# Patient Record
Sex: MALE | Race: BLACK OR AFRICAN AMERICAN | Employment: OTHER | URBAN - METROPOLITAN AREA
[De-identification: names, ages, dates, MRNs, and addresses within clinical notes are randomized per-mention and may not be internally consistent; named-entity substitution may affect disease eponyms.]

---

## 2017-01-03 ENCOUNTER — TELEPHONE (OUTPATIENT)
Dept: INTERNAL MEDICINE CLINIC | Age: 77
End: 2017-01-03

## 2017-01-03 ENCOUNTER — HOME CARE VISIT (OUTPATIENT)
Dept: SCHEDULING | Facility: HOME HEALTH | Age: 77
End: 2017-01-03
Payer: MEDICARE

## 2017-01-03 PROCEDURE — G0299 HHS/HOSPICE OF RN EA 15 MIN: HCPCS

## 2017-01-03 NOTE — TELEPHONE ENCOUNTER
Contacted pt at [facility] number. Two patient Identifiers confirmed. Advised pt per Dr Cristina Fermin notes. Pt verbalized understanding.

## 2017-01-05 ENCOUNTER — HOME CARE VISIT (OUTPATIENT)
Dept: SCHEDULING | Facility: HOME HEALTH | Age: 77
End: 2017-01-05
Payer: MEDICARE

## 2017-01-05 VITALS
OXYGEN SATURATION: 97 % | DIASTOLIC BLOOD PRESSURE: 60 MMHG | RESPIRATION RATE: 24 BRPM | TEMPERATURE: 98.4 F | SYSTOLIC BLOOD PRESSURE: 118 MMHG | HEART RATE: 84 BPM

## 2017-01-05 PROCEDURE — G0300 HHS/HOSPICE OF LPN EA 15 MIN: HCPCS

## 2017-01-09 DIAGNOSIS — I10 BENIGN HYPERTENSION WITHOUT CHF: Primary | ICD-10-CM

## 2017-01-09 DIAGNOSIS — Z79.01 ANTICOAGULATION MONITORING, INR RANGE 2-3: ICD-10-CM

## 2017-01-10 VITALS
RESPIRATION RATE: 16 BRPM | TEMPERATURE: 98.7 F | HEART RATE: 86 BPM | SYSTOLIC BLOOD PRESSURE: 130 MMHG | OXYGEN SATURATION: 97 % | DIASTOLIC BLOOD PRESSURE: 76 MMHG

## 2017-01-11 ENCOUNTER — HOME CARE VISIT (OUTPATIENT)
Dept: HOME HEALTH SERVICES | Facility: HOME HEALTH | Age: 77
End: 2017-01-11
Payer: MEDICARE

## 2017-01-13 ENCOUNTER — HOME CARE VISIT (OUTPATIENT)
Dept: HOME HEALTH SERVICES | Facility: HOME HEALTH | Age: 77
End: 2017-01-13
Payer: MEDICARE

## 2017-01-17 ENCOUNTER — TELEPHONE (OUTPATIENT)
Dept: INTERNAL MEDICINE CLINIC | Age: 77
End: 2017-01-17

## 2017-01-17 NOTE — TELEPHONE ENCOUNTER
Incoming call from pt's wife. She was told of below. He is taking 5mg daily of coumadin. He will continue this and she said they will check INR today or Thursday. Told her that to give us a call when we does the INR. She verbalized understanding. Just got results of his INR today. INR: 2.4. Called pt at 1/17/17 and left message on VM to call the office back at [phone].

## 2017-01-26 ENCOUNTER — HOME CARE VISIT (OUTPATIENT)
Dept: SCHEDULING | Facility: HOME HEALTH | Age: 77
End: 2017-01-26
Payer: MEDICARE

## 2017-01-26 ENCOUNTER — HOME CARE VISIT (OUTPATIENT)
Dept: HOME HEALTH SERVICES | Facility: HOME HEALTH | Age: 77
End: 2017-01-26
Payer: MEDICARE

## 2017-01-26 ENCOUNTER — TELEPHONE (OUTPATIENT)
Dept: INTERNAL MEDICINE CLINIC | Age: 77
End: 2017-01-26

## 2017-01-26 VITALS
DIASTOLIC BLOOD PRESSURE: 70 MMHG | HEART RATE: 76 BPM | RESPIRATION RATE: 20 BRPM | OXYGEN SATURATION: 98 % | SYSTOLIC BLOOD PRESSURE: 140 MMHG | TEMPERATURE: 97.7 F

## 2017-01-26 LAB
INR BLD: 2.7 (ref 0.9–1.1)
PT POC: 32.7 SEC

## 2017-01-26 PROCEDURE — G0496 LPN CARE TRAIN/EDU IN HH: HCPCS

## 2017-01-26 NOTE — TELEPHONE ENCOUNTER
Contacted pts wife at [facility] number. Two patient Identifiers confirmed. Advised pt per Dr Radha Parham notes. Pts wife verbalized understanding.

## 2017-01-26 NOTE — TELEPHONE ENCOUNTER
Patient calling in PT/INR Results as follows:    PT: 32.7  INR: 2.7    Patient currently on 5mg of coumadin daily. Patient would like callback with further instructions.

## 2017-02-03 ENCOUNTER — HOME CARE VISIT (OUTPATIENT)
Dept: HOME HEALTH SERVICES | Facility: HOME HEALTH | Age: 77
End: 2017-02-03
Payer: MEDICARE

## 2017-02-11 ENCOUNTER — HOME CARE VISIT (OUTPATIENT)
Dept: HOME HEALTH SERVICES | Facility: HOME HEALTH | Age: 77
End: 2017-02-11
Payer: MEDICARE

## 2017-02-14 ENCOUNTER — TELEPHONE (OUTPATIENT)
Dept: INTERNAL MEDICINE CLINIC | Age: 77
End: 2017-02-14

## 2017-02-14 NOTE — TELEPHONE ENCOUNTER
Please let pt know that received the INR results from 2/10/17 which was 2.7. Continue the 5 mg daily and recheck this Thursday. Please call us with the results.

## 2017-02-14 NOTE — TELEPHONE ENCOUNTER
Attempted to reach patient regarding below. No answer; left message for pt to return call to the office at [phone].  Will continue to try to contact patient

## 2017-02-15 NOTE — TELEPHONE ENCOUNTER
Attempted to contact pt at mobile number, no answer. m for pt to return call to office at [phone]. Will continue to try to contact pt.

## 2017-02-16 NOTE — TELEPHONE ENCOUNTER
Spoke with patient's wife, confirmed name and . Reported patient's INR at 2.7 on 5 mg of Coumadin daily. Per Dr. Magy Sanchez pt is to continue 5 mg daily and to recheck next Thursday.  Wife verbalized understanding    Be advised

## 2017-02-16 NOTE — TELEPHONE ENCOUNTER
Spoke with patient's wife, confirmed patient name and . Advised that we need a PT/INR recheck today per Dr. Sharmaine Gutiérrez. Wife verbalized understanding, stated that she would call back shortly with results.

## 2017-02-27 ENCOUNTER — TELEPHONE (OUTPATIENT)
Dept: INTERNAL MEDICINE CLINIC | Age: 77
End: 2017-02-27

## 2017-02-27 NOTE — TELEPHONE ENCOUNTER
Please let pt know that just received INR results done on 2/24/17. INR: 2.5. Continue 5mg daily and recheck on Thursday. Please call us with results.

## 2017-02-27 NOTE — TELEPHONE ENCOUNTER
Attempted to reach patient regarding below. No answer; left message for pt to return call to the office at [phone].  Will continue to try to contact patient

## 2017-02-28 NOTE — TELEPHONE ENCOUNTER
Spoke with pt's wife, confirmed pt name and . Relayed Dr. Richy Rodriguez advise regarding pts Coumadin. Wife verbalized understanding.      Be advised

## 2017-03-04 ENCOUNTER — TELEPHONE (OUTPATIENT)
Dept: INTERNAL MEDICINE CLINIC | Age: 77
End: 2017-03-04

## 2017-03-04 NOTE — TELEPHONE ENCOUNTER
Called pt and talked to pt's wife. INR: 2.5 on 3/2/17    Continue coumadin 5mg daily qpm and recheck in 2 weeks. She was told to give us a call with the results. Pt's wife verbalized understanding.

## 2017-03-24 ENCOUNTER — TELEPHONE (OUTPATIENT)
Dept: INTERNAL MEDICINE CLINIC | Age: 77
End: 2017-03-24

## 2017-03-24 NOTE — TELEPHONE ENCOUNTER
Attempted to reach patient regarding PT INR results. No answer; left message for pt to return call to the office at [phone].  Will continue to try to contact patient

## 2017-03-24 NOTE — TELEPHONE ENCOUNTER
Wife called to report INR results. She said Dr Max Page told her to. 2.5.  Please call to confirm [phone]

## 2017-03-25 NOTE — TELEPHONE ENCOUNTER
Contacted pts wife Jarred Jorge  at [facility] number. Two patient Identifiers confirmed. Confirmed pts INR was 2.5. Pt currently taking coumadin 5 mg daily.   Please advise

## 2017-03-27 NOTE — TELEPHONE ENCOUNTER
Attempted to reach patient regarding below. No answer; left message for pt to return call to the office at [phone].  Will continue to try to contact patient

## 2017-03-28 NOTE — TELEPHONE ENCOUNTER
Attempted to contact pt at  number, no answer. Lvm for pt's wife to continue having the pt take the 5 mg, and to repeat on 4/6/17 and call the office at [phone] with the results.      Be advised

## 2017-04-06 ENCOUNTER — TELEPHONE (OUTPATIENT)
Dept: INTERNAL MEDICINE CLINIC | Age: 77
End: 2017-04-06

## 2017-04-06 NOTE — TELEPHONE ENCOUNTER
Patients wife calling in patient INR result which is a 2.5 and he is taking 5 mg of Coumadin please call patient with new orders

## 2017-04-07 NOTE — TELEPHONE ENCOUNTER
Pt wife contacted at home number. 2 pt identifiers confirmed. Pt wife informed of below. Pt wife verbalized understanding. No other questions at this time.

## 2017-04-24 ENCOUNTER — TELEPHONE (OUTPATIENT)
Dept: INTERNAL MEDICINE CLINIC | Age: 77
End: 2017-04-24

## 2017-04-24 NOTE — TELEPHONE ENCOUNTER
Please let pt know that received INR 2.5 on 4/20/17. Continue the coumadin 5mg qpm and recheck this Thursday.

## 2017-04-25 NOTE — TELEPHONE ENCOUNTER
I do not think there will be anyone to go to the home. They have to do home INR machine or come for OV.

## 2017-04-25 NOTE — TELEPHONE ENCOUNTER
2 pt. Identifiers confirmed. S/w pt's wife about below. She verbalized understanding and wants to know if it is possible to have someone come out to their house to have pt's INR checked once a week. She states sometimes she can get the INR and sometimes she can't so she'll go with the last value that she was able to get. She does not want the patient to be in any danger. Dr. Gilford Pilar please advise.

## 2017-04-25 NOTE — TELEPHONE ENCOUNTER
Attempted to contact pt at  number, no answer. Lvm for pt to return call to office at [phone]. Will continue to try to contact pt.

## 2017-04-25 NOTE — TELEPHONE ENCOUNTER
Attempted to reach patient regarding below. No answer; left message for pt to return call to the office at [phone].  Will continue to try to contact patient

## 2017-05-08 ENCOUNTER — TELEPHONE (OUTPATIENT)
Dept: INTERNAL MEDICINE CLINIC | Age: 77
End: 2017-05-08

## 2017-05-08 NOTE — TELEPHONE ENCOUNTER
Please let pt know that INR 2.5 on 5/5/17. He needs to continue the coumadin 5mg daily qpm and recheck on Thursday. Give us a call with the results.

## 2017-05-09 NOTE — TELEPHONE ENCOUNTER
Spoke with patient, confirmed name and . Advised patient of Coumadin results and instructions, per Dr. Shaggy Bella. Patient verbalized understanding.      Be advised

## 2017-05-19 ENCOUNTER — TELEPHONE (OUTPATIENT)
Dept: INTERNAL MEDICINE CLINIC | Age: 77
End: 2017-05-19

## 2017-05-19 NOTE — TELEPHONE ENCOUNTER
Attempted to contact pt at  number, no answer. Lvm for pt to return call to office at [phone]. Will continue to try to contact pt.

## 2017-05-22 NOTE — TELEPHONE ENCOUNTER
Attempted to contact pt at mobile  number, no answer. Lvm for pt to return call to office at [phone]. Will continue to try to contact pt.

## 2017-05-23 NOTE — TELEPHONE ENCOUNTER
Attempted to contact pt at cell number, no answer. Lvm for pt to return call to office at [phone]. [facility]. Advised pts wife Chris Dexter per Dr Greco Speaker notes. Pts wife verbalized understanding.

## 2017-05-23 NOTE — TELEPHONE ENCOUNTER
Attempted to contact pt at  number, no answer. Lvm for pt to return call to office at [phone]. Will continue to try to contact pt.

## 2017-06-06 ENCOUNTER — TELEPHONE (OUTPATIENT)
Dept: INTERNAL MEDICINE CLINIC | Age: 77
End: 2017-06-06

## 2017-06-06 NOTE — TELEPHONE ENCOUNTER
Please let pt know that INR on 6/1/17 is 2.5. Continue the 5mg daily pm and recheck in 2 weeks. Please call us with the results.

## 2017-06-06 NOTE — TELEPHONE ENCOUNTER
2 pt. Identifiers confirmed. Pt.'s wife Zoey Guzman notified of below. She states that the company checks his INR weekly. She will call in with the 2 week result. No other questions/concerns at this time.

## 2017-07-06 ENCOUNTER — TELEPHONE (OUTPATIENT)
Dept: INTERNAL MEDICINE CLINIC | Age: 77
End: 2017-07-06

## 2017-07-06 NOTE — TELEPHONE ENCOUNTER
Contacted pts wife ( Mrs Anaya Quispe). Two patient Identifiers confirmed. Advised pt per Dr Venecia Clements notes. Pts wife stated pt has not taken INR this week. She stated that the results was from last week. She stated she wanted to know if she still needed to recheck this week or next week. Per verbal Dr Venecia Clements pt needs to recheck next week. Pts wife verbalized understanding.

## 2017-07-06 NOTE — TELEPHONE ENCOUNTER
Please let pt know that INR: 2.5  and to continue coumadin 5mg daily pm and recheck in 2 weeks. Call us with the results.

## 2017-07-06 NOTE — TELEPHONE ENCOUNTER
Attempted to contact pt at  number, no answer. Lvm for pt to return call to office at [phone]. Will continue to try to contact pt.

## 2017-07-14 ENCOUNTER — TELEPHONE (OUTPATIENT)
Dept: INTERNAL MEDICINE CLINIC | Age: 77
End: 2017-07-14

## 2017-07-14 NOTE — TELEPHONE ENCOUNTER
Patient daughter requesting orders for [employer] to assist patient with PT/INR checks, daily medications and daily hygiene. Any questions please contact Daughter at number listed.

## 2017-07-14 NOTE — TELEPHONE ENCOUNTER
HH does not do daily meds. They can do med teaching. They also do not supply aides for hygiene. Pt also needs to be seen in office before [place] can be ordered. (Medicare rules).

## 2017-07-15 NOTE — TELEPHONE ENCOUNTER
Called patient on both the following number [phone] & [phone] left a message for patient to call us back to let him know what provider said about home health. Also called and left a message for his daughter Osorio Prior that I was returning her call and advised that she is not on his FANG I can't speak with her about her father. Daughter called back stated she has a POA but both Carrie Ba and I could not find it on fill advised if patient called me and gave me a verbal over the phone I could then talk with her. Daughter verbalized understanding.

## 2017-07-15 NOTE — TELEPHONE ENCOUNTER
Daughter called back had father on the phone he verified who he was with two identifiers and he gave me a verbal consent to speak to his daughter. Maurice Mckeon that per Dr. Nichole Isaacs who is filling in for Dr. Rigo Ahuja that home health does not do daily med's, they can do med teaching. They also do not supply aides for hygiene. We also have to see her father in the office before we can order home health and that he does have a follow up on 8/3/17. Trisha Noriega stated that what she really wants was someone to go in and help him with his INR checks that she isn't sure her mother is doing it right. Advised that he still has to be seen and we can then do a home health order for someone to come out and do a teaching of his INR, his dm and the diease process etc. but it wont be for long term. She stated she understands and that she was going to see what  will  and do for them. She stated she is okay with all of this to wait till 8/3/17 when he comes in to see Dr. Rigo Ahuja.   In the meantime she is going to fax me the POA she has so it can be on file and patient will up date his permission to release as well and add his daughter to the list.

## 2017-07-31 ENCOUNTER — TELEPHONE (OUTPATIENT)
Dept: INTERNAL MEDICINE CLINIC | Age: 77
End: 2017-07-31

## 2017-07-31 NOTE — TELEPHONE ENCOUNTER
INR: 2.5 on 7/27/17. Let pt know to continue coumadin 5mg daily pm and recheck in 2 weeks. She needs to call us with the results.

## 2017-08-01 NOTE — TELEPHONE ENCOUNTER
2 pt. Identifiers confirmed. Pt's wife notified of below. Wife transferred to reschedule from 8/3/17 to a later date when Dr. Brendon Ball is in the office. Wife declined Saturday aptmt. No other questions/concerns at this time.

## 2017-08-10 ENCOUNTER — OFFICE VISIT (OUTPATIENT)
Dept: INTERNAL MEDICINE CLINIC | Age: 77
End: 2017-08-10

## 2017-08-10 VITALS
HEART RATE: 78 BPM | TEMPERATURE: 96.3 F | OXYGEN SATURATION: 96 % | WEIGHT: 171.2 LBS | SYSTOLIC BLOOD PRESSURE: 122 MMHG | BODY MASS INDEX: 27.51 KG/M2 | HEIGHT: 66 IN | RESPIRATION RATE: 16 BRPM | DIASTOLIC BLOOD PRESSURE: 80 MMHG

## 2017-08-10 DIAGNOSIS — I10 BENIGN HYPERTENSION WITHOUT CHF: ICD-10-CM

## 2017-08-10 DIAGNOSIS — J44.9 CHRONIC OBSTRUCTIVE PULMONARY DISEASE, UNSPECIFIED COPD TYPE (HCC): ICD-10-CM

## 2017-08-10 DIAGNOSIS — Z00.00 ENCOUNTER FOR MEDICARE ANNUAL WELLNESS EXAM: Primary | ICD-10-CM

## 2017-08-10 DIAGNOSIS — E78.5 DYSLIPIDEMIA: ICD-10-CM

## 2017-08-10 DIAGNOSIS — Z12.5 SCREENING PSA (PROSTATE SPECIFIC ANTIGEN): ICD-10-CM

## 2017-08-10 DIAGNOSIS — E11.9 DIABETES MELLITUS, STABLE (HCC): ICD-10-CM

## 2017-08-10 LAB
INR BLD: 3.4
PT POC: 41.4 SECONDS
VALID INTERNAL CONTROL?: YES

## 2017-08-10 NOTE — PROGRESS NOTES
Linda Elizabeth is a 67 y.o. male and presents for annual Medicare Wellness Visit. Problem List: Reviewed with patient and discussed risk factors. 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    H/O colonoscopy Z98.890    COPD (chronic obstructive pulmonary disease) ([facility]) J44.9    Microhematuria R31.29    History of urinary retention Z87.898    Renal cyst, right N28.1    Enlarged prostate N40.0    Coagulation defect ([facility] D68.9    Advance directive discussed with patient Z70.80    Eye exam, routine Z01.00       Current medical providers:  Patient Care Team:  Thomas Funes MD as PCP - General (Internal Medicine)  Thomas Funes MD (Internal Medicine)  Ky Delgado RN as Nurse Navigator    PSH: Reviewed with patient  Past Surgical History:   Procedure Laterality Date    HX CATARACT REMOVAL Left 4/2015    by Dr. Mando Humphrey  late 1588'A        SH: Reviewed with patient  Social History   Substance Use Topics    Smoking status: Former Smoker     Packs/day: 1.00     Years: 45.00     Types: Cigarettes     Quit date: 8/19/2015    Smokeless tobacco: Never Used      Comment: Pt counseled to continue to not smoke.  Alcohol use No       FH: Reviewed with patient  Family History   Problem Relation Age of Onset    Cancer Father      he does not know       Medications/Allergies: Reviewed with patient  Current Outpatient Prescriptions on File Prior to Visit   Medication Sig Dispense Refill    warfarin (COUMADIN) 5 mg tablet Take 1 Tab by mouth every evening.  90 Tab 3    potassium chloride (KLOR-CON M20) 20 mEq tablet Take 1 Tab by mouth daily. 90 Tab 3    omeprazole (PRILOSEC) 40 mg capsule Take 1 Cap by mouth daily. 90 Cap 3    finasteride (PROSCAR) 5 mg tablet Take 1 Tab by mouth daily. 90 Tab 3    ferrous sulfate 325 mg (65 mg iron) tablet Take 1 Tab by mouth two (2) times a day. 180 Tab 3    tamsulosin (FLOMAX) 0.4 mg capsule Take 1 Cap by mouth nightly. 90 Cap 3    insulin glargine (LANTUS) 100 unit/mL injection 7 Units by SubCUTAneous route nightly. 3 Vial 3    Lancets misc Check fasting sugars daily before breakfast. DX: E11.9 for freestyle lite meter 100 Each 11    furosemide (LASIX) 20 mg tablet Take 1 Tab by mouth daily. Indications: HYPERTENSION 90 Tab 3    metoprolol tartrate (LOPRESSOR) 25 mg tablet Take 1 Tab by mouth two (2) times a day. 180 Tab 3    Nebulizer & Compressor machine 1 Each.  albuterol (ACCUNEB) 1.25 mg/3 mL nebu 1.25 mg.      albuterol (VENTOLIN HFA) 90 mcg/actuation inhaler Take 1-2 puffs every 4-6 hrs prn shortness of breath 3 Inhaler 3    glucose blood VI test strips (FREESTYLE LITE STRIPS) strip Check fasting sugars daily before breakfast. DX: E11.9 for freestyle lite meter 100 Strip 11    ascorbic acid (VITAMIN C) 500 mg tablet Take 500 mg by mouth two (2) times a day.  Blood-Glucose Meter (ONE TOUCH ULTRA 2) monitoring kit Check fasting sugars daily before breakfast. DX: 250.02 1 Kit 0    fluticasone-salmeterol (ADVAIR HFA) 115-21 mcg/actuation inhaler Take 2 Puffs by inhalation two (2) times a day.  tiotropium bromide (SPIRIVA RESPIMAT) 2.5 mcg/actuation inhaler Take 2 Puffs by inhalation daily. No current facility-administered medications on file prior to visit.        No Known Allergies    Objective:  Visit Vitals    /80 (BP 1 Location: Left arm, BP Patient Position: Sitting)    Pulse 78    Temp 96.3 °F (35.7 °C) (Oral)    Resp 16    Ht 5' 6\" (1.676 m)    Wt 171 lb 3.2 oz (77.7 kg)    SpO2 96%  Comment: 3LNC  BMI 27.63 kg/m2    Body mass index is 27.63 kg/(m^2). Assessment of cognitive impairment: Alert and oriented x 3    Depression Screen:   PHQ over the last two weeks 8/10/2017   Little interest or pleasure in doing things Not at all   Feeling down, depressed or hopeless Not at all   Total Score PHQ 2 0       Fall Risk Assessment:    Fall Risk Assessment, last 12 mths 8/10/2017   Able to walk? Yes   Fall in past 12 months? Yes   Fall with injury? No   Number of falls in past 12 months 2   Fall Risk Score 2       Functional Ability:   Does the patient exhibit a steady gait? No, use seated walker   How long did it take the patient to get up and walk from a sitting position? 10 seconds   Is the patient self reliant?  (ie can do own laundry, meals, household chores)  no     Does the patient handle his/her own medications?  no     Does the patient handle his/her own money? no     Is the patients home safe (ie good lighting, handrails on stairs and bath, etc.)? yes     Did you notice or did patient express any hearing difficulties? yes     Did you notice or did patient express any vision difficulties?   no     Were distance and reading eye charts used? no       Advance Care Planning:   Patient was offered the opportunity to discuss advance care planning:  yes     Does patient have an Advance Directive:  yes   If no, did you provide information on [employer]? No, pt has advance directive. Pt was asked to bring us a copy. Daughter is apparently POA per wife.         Plan:      Orders Placed This Encounter    LIPID PANEL    HEMOGLOBIN A1C W/O EAG    METABOLIC PANEL, COMPREHENSIVE    CBC W/O DIFF    URINALYSIS W/ RFLX MICROSCOPIC    MICROALBUMIN, UR, RAND W/ MICROALBUMIN/CREA RATIO    PSA SCREENING (SCREENING)    TSH 3RD GENERATION    REFERRAL TO PODIATRY    AMB POC PT/INR    HM DIABETES FOOT EXAM       Health Maintenance   Topic Date Due    MICROALBUMIN Q1  11/05/2016    HEMOGLOBIN A1C Q6M 02/04/2017    EYE EXAM RETINAL OR DILATED Q1  07/26/2017    LIPID PANEL Q1  08/04/2017    INFLUENZA AGE 9 TO ADULT  09/15/2017 (Originally 8/1/2017)    GLAUCOMA SCREENING Q2Y  07/26/2018    FOOT EXAM Q1  08/10/2018    MEDICARE YEARLY EXAM  08/11/2018    COLONOSCOPY  11/24/2020    DTaP/Tdap/Td series (2 - Td) 08/01/2026    ZOSTER VACCINE AGE 60>  Addressed    Pneumococcal 65+ Low/Medium Risk  Completed       *Patient verbalized understanding and agreement with the plan. A copy of the After Visit Summary with personalized health plan was given to the patient today. Family History:   Family History   Problem Relation Age of Onset    Cancer Father      he does not know       Social History:   He  reports that he quit smoking about 1 years ago. His smoking use included Cigarettes. He has a 45.00 pack-year smoking history. He has never used smokeless tobacco.  He  reports that he does not drink alcohol.             ROS:  · General: negative for - chills, fever, weight changes or malaise, night sweats  · HEENT: no sore throat, nasal congestion,  vision problems or ear problems  · Respiratory: no cough, shortness of breath, or wheezing  · Cardiovascular: no chest pain, palpitations, or dyspnea on exertion; no orthopnea or PND  · Gastrointestinal: no abdominal pain, N/V, change in bowel habits,  black or bloody stools, constipation or diarrhea  · Musculoskeletal: no back pain, joint pain, joint stiffness, muscle pain or muscle weakness  · Neurological: no numbness, tingling, headache or dizziness  · Psychological: negative for - anxiety, depression, sleep disturbances, suicidal or homicidal ideations    Objective:   Physical exam:   Visit Vitals    /80 (BP 1 Location: Left arm, BP Patient Position: Sitting)    Pulse 78    Temp 96.3 °F (35.7 °C) (Oral)    Resp 16    Ht 5' 6\" (1.676 m)    Wt 171 lb 3.2 oz (77.7 kg)    SpO2 96%  Comment: 3LNC    BMI 27.63 kg/m2        Generally: Pleasant male in no acute distress  Cardiac Exam: regular, rate, and rhythm. Normal S1 and S2. No murmurs, gallops, or rubs  Pulmonary exam: Clear to auscultation bilaterally  Abdominal exam: Positive bowel sounds in all four quadrants, soft, nondistended, nontender  Extremities: 2+ dorsalis pedis pulses bilaterally. No pedal edema    bilaterally  Diabetic foot exam:     Left: Filament test normal sensation with micro filament, positive monofilament in all 5 toes and bottom of foot. Pulse DP: 2+ (normal)   Deformities: Yes - thickened toe nails and a lot of dry skin on his foot. Right: Filament test normal sensation with micro filament. Decrease sensation of right big toe, but otherwise normal in other 4 toes and bottom of foot. Pulse DP: 2+ (normal)   Deformities: Yes - thickened toe nails and a lot of dry on his foot.      LABS/Radiological Tests:  Lab Results   Component Value Date/Time    WBC 5.4 06/12/2013 04:23 PM    HGB 13.6 06/12/2013 04:23 PM    HCT 41.0 06/12/2013 04:23 PM    PLATELET 994 35/03/9351 04:23 PM     Lab Results   Component Value Date/Time    Sodium 143 08/04/2016 12:00 PM    Potassium 4.0 08/09/2016 11:19 AM    Chloride 97 08/04/2016 12:00 PM    CO2 43 08/04/2016 12:00 PM    Glucose 97 08/04/2016 12:00 PM    BUN 12 08/04/2016 12:00 PM    Creatinine 0.68 08/04/2016 12:00 PM     Lab Results   Component Value Date/Time    Cholesterol, total 179 08/04/2016 12:00 PM    HDL Cholesterol 78 08/04/2016 12:00 PM    LDL, calculated 84.2 08/04/2016 12:00 PM    Triglyceride 84 08/04/2016 12:00 PM     No results found for: GPT  Component      Latest Ref Rng & Units 8/4/2016          12:00 PM   Hemoglobin A1c, (calculated)      4.2 - 5.6 % 5.5   Est. average glucose      mg/dL 111       Previous labs  Component      Latest Ref Rng & Units 8/10/2017 1/26/2017          11:11 AM  1:55 AM   VALID INTERNAL CONTROL POC       Yes    Prothrombin time (POC)      seconds 41.4 32.7   INR       3.4 2.7 (A)   Pt notified of results at [address] today, 8/10/17. ASSESSMENT/PLAN:    1. Encounter for Medicare annual wellness exam    2. Diabetes mellitus, stable ([facility]): we will see what the labs show. Continue diet, exercise, and lantus.   -     HEMOGLOBIN A1C W/O EAG; Future  -     MICROALBUMIN, UR, RAND W/ MICROALBUMIN/CREA RATIO; Future  -      DIABETES FOOT EXAM  -     TSH 3RD GENERATION; Future  -     REFERRAL TO PODIATRY    3. Benign hypertension without CHF: stable. Continue the lopressor and lasix, diet and exercise. -     METABOLIC PANEL, COMPREHENSIVE; Future  -     CBC W/O DIFF; Future  -     URINALYSIS W/ RFLX MICROSCOPIC; Future    4. Saddle embolus ([facility]): hold today's dose. Starting tomorrow go back to 5mg daily pm of coumadin and recheck in 1 week. He will do with home PT/INR machine.   -     AMB POC PT/INR    5. Dyslipidemia: we will see what the labs show. Continue diet and exercise.   -     LIPID PANEL; Future  -     METABOLIC PANEL, COMPREHENSIVE; Future    6. Screening PSA (prostate specific antigen)  -     PSA SCREENING (SCREENING); Future    7. Chronic obstructive pulmonary disease, unspecified COPD type ([facility]): not well controlled because not taking the advair, spiriva. Told him he needs to be taking everyday. Continue the albuterol prn.       8. Patient verbalized understanding and agreement with the plan. 9. Patient was given an after-visit summary. 10.   Follow-up Disposition:  Return in about 3 months (around 11/10/2017) for f/u DM/HTN/lipids. or sooner if worsening symptoms.                 Li Mendoza MD

## 2017-08-10 NOTE — ACP (ADVANCE CARE PLANNING)
Advance Care Planning (ACP) Provider Conversation Snapshot    Date of ACP Conversation: 08/10/17  Persons included in Conversation:  patient and family  Length of ACP Conversation in minutes:  <16 minutes (Non-Billable)    Authorized Decision Maker (if patient is incapable of making informed decisions): This person is: Pt pt's wife, it is their daughter who is POA.   Healthcare Agent/Medical Power of  under Advance Directive          For Patients with Decision Making Capacity:   Values/Goals: Exploration of values, goals, and preferences if recovery is not expected, even with continued medical treatment in the event of:  Imminent death    Conversation Outcomes / Follow-Up Plan:   Recommended communicating the plan and making copies for the healthcare agent, personal physician, and others as appropriate (e.g., health system)

## 2017-08-10 NOTE — PROGRESS NOTES
Chief Complaint   Patient presents with    Annual Wellness Visit       HPI:     Stella Oliver is a 67 y.o.  male with history of  CVA, HTN, COPD, type 2 DM here for the above complaint. He denies any chest pain, shortness of breath, abdominal pain, headaches or dizziness. Pt accompanied by his wife at [address] Type 2 DM: 130's sugar range. COPD: he is not taking the advair or spiriva. Told him he needs to take both. He has the rx, just never picked up from the pharmacy. Coumadin: No changes to diet. No bleeding and has not missed any dosages of coumadin. Past Medical History:   Diagnosis Date    Advance directive discussed with patient     COPD [facility]chronic obstructive pulmonary disease) [facility]     CVA (cerebrovascular accident) ([facility] 7/11/2012    possible    Enlarged prostate     Essential hypertension, benign     Eye exam, routine 07/26/2016    Dr. Ariadne Duron repeat in 1 yr    H/O colonoscopy 11/24/15    Dr. Rohith Yao ([facility])Tubular adenoma/polyp bx, showed diverticulosis in the sigmoid/descending colon and internal hemorrhoids, 7mm polyp    History of urinary retention     Hypercholesterolemia     Hypertension     Microhematuria     Other and unspecified hyperlipidemia     Renal cyst, right     Saddle embolus of pulmonary artery without acute cor pulmonale ([facility] 10/2015    Tobacco use disorder 8/10/2010    Type II or unspecified type diabetes mellitus without mention of complication, not stated as uncontrolled     Urinary retention      Past Surgical History:   Procedure Laterality Date    HX CATARACT REMOVAL Left 4/2015    by Dr. Riya Tobar  late 8483'F     Current Outpatient Prescriptions   Medication Sig    warfarin (COUMADIN) 5 mg tablet Take 1 Tab by mouth every evening.  potassium chloride (KLOR-CON M20) 20 mEq tablet Take 1 Tab by mouth daily.     omeprazole (PRILOSEC) 40 mg capsule Take 1 Cap by mouth daily.    finasteride (PROSCAR) 5 mg tablet Take 1 Tab by mouth daily.  ferrous sulfate 325 mg (65 mg iron) tablet Take 1 Tab by mouth two (2) times a day.  tamsulosin (FLOMAX) 0.4 mg capsule Take 1 Cap by mouth nightly.  insulin glargine (LANTUS) 100 unit/mL injection 7 Units by SubCUTAneous route nightly.  Lancets misc Check fasting sugars daily before breakfast. DX: E11.9 for freestyle lite meter    furosemide (LASIX) 20 mg tablet Take 1 Tab by mouth daily. Indications: HYPERTENSION    metoprolol tartrate (LOPRESSOR) 25 mg tablet Take 1 Tab by mouth two (2) times a day.  Nebulizer & Compressor machine 1 Each.  albuterol (ACCUNEB) 1.25 mg/3 mL nebu 1.25 mg.    albuterol (VENTOLIN HFA) 90 mcg/actuation inhaler Take 1-2 puffs every 4-6 hrs prn shortness of breath    glucose blood VI test strips (FREESTYLE LITE STRIPS) strip Check fasting sugars daily before breakfast. DX: E11.9 for freestyle lite meter    ascorbic acid (VITAMIN C) 500 mg tablet Take 500 mg by mouth two (2) times a day.  Blood-Glucose Meter (ONE TOUCH ULTRA 2) monitoring kit Check fasting sugars daily before breakfast. DX: 250.02    fluticasone-salmeterol (ADVAIR HFA) 115-21 mcg/actuation inhaler Take 2 Puffs by inhalation two (2) times a day.  tiotropium bromide (SPIRIVA RESPIMAT) 2.5 mcg/actuation inhaler Take 2 Puffs by inhalation daily. No current facility-administered medications for this visit.       Health Maintenance   Topic Date Due    MICROALBUMIN Q1  11/05/2016    HEMOGLOBIN A1C Q6M  02/04/2017    EYE EXAM RETINAL OR DILATED Q1  07/26/2017    LIPID PANEL Q1  08/04/2017    INFLUENZA AGE 9 TO ADULT  09/15/2017 (Originally 8/1/2017)    GLAUCOMA SCREENING Q2Y  07/26/2018    FOOT EXAM Q1  08/10/2018    MEDICARE YEARLY EXAM  08/11/2018    COLONOSCOPY  11/24/2020    DTaP/Tdap/Td series (2 - Td) 08/01/2026    ZOSTER VACCINE AGE 60>  Addressed    Pneumococcal 65+ Low/Medium Risk  Completed Immunization History   Administered Date(s) Administered    Influenza High Dose Vaccine PF 11/05/2015    Influenza Vaccine 10/13/2010, 09/16/2014, 09/14/2015, 11/10/2016    Influenza Vaccine PF 01/29/2013    Pneumococcal Conjugate (PCV-13) 09/14/2015    Pneumococcal Polysaccharide (PPSV-23) 10/13/2010, 08/14/2014     No LMP for male patient. Allergies and Intolerances:   No Known Allergies    Family History:   Family History   Problem Relation Age of Onset    Cancer Father      he does not know       Social History:   He  reports that he quit smoking about 1 years ago. His smoking use included Cigarettes. He has a 45.00 pack-year smoking history. He has never used smokeless tobacco.  He  reports that he does not drink alcohol. ROS:  · General: negative for - chills, fever, weight changes or malaise, night sweats  · HEENT: no sore throat, nasal congestion,  vision problems or ear problems  · Respiratory: no cough, shortness of breath, or wheezing  · Cardiovascular: no chest pain, palpitations, or dyspnea on exertion; no orthopnea or PND  · Gastrointestinal: no abdominal pain, N/V, change in bowel habits,  black or bloody stools, constipation or diarrhea  · Musculoskeletal: no back pain, joint pain, joint stiffness, muscle pain or muscle weakness  · Neurological: no numbness, tingling, headache or dizziness  · Psychological: negative for - anxiety, depression, sleep disturbances, suicidal or homicidal ideations    Objective:   Physical exam:   Visit Vitals    /80 (BP 1 Location: Left arm, BP Patient Position: Sitting)    Pulse 78    Temp 96.3 °F (35.7 °C) (Oral)    Resp 16    Ht 5' 6\" (1.676 m)    Wt 171 lb 3.2 oz (77.7 kg)    SpO2 96%  Comment: 3LNC    BMI 27.63 kg/m2        Generally: Pleasant male in no acute distress  Cardiac Exam: regular, rate, and rhythm. Normal S1 and S2.  No murmurs, gallops, or rubs  Pulmonary exam: Clear to auscultation bilaterally  Abdominal exam: Positive bowel sounds in all four quadrants, soft, nondistended, nontender  Extremities: 2+ dorsalis pedis pulses bilaterally. No pedal edema    bilaterally  Diabetic foot exam:     Left: Filament test normal sensation with micro filament, positive monofilament in all 5 toes and bottom of foot. Pulse DP: 2+ (normal)   Deformities: Yes - thickened toe nails and a lot of dry skin on his foot. Right: Filament test normal sensation with micro filament. Decrease sensation of right big toe, but otherwise normal in other 4 toes and bottom of foot. Pulse DP: 2+ (normal)   Deformities: Yes - thickened toe nails and a lot of dry on his foot. LABS/Radiological Tests:  Lab Results   Component Value Date/Time    WBC 5.4 06/12/2013 04:23 PM    HGB 13.6 06/12/2013 04:23 PM    HCT 41.0 06/12/2013 04:23 PM    PLATELET 267 17/53[birth year]1922 04:23 PM     Lab Results   Component Value Date/Time    Sodium 143 08/04/2016 12:00 PM    Potassium 4.0 08/09/2016 11:19 AM    Chloride 97 08/04/2016 12:00 PM    CO2 43 08/04/2016 12:00 PM    Glucose 97 08/04/2016 12:00 PM    BUN 12 08/04/2016 12:00 PM    Creatinine 0.68 08/04/2016 12:00 PM     Lab Results   Component Value Date/Time    Cholesterol, total 179 08/04/2016 12:00 PM    HDL Cholesterol 78 08/04/2016 12:00 PM    LDL, calculated 84.2 08/04/2016 12:00 PM    Triglyceride 84 08/04/2016 12:00 PM     No results found for: GPT  Component      Latest Ref Rng & Units 8/4/2016          12:00 PM   Hemoglobin A1c, (calculated)      4.2 - 5.6 % 5.5   Est. average glucose      mg/dL 111       Previous labs  Component      Latest Ref Rng & Units 8/10/2017 1/26/2017          11:11 AM  1:55 AM   VALID INTERNAL CONTROL POC       Yes    Prothrombin time (POC)      seconds 41.4 32.7   INR       3.4 2.7 (A)   Pt notified of results at [address] today, 8/10/17. ASSESSMENT/PLAN:    1. Encounter for Medicare annual wellness exam    2. Diabetes mellitus, stable ([facility] [address]): we will see what the labs show. Continue diet, exercise, and lantus.   -     HEMOGLOBIN A1C W/O EAG; Future  -     MICROALBUMIN, UR, RAND W/ MICROALBUMIN/CREA RATIO; Future  -      DIABETES FOOT EXAM  -     TSH 3RD GENERATION; Future  -     REFERRAL TO PODIATRY    3. Benign hypertension without CHF: stable. Continue the lopressor and lasix, diet and exercise. -     METABOLIC PANEL, COMPREHENSIVE; Future  -     CBC W/O DIFF; Future  -     URINALYSIS W/ RFLX MICROSCOPIC; Future    4. Saddle embolus ([facility]): hold today's dose. Starting tomorrow go back to 5mg daily pm of coumadin and recheck in 1 week. He will do with home PT/INR machine.   -     AMB POC PT/INR    5. Dyslipidemia: we will see what the labs show. Continue diet and exercise.   -     LIPID PANEL; Future  -     METABOLIC PANEL, COMPREHENSIVE; Future    6. Screening PSA (prostate specific antigen)  -     PSA SCREENING (SCREENING); Future    7. Chronic obstructive pulmonary disease, unspecified COPD type ([facility]): not well controlled because not taking the advair, spiriva. Told him he needs to be taking everyday. Continue the albuterol prn.       8. Patient verbalized understanding and agreement with the plan. 9. Patient was given an after-visit summary. 10.   Follow-up Disposition:  Return in about 3 months (around 11/10/2017) for f/u DM/HTN/lipids. or sooner if worsening symptoms.                 Brittney Olivarez MD

## 2017-08-10 NOTE — PATIENT INSTRUCTIONS
1) take the advair and spiriva. 2) hold today's dose and starting tomorrow go back to coumadin 5mg daily pm and recheck in 1 week. 3) follow-up in 1 week or sooner if worsening symptoms. 4) give us a call with the results. 5) follow-up in 3 months or sooner if worsening symptoms.

## 2017-08-10 NOTE — MR AVS SNAPSHOT
Visit Information Date & Time Provider Department Dept. Phone Encounter #  
 8/10/2017 10:45 AM Alyssa Muse MD [facility] [phone] [id number] Follow-up Instructions Return in about 3 months (around 11/10/2017) for f/u DM/HTN/lipids. 8/24/2017 10:30 AM  
Any with Naty Leon MD  
[facility] [address] [facility]) Appt Note: Return for F/u in 1 year with Dr. Harris Neff with [facility].  
 [address] [address]  
[phone]  
  
   
 [place] [id number] Upcoming Health Maintenance Date Due MICROALBUMIN Q1 11/5/2016 FOOT EXAM Q1 12/8/2016 HEMOGLOBIN A1C Q6M 2/4/2017 EYE EXAM RETINAL OR DILATED Q1 7/26/2017 MEDICARE YEARLY EXAM 8/2/2017 LIPID PANEL Q1 8/4/2017 INFLUENZA AGE 9 TO ADULT 9/15/2017* GLAUCOMA SCREENING Q2Y 7/26/2018 COLONOSCOPY 11/24/2020 DTaP/Tdap/Td series (2 - Td) 8/1/2026 *Topic was postponed. The date shown is not the original due date. Allergies as of 8/10/2017  Review Complete On: 8/10/2017 By: Jonelle Quiroz MD  
 No Known Allergies Current Immunizations  Reviewed on 8/30/2016 Name Date Influenza High Dose Vaccine PF 11/5/2015 Influenza Vaccine 11/10/2016 12:00 AM, 9/14/2015, 9/16/2014 12:14 PM, 10/13/2010 Influenza Vaccine PF 1/29/2013  8:12 AM  
 Pneumococcal Conjugate (PCV-13) 9/14/2015 Pneumococcal Polysaccharide (PPSV-23) 8/14/2014 12:00 PM, 10/13/2010 Not reviewed this visit You Were Diagnosed With   
  
 Codes Comments Saddle embolus (HCC)    -  Primary ICD-10-CM: I74.9 ICD-9-CM: 444.9 Diabetes mellitus, stable ([facility]     ICD-10-CM: E11.9 ICD-9-CM: 250.00 Dyslipidemia     ICD-10-CM: E78.5 ICD-9-CM: 272.4 Benign hypertension without CHF     ICD-10-CM: I10 
ICD-9-CM: 401.1 Screening PSA (prostate specific antigen)     ICD-10-CM: Z12.5 ICD-9-CM: V76.44   
  
 Vitals BP Pulse Temp Resp Height(growth percentile) Weight(growth percentile) 144/77 (BP 1 Location: Right arm, BP Patient Position: Sitting) 78 96.3 °F (35.7 °C) (Oral) 16 5' 6\" (1.676 m) 171 lb 3.2 oz (77.7 kg) SpO2 BMI Smoking Status 96% 27.63 kg/m2 Former Smoker Vitals History BMI and BSA Data Body Mass Index Body Surface Area  
 27.63 kg/m 2 1.9 m 2 Preferred Pharmacy Pharmacy Name [address] [place] [address],[address] [phone] Your Updated Medication List  
  
   
This list is accurate as of: 8/10/17 11:25 AM.  Always use your most recent med list.  
  
  
  
  
 * albuterol 90 mcg/actuation inhaler Commonly known as:  VENTOLIN HFA Take 1-2 puffs every 4-6 hrs prn shortness of breath * albuterol 1.25 mg/3 mL Nebu Commonly known as:  ACCUNEB  
1.25 mg. Blood-Glucose Meter monitoring kit Commonly known as:  RachelKupiBonuss Check fasting sugars daily before breakfast. DX: 250.02  
  
 ferrous sulfate 325 mg (65 mg iron) tablet Take 1 Tab by mouth two (2) times a day. finasteride 5 mg tablet Commonly known as:  PROSCAR Take 1 Tab by mouth daily. fluticasone-salmeterol 115-21 mcg/actuation inhaler Commonly known as:  ADVAIR HFA Take 2 Puffs by inhalation two (2) times a day. furosemide 20 mg tablet Commonly known as:  LASIX Take 1 Tab by mouth daily. Indications: HYPERTENSION  
  
 glucose blood VI test strips strip Commonly known as:  FREESTYLE LITE STRIPS Check fasting sugars daily before breakfast. DX: E11.9 for freestyle lite meter  
  
 insulin glargine 100 unit/mL injection Commonly known as:  LANTUS  
7 Units by SubCUTAneous route nightly. Lancets Misc Check fasting sugars daily before breakfast. DX: E11.9 for freestyle lite meter  
  
 metoprolol tartrate 25 mg tablet Commonly known as:  LOPRESSOR  
 Take 1 Tab by mouth two (2) times a day. Nebulizer & Compressor machine 1 Each. omeprazole 40 mg capsule Commonly known as:  PRILOSEC Take 1 Cap by mouth daily. potassium chloride 20 mEq tablet Commonly known as:  KLOR-CON M20 Take 1 Tab by mouth daily. tamsulosin 0.4 mg capsule Commonly known as:  FLOMAX Take 1 Cap by mouth nightly. tiotropium bromide 2.5 mcg/actuation inhaler Commonly known as:  Kota Stanislaus Take 2 Puffs by inhalation daily. VITAMIN C 500 mg tablet Generic drug:  ascorbic acid (vitamin C) Take 500 mg by mouth two (2) times a day. warfarin 5 mg tablet Commonly known as:  COUMADIN Take 1 Tab by mouth every evening. * Notice: This list has 2 medication(s) that are the same as other medications prescribed for you. Read the directions carefully, and ask your doctor or other care provider to review them with you. We Performed the Following AMB POC PT/INR [28035 CPT(R)]  DIABETES FOOT EXAM [HM7 Custom] REFERRAL TO PODIATRY [REF90 Custom] Comments:  
 Please evaluate for DM foot exam, thickened toe nails in 1-2 weeks. Follow-up Instructions Return in about 3 months (around 11/10/2017) for f/u DM/HTN/lipids. To-Do List   
 08/10/2017 Lab:  CBC W/O DIFF   
  
 08/10/2017 Lab:  HEMOGLOBIN A1C W/O EAG   
  
 08/10/2017 Lab:  LIPID PANEL   
  
 08/10/2017 Lab:  METABOLIC PANEL, COMPREHENSIVE   
  
 08/10/2017 Lab:  MICROALBUMIN, UR, RAND W/ MICROALBUMIN/CREA RATIO   
  
 08/10/2017 Lab:  PSA SCREENING (SCREENING)   
  
 08/10/2017 Lab:  TSH 3RD GENERATION   
  
 08/10/2017 Lab:  URINALYSIS W/ RFLX MICROSCOPIC Referral Information Referral ID Referred By Referred To  
  
 [id number] [place],  [place]ovanna [place] Phone: [phone] Fax: [phone] Visits Status Start Date End Date 1 New Request 8/10/17 8/10/18 If your referral has a status of pending review or denied, additional information will be sent to support the outcome of this decision. Patient Instructions 1) take the advair and spiriva. 2) hold today's dose and starting tomorrow go back to coumadin 5mg daily pm and recheck in 1 week. 3) follow-up in 1 week or sooner if worsening symptoms. 4) give us a call with the results. 5) follow-up in 3 months or sooner if worsening symptoms. Introducing [facility] SERVICES! [place] Gonsalez introduces Highfive patient portal. Now you can access parts of your medical record, email your doctor's office, and request medication refills online. 1. In your internet browser, go to [email or web address] 2. Click on the First Time User? Click Here link in the Sign In box. You will see the New Member Sign Up page. 3. Enter your Highfive Access Code exactly as it appears below. You will not need to use this code after youve completed the sign-up process. If you do not sign up before the expiration date, you must request a new code. · Highfive Access Code: [email or web address] Expires: 11/8/2017 11:11 AM 
 
4. Enter the last four digits of your Social Security Number (xxxx) and Date of Birth (mm/dd/yyyy) as indicated and click Submit. You will be taken to the next sign-up page. 5. Create a ArthroCADt ID. This will be your Highfive login ID and cannot be changed, so think of one that is secure and easy to remember. 6. Create a Highfive password. You can change your password at any time. 7. Enter your Password Reset Question and Answer. This can be used at a later time if you forget your password. 8. Enter your e-mail address. You will receive e-mail notification when new information is available in [address] 9. Click Sign Up. You can now view and download portions of your medical record. 10. Click the Download Summary menu link to download a portable copy of your medical information. If you have questions, please visit the Frequently Asked Questions section of the [email or web address] website. Remember, [email or web address] is NOT to be used for urgent needs. For medical emergencies, dial 911. Now available from your iPhone and Android! Please provide this summary of care documentation to your next provider. Your primary care clinician is listed as Omar Keane. If you have any questions after today's visit, please call [phone].

## 2017-08-10 NOTE — PROGRESS NOTES
ROOM # 3    Cullen Dwyer presents today for   Chief Complaint   Patient presents with    Annual Wellness Visit       Cullen Dwyer preferred language for health care discussion is english/other. Is someone accompanying this pt? Yes, wife    Is the patient using any DME equipment during [address]? Yes, wheelchair, O2NC    Depression Screening:  PHQ over the last two weeks 8/10/2017 12/12/2016 8/30/2016 8/16/2016 8/9/2016 8/1/2016 7/26/2016   Little interest or pleasure in doing things Not at all Not at all Not at all Not at all Not at all Not at all Not at all   Feeling down, depressed or hopeless Not at all Not at all Not at all Not at all Not at all Not at all Not at all   Total Score PHQ 2 0 0 0 0 0 0 0       Learning Assessment:  Learning Assessment 8/14/2014   PRIMARY LEARNER Patient   HIGHEST LEVEL OF EDUCATION - PRIMARY LEARNER  SOME COLLEGE   BARRIERS PRIMARY LEARNER NONE   PRIMARY LANGUAGE ENGLISH   LEARNER PREFERENCE PRIMARY LISTENING   ANSWERED BY self   RELATIONSHIP SELF       Abuse Screening:  Abuse Screening Questionnaire 8/10/2017 5/12/2015   Do you ever feel afraid of your partner? N N   Are you in a relationship with someone who physically or mentally threatens you? N N   Is it safe for you to go home? Y Y       Fall Risk  Fall Risk Assessment, last 12 mths 8/10/2017 12/12/2016 8/30/2016 8/16/2016 8/9/2016 8/1/2016 7/26/2016   Able to walk? Yes Yes Yes Yes No Yes No   Fall in past 12 months? Yes Yes No No - No No   Fall with injury? No Yes - - - - -   Number of falls in past 12 months 2 3 - - - - -   Fall Risk Score 2 4 - - - - -       Health Maintenance reviewed and discussed per provider. Yes    Cullen Dwyer is due for MULTIPLE, see hm. Please order/place referral if appropriate. Advance Directive:  1. Do you have an advance directive in place? Patient Reply: no    2. If not, would you like material regarding how to put one in place? Patient Reply: no    Coordination of Care:  1.  Have you been to the ER, urgent care clinic since your last visit? Hospitalized since your last visit? no    2. Have you seen or consulted any other health care providers outside of the [address] [place] [address] since your last visit? Include any pap smears or colon screening.  no

## 2017-08-11 ENCOUNTER — HOSPITAL ENCOUNTER (OUTPATIENT)
Dept: LAB | Age: 77
Discharge: HOME | End: 2017-08-11
Payer: MEDICARE

## 2017-08-11 DIAGNOSIS — E11.9 DIABETES MELLITUS, STABLE (HCC): ICD-10-CM

## 2017-08-11 DIAGNOSIS — I10 BENIGN HYPERTENSION WITHOUT CHF: ICD-10-CM

## 2017-08-11 DIAGNOSIS — E78.5 DYSLIPIDEMIA: ICD-10-CM

## 2017-08-11 DIAGNOSIS — Z12.5 SCREENING PSA (PROSTATE SPECIFIC ANTIGEN): ICD-10-CM

## 2017-08-11 LAB
ALBUMIN SERPL BCP-MCNC: 3.5 G/DL (ref 3.4–5)
ALBUMIN/GLOB SERPL: 1.1 {RATIO} (ref 0.8–1.7)
ALP SERPL-CCNC: 69 U/L (ref 45–117)
ALT SERPL-CCNC: 19 U/L (ref 16–61)
ANION GAP BLD CALC-SCNC: 7 MMOL/L (ref 3–18)
AST SERPL W P-5'-P-CCNC: 15 U/L (ref 15–37)
BILIRUB SERPL-MCNC: 0.6 MG/DL (ref 0.2–1)
BUN SERPL-MCNC: 14 MG/DL (ref 7–18)
BUN/CREAT SERPL: 22 (ref 12–20)
CALCIUM SERPL-MCNC: 8.7 MG/DL (ref 8.5–10.1)
CHLORIDE SERPL-SCNC: 97 MMOL/L (ref 100–108)
CHOLEST SERPL-MCNC: 202 MG/DL
CO2 SERPL-SCNC: 39 MMOL/L (ref 21–32)
CREAT SERPL-MCNC: 0.65 MG/DL (ref 0.6–1.3)
ERYTHROCYTE [DISTWIDTH] IN BLOOD BY AUTOMATED COUNT: 13.1 % (ref 11.6–14.5)
GLOBULIN SER CALC-MCNC: 3.1 G/DL (ref 2–4)
GLUCOSE SERPL-MCNC: 99 MG/DL (ref 74–99)
HBA1C MFR BLD: 6.1 % (ref 4.2–5.6)
HCT VFR BLD AUTO: 41.3 % (ref 36–48)
HDLC SERPL-MCNC: 80 MG/DL (ref 40–60)
HDLC SERPL: 2.5 {RATIO} (ref 0–5)
HGB BLD-MCNC: 12.3 G/DL (ref 13–16)
LDLC SERPL CALC-MCNC: 104 MG/DL (ref 0–100)
LIPID PROFILE,FLP: ABNORMAL
MCH RBC QN AUTO: 31.4 PG (ref 24–34)
MCHC RBC AUTO-ENTMCNC: 29.8 G/DL (ref 31–37)
MCV RBC AUTO: 105.4 FL (ref 74–97)
PLATELET # BLD AUTO: 185 K/UL (ref 135–420)
PMV BLD AUTO: 11.9 FL (ref 9.2–11.8)
POTASSIUM SERPL-SCNC: 3.7 MMOL/L (ref 3.5–5.5)
PROT SERPL-MCNC: 6.6 G/DL (ref 6.4–8.2)
PSA SERPL-MCNC: 1.1 NG/ML (ref 0–4)
RBC # BLD AUTO: 3.92 M/UL (ref 4.7–5.5)
SODIUM SERPL-SCNC: 143 MMOL/L (ref 136–145)
TRIGL SERPL-MCNC: 90 MG/DL (ref ?–150)
TSH SERPL DL<=0.05 MIU/L-ACNC: 1.38 UIU/ML (ref 0.36–3.74)
VLDLC SERPL CALC-MCNC: 18 MG/DL
WBC # BLD AUTO: 6.7 K/UL (ref 4.6–13.2)

## 2017-08-11 PROCEDURE — 36415 COLL VENOUS BLD VENIPUNCTURE: CPT | Performed by: INTERNAL MEDICINE

## 2017-08-11 PROCEDURE — 85027 COMPLETE CBC AUTOMATED: CPT | Performed by: INTERNAL MEDICINE

## 2017-08-11 PROCEDURE — 84153 ASSAY OF PSA TOTAL: CPT | Performed by: INTERNAL MEDICINE

## 2017-08-11 PROCEDURE — 83036 HEMOGLOBIN GLYCOSYLATED A1C: CPT | Performed by: INTERNAL MEDICINE

## 2017-08-11 PROCEDURE — 80061 LIPID PANEL: CPT | Performed by: INTERNAL MEDICINE

## 2017-08-11 PROCEDURE — 84443 ASSAY THYROID STIM HORMONE: CPT | Performed by: INTERNAL MEDICINE

## 2017-08-11 PROCEDURE — 80053 COMPREHEN METABOLIC PANEL: CPT | Performed by: INTERNAL MEDICINE

## 2017-08-18 ENCOUNTER — TELEPHONE (OUTPATIENT)
Dept: INTERNAL MEDICINE CLINIC | Age: 77
End: 2017-08-18

## 2017-08-19 NOTE — TELEPHONE ENCOUNTER
Attempted to contact pt at  number, no answer. m for pt to return call to office at [phone]. Will continue to try to contact pt.

## 2017-08-21 NOTE — TELEPHONE ENCOUNTER
Attempted to contact pt at mobile number, sister-in-law answered. Lvm for pt to return call to office at [phone]. Will continue to try to contact pt/await return call.

## 2017-08-22 NOTE — TELEPHONE ENCOUNTER
Spoke with patient's wife, confirmed pt name and . Pt's wife states that the pt's INR was 2.4  When checked on Friday.  Will recheck this Friday

## 2017-08-28 NOTE — TELEPHONE ENCOUNTER
Spoke with patient's wife, confirmed pt name and . Calling to get pt's PT/INR results, 2.4. I advised I would let Dr. Joyce Campbell know. Patient's wife verbalized understanding.      Be advised

## 2017-08-29 NOTE — TELEPHONE ENCOUNTER
Spoke with wife, verified patient with 2 identifiers. Advised of below. Wife stated understanding. No further questions at this time.

## 2017-09-18 ENCOUNTER — TELEPHONE (OUTPATIENT)
Dept: INTERNAL MEDICINE CLINIC | Age: 77
End: 2017-09-18

## 2017-09-18 DIAGNOSIS — I10 ESSENTIAL HYPERTENSION, BENIGN: ICD-10-CM

## 2017-09-18 RX ORDER — FUROSEMIDE 20 MG/1
20 TABLET ORAL DAILY
Qty: 90 TAB | Refills: 3 | Status: SHIPPED | OUTPATIENT
Start: 2017-09-18 | End: 2017-10-06 | Stop reason: SDUPTHER

## 2017-09-18 RX ORDER — METOPROLOL TARTRATE 25 MG/1
25 TABLET, FILM COATED ORAL 2 TIMES DAILY
Qty: 180 TAB | Refills: 3 | Status: SHIPPED | OUTPATIENT
Start: 2017-09-18 | End: 2017-10-06 | Stop reason: SDUPTHER

## 2017-09-18 NOTE — TELEPHONE ENCOUNTER
2 pt. Identifiers confirmed. Pt's wife notified of below. She would aso like Dr. Deloris Le to know that Mr. Norma Riley has had 2 falls and was taken to [facility] for labs etc. He may be admitted. She was advised to make a 30min hospital f/u aptmt after he is discharge. No other questions/concerns at this time.

## 2017-09-18 NOTE — TELEPHONE ENCOUNTER
Please let pt know that INR done on 9/14/17 was 2.4. Continue the coumadin 5mg daily pm.     Recheck in 2 weeks and give us a call with the results.

## 2017-09-25 ENCOUNTER — OFFICE VISIT (OUTPATIENT)
Dept: INTERNAL MEDICINE CLINIC | Age: 77
End: 2017-09-25

## 2017-09-25 ENCOUNTER — HOSPITAL ENCOUNTER (OUTPATIENT)
Dept: LAB | Age: 77
Discharge: HOME | End: 2017-09-25
Payer: MEDICARE

## 2017-09-25 VITALS
HEIGHT: 66 IN | OXYGEN SATURATION: 92 % | WEIGHT: 176.4 LBS | BODY MASS INDEX: 28.35 KG/M2 | HEART RATE: 95 BPM | RESPIRATION RATE: 16 BRPM | DIASTOLIC BLOOD PRESSURE: 68 MMHG | SYSTOLIC BLOOD PRESSURE: 122 MMHG | TEMPERATURE: 97.1 F

## 2017-09-25 DIAGNOSIS — R26.89 IMBALANCE: ICD-10-CM

## 2017-09-25 DIAGNOSIS — R53.1 WEAKNESS: Primary | ICD-10-CM

## 2017-09-25 DIAGNOSIS — E11.9 DIABETES MELLITUS, STABLE (HCC): ICD-10-CM
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
## 2017-09-25 DIAGNOSIS — Z23 ENCOUNTER FOR IMMUNIZATION: ICD-10-CM

## 2017-09-25 DIAGNOSIS — I82.409 DEEP VEIN THROMBOSIS (DVT) OF LOWER EXTREMITY, UNSPECIFIED CHRONICITY, UNSPECIFIED LATERALITY, UNSPECIFIED VEIN (HCC): ICD-10-CM

## 2017-09-25 DIAGNOSIS — E87.0 HYPERNATREMIA: ICD-10-CM

## 2017-09-25 LAB
INR BLD: 4.3
PT POC: 51.6 SECONDS
SODIUM SERPL-SCNC: 145 MMOL/L (ref 136–145)
VALID INTERNAL CONTROL?: YES
VIT B12 SERPL-MCNC: 520 PG/ML (ref 211–911)

## 2017-09-25 PROCEDURE — 82607 VITAMIN B-12: CPT | Performed by: INTERNAL MEDICINE

## 2017-09-25 PROCEDURE — 36415 COLL VENOUS BLD VENIPUNCTURE: CPT | Performed by: INTERNAL MEDICINE

## 2017-09-25 PROCEDURE — 84295 ASSAY OF SERUM SODIUM: CPT | Performed by: INTERNAL MEDICINE

## 2017-09-25 PROCEDURE — 82043 UR ALBUMIN QUANTITATIVE: CPT | Performed by: INTERNAL MEDICINE

## 2017-09-25 NOTE — MR AVS SNAPSHOT
Visit Information Date & Time Provider Department Dept. Phone Encounter #  
 9/25/2017  1:30 PM Orly Hurst MD duuin [phone] [id number] Follow-up Instructions Return in about 3 months (around 12/25/2017) for f/u DM/HTN.  
  
 8/24/2018 11:00 AM  
Any with Chrissy Lyons MD  
[facility] [place] [facility] [id number] [facility]) Appt Note: 1 year fu  
 [address] [place] [address]  
[phone]  
  
   
 [facility] [id number] Upcoming Health Maintenance Date Due MICROALBUMIN Q1 11/5/2016 INFLUENZA AGE 9 TO ADULT 8/1/2017 HEMOGLOBIN A1C Q6M 2/11/2018 EYE EXAM RETINAL OR DILATED Q1 7/25/2018 FOOT EXAM Q1 8/10/2018 MEDICARE YEARLY EXAM 8/11/2018 LIPID PANEL Q1 8/11/2018 GLAUCOMA SCREENING Q2Y 7/25/2019 COLONOSCOPY 11/24/2020 DTaP/Tdap/Td series (2 - Td) 8/1/2026 Allergies as of 9/25/2017  Review Complete On: 9/25/2017 By: Orly Hurst MD  
 No Known Allergies Current Immunizations  Reviewed on 8/30/2016 Name Date Influenza High Dose Vaccine PF  Incomplete, 11/5/2015 Influenza Vaccine 11/10/2016 12:00 AM, 9/14/2015, 9/16/2014 12:14 PM, 10/13/2010 Influenza Vaccine PF 1/29/2013  8:12 AM  
 Pneumococcal Conjugate (PCV-13) 9/14/2015 Pneumococcal Polysaccharide (PPSV-23) 8/14/2014 12:00 PM, 10/13/2010 12:00 AM  
  
 Not reviewed this visit You Were Diagnosed With   
  
 Codes Comments Deep vein thrombosis (DVT) of lower extremity, unspecified chronicity, unspecified laterality, unspecified vein (HCC)    -  Primary ICD-10-CM: I82.409 ICD-9-CM: 453.40 Encounter for immunization     ICD-10-CM: Q05 ICD-9-CM: V03.89 Hypernatremia     ICD-10-CM: E87.0 ICD-9-CM: 276.0 Diabetes mellitus, stable ([facility]     ICD-10-CM: E11.9 ICD-9-CM: 250.00 Imbalance     ICD-10-CM: R26.89 
ICD-9-CM: 781. 2 Vitals BP Pulse Temp Resp Height(growth percentile) Weight(growth percentile) 122/68 (BP 1 Location: Left arm, BP Patient Position: Sitting) 95 97.1 °F (36.2 °C) (Oral) 16 5' 6\" (1.676 m) 176 lb 6.4 oz (80 kg) SpO2 BMI Smoking Status 92% 28.47 kg/m2 Former Smoker Vitals History BMI and BSA Data Body Mass Index Body Surface Area  
 28.47 kg/m 2 1.93 m 2 Preferred Pharmacy Pharmacy Name Phone [facility] [address] [place] [address],[address] [phone] Your Updated Medication List  
  
   
This list is accurate as of: 9/25/17  1:30 PM.  Always use your most recent med list.  
  
  
  
  
 * albuterol 90 mcg/actuation inhaler Commonly known as:  VENTOLIN HFA Take 1-2 puffs every 4-6 hrs prn shortness of breath * albuterol 1.25 mg/3 mL Nebu Commonly known as:  ACCUNEB  
1.25 mg. Blood-Glucose Meter monitoring kit Commonly known as:  Kevin Campos Check fasting sugars daily before breakfast. DX: 250.02  
  
 ferrous sulfate 325 mg (65 mg iron) tablet Take 1 Tab by mouth two (2) times a day. finasteride 5 mg tablet Commonly known as:  PROSCAR Take 1 Tab by mouth daily. fluticasone-salmeterol 115-21 mcg/actuation inhaler Commonly known as:  ADVAIR HFA Take 2 Puffs by inhalation two (2) times a day. furosemide 20 mg tablet Commonly known as:  LASIX Take 1 Tab by mouth daily. Indications: hypertension  
  
 glucose blood VI test strips strip Commonly known as:  FREESTYLE LITE STRIPS Check fasting sugars daily before breakfast. DX: E11.9 for freestyle lite meter  
  
 insulin glargine 100 unit/mL injection Commonly known as:  LANTUS  
7 Units by SubCUTAneous route nightly. Lancets Misc Check fasting sugars daily before breakfast. DX: E11.9 for freestyle lite meter  
  
 metoprolol tartrate 25 mg tablet Commonly known as:  LOPRESSOR Take 1 Tab by mouth two (2) times a day. Nebulizer & Compressor machine 1 Each. omeprazole 40 mg capsule Commonly known as:  PRILOSEC Take 1 Cap by mouth daily. potassium chloride 20 mEq tablet Commonly known as:  KLOR-CON M20 Take 1 Tab by mouth daily. tamsulosin 0.4 mg capsule Commonly known as:  FLOMAX Take 1 Cap by mouth nightly. tiotropium bromide 2.5 mcg/actuation inhaler Commonly known as:  [place] Pellant Take 2 Puffs by inhalation daily. VITAMIN C 500 mg tablet Generic drug:  ascorbic acid (vitamin C) Take 500 mg by mouth two (2) times a day. warfarin 5 mg tablet Commonly known as:  COUMADIN Take 1 Tab by mouth every evening. * Notice: This list has 2 medication(s) that are the same as other medications prescribed for you. Read the directions carefully, and ask your doctor or other care provider to review them with you. We Performed the Following AMB POC PT/INR [63784 CPT(R)] INFLUENZA VIRUS VACCINE, HIGH DOSE SEASONAL, PRESERVATIVE FREE [36802 CPT(R)] Follow-up Instructions Return in about 3 months (around 12/25/2017) for f/u DM/HTN. To-Do List   
 09/25/2017 Lab:  MICROALBUMIN, UR, RAND W/ MICROALBUMIN/CREA RATIO   
  
 09/25/2017 Lab:  SODIUM   
  
 09/25/2017 Lab:  VITAMIN B12 Patient Instructions 1) hold coumadin today and tomorrow and recheck on Wed. Give us a call. 2) follow-up in 3 months or sooner if worsening symptoms. Introducing [facility]! Sukhi Torrez introduces [employer] patient portal. Now you can access parts of your medical record, email your doctor's office, and request medication refills online. 1. In your internet browser, go to [email or web address] 2. Click on the First Time User? Click Here link in the Sign In box. You will see the New Member Sign Up page. 3. Enter your [employer] Access Code exactly as it appears below.  You will not need to use this code after youve completed the sign-up process. If you do not sign up before the expiration date, you must request a new code. · [employer] Access Code: [email or web address] Expires: 11/8/2017 11:11 AM 
 
4. Enter the last four digits of your Social Security Number (xxxx) and Date of Birth (mm/dd/yyyy) as indicated and click Submit. You will be taken to the next sign-up page. 5. Create a [employer] ID. This will be your [employer] login ID and cannot be changed, so think of one that is secure and easy to remember. 6. Create a [employer] password. You can change your password at any time. 7. Enter your Password Reset Question and Answer. This can be used at a later time if you forget your password. 8. Enter your e-mail address. You will receive e-mail notification when new information is available in [address]. 9. Click Sign Up. You can now view and download portions of your medical record. 10. Click the Download Summary menu link to download a portable copy of your medical information. If you have questions, please visit the Frequently Asked Questions section of the [employer] website. Remember, [employer] is NOT to be used for urgent needs. For medical emergencies, dial 911. Now available from your iPhone and Android! Please provide this summary of care documentation to your next provider. Your primary care clinician is listed as Omar eKane. If you have any questions after today's visit, please call [phone].

## 2017-09-25 NOTE — PATIENT INSTRUCTIONS
1) hold coumadin today and tomorrow and recheck on Wed. Give us a call. 2) follow-up in 3 months or sooner if worsening symptoms.

## 2017-09-25 NOTE — PROGRESS NOTES
Chief Complaint   Patient presents with   Meli Mitchell     s/p fall. [address] f/u    Extremity Weakness     [address] fu for generalized weakness       HPI:     Khushbu Yeboah is a 65 y.o.  male with history of type 2 DM and PE   here for the above complaint. He is not walking at all and told him he needs to walk and move around. He denies any chest pain, shortness of breath, abdominal pain, headaches or dizziness. He is not moving or walking at all and this is most likely the cause of his weakness in lower extremities and causing fall. He was told to walk around house and go to the mailbox. Also do weights in chair and exercises. He has home PT coming to house. Pt went to [facility] ER on 9/18/17. Head CT negative. L-spine x-ray negative. Labs okay except sodium high. Hospital records were reviewed.            Past Medical History:   Diagnosis Date    Advance directive discussed with patient     COPD (chronic obstructive pulmonary disease) ([address]     COPD with emphysema ([address]     CVA (cerebrovascular accident) ([address] 7/11/2012    possible    Diabetes mellitus ([address]     Drowsiness     Enlarged prostate     Essential hypertension, benign     Eye exam, routine 07/26/2016    Dr. Linda Love repeat in 1 yr    H/O colonoscopy 11/24/15    Dr. 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 Kaylee Echeverria  late 3888'P     Current Outpatient Prescriptions   Medication Sig    metoprolol tartrate (LOPRESSOR) 25 mg tablet Take 1 Tab by mouth two (2) times a day.  furosemide (LASIX) 20 mg tablet Take 1 Tab by mouth daily. Indications: hypertension    warfarin (COUMADIN) 5 mg tablet Take 1 Tab by mouth every evening.  potassium chloride (KLOR-CON M20) 20 mEq tablet Take 1 Tab by mouth daily.  omeprazole (PRILOSEC) 40 mg capsule Take 1 Cap by mouth daily.  finasteride (PROSCAR) 5 mg tablet Take 1 Tab by mouth daily.  ferrous sulfate 325 mg (65 mg iron) tablet Take 1 Tab by mouth two (2) times a day.  tamsulosin (FLOMAX) 0.4 mg capsule Take 1 Cap by mouth nightly.  insulin glargine (LANTUS) 100 unit/mL injection 7 Units by SubCUTAneous route nightly.  Lancets misc Check fasting sugars daily before breakfast. DX: E11.9 for freestyle lite meter    fluticasone-salmeterol (ADVAIR HFA) 115-21 mcg/actuation inhaler Take 2 Puffs by inhalation two (2) times a day.  Nebulizer & Compressor machine 1 Each.  tiotropium bromide (SPIRIVA RESPIMAT) 2.5 mcg/actuation inhaler Take 2 Puffs by inhalation daily.  albuterol (ACCUNEB) 1.25 mg/3 mL nebu 1.25 mg.    albuterol (VENTOLIN HFA) 90 mcg/actuation inhaler Take 1-2 puffs every 4-6 hrs prn shortness of breath    glucose blood VI test strips (FREESTYLE LITE STRIPS) strip Check fasting sugars daily before breakfast. DX: E11.9 for freestyle lite meter    ascorbic acid (VITAMIN C) 500 mg tablet Take 500 mg by mouth two (2) times a day.  Blood-Glucose Meter (ONE TOUCH ULTRA 2) monitoring kit Check fasting sugars daily before breakfast. DX: 250.02     No current facility-administered medications for this visit.       Health Maintenance   Topic Date Due    MICROALBUMIN Q1  11/05/2016    INFLUENZA AGE 9 TO ADULT  08/01/2017    HEMOGLOBIN A1C Q6M  02/11/2018    EYE EXAM RETINAL OR DILATED Q1  07/25/2018    FOOT EXAM Q1  08/10/2018    MEDICARE YEARLY EXAM  08/11/2018    LIPID PANEL Q1  08/11/2018    GLAUCOMA SCREENING Q2Y  07/25/2019    COLONOSCOPY  11/24/2020    DTaP/Tdap/Td series (2 - Td) 08/01/2026    ZOSTER VACCINE AGE 60>  Addressed    Pneumococcal 65+ Low/Medium Risk  Completed     Immunization History   Administered Date(s) Administered    Influenza High Dose Vaccine PF 11/05/2015, 09/25/2017    Influenza Vaccine 10/13/2010, 09/16/2014, 09/14/2015, 11/10/2016    Influenza Vaccine PF 01/29/2013    Pneumococcal Conjugate (PCV-13) 09/14/2015    Pneumococcal Polysaccharide (PPSV-23) 10/13/2010, 08/14/2014     No LMP for male patient. Allergies and Intolerances:   No Known Allergies    Family History:   Family History   Problem Relation Age of Onset    Cancer Father      he does not know       Social History:   He  reports that he quit smoking about 2 years ago. His smoking use included Cigarettes. He has a 45.00 pack-year smoking history. He has never used smokeless tobacco.  He  reports that he does not drink alcohol. ·     OBJECTIVE:   Physical exam:   Visit Vitals    /68 (BP 1 Location: Left arm, BP Patient Position: Sitting)    Pulse 95    Temp 97.1 °F (36.2 °C) (Oral)    Resp 16    Ht 5' 6\" (1.676 m)    Wt 176 lb 6.4 oz (80 kg)    SpO2 92%  Comment: 3LNC    BMI 28.47 kg/m2        Generally: Pleasant female in no acute distress  Cardiac Exam: regular, rate, and rhythm. Normal S1 and S2. No murmurs, gallops, or rubs  Pulmonary exam: Clear to auscultation bilaterally  Abdominal exam: Positive bowel sounds in all four quadrants, soft, nondistended, nontender  Extremities: 2+ dorsalis pedis pulses bilaterally.  No pedal edema    bilaterally    LABS/RADIOLOGICAL TESTS:  Lab Results   Component Value Date/Time    WBC 6.7 08/11/2017 10:55 AM    HGB 12.3 08/11/2017 10:55 AM    HCT 41.3 08/11/2017 10:55 AM PLATELET 188 21/07/5617 10:55 AM     Lab Results   Component Value Date/Time    Sodium 143 08/11/2017 10:55 AM    Potassium 3.7 08/11/2017 10:55 AM    Chloride 97 08/11/2017 10:55 AM    CO2 39 08/11/2017 10:55 AM    Glucose 99 08/11/2017 10:55 AM    BUN 14 08/11/2017 10:55 AM    Creatinine 0.65 08/11/2017 10:55 AM     Lab Results   Component Value Date/Time    Cholesterol, total 202 08/11/2017 10:55 AM    HDL Cholesterol 80 08/11/2017 10:55 AM    LDL, calculated 104 08/11/2017 10:55 AM    Triglyceride 90 08/11/2017 10:55 AM     No results found for: GPT  Previous labs  Component      Latest Ref Rng & Units 9/25/2017 8/10/2017           1:10 PM 11:11 AM   VALID INTERNAL CONTROL POC       Yes Yes   Prothrombin time (POC)      seconds 51.6 41.4   INR       4.3 3.4   Pt notified of results at [address] today. ASSESSMENT/PLAN:    1. Weakness: due to inactivity. Pt encouraged to walk and do PT exercises. If not better, will refer to neurology. 2. Imbalance  -     VITAMIN B12; Future    3. Fall, initial encounter    4. Deep vein thrombosis (DVT) of lower extremity, unspecified chronicity, unspecified laterality, unspecified vein ([facility]): hold coumadin today and tomorrow and recheck on Wed. She will call us with the results. -     AMB POC PT/INR    5. Hypernatremia  -     SODIUM; Future    6. Diabetes mellitus, stable (HCC)  -     MICROALBUMIN, UR, RAND W/ MICROALBUMIN/CREA RATIO; Future    7. Encounter for immunization  -     Influenza virus vaccine ([address]) 72 years and older ([address])      6. Patient verbalized understanding and agreement with the plan. 9. Patient was given an after-visit summary. 10.   Follow-up Disposition:  Return in about 3 months (around 12/25/2017) for f/u DM/HTN. or sooner if worsening symptoms.           Nahun Freitas MD

## 2017-09-25 NOTE — PROGRESS NOTES
ROOM # 1    Rosey Garcia presents today for   Chief Complaint   Patient presents with   Gaviota Young Fall     s/p fall. [address] f/u    Extremity Weakness     [address] fu for generalized weakness       Rosey Garcia preferred language for health care discussion is english/other. Is someone accompanying this pt? wife    Is the patient using any DME equipment during [address]? wheelchair    Depression Screening:  PHQ over the last two weeks 9/25/2017 8/10/2017 12/12/2016 8/30/2016 8/16/2016 8/9/2016 8/1/2016   Little interest or pleasure in doing things Not at all Not at all Not at all Not at all Not at all Not at all Not at all   Feeling down, depressed or hopeless Not at all Not at all Not at all Not at all Not at all Not at all Not at all   Total Score PHQ 2 0 0 0 0 0 0 0       Learning Assessment:  Learning Assessment 8/14/2014   PRIMARY LEARNER Patient   HIGHEST LEVEL OF EDUCATION - PRIMARY LEARNER  SOME COLLEGE   BARRIERS PRIMARY LEARNER NONE   PRIMARY LANGUAGE ENGLISH   LEARNER PREFERENCE PRIMARY LISTENING   ANSWERED BY self   RELATIONSHIP SELF       Abuse Screening:  Abuse Screening Questionnaire 8/10/2017 5/12/2015   Do you ever feel afraid of your partner? N N   Are you in a relationship with someone who physically or mentally threatens you? N N   Is it safe for you to go home? Y Y       Fall Risk  Fall Risk Assessment, last 12 mths 9/25/2017 8/10/2017 12/12/2016 8/30/2016 8/16/2016 8/9/2016 8/1/2016   Able to walk? Yes Yes Yes Yes Yes No Yes   Fall in past 12 months? Yes Yes Yes No No - No   Fall with injury? Yes No Yes - - - -   Number of falls in past 12 months 3 2 3 - - - -   Fall Risk Score 4 2 4 - - - -       Health Maintenance reviewed and discussed per provider. Yes    Rosey Garcia is due for influenza vax, microalbumin. Please order/place referral if appropriate. Advance Directive:  1. Do you have an advance directive in place? Patient Reply: no    2. If not, would you like material regarding how to put one in place?  Patient Reply: no    Coordination of Care:  1. Have you been to the ER, urgent care clinic since your last visit? Hospitalized since your last visit? [facility]    2. Have you seen or consulted any other health care providers outside of the [address] [place] [address] since your last visit? Include any pap smears or colon screening.  no

## 2017-09-25 NOTE — PROGRESS NOTES
Immunization/s administered 9/25/2017 by Henna Jean-Baptiste LPN with guardian's consent. Patient tolerated procedure well. No reactions noted.

## 2017-09-26 LAB
CREAT UR-MCNC: 175.34 MG/DL (ref 30–125)
MICROALBUMIN UR-MCNC: 6.8 MG/DL (ref 0–3)
MICROALBUMIN/CREAT UR-RTO: 39 MG/G (ref 0–30)

## 2017-09-27 ENCOUNTER — TELEPHONE (OUTPATIENT)
Dept: INTERNAL MEDICINE CLINIC | Age: 77
End: 2017-09-27

## 2017-09-28 NOTE — TELEPHONE ENCOUNTER
Patient's wife called back verified with two identifiers advised of Dr. Vargas Rhoades instructions to take 7.5 mg tonight (coumadin 5mg take 1.5 tablet) today and then back to coumadin 5mg daily starting on Friday, 5 mg on Saturday and 5 mg on Sunday then recheck on Monday and give us a call. Asked wife to repeat back to make sure she understood. Wife wrote down instructions and understands the above order.

## 2017-09-28 NOTE — TELEPHONE ENCOUNTER
Attempted to contact pt at  number, no answer. Lvm for pt to return call to office at [phone]. Will continue to try to contact pt.

## 2017-09-28 NOTE — TELEPHONE ENCOUNTER
Incoming call from pt on 9/27/17 at 5:00pm. Pt's wife said unable to get blood for INR. She was told to go back on the 5mg coumadin today and recheck tomorrow. He has been holding it for the past 2 days.

## 2017-09-28 NOTE — TELEPHONE ENCOUNTER
Take coumadin 7.5mg  (coumadin 5mg take 1.5 tablet) today and then back to coumadin 5mg daily starting on Friday. Recheck on Monday and give us a call.

## 2017-09-28 NOTE — TELEPHONE ENCOUNTER
Contact patient wife, patient wife states that she has not checked patients INR yet, but she will check it  and give us a call back.

## 2017-10-01 NOTE — TELEPHONE ENCOUNTER
Bed: ED14  Expected date:   Expected time:   Means of arrival:   Comments:  triage   Patient's spouse reports INR as follows:    INR:1.3

## 2017-10-02 ENCOUNTER — TELEPHONE (OUTPATIENT)
Dept: INTERNAL MEDICINE CLINIC | Age: 77
End: 2017-10-02

## 2017-10-02 DIAGNOSIS — Z75.3 LIMITED ACCESS TO COMMUNITY SUPPORT SERVICES: ICD-10-CM

## 2017-10-02 DIAGNOSIS — R41.89 COGNITIVE CHANGES: Primary | ICD-10-CM

## 2017-10-02 DIAGNOSIS — Z79.899 MEDICATION MANAGEMENT: ICD-10-CM

## 2017-10-02 NOTE — TELEPHONE ENCOUNTER
Called wife verified with two identifiers advised to continue with current treatment until I get recommendation from Dr. Randall Jenkins, and we will call back tomorrow. Wife verbalized understanding.

## 2017-10-02 NOTE — TELEPHONE ENCOUNTER
Patient's wife is calling in with patient's Pt/INR. She is reporting his reading at 2.5 and the patient is taking 5mg of coumadin a day.

## 2017-10-02 NOTE — TELEPHONE ENCOUNTER
Received fax from Avelino May RN  that pt needs orders for referrals to speech therapy, skilled nursing, and medical social worker. All referral put in the system and will be faxed to [address] [place] [address].. (f) [phone] (p) [phone].

## 2017-10-03 RX ORDER — LISINOPRIL 2.5 MG/1
2.5 TABLET ORAL DAILY
Qty: 30 TAB | Refills: 0 | Status: SHIPPED | OUTPATIENT
Start: 2017-10-03 | End: 2017-10-19 | Stop reason: SDUPTHER

## 2017-10-03 NOTE — TELEPHONE ENCOUNTER
Sent electronically:    Requested Prescriptions     Signed Prescriptions Disp Refills    lisinopril (PRINIVIL, ZESTRIL) 2.5 mg tablet 30 Tab 0     Sig: Take 1 Tab by mouth daily. Authorizing Provider: Isaac Bran     He will need to come back in 2 weeks to see how his blood pressure is doing.

## 2017-10-03 NOTE — TELEPHONE ENCOUNTER
----- Message from Rafael Maya MD sent at 10/3/2017  8:00 AM EDT -----  Please let pt know that:    1) microalbumin/creatinine ratio up. Need to start back on lisinopril 2.5mg one po daily. Is there a reason why it was stopped? Don't see why it was stopped? Did he have side effects from the lisinopril? Cough, swelling of mouth and tongue? 2) if not, will add lisinopril.

## 2017-10-03 NOTE — PROGRESS NOTES
Please let pt know that:    1) microalbumin/creatinine ratio up. Need to start back on lisinopril 2.5mg one po daily. Is there a reason why it was stopped? Don't see why it was stopped? Did he have side effects from the lisinopril? Cough, swelling of mouth and tongue? 2) if not, will add lisinopril.

## 2017-10-03 NOTE — TELEPHONE ENCOUNTER
Contacted pt wife informed her of below she stated understanding and that she does not remember him having an allergic reaction to the medication stated understanding and informed her that will let Dr Lynn White know and have her send a refill to their pharmacy she stated understanding no other questions or concerns noted at [facility] time    Requested Prescriptions     Pending Prescriptions Disp Refills    lisinopril (PRINIVIL, ZESTRIL) 2.5 mg tablet       Sig: Take 1 Tab by mouth daily.

## 2017-10-03 NOTE — TELEPHONE ENCOUNTER
Called spoke with patient's wife verified with two identifiers advised per Dr. Jacquie Waddell:  Continue the coumadin 5mg daily pm and recheck in 1 week. Call us with the results. Wife verbalized understanding.

## 2017-10-05 ENCOUNTER — TELEPHONE (OUTPATIENT)
Dept: INTERNAL MEDICINE CLINIC | Age: 77
End: 2017-10-05

## 2017-10-05 NOTE — TELEPHONE ENCOUNTER
Contacted pts wife at number listed. Two patient Identifiers confirmed. Advised pt per Dr Connor Davis notes.  Pts wife verbalized understanding and scheduled for Thursday, October 19, 2017 12:45 PM .

## 2017-10-05 NOTE — TELEPHONE ENCOUNTER
Patient's wife called in stating she would like to speak with Dr. Filemon Nye nurse about one of her husbands medications. Would not give any other information.

## 2017-10-06 DIAGNOSIS — I10 ESSENTIAL HYPERTENSION, BENIGN: ICD-10-CM

## 2017-10-06 RX ORDER — FUROSEMIDE 20 MG/1
20 TABLET ORAL DAILY
Qty: 90 TAB | Refills: 3 | Status: SHIPPED | OUTPATIENT
Start: 2017-10-06 | End: 2018-09-04 | Stop reason: SDUPTHER

## 2017-10-06 RX ORDER — METOPROLOL TARTRATE 25 MG/1
25 TABLET, FILM COATED ORAL 2 TIMES DAILY
Qty: 180 TAB | Refills: 3 | Status: SHIPPED | OUTPATIENT
Start: 2017-10-06 | End: 2018-09-04 | Stop reason: SDUPTHER

## 2017-10-06 NOTE — TELEPHONE ENCOUNTER
Requested Prescriptions     Pending Prescriptions Disp Refills    furosemide (LASIX) 20 mg tablet 90 Tab 3     Sig: Take 1 Tab by mouth daily. Indications: hypertension    metoprolol tartrate (LOPRESSOR) 25 mg tablet 180 Tab 3     Sig: Take 1 Tab by mouth two (2) times a day.

## 2017-10-07 NOTE — TELEPHONE ENCOUNTER
Attempted to contact pt at  number, no answer. Lvm for pt to return call to office at [phone]. Will continue to try to contact pt.

## 2017-10-09 NOTE — TELEPHONE ENCOUNTER
Unsuccessful attempt to reach pt for below. Left message for pt's wife to call back at her earliest convenience.

## 2017-10-10 NOTE — TELEPHONE ENCOUNTER
Contacted pts wife at [facility] number. Two patient Identifiers confirmed. Pts wife stated pt was suppose to have medication sent to pharmacy and she has a question about it but not have resolved issue. No other issues noted.

## 2017-10-13 ENCOUNTER — TELEPHONE (OUTPATIENT)
Dept: INTERNAL MEDICINE CLINIC | Age: 77
End: 2017-10-13

## 2017-10-13 NOTE — TELEPHONE ENCOUNTER
Called pt and talked to wife. She said pt is taking coumadin 5mg not 7mg. She got confused with the lantus in which he takes 7 units. Told her for the pt to continue coumadin 5mg daily pm and since he has appt. With me on 10/19/17, we will recheck his INR then. Told her that the appt. Time is 12:45pm.    She verbalized understanding of above.

## 2017-10-13 NOTE — TELEPHONE ENCOUNTER
Patient wife called with INR order of 2.3 patient is taking 7 mg of Coumadin please return call to wife with new orders

## 2017-10-19 ENCOUNTER — OFFICE VISIT (OUTPATIENT)
Dept: INTERNAL MEDICINE CLINIC | Age: 77
End: 2017-10-19

## 2017-10-19 ENCOUNTER — HOSPITAL ENCOUNTER (OUTPATIENT)
Dept: LAB | Age: 77
Discharge: HOME | End: 2017-10-19
Payer: MEDICARE

## 2017-10-19 VITALS
TEMPERATURE: 97.3 F | HEART RATE: 79 BPM | RESPIRATION RATE: 20 BRPM | HEIGHT: 66 IN | SYSTOLIC BLOOD PRESSURE: 113 MMHG | OXYGEN SATURATION: 92 % | WEIGHT: 175.8 LBS | BODY MASS INDEX: 28.25 KG/M2 | DIASTOLIC BLOOD PRESSURE: 65 MMHG

## 2017-10-19 DIAGNOSIS — Z79.01 ANTICOAGULATION MANAGEMENT ENCOUNTER: ICD-10-CM

## 2017-10-19 DIAGNOSIS — I10 BENIGN HYPERTENSION WITHOUT CHF: ICD-10-CM

## 2017-10-19 DIAGNOSIS — E11.9 DIABETES MELLITUS, STABLE (HCC): ICD-10-CM

## 2017-10-19 DIAGNOSIS — L72.3 SEBACEOUS CYST: Primary | ICD-10-CM

## 2017-10-19 DIAGNOSIS — Z76.0 MEDICATION REFILL: ICD-10-CM

## 2017-10-19 DIAGNOSIS — Z51.81 ANTICOAGULATION MANAGEMENT ENCOUNTER: ICD-10-CM

## 2017-10-19 LAB
AMORPH CRY URNS QL MICRO: ABNORMAL
APPEARANCE UR: ABNORMAL
BACTERIA URNS QL MICRO: ABNORMAL /HPF
BILIRUB UR QL: NEGATIVE
COLOR UR: YELLOW
EPITH CASTS URNS QL MICRO: ABNORMAL /LPF (ref 0–5)
GLUCOSE UR STRIP.AUTO-MCNC: NEGATIVE MG/DL
HGB UR QL STRIP: NEGATIVE
INR BLD: 2.7
KETONES UR QL STRIP.AUTO: NEGATIVE MG/DL
LEUKOCYTE ESTERASE UR QL STRIP.AUTO: ABNORMAL
NITRITE UR QL STRIP.AUTO: POSITIVE
PH UR STRIP: 7.5 [PH] (ref 5–8)
PROT UR STRIP-MCNC: NEGATIVE MG/DL
PT POC: 32 SECONDS
RBC #/AREA URNS HPF: ABNORMAL /HPF (ref 0–5)
SP GR UR REFRACTOMETRY: 1.02 (ref 1–1.03)
UROBILINOGEN UR QL STRIP.AUTO: 0.2 EU/DL (ref 0.2–1)
VALID INTERNAL CONTROL?: YES
WBC URNS QL MICRO: ABNORMAL /HPF (ref 0–4)

## 2017-10-19 PROCEDURE — 81001 URINALYSIS AUTO W/SCOPE: CPT | Performed by: INTERNAL MEDICINE

## 2017-10-19 PROCEDURE — 82043 UR ALBUMIN QUANTITATIVE: CPT | Performed by: INTERNAL MEDICINE

## 2017-10-19 RX ORDER — LISINOPRIL 2.5 MG/1
2.5 TABLET ORAL DAILY
Qty: 90 TAB | Refills: 3 | Status: SHIPPED | OUTPATIENT
Start: 2017-10-19 | End: 2018-09-04 | Stop reason: SDUPTHER

## 2017-10-19 NOTE — MR AVS SNAPSHOT
Visit Information Date & Time Provider Department Dept. Phone Encounter #  
 10/19/2017 12:45 PM Omar Hooper MD [employer] [phone] [id number] Follow-up Instructions Return in about 1 month (around 11/19/2017) for f/u DM/HTN/lipids. Your Appointments 1/2/2018 12:45 PM  
Office Visit with Tony Rose MD  
[employer] [place] [facility]) Appt Note: 3 month f/u HTN and DM  
 [address]  
  
   
 [address] [facility]  
  
    
  
 8/24/2018 11:00 AM  
Any with Giovanny Patterson MD  
[facility] [place]: 1 year fu  
 [address] [facility] [phone]  
  
   
 [place] [id number] Upcoming Health Maintenance Date Due HEMOGLOBIN A1C Q6M 2/11/2018 EYE EXAM RETINAL OR DILATED Q1 7/25/2018 FOOT EXAM Q1 8/10/2018 MEDICARE YEARLY EXAM 8/11/2018 LIPID PANEL Q1 8/11/2018 MICROALBUMIN Q1 9/25/2018 GLAUCOMA SCREENING Q2Y 7/25/2019 COLONOSCOPY 11/24/2020 DTaP/Tdap/Td series (2 - Td) 8/1/2026 Allergies as of 10/19/2017  Review Complete On: 10/19/2017 By: Tony Rose MD  
 No Known Allergies Current Immunizations  Reviewed on 10/19/2017 Name Date Influenza High Dose Vaccine PF 9/25/2017, 11/5/2015 Influenza Vaccine 11/10/2016 12:00 AM, 9/14/2015, 9/16/2014 12:14 PM, 10/13/2010 Influenza Vaccine PF 1/29/2013  8:12 AM  
 Pneumococcal Conjugate (PCV-13) 9/14/2015 Pneumococcal Polysaccharide (PPSV-23) 8/14/2014 12:00 PM, 10/13/2010 12:00 AM  
  
 Reviewed by Tony Rose MD on 10/19/2017 at  1:05 PM  
You Were Diagnosed With   
  
 Codes Comments Anticoagulation management encounter    -  Primary ICD-10-CM: Z51.81, Z79.01 
ICD-9-CM: V58.83, V58.61 Medication refill     ICD-10-CM: Z76.0 ICD-9-CM: V68.1 Vitals BP Pulse Temp Resp Height(growth percentile) Weight(growth percentile) 113/65 (BP 1 Location: Right arm, BP Patient Position: Sitting) 79 97.3 °F (36.3 °C) (Oral) 20 5' 6\" (1.676 m) 175 lb 12.8 oz (79.7 kg) SpO2 BMI Smoking Status 92% 28.37 kg/m2 Former Smoker Vitals History BMI and BSA Data Body Mass Index Body Surface Area  
 28.37 kg/m 2 1.93 m 2 Preferred Pharmacy Pharmacy Name Phone [address] [place] [address] - [address] [place] [address],[address] [phone] Your Updated Medication List  
  
   
This list is accurate as of: 10/19/17  1:12 PM.  Always use your most recent med list.  
  
  
  
  
 * albuterol 90 mcg/actuation inhaler Commonly known as:  VENTOLIN HFA Take 1-2 puffs every 4-6 hrs prn shortness of breath * albuterol 1.25 mg/3 mL Nebu Commonly known as:  ACCUNEB  
1.25 mg. Blood-Glucose Meter monitoring kit Commonly known as:  Faizan Deluna Check fasting sugars daily before breakfast. DX: 250.02  
  
 ferrous sulfate 325 mg (65 mg iron) tablet Take 1 Tab by mouth two (2) times a day. finasteride 5 mg tablet Commonly known as:  PROSCAR Take 1 Tab by mouth daily. fluticasone-salmeterol 115-21 mcg/actuation inhaler Commonly known as:  ADVAIR HFA Take 2 Puffs by inhalation two (2) times a day. furosemide 20 mg tablet Commonly known as:  LASIX Take 1 Tab by mouth daily. Indications: hypertension  
  
 glucose blood VI test strips strip Commonly known as:  FREESTYLE LITE STRIPS Check fasting sugars daily before breakfast. DX: E11.9 for freestyle lite meter  
  
 insulin glargine 100 unit/mL injection Commonly known as:  LANTUS  
7 Units by SubCUTAneous route nightly. Lancets Misc Check fasting sugars daily before breakfast. DX: E11.9 for freestyle lite meter  
  
 lisinopril 2.5 mg tablet Commonly known as:  Smita Swan  
 Take 1 Tab by mouth daily. metoprolol tartrate 25 mg tablet Commonly known as:  LOPRESSOR Take 1 Tab by mouth two (2) times a day. Nebulizer & Compressor machine 1 Each. omeprazole 40 mg capsule Commonly known as:  PRILOSEC Take 1 Cap by mouth daily. potassium chloride 20 mEq tablet Commonly known as:  KLOR-CON M20 Take 1 Tab by mouth daily. tamsulosin 0.4 mg capsule Commonly known as:  FLOMAX Take 1 Cap by mouth nightly. tiotropium bromide 2.5 mcg/actuation inhaler Commonly known as:  Evlyn Bare Take 2 Puffs by inhalation daily. VITAMIN C 500 mg tablet Generic drug:  ascorbic acid (vitamin C) Take 500 mg by mouth two (2) times a day. warfarin 5 mg tablet Commonly known as:  COUMADIN Take 1 Tab by mouth every evening. * Notice: This list has 2 medication(s) that are the same as other medications prescribed for you. Read the directions carefully, and ask your doctor or other care provider to review them with you. Prescriptions Sent to Pharmacy Refills  
 lisinopril (PRINIVIL, ZESTRIL) 2.5 mg tablet 3 Sig: Take 1 Tab by mouth daily. Class: Normal  
 [facility] [address] Ph #: [phone] Route: Oral  
  
We Performed the Following AMB POC PT/INR [65410 CPT(R)] Follow-up Instructions Return in about 1 month (around 11/19/2017) for f/u DM/HTN/lipids. Patient Instructions 1) continue coumadin 5mg daily pm and recheck in 2 weeks. Call us with the results. 2) Follow-up in 1 month or sooner if worsening symptoms. 3) monitor sebaceous cyst. If inflamed, drainage, pain, let us know. Epidermoid Cyst: Care Instructions Your Care Instructions An epidermoid (say \"gu-pap-HBJ-moyd\") cyst is a lump just under the skin. These cysts can form when a hair follicle becomes blocked.  They are common in acne and may occur on the face, neck, back, and genitals. However, they can form anywhere on the body. These cysts are not cancer and do not lead to cancer. They tend not to hurt, but they can sometimes become swollen and painful. They also may break open (rupture) and cause scarring. These cysts sometimes do not cause problems and may not need treatment. If you have a cyst that is swollen and hurts, your doctor may inject it with a medicine to help it heal. But it is more likely that a painful cyst will need to be removed. Your doctor will give you a shot of numbing medicine and cut into the cyst to drain it or remove it. This makes the symptoms go away. Follow-up care is a key part of your treatment and safety. Be sure to make and go to all appointments, and call your doctor if you are having problems. Its also a good idea to know your test results and keep a list of the medicines you take. How can you care for yourself at home? · Do not squeeze the cyst or poke it with a needle to open it. This can cause swelling, redness, and infection. · Always have a doctor look at any new lumps you get to make sure that they are not serious. When should you call for help? Watch closely for changes in your health, and be sure to contact your doctor if: 
· You have a fever, redness, or swelling after you get a shot of medicine in the cyst. 
· You see or feel a new lump on your skin. Where can you learn more? Go to http://ty-sharyn.info/. Enter W901 in the search box to learn more about \"Epidermoid Cyst: Care Instructions. \" Current as of: October 13, 2016 Content Version: 11.3 © 1185-5526 [employer]. Care instructions adapted under license by [employer] (which disclaims liability or warranty for this information).  If you have questions about a medical condition or this instruction, always ask your healthcare professional. [employer] disclaims any warranty or liability for your use of this information. Introducing [facility]! Roopa Dexter introduces ShopIt patient portal. Now you can access parts of your medical record, email your doctor's office, and request medication refills online. 1. In your internet browser, go to [email or web address] 2. Click on the First Time User? Click Here link in the Sign In box. You will see the New Member Sign Up page. 3. Enter your ShopIt Access Code exactly as it appears below. You will not need to use this code after youve completed the sign-up process. If you do not sign up before the expiration date, you must request a new code. · ShopIt Access Code: [email or web address] Expires: 11/8/2017 11:11 AM 
 
4. Enter the last four digits of your Social Security Number (xxxx) and Date of Birth (mm/dd/yyyy) as indicated and click Submit. You will be taken to the next sign-up page. 5. Create a ShopIt ID. This will be your ShopIt login ID and cannot be changed, so think of one that is secure and easy to remember. 6. Create a ShopIt password. You can change your password at any time. 7. Enter your Password Reset Question and Answer. This can be used at a later time if you forget your password. 8. Enter your e-mail address. You will receive e-mail notification when new information is available in [address] 9. Click Sign Up. You can now view and download portions of your medical record. 10. Click the Download Summary menu link to download a portable copy of your medical information. If you have questions, please visit the Frequently Asked Questions section of the ShopIt website. Remember, ShopIt is NOT to be used for urgent needs. For medical emergencies, dial 911. Now available from your iPhone and Android! Please provide this summary of care documentation to your next provider. Your primary care clinician is listed as Omar Keane. If you have any questions after today's visit, please call [phone].

## 2017-10-19 NOTE — PROGRESS NOTES
Chief Complaint   Patient presents with    Anticoagulation     pt/inr check    Hypertension       HPI:     Ranjana Nelson is a 65 y.o.  male with history of CVA and PE and COPD  here for the above complaint. He is taking coumadin 5mg daily pm and no bleeding, missing dosages or change in diet. He denies any chest pain, shortness of breath, abdominal pain, headaches or dizziness. Knot on lower back area for several months. No drainage or pain, fevers, night sweats. He has some chills. He said it is not getting any bigger. Started pt on lisinopril for proteinuria. Wife who accompanies him said first time he took it, he got shaky, but not anymore. He tolerates it fine.              Past Medical History:   Diagnosis Date    Advance directive discussed with patient     COPD (chronic obstructive pulmonary disease) ([address]     COPD with emphysema ([address]     CVA (cerebrovascular accident) ([address] 7/11/2012    possible    Diabetes mellitus ([address]     Drowsiness     Enlarged prostate     Essential hypertension, benign     Eye exam, routine 07/26/2016    Dr. Eri Carpenter repeat in 1 yr    H/O colonoscopy 11/24/15    Dr. 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 Onesimo Cortez late 1980's     Current Outpatient Prescriptions   Medication Sig    lisinopril (PRINIVIL, ZESTRIL) 2.5 mg tablet Take 1 Tab by mouth daily.  furosemide (LASIX) 20 mg tablet Take 1 Tab by mouth daily. Indications: hypertension    metoprolol tartrate (LOPRESSOR) 25 mg tablet Take 1 Tab by mouth two (2) times a day.  warfarin (COUMADIN) 5 mg tablet Take 1 Tab by mouth every evening.  potassium chloride (KLOR-CON M20) 20 mEq tablet Take 1 Tab by mouth daily.  omeprazole (PRILOSEC) 40 mg capsule Take 1 Cap by mouth daily.  finasteride (PROSCAR) 5 mg tablet Take 1 Tab by mouth daily.  ferrous sulfate 325 mg (65 mg iron) tablet Take 1 Tab by mouth two (2) times a day.  tamsulosin (FLOMAX) 0.4 mg capsule Take 1 Cap by mouth nightly.  insulin glargine (LANTUS) 100 unit/mL injection 7 Units by SubCUTAneous route nightly.  Lancets misc Check fasting sugars daily before breakfast. DX: E11.9 for freestyle lite meter    fluticasone-salmeterol (ADVAIR HFA) 115-21 mcg/actuation inhaler Take 2 Puffs by inhalation two (2) times a day.  Nebulizer & Compressor machine 1 Each.  tiotropium bromide (SPIRIVA RESPIMAT) 2.5 mcg/actuation inhaler Take 2 Puffs by inhalation daily.  albuterol (ACCUNEB) 1.25 mg/3 mL nebu 1.25 mg.    albuterol (VENTOLIN HFA) 90 mcg/actuation inhaler Take 1-2 puffs every 4-6 hrs prn shortness of breath    glucose blood VI test strips (FREESTYLE LITE STRIPS) strip Check fasting sugars daily before breakfast. DX: E11.9 for freestyle lite meter    ascorbic acid (VITAMIN C) 500 mg tablet Take 500 mg by mouth two (2) times a day.  Blood-Glucose Meter (ONE TOUCH ULTRA 2) monitoring kit Check fasting sugars daily before breakfast. DX: 250.02     No current facility-administered medications for this visit.       Health Maintenance   Topic Date Due    HEMOGLOBIN A1C Q6M  02/11/2018    EYE EXAM RETINAL OR DILATED Q1  07/25/2018    FOOT EXAM Q1  08/10/2018    MEDICARE YEARLY EXAM  08/11/2018    LIPID PANEL Q1  08/11/2018    MICROALBUMIN Q1  09/25/2018    GLAUCOMA SCREENING Q2Y  07/25/2019    COLONOSCOPY  11/24/2020    DTaP/Tdap/Td series (2 - Td) 08/01/2026    ZOSTER VACCINE AGE 60>  Addressed    Pneumococcal 65+ Low/Medium Risk  Completed    INFLUENZA AGE 9 TO ADULT  Completed     Immunization History   Administered Date(s) Administered    Influenza High Dose Vaccine PF 11/05/2015, 09/25/2017    Influenza Vaccine 10/13/2010, 09/16/2014, 09/14/2015, 11/10/2016    Influenza Vaccine PF 01/29/2013    Pneumococcal Conjugate (PCV-13) 09/14/2015    Pneumococcal Polysaccharide (PPSV-23) 10/13/2010, 08/14/2014     No LMP for male patient. Allergies and Intolerances:   No Known Allergies    Family History:   Family History   Problem Relation Age of Onset    Cancer Father      he does not know       Social History:   He  reports that he quit smoking about 2 years ago. His smoking use included Cigarettes. He has a 45.00 pack-year smoking history. He has never used smokeless tobacco.  He  reports that he does not drink alcohol. Objective:   Physical exam:   Visit Vitals    /65 (BP 1 Location: Right arm, BP Patient Position: Sitting)    Pulse 79    Temp 97.3 °F (36.3 °C) (Oral)    Resp 20    Ht 5' 6\" (1.676 m)    Wt 175 lb 12.8 oz (79.7 kg)    SpO2 92%  Comment: 3LNC    BMI 28.37 kg/m2        Generally: Pleasant male in no acute distress  Cardiac Exam: regular, rate, and rhythm. Normal S1 and S2. No murmurs, gallops, or rubs  Pulmonary exam: Clear to auscultation bilaterally  Abdominal exam: Positive bowel sounds in all four quadrants, soft, nondistended, nontender  Extremities: 2+ dorsalis pedis pulses bilaterally. No pedal edema    bilaterally  Lower back exam: at L1-L2 area is a 1/2 dollar size that is mobile and no erythema or TTP. No drainage.  Think this is a sebaceous cyst.     LABS/Radiological Tests:  Component      Latest Ref Rng & Units 10/19/2017 9/25/2017          12:55 PM  1:10 PM   VALID INTERNAL CONTROL POC       Yes Yes   Prothrombin time (POC)      seconds 32.0 51.6   INR       2.7 4.3     All lab results  were discussed and reviewed with the patient. ASSESSMENT/PLAN:    1. Sebaceous cyst: not inflamed or infected at this time. He will monitor and If starts draining or getting bigger. He will let us know. He does not want to see a surgeon at this time. 2. Benign hypertension without CHF: stable since adding the lisinopril for proteinuria. 3. Anticoagulation management encounter: stable. Continue the coumadin 5mg daily pm and recheck in 2 weeks. -     AMB POC PT/INR    4. Medication refill  -     lisinopril (PRINIVIL, ZESTRIL) 2.5 mg tablet; Take 1 Tab by mouth daily. 5.   Requested Prescriptions     Signed Prescriptions Disp Refills    lisinopril (PRINIVIL, ZESTRIL) 2.5 mg tablet 90 Tab 3     Sig: Take 1 Tab by mouth daily. 5. Patient verbalized understanding and agreement with the plan. 7. Patient was given an after-visit summary. 8. Follow-up Disposition:  Return in about 1 month (around 11/19/2017) for f/u DM/HTN/lipids. or sooner if worsening symptoms.                 Connie Fong MD

## 2017-10-19 NOTE — PROGRESS NOTES
ROOM # 1    Jose Elias Hopkins presents today for   Chief Complaint   Patient presents with    Anticoagulation     pt/inr check    Hypertension     Requested Prescriptions     Pending Prescriptions Disp Refills    lisinopril (PRINIVIL, ZESTRIL) 2.5 mg tablet 30 Tab 0     Sig: Take 1 Tab by mouth daily. Jose Elias Hopkins preferred language for health care discussion is english/other. Is someone accompanying this pt? Yes, wife    Is the patient using any DME equipment during OV? no    Depression Screening:  PHQ over the last two weeks 9/25/2017 8/10/2017 12/12/2016 8/30/2016 8/16/2016 8/9/2016 8/1/2016   Little interest or pleasure in doing things Not at all Not at all Not at all Not at all Not at all Not at all Not at all   Feeling down, depressed or hopeless Not at all Not at all Not at all Not at all Not at all Not at all Not at all   Total Score PHQ 2 0 0 0 0 0 0 0       Learning Assessment:  Learning Assessment 8/14/2014   PRIMARY LEARNER Patient   HIGHEST LEVEL OF EDUCATION - PRIMARY LEARNER  SOME COLLEGE   BARRIERS PRIMARY LEARNER NONE   PRIMARY LANGUAGE ENGLISH   LEARNER PREFERENCE PRIMARY LISTENING   ANSWERED BY self   RELATIONSHIP SELF       Abuse Screening:  Abuse Screening Questionnaire 8/10/2017 5/12/2015   Do you ever feel afraid of your partner? N N   Are you in a relationship with someone who physically or mentally threatens you? N N   Is it safe for you to go home? Y Y       Fall Risk  Fall Risk Assessment, last 12 mths 10/19/2017 9/25/2017 8/10/2017 12/12/2016 8/30/2016 8/16/2016 8/9/2016   Able to walk? Yes Yes Yes Yes Yes Yes No   Fall in past 12 months? No Yes Yes Yes No No -   Fall with injury? - Yes No Yes - - -   Number of falls in past 12 months - 3 2 3 - - -   Fall Risk Score - 4 2 4 - - -       Health Maintenance reviewed and discussed per provider. Yes      Advance Directive:  1. Do you have an advance directive in place? Patient Reply: no    2.  If not, would you like material regarding how to put one in place? Patient Reply: no    Coordination of Care:  1. Have you been to the ER, urgent care clinic since your last visit? Hospitalized since your last visit? no    2. Have you seen or consulted any other health care providers outside of the [facility] since your last visit? Include any pap smears or colon screening.  no

## 2017-10-19 NOTE — PATIENT INSTRUCTIONS
1) continue coumadin 5mg daily pm and recheck in 2 weeks. Call us with the results. 2) Follow-up in 1 month or sooner if worsening symptoms. 3) monitor sebaceous cyst. If inflamed, drainage, pain, let us know. Epidermoid Cyst: Care Instructions  Your Care Instructions  An epidermoid (say \"js-fwq-ZKT-mai\") cyst is a lump just under the skin. These cysts can form when a hair follicle becomes blocked. They are common in acne and may occur on the face, neck, back, and genitals. However, they can form anywhere on the body. These cysts are not cancer and do not lead to cancer. They tend not to hurt, but they can sometimes become swollen and painful. They also may break open (rupture) and cause scarring. These cysts sometimes do not cause problems and may not need treatment. If you have a cyst that is swollen and hurts, your doctor may inject it with a medicine to help it heal. But it is more likely that a painful cyst will need to be removed. Your doctor will give you a shot of numbing medicine and cut into the cyst to drain it or remove it. This makes the symptoms go away. Follow-up care is a key part of your treatment and safety. Be sure to make and go to all appointments, and call your doctor if you are having problems. Its also a good idea to know your test results and keep a list of the medicines you take. How can you care for yourself at home? · Do not squeeze the cyst or poke it with a needle to open it. This can cause swelling, redness, and infection. · Always have a doctor look at any new lumps you get to make sure that they are not serious. When should you call for help? Watch closely for changes in your health, and be sure to contact your doctor if:  · You have a fever, redness, or swelling after you get a shot of medicine in the cyst.  · You see or feel a new lump on your skin. Where can you learn more? Go to http://ty-sharyn.info/.   Enter P036 in the search box to learn more about \"Epidermoid Cyst: Care Instructions. \"  Current as of: October 13, 2016  Content Version: 11.3  © 9094-3342 [employer]. Care instructions adapted under license by [employer] (which disclaims liability or warranty for this information). If you have questions about a medical condition or this instruction, always ask your healthcare professional. [address] any warranty or liability for your use of this information.

## 2017-10-20 DIAGNOSIS — N39.0 URINARY TRACT INFECTION WITHOUT HEMATURIA, SITE UNSPECIFIED: Primary | ICD-10-CM

## 2017-10-20 LAB
CREAT UR-MCNC: 177.38 MG/DL (ref 30–125)
MICROALBUMIN UR-MCNC: 4.4 MG/DL (ref 0–3)
MICROALBUMIN/CREAT UR-RTO: 25 MG/G (ref 0–30)

## 2017-10-20 RX ORDER — CIPROFLOXACIN 500 MG/1
500 TABLET ORAL 2 TIMES DAILY
Qty: 20 TAB | Refills: 0 | Status: SHIPPED | OUTPATIENT
Start: 2017-10-20 | End: 2017-10-30

## 2017-10-20 NOTE — PROGRESS NOTES
1) Please let pt know that urine shows at UTI. 2) Is he having dysuria, hematuria, f/c/ns, increase urgency/frequency, abd pain, back pain? 3) He needs to come back to get urine ctx. 4) Will send electronically cipro 500mg one po bid x 10 days #20 no refills. This will increase his INR. He needs to check his INR on Tuesday. 5) don't take cipro at the same time as the flomax. \    6) other urine results are pending. Will notify him with results.

## 2017-10-21 ENCOUNTER — TELEPHONE (OUTPATIENT)
Dept: INTERNAL MEDICINE CLINIC | Age: 77
End: 2017-10-21

## 2017-10-21 NOTE — TELEPHONE ENCOUNTER
----- Message from Karly Hawthorne MD sent at 10/20/2017  9:52 AM EDT -----  1) Please let pt know that urine shows at UTI. 2) Is he having dysuria, hematuria, f/c/ns, increase urgency/frequency, abd pain, back pain? 3) He needs to come back to get urine ctx. 4) Will send electronically cipro 500mg one po bid x 10 days #20 no refills. This will increase his INR. He needs to check his INR on Tuesday. 5) don't take cipro at the same time as the flomax. \    6) other urine results are pending. Will notify him with results.

## 2017-10-21 NOTE — TELEPHONE ENCOUNTER
Called patient's wife spoke with her verified with two identifiers advised of lab results and Dr. Lisa Henderson instructions on coming in and giving us some urine so we can send out for culture (order is in system) she stated that she has cups from the lab and will bring it in on Tuesday advised of the new RX Cipro and that when taking this patient must stop his Flomax, and that when he is taking his Cipro his INR will increase so she needs to take his INR reading and call it in on Tuesday. I had wife repeat everything back to me she had a verbal understanding.

## 2017-10-21 NOTE — PROGRESS NOTES
Called patient's wife spoke with her verified with two identifiers advised of lab results and Dr. Leon Little instructions on coming in and giving us some urine so we can send out for culture (order is in system) she stated that she has cups from the lab and will bring it in on Tuesday advised of the new RX Cipro and that when taking this patient must stop his Flomax, and that when he is taking his Cipro his INR will increase so she needs to take his INR reading and call it in on Tuesday. I had wife repeat everything back to me she had a verbal understanding.

## 2017-10-24 ENCOUNTER — TELEPHONE (OUTPATIENT)
Dept: INTERNAL MEDICINE CLINIC | Age: 77
End: 2017-10-24

## 2017-10-25 NOTE — TELEPHONE ENCOUNTER
Attempted to reach patient regarding INR results. No answer; left message for pt to return call to the office at [phone].  Will continue to try to contact patient

## 2017-10-26 NOTE — TELEPHONE ENCOUNTER
Called spoke with wife verified with two identifiers advised per Dr. Jamie Snow to continue with 5mg and to call us back in a week. Wife verbalized understanding.   Closing call

## 2017-11-03 ENCOUNTER — TELEPHONE (OUTPATIENT)
Dept: INTERNAL MEDICINE CLINIC | Age: 77
End: 2017-11-03

## 2017-11-03 NOTE — TELEPHONE ENCOUNTER
Incoming from pts wife. Two patient Identifiers confirmed. She stated pts INR was 2.5 mg daily with pt taking 5 mg daily. She stated pt has finished Cipro and wanted to know if it was ok to get back lux the Flomax. Dr Lea Givens please advise.

## 2017-11-06 NOTE — TELEPHONE ENCOUNTER
Continue 5mg daily pm coumadin and recheck in 1 week. Please call us with the results. Yes, ok to go back on flomax.

## 2017-11-06 NOTE — TELEPHONE ENCOUNTER
Attempted to contact pt at  number, no answer. Lvm for pt to return call to office at [phone]. Will continue to try to contact pt.

## 2017-11-06 NOTE — TELEPHONE ENCOUNTER
Contacted pts wife at [facility] number. Two patient Identifiers confirmed. Advised pts wife per Dr Gamaliel West notes. Pts wife verbalized understanding.

## 2017-11-29 ENCOUNTER — TELEPHONE (OUTPATIENT)
Dept: INTERNAL MEDICINE CLINIC | Age: 77
End: 2017-11-29

## 2017-12-18 DIAGNOSIS — Z76.0 MEDICATION REFILL: ICD-10-CM

## 2017-12-18 DIAGNOSIS — R31.29 MICROHEMATURIA: ICD-10-CM

## 2017-12-18 DIAGNOSIS — Z87.898 HISTORY OF URINARY RETENTION: ICD-10-CM

## 2017-12-18 DIAGNOSIS — N28.1 RENAL CYST, RIGHT: ICD-10-CM

## 2017-12-18 DIAGNOSIS — N40.0 ENLARGED PROSTATE: ICD-10-CM

## 2017-12-18 RX ORDER — LANOLIN ALCOHOL/MO/W.PET/CERES
325 CREAM (GRAM) TOPICAL 2 TIMES DAILY
Qty: 180 TAB | Refills: 3 | Status: SHIPPED | OUTPATIENT
Start: 2017-12-18 | End: 2018-11-26 | Stop reason: SDUPTHER

## 2017-12-18 RX ORDER — FINASTERIDE 5 MG/1
5 TABLET, FILM COATED ORAL DAILY
Qty: 90 TAB | Refills: 3 | Status: SHIPPED | OUTPATIENT
Start: 2017-12-18 | End: 2018-11-26 | Stop reason: SDUPTHER

## 2017-12-18 RX ORDER — WARFARIN SODIUM 5 MG/1
5 TABLET ORAL EVERY EVENING
Qty: 90 TAB | Refills: 3 | Status: SHIPPED | OUTPATIENT
Start: 2017-12-18 | End: 2018-11-26 | Stop reason: SDUPTHER

## 2017-12-18 RX ORDER — OMEPRAZOLE 40 MG/1
40 CAPSULE, DELAYED RELEASE ORAL DAILY
Qty: 90 CAP | Refills: 3 | Status: SHIPPED | OUTPATIENT
Start: 2017-12-18 | End: 2018-11-26 | Stop reason: SDUPTHER

## 2017-12-18 RX ORDER — TAMSULOSIN HYDROCHLORIDE 0.4 MG/1
0.4 CAPSULE ORAL
Qty: 90 CAP | Refills: 3 | Status: SHIPPED | OUTPATIENT
Start: 2017-12-18 | End: 2018-11-06 | Stop reason: SDUPTHER

## 2017-12-18 RX ORDER — POTASSIUM CHLORIDE 20 MEQ/1
20 TABLET, EXTENDED RELEASE ORAL DAILY
Qty: 90 TAB | Refills: 3 | Status: SHIPPED | OUTPATIENT
Start: 2017-12-18 | End: 2018-11-26 | Stop reason: SDUPTHER

## 2017-12-18 RX ORDER — INSULIN GLARGINE 100 [IU]/ML
7 INJECTION, SOLUTION SUBCUTANEOUS
Qty: 3 VIAL | Refills: 3 | Status: SHIPPED | OUTPATIENT
Start: 2017-12-18 | End: 2018-10-11 | Stop reason: CLARIF

## 2017-12-18 NOTE — TELEPHONE ENCOUNTER
Requested Prescriptions     Pending Prescriptions Disp Refills    insulin glargine (LANTUS) 100 unit/mL injection 3 Vial 3     Si Units by SubCUTAneous route nightly.  finasteride (PROSCAR) 5 mg tablet 90 Tab 3     Sig: Take 1 Tab by mouth daily.  omeprazole (PRILOSEC) 40 mg capsule 90 Cap 3     Sig: Take 1 Cap by mouth daily.  potassium chloride (KLOR-CON M20) 20 mEq tablet 90 Tab 3     Sig: Take 1 Tab by mouth daily.  ferrous sulfate 325 mg (65 mg iron) tablet 180 Tab 3     Sig: Take 1 Tab by mouth two (2) times a day.  tamsulosin (FLOMAX) 0.4 mg capsule 90 Cap 3     Sig: Take 1 Cap by mouth nightly.  warfarin (COUMADIN) 5 mg tablet 90 Tab 3     Sig: Take 1 Tab by mouth every evening.

## 2017-12-19 ENCOUNTER — TELEPHONE (OUTPATIENT)
Dept: INTERNAL MEDICINE CLINIC | Age: 77
End: 2017-12-19

## 2018-01-09 ENCOUNTER — TELEPHONE (OUTPATIENT)
Dept: INTERNAL MEDICINE CLINIC | Age: 78
End: 2018-01-09

## 2018-01-09 NOTE — TELEPHONE ENCOUNTER
Just received fax regarding pt's INR on 1/5/18 which was 2.5. We were closed on 1/5/18 due to weather. Called pt and talked to wife. He has been taking 5mg coumadin daily pm and she will check INR tomorrow, 1/10/18 and she will us with the results. She verbalized understanding.

## 2018-01-16 ENCOUNTER — TELEPHONE (OUTPATIENT)
Dept: INTERNAL MEDICINE CLINIC | Age: 78
End: 2018-01-16

## 2018-01-30 ENCOUNTER — TELEPHONE (OUTPATIENT)
Dept: INTERNAL MEDICINE CLINIC | Age: 78
End: 2018-01-30

## 2018-01-30 NOTE — TELEPHONE ENCOUNTER
Patient's wife called in with the Pt.'s INR results. States today's INR is 2.5 and he is currently taking 5mg of coumadin a day. Please contact at [phone] with orders.     Thank you

## 2018-01-30 NOTE — TELEPHONE ENCOUNTER
Contacted pts wife at [facility] number. Two patient Identifiers confirmed. Advised pt per Dr Dayna Melton notes. Pts wife verbalized understanding.

## 2018-02-07 ENCOUNTER — TELEPHONE (OUTPATIENT)
Dept: INTERNAL MEDICINE CLINIC | Age: 78
End: 2018-02-07

## 2018-02-07 NOTE — TELEPHONE ENCOUNTER
Patient's wife returned phone call. She gave an alternative phone number if she can not be contacted at the number listed [phone].

## 2018-02-13 ENCOUNTER — TELEPHONE (OUTPATIENT)
Dept: INTERNAL MEDICINE CLINIC | Age: 78
End: 2018-02-13

## 2018-02-13 NOTE — TELEPHONE ENCOUNTER
Attempted to reach patient regarding inr results. No answer; left message for pt to return call to the office at [phone].  Will continue to try to contact patient

## 2018-02-13 NOTE — TELEPHONE ENCOUNTER
Please let pt know that INR: 2.5. Continue coumadin 5mg daily pm and recheck in 1 week. Give us a call with results. He needs follow-up with me this month for his DM, HTN, lipids--30 minute appt.

## 2018-02-14 NOTE — TELEPHONE ENCOUNTER
2 pt. Identifiers confirmed. Pt's wife notified of below. They will call back to make 30 min OV aptmt. No other questions/concerns at this time.

## 2018-02-22 ENCOUNTER — TELEPHONE (OUTPATIENT)
Dept: INTERNAL MEDICINE CLINIC | Age: 78
End: 2018-02-22

## 2018-02-22 NOTE — TELEPHONE ENCOUNTER
Please let pt know that INR today: 2.5. Continue coumadin 5mg daily pm and recheck in 1 week. Call us with the results. He also needs 30 minute OV with me for f/u DM,HTN, and lipids.

## 2018-02-22 NOTE — TELEPHONE ENCOUNTER
Attempted to contact pt at  number, no answer. Lvm for pt to return call to office at [phone]. Will continue to try to contact pt.

## 2018-02-23 NOTE — TELEPHONE ENCOUNTER
Attempted to reach patient regarding PT INR. No answer; left message for pt to return call to the office at [phone].  Will continue to try to contact patient

## 2018-02-24 NOTE — TELEPHONE ENCOUNTER
Attempted to contact pt at  number, no answer. Lvm for pt to return call to office at [phone]. Will continue to try to contact pt.

## 2018-02-26 NOTE — TELEPHONE ENCOUNTER
2 pt. Identifiers confirmed. Pt. Notified of below. Pt. Is already scheduled to be seen Thursday. No other questions/concerns at this time.

## 2018-03-01 ENCOUNTER — OFFICE VISIT (OUTPATIENT)
Dept: INTERNAL MEDICINE CLINIC | Age: 78
End: 2018-03-01

## 2018-03-01 ENCOUNTER — HOSPITAL ENCOUNTER (OUTPATIENT)
Dept: LAB | Age: 78
Discharge: HOME | End: 2018-03-01
Payer: MEDICARE

## 2018-03-01 VITALS
WEIGHT: 173 LBS | RESPIRATION RATE: 20 BRPM | OXYGEN SATURATION: 90 % | TEMPERATURE: 96.3 F | BODY MASS INDEX: 27.8 KG/M2 | SYSTOLIC BLOOD PRESSURE: 138 MMHG | DIASTOLIC BLOOD PRESSURE: 77 MMHG | HEART RATE: 77 BPM | HEIGHT: 66 IN

## 2018-03-01 DIAGNOSIS — E11.9 DIABETES MELLITUS, STABLE (HCC): ICD-10-CM

## 2018-03-01 DIAGNOSIS — E78.5 DYSLIPIDEMIA: ICD-10-CM

## 2018-03-01 DIAGNOSIS — I10 BENIGN HYPERTENSION WITHOUT CHF: ICD-10-CM

## 2018-03-01 DIAGNOSIS — Z91.81 AT RISK FOR FALLS: ICD-10-CM

## 2018-03-01 DIAGNOSIS — R26.89 DECREASED MOBILITY: ICD-10-CM

## 2018-03-01 DIAGNOSIS — Z74.1 REQUIRES ASSISTANCE WITH ACTIVITIES OF DAILY LIVING (ADL): ICD-10-CM

## 2018-03-01 DIAGNOSIS — D68.9 COAGULATION DEFECT (HCC): ICD-10-CM

## 2018-03-01 DIAGNOSIS — E11.9 DIABETES MELLITUS, STABLE (HCC): Primary | ICD-10-CM

## 2018-03-01 LAB
ALBUMIN SERPL-MCNC: 3.6 G/DL (ref 3.4–5)
ALBUMIN/GLOB SERPL: 1.1 {RATIO} (ref 0.8–1.7)
ALP SERPL-CCNC: 67 U/L (ref 45–117)
ALT SERPL-CCNC: 16 U/L (ref 16–61)
ANION GAP SERPL CALC-SCNC: 8 MMOL/L (ref 3–18)
AST SERPL-CCNC: 16 U/L (ref 15–37)
BILIRUB SERPL-MCNC: 0.5 MG/DL (ref 0.2–1)
BUN SERPL-MCNC: 15 MG/DL (ref 7–18)
BUN/CREAT SERPL: 27 (ref 12–20)
CALCIUM SERPL-MCNC: 8.7 MG/DL (ref 8.5–10.1)
CHLORIDE SERPL-SCNC: 96 MMOL/L (ref 100–108)
CHOLEST SERPL-MCNC: 199 MG/DL
CO2 SERPL-SCNC: 40 MMOL/L (ref 21–32)
CREAT SERPL-MCNC: 0.56 MG/DL (ref 0.6–1.3)
GLOBULIN SER CALC-MCNC: 3.3 G/DL (ref 2–4)
GLUCOSE SERPL-MCNC: 111 MG/DL (ref 74–99)
HBA1C MFR BLD: 5.3 % (ref 4.2–5.6)
HDLC SERPL-MCNC: 79 MG/DL (ref 40–60)
HDLC SERPL: 2.5 {RATIO} (ref 0–5)
INR BLD: 2.4
LDLC SERPL CALC-MCNC: 101.2 MG/DL (ref 0–100)
LIPID PROFILE,FLP: ABNORMAL
POTASSIUM SERPL-SCNC: 4.3 MMOL/L (ref 3.5–5.5)
PROT SERPL-MCNC: 6.9 G/DL (ref 6.4–8.2)
PT POC: 28.4 SECONDS
SODIUM SERPL-SCNC: 144 MMOL/L (ref 136–145)
TRIGL SERPL-MCNC: 94 MG/DL (ref ?–150)
VALID INTERNAL CONTROL?: YES
VLDLC SERPL CALC-MCNC: 18.8 MG/DL

## 2018-03-01 PROCEDURE — 80061 LIPID PANEL: CPT | Performed by: INTERNAL MEDICINE

## 2018-03-01 PROCEDURE — 80053 COMPREHEN METABOLIC PANEL: CPT | Performed by: INTERNAL MEDICINE

## 2018-03-01 PROCEDURE — 36415 COLL VENOUS BLD VENIPUNCTURE: CPT | Performed by: INTERNAL MEDICINE

## 2018-03-01 PROCEDURE — 83036 HEMOGLOBIN GLYCOSYLATED A1C: CPT | Performed by: INTERNAL MEDICINE

## 2018-03-01 NOTE — PROGRESS NOTES
ROOM # 2    Letty Swan presents today for   Chief Complaint   Patient presents with    Anticoagulation     pt/inr check       Letty Swan preferred language for health care discussion is english/other. Is someone accompanying this pt? Yes, wife    Is the patient using any DME equipment during [address]? Yes, [employer]    Depression Screening:  PHQ over the last two weeks 3/1/2018 9/25/2017 8/10/2017 12/12/2016 8/30/2016 8/16/2016 8/9/2016   Little interest or pleasure in doing things Not at all Not at all Not at all Not at all Not at all Not at all Not at all   Feeling down, depressed or hopeless Not at all Not at all Not at all Not at all Not at all Not at all Not at all   Total Score PHQ 2 0 0 0 0 0 0 0       Learning Assessment:  Learning Assessment 8/14/2014   PRIMARY LEARNER Patient   HIGHEST LEVEL OF EDUCATION - PRIMARY LEARNER  SOME COLLEGE   BARRIERS PRIMARY LEARNER NONE   PRIMARY LANGUAGE ENGLISH   LEARNER PREFERENCE PRIMARY LISTENING   ANSWERED BY self   RELATIONSHIP SELF       Abuse Screening:  Abuse Screening Questionnaire 8/10/2017 5/12/2015   Do you ever feel afraid of your partner? N N   Are you in a relationship with someone who physically or mentally threatens you? N N   Is it safe for you to go home? Y Y       Fall Risk  Fall Risk Assessment, last 12 mths 3/1/2018 10/19/2017 9/25/2017 8/10/2017 12/12/2016 8/30/2016 8/16/2016   Able to walk? Yes Yes Yes Yes Yes Yes Yes   Fall in past 12 months? Yes No Yes Yes Yes No No   Fall with injury? Yes - Yes No Yes - -   Number of falls in past 12 months 3 - 3 2 3 - -   Fall Risk Score 4 - 4 2 4 - -       Health Maintenance reviewed and discussed per provider. Yes    Letty Swan is due for A1C. Please order/place referral if appropriate. Advance Directive:  1. Do you have an advance directive in place? Patient Reply: no    2. If not, would you like material regarding how to put one in place? Patient Reply: no    Coordination of Care:  1.  Have you been to the ER, urgent care clinic since your last visit? Hospitalized since your last visit? no    2. Have you seen or consulted any other health care providers outside of the [address] [place] [address] since your last visit? Include any pap smears or colon screening.  no

## 2018-03-01 NOTE — PATIENT INSTRUCTIONS
1) follow-up in 3 months or sooner if worsening symptoms. 2) continue the coumadin 5mg daily pm and recheck in 2 weeks. Call us with results. Benign Paroxysmal Positional Vertigo (BPPV): Care Instructions  Your Care Instructions    Benign paroxysmal positional vertigo, also called BPPV, is an inner ear problem. It causes a spinning or whirling sensation when you move your head. This sensation is called vertigo. The vertigo usually lasts for less than a minute. People often have vertigo spells for a few days or weeks. Then the vertigo goes away. But it may come back again. The vertigo may be mild, or it may be bad enough to cause unsteadiness, nausea, and vomiting. When you move, your inner ear sends messages to the brain. This helps you keep your balance. Vertigo can happen when debris builds up in the inner ear. The buildup can cause the inner ear to send the wrong message to the brain. Your doctor may move you in different positions to help your vertigo get better faster. This is called the Epley maneuver. Your doctor may also prescribe medicines or exercises to help with your symptoms. Follow-up care is a key part of your treatment and safety. Be sure to make and go to all appointments, and call your doctor if you are having problems. It's also a good idea to know your test results and keep a list of the medicines you take. How can you care for yourself at home? · If your doctor suggests that you do Clark-Daroff exercises:  ¨ Sit on the edge of a bed or sofa. Quickly lie down on the side that causes the worst vertigo. Lie on your side with your ear down. ¨ Stay in this position for at least 30 seconds or until the vertigo goes away. ¨ Sit up. If this causes vertigo, wait for it to stop. ¨ Repeat the procedure on the other side. ¨ Repeat this 10 times. Do these exercises 2 times a day until the vertigo is gone. When should you call for help?   Call 911 anytime you think you may need emergency care. For example, call if:  ? · You have symptoms of a stroke. These may include:  ¨ Sudden numbness, tingling, weakness, or loss of movement in your face, arm, or leg, especially on only one side of your body. ¨ Sudden vision changes. ¨ Sudden trouble speaking. ¨ Sudden confusion or trouble understanding simple statements. ¨ Sudden problems with walking or balance. ¨ A sudden, severe headache that is different from past headaches. ?Call your doctor now or seek immediate medical care if:  ? · You have new or worse nausea and vomiting. ? · You have new symptoms such as hearing loss or roaring in your ears. ? Watch closely for changes in your health, and be sure to contact your doctor if:  ? · You are not getting better as expected. ? · Your vertigo gets worse. Where can you learn more? Go to http://ty-sharyn.info/. Enter  in the search box to learn more about \"Benign Paroxysmal Positional Vertigo (BPPV): Care Instructions. \"  Current as of: May 12, 2017  Content Version: 11.4  © 6570-0120 [employer]. Care instructions adapted under license by [employer] (which disclaims liability or warranty for this information). If you have questions about a medical condition or this instruction, always ask your healthcare professional. [place] [address] any warranty or liability for your use of this information. Epley Maneuver for Vertigo: Exercises  Your Care Instructions  The Epley Maneuver is a series of movements your doctor may use to treat your vertigo. Here are the steps for the exercises. Your doctor or physical therapist will guide you through the movements. A single 10- to 15-minute session often is all that's needed. Crystal debris (canaliths) cause the vertigo. When your head is moved into different positions, the debris moves freely. This may cause your symptoms to stop. How to do the exercises  Step 1    1.  You will sit on the doctor's exam table. Your legs will be out in front of you. The doctor or physical therapist will turn your head so that it is correction between looking straight ahead and looking to the side that causes the worst vertigo. 2. Without changing your head position, he or she will guide you back quickly. Your shoulders will be on the table. Your head will hang over the edge of the table. At this point, the side of your head that is causing the worst vertigo will face the floor. You'll stay in this position for 30 seconds or until your symptoms stop. Step 2    1. Then, the doctor or physical therapist will turn your head to the other side. You don't need to lift your head. The other side of your head will face the floor. You will stay in this position for 30 seconds or until your symptoms stop. Step 3    1. The doctor or physical therapist will help you roll your body in the same direction that your head is facing. You will lie on your side. (For example, if you are looking to your right, you will roll onto your right side.) The side that causes the worst symptoms should be facing up. You'll stay in this position for another 30 seconds or until your symptoms stop. Step 4    1. The doctor or physical therapist will then help you to sit back up. Your legs will hang off the table on the same side that you were facing. Follow-up care is a key part of your treatment and safety. Be sure to make and go to all appointments, and call your doctor if you are having problems. It's also a good idea to know your test results and keep a list of the medicines you take. Where can you learn more? Go to http://ty-sharyn.info/. Enter N644 in the search box to learn more about \"Epley Maneuver for Vertigo: Exercises. \"  Current as of: May 12, 2017  Content Version: 11.4  © 1736-0471 [employer].  Care instructions adapted under license by [employer] (which disclaims liability or warranty for this information). If you have questions about a medical condition or this instruction, always ask your healthcare professional. [address] any warranty or liability for your use of this information. Cawthorne Exercises for Vertigo: Care Instructions  Your Care Instructions  Simple exercises can help you regain your balance when you have vertigo. If you have Ménière's disease, benign paroxysmal positional vertigo (BPPV), or another inner ear problem, you may have vertigo off and on. Do these exercises first thing in the morning and before you go to bed. You might get dizzy when you first start them. If this happens, try to do them for at least 5 minutes. Do a group of exercises at a time, starting at the top of the list. It may take several weeks before you can do all the exercises without feeling dizzy. Follow-up care is a key part of your treatment and safety. Be sure to make and go to all appointments, and call your doctor if you are having problems. It's also a good idea to know your test results and keep a list of the medicines you take. How can you care for yourself at home? Exercise 1  While sitting on the side of the bed and holding your head still:  · Look up as far as you can. · Look down as far as you can. · Look from side to side as far as you can. · Stretch your arm straight out in front of you. Focus on your index finger. Continue to focus on your finger while you bring it to your nose. Exercise 2  While sitting on the side of the bed:  · Bring your head as far back as you can. · Bring your head forward to touch your chin to your chest.  · Turn your head from side to side. · Do these exercises first with your eyes open. Then try with your eyes closed. Exercise 3  While sitting on the side of the bed:  · Shrug your shoulders straight upward, then relax them. · Bend over and try to touch the ground with your fingers.  Then go back to a sitting position. · Toss a small ball from one hand to the other. Throw the ball higher than your eyes so you have to look up. Exercise 4  While standing (with someone close by if you feel uncomfortable):  · Repeat Exercise 1.  · Repeat Exercise 2.  · Pass a ball between your legs and above your head. · Sit down and then stand up. Repeat. Turn around in a [place] a different way each time you stand. · With someone close by to help you, try the above exercises with your eyes closed. Exercise 5  In a room that is cleared of obstacles:  · Walk to a corner of the room, turn to your right, and walk back to the starting point. Now, repeat and turn left. · Walk up and down a slope. Now try stairs. · While holding on to someone's arm, try these exercises with your eyes closed. When should you call for help? Watch closely for changes in your health, and be sure to contact your doctor if:  ? · You do not get better as expected. Where can you learn more? Go to http://ty-sharyn.info/. Enter W644 in the search box to learn more about \"Cawthorne Exercises for Vertigo: Care Instructions. \"  Current as of: May 12, 2017  Content Version: 11.4  © 9791-2126 [employer]. Care instructions adapted under license by [employer] (which disclaims liability or warranty for this information). If you have questions about a medical condition or this instruction, always ask your healthcare professional. [place] [address] any warranty or liability for your use of this information.

## 2018-03-01 NOTE — PROGRESS NOTES
Chief Complaint   Patient presents with    Anticoagulation     pt/inr check    Diabetes    Hypertension       HPI:     Bobby Browne is a 65 y.o.  male with history of COPD, type 2 DM and PE  here for the above complaint. He is taking coumadin 5mg daily pm and no bleeding, changes in diet or missing any dosages. He denies any chest pain, shortness of breath, abdominal pain, headaches. He has dizziness when he stands up or moves from side to side. However, he does not exercise much. He does not want medication for this. Given exercises for BPPV. His wife, who accompanied him, said that pt needs help with bathing and PT. She wants him to have [facility] come by the house. Orders in [facility].      Type 2 DM: sugar range: 's      Past Medical History:   Diagnosis Date    Advance directive discussed with patient     COPD (chronic obstructive pulmonary disease) ([address]     COPD with emphysema ([address]     CVA (cerebrovascular accident) ([address] 7/11/2012    possible    Diabetes mellitus ([address]     Drowsiness     Enlarged prostate     Essential hypertension, benign     Eye exam, routine 07/26/2016    Dr. Vini Bautista repeat in 1 yr    H/O colonoscopy 11/24/15    Dr. 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 Bullock Mouse  late [id number]     Current Outpatient Prescriptions   Medication Sig    insulin glargine (LANTUS) 100 unit/mL injection 7 Units by SubCUTAneous route nightly.  finasteride (PROSCAR) 5 mg tablet Take 1 Tab by mouth daily.  omeprazole (PRILOSEC) 40 mg capsule Take 1 Cap by mouth daily.  potassium chloride (KLOR-CON M20) 20 mEq tablet Take 1 Tab by mouth daily.  ferrous sulfate 325 mg (65 mg iron) tablet Take 1 Tab by mouth two (2) times a day.  tamsulosin (FLOMAX) 0.4 mg capsule Take 1 Cap by mouth nightly.  warfarin (COUMADIN) 5 mg tablet Take 1 Tab by mouth every evening.  lisinopril (PRINIVIL, ZESTRIL) 2.5 mg tablet Take 1 Tab by mouth daily.  furosemide (LASIX) 20 mg tablet Take 1 Tab by mouth daily. Indications: hypertension    metoprolol tartrate (LOPRESSOR) 25 mg tablet Take 1 Tab by mouth two (2) times a day.  Lancets misc Check fasting sugars daily before breakfast. DX: E11.9 for freestyle lite meter    fluticasone-salmeterol (ADVAIR HFA) 115-21 mcg/actuation inhaler Take 2 Puffs by inhalation two (2) times a day.  Nebulizer & Compressor machine 1 Each.  tiotropium bromide (SPIRIVA RESPIMAT) 2.5 mcg/actuation inhaler Take 2 Puffs by inhalation daily.  albuterol (ACCUNEB) 1.25 mg/3 mL nebu 1.25 mg.    glucose blood VI test strips (FREESTYLE LITE STRIPS) strip Check fasting sugars daily before breakfast. DX: E11.9 for freestyle lite meter    ascorbic acid (VITAMIN C) 500 mg tablet Take 500 mg by mouth two (2) times a day.  Blood-Glucose Meter (ONE TOUCH ULTRA 2) monitoring kit Check fasting sugars daily before breakfast. DX: 250.02    albuterol (VENTOLIN HFA) 90 mcg/actuation inhaler Take 1-2 puffs every 4-6 hrs prn shortness of breath     No current facility-administered medications for this visit.       Health Maintenance   Topic Date Due    HEMOGLOBIN A1C Q6M  02/11/2018    EYE EXAM RETINAL OR DILATED Q1 07/25/2018    FOOT EXAM Q1  08/10/2018    MEDICARE YEARLY EXAM  08/11/2018    LIPID PANEL Q1  08/11/2018    MICROALBUMIN Q1  10/19/2018    GLAUCOMA SCREENING Q2Y  07/25/2019    COLONOSCOPY  11/24/2020    DTaP/Tdap/Td series (2 - Td) 08/01/2026    ZOSTER VACCINE AGE 60>  Addressed    Pneumococcal 65+ Low/Medium Risk  Completed    Influenza Age 5 to Adult  Completed     Immunization History   Administered Date(s) Administered    Influenza High Dose Vaccine PF 11/05/2015, 09/25/2017    Influenza Vaccine 10/13/2010, 09/16/2014, 09/14/2015, 11/10/2016    Influenza Vaccine PF 01/29/2013    Pneumococcal Conjugate (PCV-13) 09/14/2015    Pneumococcal Polysaccharide (PPSV-23) 10/13/2010, 08/14/2014     No LMP for male patient. Allergies and Intolerances:   No Known Allergies    Family History:   Family History   Problem Relation Age of Onset    Cancer Father      he does not know       Social History:   He  reports that he quit smoking about 2 years ago. His smoking use included Cigarettes. He has a 45.00 pack-year smoking history. He has never used smokeless tobacco.  He  reports that he does not drink alcohol. ·     Objective:   Physical exam:   Visit Vitals    /77 (BP 1 Location: Right arm, BP Patient Position: Sitting)    Pulse 77    Temp 96.3 °F (35.7 °C) (Oral)    Resp 20    Ht 5' 6\" (1.676 m)    Wt 173 lb (78.5 kg)    SpO2 90%  Comment: 3LNC    BMI 27.92 kg/m2        Generally: Pleasant male in no acute distress  Cardiac Exam: regular, rate, and rhythm. Normal S1 and S2. No murmurs, gallops, or rubs  Pulmonary exam: Clear to auscultation bilaterally  Abdominal exam: Positive bowel sounds in all four quadrants, soft, nondistended, nontender  Extremities: 2+ dorsalis pedis pulses bilaterally.  No pedal edema    bilaterally    LABS/Radiological Tests:  Component      Latest Ref Rng & Units 3/1/2018 10/19/2017 10/19/2017          11:45 AM 12:55 PM 10:55 AM   Sodium      136 - 145 mmol/L      Potassium      3.5 - 5.5 mmol/L      Chloride      100 - 108 mmol/L      CO2      21 - 32 mmol/L      Anion gap      3.0 - 18 mmol/L      Glucose      74 - 99 mg/dL      BUN      7.0 - 18 MG/DL      Creatinine      0.6 - 1.3 MG/DL      BUN/Creatinine ratio      12 - 20        GFR est AA      >60 ml/min/1.73m2      GFR est non-AA      >60 ml/min/1.73m2      Calcium      8.5 - 10.1 MG/DL      Bilirubin, total      0.2 - 1.0 MG/DL      ALT (SGPT)      16 - 61 U/L      AST      15 - 37 U/L      Alk. phosphatase      45 - 117 U/L      Protein, total      6.4 - 8.2 g/dL      Albumin      3.4 - 5.0 g/dL      Globulin      2.0 - 4.0 g/dL      A-G Ratio      0.8 - 1.7        Color            Appearance            Specific gravity      1.005 - 1.030        pH (UA)      5.0 - 8.0        Protein      NEG mg/dL      Glucose      NEG mg/dL      Ketone      NEG mg/dL      Bilirubin      NEG        Blood      NEG        Urobilinogen      0.2 - 1.0 EU/dL      Nitrites      NEG        Leukocyte Esterase      NEG        WBC      4.6 - 13.2 K/uL      RBC      4.70 - 5.50 M/uL      HGB      13.0 - 16.0 g/dL      HCT      36.0 - 48.0 %      MCV      74.0 - 97.0 FL      MCH      24.0 - 34.0 PG      MCHC      31.0 - 37.0 g/dL      RDW      11.6 - 14.5 %      PLATELET      535 - 735 K/uL      MPV      9.2 - 11.8 FL      Cholesterol, total      <200 MG/DL      Triglyceride      <150 MG/DL      HDL Cholesterol      40 - 60 MG/DL      LDL, calculated      0 - 100 MG/DL      VLDL, calculated      MG/DL      CHOL/HDL Ratio      0 - 5.0        WBC      0 - 4 /hpf      RBC      0 - 5 /hpf      Epithelial cells      0 - 5 /lpf      Bacteria      NEG /hpf      Amorphous Crystals      NEG      VALID INTERNAL CONTROL POC       Yes Yes    Prothrombin time (POC)      seconds 28.4 32.0    INR       2.4 2.7    Microalbumin,urine random      0 - 3.0 MG/DL   4.40 (H)   Creatinine, urine      30 - 125 mg/dL   177.38 (H)   Microalbumin/Creat. Ratio      0 - 30 mg/g   25   Hemoglobin A1c, (calculated)      4.2 - 5.6 %      Prostate Specific Ag      0.0 - 4.0 ng/mL      TSH      0.36 - 3.74 uIU/mL      PVR      cc      Vitamin B12      211 - 911 pg/mL        Component      Latest Ref Rng & Units 10/19/2017 10/19/2017 9/25/2017          10:55 AM 10:55 AM  1:33 PM   Sodium      136 - 145 mmol/L      Potassium      3.5 - 5.5 mmol/L      Chloride      100 - 108 mmol/L      CO2      21 - 32 mmol/L      Anion gap      3.0 - 18 mmol/L      Glucose      74 - 99 mg/dL      BUN      7.0 - 18 MG/DL      Creatinine      0.6 - 1.3 MG/DL      BUN/Creatinine ratio      12 - 20        GFR est AA      >60 ml/min/1.73m2      GFR est non-AA      >60 ml/min/1.73m2      Calcium      8.5 - 10.1 MG/DL      Bilirubin, total      0.2 - 1.0 MG/DL      ALT (SGPT)      16 - 61 U/L      AST      15 - 37 U/L      Alk.  phosphatase      45 - 117 U/L      Protein, total      6.4 - 8.2 g/dL      Albumin      3.4 - 5.0 g/dL      Globulin      2.0 - 4.0 g/dL      A-G Ratio      0.8 - 1.7        Color        YELLOW    Appearance        CLOUDY    Specific gravity      1.005 - 1.030    1.024    pH (UA)      5.0 - 8.0    7.5    Protein      NEG mg/dL  NEGATIVE    Glucose      NEG mg/dL  NEGATIVE    Ketone      NEG mg/dL  NEGATIVE    Bilirubin      NEG    NEGATIVE    Blood      NEG    NEGATIVE    Urobilinogen      0.2 - 1.0 EU/dL  0.2    Nitrites      NEG    POSITIVE (A)    Leukocyte Esterase      NEG    TRACE (A)    WBC      4.6 - 13.2 K/uL      RBC      4.70 - 5.50 M/uL      HGB      13.0 - 16.0 g/dL      HCT      36.0 - 48.0 %      MCV      74.0 - 97.0 FL      MCH      24.0 - 34.0 PG      MCHC      31.0 - 37.0 g/dL      RDW      11.6 - 14.5 %      PLATELET      526 - 682 K/uL      MPV      9.2 - 11.8 FL      Cholesterol, total      <200 MG/DL      Triglyceride      <150 MG/DL      HDL Cholesterol      40 - 60 MG/DL      LDL, calculated      0 - 100 MG/DL      VLDL, calculated      MG/DL CHOL/HDL Ratio      0 - 5.0        WBC      0 - 4 /hpf 0 to 3     RBC      0 - 5 /hpf 0 to 2     Epithelial cells      0 - 5 /lpf FEW     Bacteria      NEG /hpf 2+ (A)     Amorphous Crystals      NEG 2+ (A)     VALID INTERNAL CONTROL POC            Prothrombin time (POC)      seconds      INR            Microalbumin,urine random      0 - 3.0 MG/DL   6.80 (H)   Creatinine, urine      30 - 125 mg/dL   175.34 (H)   Microalbumin/Creat. Ratio      0 - 30 mg/g   39 (H)   Hemoglobin A1c, (calculated)      4.2 - 5.6 %      Prostate Specific Ag      0.0 - 4.0 ng/mL      TSH      0.36 - 3.74 uIU/mL      PVR      cc      Vitamin B12      211 - 911 pg/mL        Component      Latest Ref Rng & Units 9/25/2017 9/25/2017 9/25/2017 8/24/2017           1:33 PM  1:33 PM  1:10 PM 10:50 AM   Sodium      136 - 145 mmol/L  145     Potassium      3.5 - 5.5 mmol/L       Chloride      100 - 108 mmol/L       CO2      21 - 32 mmol/L       Anion gap      3.0 - 18 mmol/L       Glucose      74 - 99 mg/dL       BUN      7.0 - 18 MG/DL       Creatinine      0.6 - 1.3 MG/DL       BUN/Creatinine ratio      12 - 20         GFR est AA      >60 ml/min/1.73m2       GFR est non-AA      >60 ml/min/1.73m2       Calcium      8.5 - 10.1 MG/DL       Bilirubin, total      0.2 - 1.0 MG/DL       ALT (SGPT)      16 - 61 U/L       AST      15 - 37 U/L       Alk.  phosphatase      45 - 117 U/L       Protein, total      6.4 - 8.2 g/dL       Albumin      3.4 - 5.0 g/dL       Globulin      2.0 - 4.0 g/dL       A-G Ratio      0.8 - 1.7         Color             Appearance             Specific gravity      1.005 - 1.030         pH (UA)      5.0 - 8.0         Protein      NEG mg/dL       Glucose      NEG mg/dL       Ketone      NEG mg/dL       Bilirubin      NEG         Blood      NEG         Urobilinogen      0.2 - 1.0 EU/dL       Nitrites      NEG         Leukocyte Esterase      NEG         WBC      4.6 - 13.2 K/uL       RBC      4.70 - 5.50 M/uL       HGB      13.0 - 16.0 g/dL       HCT      36.0 - 48.0 %       MCV      74.0 - 97.0 FL       MCH      24.0 - 34.0 PG       MCHC      31.0 - 37.0 g/dL       RDW      11.6 - 14.5 %       PLATELET      777 - 811 K/uL       MPV      9.2 - 11.8 FL       Cholesterol, total      <200 MG/DL       Triglyceride      <150 MG/DL       HDL Cholesterol      40 - 60 MG/DL       LDL, calculated      0 - 100 MG/DL       VLDL, calculated      MG/DL       CHOL/HDL Ratio      0 - 5.0         WBC      0 - 4 /hpf       RBC      0 - 5 /hpf       Epithelial cells      0 - 5 /lpf       Bacteria      NEG /hpf       Amorphous Crystals      NEG       VALID INTERNAL CONTROL POC         Yes    Prothrombin time (POC)      seconds   51.6    INR         4.3    Microalbumin,urine random      0 - 3.0 MG/DL       Creatinine, urine      30 - 125 mg/dL       Microalbumin/Creat. Ratio      0 - 30 mg/g       Hemoglobin A1c, (calculated)      4.2 - 5.6 %       Prostate Specific Ag      0.0 - 4.0 ng/mL       TSH      0.36 - 3.74 uIU/mL       PVR      cc    158   Vitamin B12      211 - 911 pg/mL 520        Component      Latest Ref Rng & Units 8/11/2017 8/11/2017 8/11/2017 8/11/2017          10:55 AM 10:55 AM 10:55 AM 10:55 AM   Sodium      136 - 145 mmol/L   143    Potassium      3.5 - 5.5 mmol/L   3.7    Chloride      100 - 108 mmol/L   97 (L)    CO2      21 - 32 mmol/L   39 (H)    Anion gap      3.0 - 18 mmol/L   7    Glucose      74 - 99 mg/dL   99    BUN      7.0 - 18 MG/DL   14    Creatinine      0.6 - 1.3 MG/DL   0.65    BUN/Creatinine ratio      12 - 20     22 (H)    GFR est AA      >60 ml/min/1.73m2   >60    GFR est non-AA      >60 ml/min/1.73m2   >60    Calcium      8.5 - 10.1 MG/DL   8.7    Bilirubin, total      0.2 - 1.0 MG/DL   0.6    ALT (SGPT)      16 - 61 U/L   19    AST      15 - 37 U/L   15    Alk.  phosphatase      45 - 117 U/L   69    Protein, total      6.4 - 8.2 g/dL   6.6    Albumin      3.4 - 5.0 g/dL   3.5    Globulin      2.0 - 4.0 g/dL   3.1    A-G Ratio      0.8 - 1.7     1.1    Color             Appearance             Specific gravity      1.005 - 1.030         pH (UA)      5.0 - 8.0         Protein      NEG mg/dL       Glucose      NEG mg/dL       Ketone      NEG mg/dL       Bilirubin      NEG         Blood      NEG         Urobilinogen      0.2 - 1.0 EU/dL       Nitrites      NEG         Leukocyte Esterase      NEG         WBC      4.6 - 13.2 K/uL       RBC      4.70 - 5.50 M/uL       HGB      13.0 - 16.0 g/dL       HCT      36.0 - 48.0 %       MCV      74.0 - 97.0 FL       MCH      24.0 - 34.0 PG       MCHC      31.0 - 37.0 g/dL       RDW      11.6 - 14.5 %       PLATELET      144 - 125 K/uL       MPV      9.2 - 11.8 FL       Cholesterol, total      <200 MG/DL    202 (H)   Triglyceride      <150 MG/DL    90   HDL Cholesterol      40 - 60 MG/DL    80 (H)   LDL, calculated      0 - 100 MG/DL    104 (H)   VLDL, calculated      MG/DL    18   CHOL/HDL Ratio      0 - 5.0      2.5   WBC      0 - 4 /hpf       RBC      0 - 5 /hpf       Epithelial cells      0 - 5 /lpf       Bacteria      NEG /hpf       Amorphous Crystals      NEG       VALID INTERNAL CONTROL POC             Prothrombin time (POC)      seconds       INR             Microalbumin,urine random      0 - 3.0 MG/DL       Creatinine, urine      30 - 125 mg/dL       Microalbumin/Creat.  Ratio      0 - 30 mg/g       Hemoglobin A1c, (calculated)      4.2 - 5.6 %       Prostate Specific Ag      0.0 - 4.0 ng/mL 1.1      TSH      0.36 - 3.74 uIU/mL  1.38     PVR      cc       Vitamin B12      211 - 911 pg/mL         Component      Latest Ref Rng & Units 8/11/2017 8/11/2017 8/10/2017          10:55 AM 10:55 AM 11:11 AM   Sodium      136 - 145 mmol/L      Potassium      3.5 - 5.5 mmol/L      Chloride      100 - 108 mmol/L      CO2      21 - 32 mmol/L      Anion gap      3.0 - 18 mmol/L      Glucose      74 - 99 mg/dL      BUN      7.0 - 18 MG/DL      Creatinine      0.6 - 1.3 MG/DL      BUN/Creatinine ratio 12 - 20        GFR est AA      >60 ml/min/1.73m2      GFR est non-AA      >60 ml/min/1.73m2      Calcium      8.5 - 10.1 MG/DL      Bilirubin, total      0.2 - 1.0 MG/DL      ALT (SGPT)      16 - 61 U/L      AST      15 - 37 U/L      Alk. phosphatase      45 - 117 U/L      Protein, total      6.4 - 8.2 g/dL      Albumin      3.4 - 5.0 g/dL      Globulin      2.0 - 4.0 g/dL      A-G Ratio      0.8 - 1.7        Color            Appearance            Specific gravity      1.005 - 1.030        pH (UA)      5.0 - 8.0        Protein      NEG mg/dL      Glucose      NEG mg/dL      Ketone      NEG mg/dL      Bilirubin      NEG        Blood      NEG        Urobilinogen      0.2 - 1.0 EU/dL      Nitrites      NEG        Leukocyte Esterase      NEG        WBC      4.6 - 13.2 K/uL  6.7    RBC      4.70 - 5.50 M/uL  3.92 (L)    HGB      13.0 - 16.0 g/dL  12.3 (L)    HCT      36.0 - 48.0 %  41.3    MCV      74.0 - 97.0 FL  105.4 (H)    MCH      24.0 - 34.0 PG  31.4    MCHC      31.0 - 37.0 g/dL  29.8 (L)    RDW      11.6 - 14.5 %  13.1    PLATELET      593 - 315 K/uL  185    MPV      9.2 - 11.8 FL  11.9 (H)    Cholesterol, total      <200 MG/DL      Triglyceride      <150 MG/DL      HDL Cholesterol      40 - 60 MG/DL      LDL, calculated      0 - 100 MG/DL      VLDL, calculated      MG/DL      CHOL/HDL Ratio      0 - 5.0        WBC      0 - 4 /hpf      RBC      0 - 5 /hpf      Epithelial cells      0 - 5 /lpf      Bacteria      NEG /hpf      Amorphous Crystals      NEG      VALID INTERNAL CONTROL POC         Yes   Prothrombin time (POC)      seconds   41.4   INR         3.4   Microalbumin,urine random      0 - 3.0 MG/DL      Creatinine, urine      30 - 125 mg/dL      Microalbumin/Creat.  Ratio      0 - 30 mg/g      Hemoglobin A1c, (calculated)      4.2 - 5.6 % 6.1 (H)     Prostate Specific Ag      0.0 - 4.0 ng/mL      TSH      0.36 - 3.74 uIU/mL      PVR      cc      Vitamin B12      211 - 911 pg/mL        Previous labs  Pt notified of INR results at [address] today, 3/1/18. ASSESSMENT/PLAN:    1. Diabetes mellitus, stable ([address]): we will see what the labs show. Continue diet, exercise and lantus.  -     HEMOGLOBIN A1C W/O EAG; Future    2. Dyslipidemia: we will see what the labs show. Continue diet and exercise.   -     LIPID PANEL; Future  -     METABOLIC PANEL, COMPREHENSIVE; Future    3. Benign hypertension without CHF: stable. Continue the lopressor, lisinopril, diet and exercise. 4. Coagulation defect (HCC):stable. Continue the coumadin 5mg daily pm and recheck in 2 weeks. He will call us with the results. -     AMB POC PT/INR    5. Requires assistance with activities of daily living (ADL)  -     [address]    6. Decreased mobility  -     REFERRAL TO HOME HEALTH    7. At risk for falls  -     [address]      8. Patient verbalized understanding and agreement with the plan. 9. Patient was given an after-visit summary. 10. Follow-up Disposition:  Return in about 4 months (around 6/28/2018) for f/u DM/HTN/lipids. or sooner if worsening symptoms.                 Arnie Young MD

## 2018-03-01 NOTE — MR AVS SNAPSHOT
[address] [place] [address] 
 
 
 [address] [facility] [address] 
[phone] Patient: Ketan Hargrove MRN: [id number] Elizabeth Metzger Visit Information Date & Time Provider Department Dept. Phone Encounter #  
 3/1/2018 11:15 AM Dennis Client Nae Mckay MD [facility] [phone] [id number] Follow-up Instructions Return in about 4 months (around 6/28/2018) for f/u DM/HTN/lipids. 8/24/2018 11:00 AM  
Any with Inessa Soto MD  
[facility] Appt Note: 1 year fu  
 [address] [place] [address]  
[phone]  
  
   
 [place] [id number] Upcoming Health Maintenance Date Due HEMOGLOBIN A1C Q6M 2/11/2018 EYE EXAM RETINAL OR DILATED Q1 7/25/2018 FOOT EXAM Q1 8/10/2018 MEDICARE YEARLY EXAM 8/11/2018 LIPID PANEL Q1 8/11/2018 MICROALBUMIN Q1 10/19/2018 GLAUCOMA SCREENING Q2Y 7/25/2019 COLONOSCOPY 11/24/2020 DTaP/Tdap/Td series (2 - Td) 8/1/2026 Allergies as of 3/1/2018  Review Complete On: 3/1/2018 By: Lord Emre MD  
 No Known Allergies Current Immunizations  Reviewed on 11/21/2017 Name Date Influenza High Dose Vaccine PF 9/25/2017, 11/5/2015 Influenza Vaccine 11/10/2016 12:00 AM, 9/14/2015, 9/16/2014 12:14 PM, 10/13/2010 Influenza Vaccine PF 1/29/2013  8:12 AM  
 Pneumococcal Conjugate (PCV-13) 9/14/2015 Pneumococcal Polysaccharide (PPSV-23) 8/14/2014 12:00 PM, 10/13/2010 12:00 AM  
  
 Not reviewed this visit You Were Diagnosed With   
  
 Codes Comments Coagulation defect ([facility]    -  Primary ICD-10-CM: M59.8 ICD-9-CM: 286.9 Diabetes mellitus, stable ([facility]     ICD-10-CM: E11.9 ICD-9-CM: 250.00 Dyslipidemia     ICD-10-CM: E78.5 ICD-9-CM: 272.4 Benign hypertension without CHF     ICD-10-CM: I10 
ICD-9-CM: 401.1 Requires assistance with activities of daily living (ADL)     ICD-10-CM: Z74.1 ICD-9-CM: V60.89 Decreased mobility     ICD-10-CM: R26.89 
ICD-9-CM: 781.99 At risk for falls     ICD-10-CM: Z91.81 
ICD-9-CM: V15.88 Vitals BP Pulse Temp Resp Height(growth percentile) Weight(growth percentile) 138/77 (BP 1 Location: Right arm, BP Patient Position: Sitting) 77 96.3 °F (35.7 °C) (Oral) 20 5' 6\" (1.676 m) 173 lb (78.5 kg) SpO2 BMI Smoking Status 90% 27.92 kg/m2 Former Smoker BMI and BSA Data Body Mass Index Body Surface Area  
 27.92 kg/m 2 1.91 m 2 Preferred Pharmacy Pharmacy Name Phone [facility] [address] [place] [address] - [address] [place] [address],[address] [phone] Your Updated Medication List  
  
   
This list is accurate as of 3/1/18 11:53 AM.  Always use your most recent med list.  
  
  
  
  
 * albuterol 90 mcg/actuation inhaler Commonly known as:  VENTOLIN HFA Take 1-2 puffs every 4-6 hrs prn shortness of breath * albuterol 1.25 mg/3 mL Nebu Commonly known as:  ACCUNEB  
1.25 mg. Blood-Glucose Meter monitoring kit Commonly known as:  Malissa Cleveland Check fasting sugars daily before breakfast. DX: 250.02  
  
 ferrous sulfate 325 mg (65 mg iron) tablet Take 1 Tab by mouth two (2) times a day. finasteride 5 mg tablet Commonly known as:  PROSCAR Take 1 Tab by mouth daily. fluticasone-salmeterol 115-21 mcg/actuation inhaler Commonly known as:  ADVAIR HFA Take 2 Puffs by inhalation two (2) times a day. furosemide 20 mg tablet Commonly known as:  LASIX Take 1 Tab by mouth daily. Indications: hypertension  
  
 glucose blood VI test strips strip Commonly known as:  FREESTYLE LITE STRIPS Check fasting sugars daily before breakfast. DX: E11.9 for freestyle lite meter  
  
 insulin glargine 100 unit/mL injection Commonly known as:  LANTUS U-100 INSULIN  
 7 Units by SubCUTAneous route nightly. Lancets Misc Check fasting sugars daily before breakfast. DX: E11.9 for freestyle lite meter  
  
 lisinopril 2.5 mg tablet Commonly known as:  Luetta Manzanilla Take 1 Tab by mouth daily. metoprolol tartrate 25 mg tablet Commonly known as:  LOPRESSOR Take 1 Tab by mouth two (2) times a day. Nebulizer & Compressor machine 1 Each. omeprazole 40 mg capsule Commonly known as:  PRILOSEC Take 1 Cap by mouth daily. potassium chloride 20 mEq tablet Commonly known as:  KLOR-CON M20 Take 1 Tab by mouth daily. tamsulosin 0.4 mg capsule Commonly known as:  FLOMAX Take 1 Cap by mouth nightly. tiotropium bromide 2.5 mcg/actuation inhaler Commonly known as:  Emily Parham Take 2 Puffs by inhalation daily. VITAMIN C 500 mg tablet Generic drug:  ascorbic acid (vitamin C) Take 500 mg by mouth two (2) times a day. warfarin 5 mg tablet Commonly known as:  COUMADIN Take 1 Tab by mouth every evening. * Notice: This list has 2 medication(s) that are the same as other medications prescribed for you. Read the directions carefully, and ask your doctor or other care provider to review them with you. We Performed the Following AMB POC PT/INR [46719 CPT(R)] [address] Comments:  
 Please evaluate for assistance with ADL's and possible PT/OT in 1 week with Jorge Nuñez. Follow-up Instructions Return in about 4 months (around 6/28/2018) for f/u DM/HTN/lipids. To-Do List   
 03/01/2018 Lab:  HEMOGLOBIN A1C W/O EAG   
  
 03/01/2018 Lab:  LIPID PANEL   
  
 03/01/2018 Lab:  METABOLIC PANEL, COMPREHENSIVE Referral Information Referral ID Referred By Referred To  
  
 [id number] Justin NICHOLS Not Available Visits Status Start Date End Date 1 New Request 3/1/18 3/1/19 If your referral has a status of pending review or denied, additional information will be sent to support the outcome of this decision. Patient Instructions 1) follow-up in 3 months or sooner if worsening symptoms. 2) continue the coumadin 5mg daily pm and recheck in 2 weeks. Call us with results. Benign Paroxysmal Positional Vertigo (BPPV): Care Instructions Your Care Instructions Benign paroxysmal positional vertigo, also called BPPV, is an inner ear problem. It causes a spinning or whirling sensation when you move your head. This sensation is called vertigo. The vertigo usually lasts for less than a minute. People often have vertigo spells for a few days or weeks. Then the vertigo goes away. But it may come back again. The vertigo may be mild, or it may be bad enough to cause unsteadiness, nausea, and vomiting. When you move, your inner ear sends messages to the brain. This helps you keep your balance. Vertigo can happen when debris builds up in the inner ear. The buildup can cause the inner ear to send the wrong message to the brain. Your doctor may move you in different positions to help your vertigo get better faster. This is called the Epley maneuver. Your doctor may also prescribe medicines or exercises to help with your symptoms. Follow-up care is a key part of your treatment and safety. Be sure to make and go to all appointments, and call your doctor if you are having problems. It's also a good idea to know your test results and keep a list of the medicines you take. How can you care for yourself at home? · If your doctor suggests that you do Clark-Daroff exercises: ¨ Sit on the edge of a bed or sofa. Quickly lie down on the side that causes the worst vertigo. Lie on your side with your ear down. ¨ Stay in this position for at least 30 seconds or until the vertigo goes away. ¨ Sit up. If this causes vertigo, wait for it to stop. ¨ Repeat the procedure on the other side. ¨ Repeat this 10 times. Do these exercises 2 times a day until the vertigo is gone. When should you call for help? Call 911 anytime you think you may need emergency care. For example, call if: 
? · You have symptoms of a stroke. These may include: 
¨ Sudden numbness, tingling, weakness, or loss of movement in your face, arm, or leg, especially on only one side of your body. ¨ Sudden vision changes. ¨ Sudden trouble speaking. ¨ Sudden confusion or trouble understanding simple statements. ¨ Sudden problems with walking or balance. ¨ A sudden, severe headache that is different from past headaches. ?Call your doctor now or seek immediate medical care if: 
? · You have new or worse nausea and vomiting. ? · You have new symptoms such as hearing loss or roaring in your ears. ? Watch closely for changes in your health, and be sure to contact your doctor if: 
? · You are not getting better as expected. ? · Your vertigo gets worse. Where can you learn more? Go to http://ty-sharyn.info/. Enter  in the search box to learn more about \"Benign Paroxysmal Positional Vertigo (BPPV): Care Instructions. \" Current as of: May 12, 2017 Content Version: 11.4 © 2246-6539 Tripvi. Care instructions adapted under license by [employer]ate (which disclaims liability or warranty for this information). If you have questions about a medical condition or this instruction, always ask your healthcare professional. [place] [address] any warranty or liability for your use of this information. Epley Maneuver for Vertigo: Exercises Your Care Instructions The Epley Maneuver is a series of movements your doctor may use to treat your vertigo. Here are the steps for the exercises. Your doctor or physical therapist will guide you through the movements. A single 10- to 15-minute session often is all that's needed. Crystal debris (canaliths) cause the vertigo. When your head is moved into different positions, the debris moves freely. This may cause your symptoms to stop. How to do the exercises Step 1 
 
1. You will sit on the doctor's exam table. Your legs will be out in front of you. The doctor or physical therapist will turn your head so that it is detention between looking straight ahead and looking to the side that causes the worst vertigo. 2. Without changing your head position, he or she will guide you back quickly. Your shoulders will be on the table. Your head will hang over the edge of the table. At this point, the side of your head that is causing the worst vertigo will face the floor. You'll stay in this position for 30 seconds or until your symptoms stop. Step 2 1. Then, the doctor or physical therapist will turn your head to the other side. You don't need to lift your head. The other side of your head will face the floor. You will stay in this position for 30 seconds or until your symptoms stop. Step 3 1. The doctor or physical therapist will help you roll your body in the same direction that your head is facing. You will lie on your side. (For example, if you are looking to your right, you will roll onto your right side.) The side that causes the worst symptoms should be facing up. You'll stay in this position for another 30 seconds or until your symptoms stop. Step 4 
 
1. The doctor or physical therapist will then help you to sit back up. Your legs will hang off the table on the same side that you were facing. Follow-up care is a key part of your treatment and safety. Be sure to make and go to all appointments, and call your doctor if you are having problems. It's also a good idea to know your test results and keep a list of the medicines you take. Where can you learn more? Go to http://ty-sharyn.info/. Enter R675 in the search box to learn more about \"Epley Maneuver for Vertigo: Exercises. \" 
 Current as of: May 12, 2017 Content Version: 11.4 © 6605-0130 Bux180. Care instructions adapted under license by [employer] (which disclaims liability or warranty for this information). If you have questions about a medical condition or this instruction, always ask your healthcare professional. [address] any warranty or liability for your use of this information. Cawthorne Exercises for Vertigo: Care Instructions Your Care Instructions Simple exercises can help you regain your balance when you have vertigo. If you have Ménière's disease, benign paroxysmal positional vertigo (BPPV), or another inner ear problem, you may have vertigo off and on. Do these exercises first thing in the morning and before you go to bed. You might get dizzy when you first start them. If this happens, try to do them for at least 5 minutes. Do a group of exercises at a time, starting at the top of the list. It may take several weeks before you can do all the exercises without feeling dizzy. Follow-up care is a key part of your treatment and safety. Be sure to make and go to all appointments, and call your doctor if you are having problems. It's also a good idea to know your test results and keep a list of the medicines you take. How can you care for yourself at home? Exercise 1 While sitting on the side of the bed and holding your head still: 
· Look up as far as you can. · Look down as far as you can. · Look from side to side as far as you can. · Stretch your arm straight out in front of you. Focus on your index finger. Continue to focus on your finger while you bring it to your nose. Exercise 2 While sitting on the side of the bed: · Bring your head as far back as you can. · Bring your head forward to touch your chin to your chest. 
· Turn your head from side to side. · Do these exercises first with your eyes open. Then try with your eyes closed.  
Exercise 3 
 While sitting on the side of the bed: · Shrug your shoulders straight upward, then relax them. · Bend over and try to touch the ground with your fingers. Then go back to a sitting position. · Toss a small ball from one hand to the other. Throw the ball higher than your eyes so you have to look up. Exercise 4 While standing (with someone close by if you feel uncomfortable): 
· Repeat Exercise 1. 
· Repeat Exercise 2. 
· Pass a ball between your legs and above your head. · Sit down and then stand up. Repeat. Turn around in a [place] a different way each time you stand. · With someone close by to help you, try the above exercises with your eyes closed. Exercise 5 In a room that is cleared of obstacles: 
· Walk to a corner of the room, turn to your right, and walk back to the starting point. Now, repeat and turn left. · Walk up and down a slope. Now try stairs. · While holding on to someone's arm, try these exercises with your eyes closed. When should you call for help? Watch closely for changes in your health, and be sure to contact your doctor if: 
? · You do not get better as expected. Where can you learn more? Go to http://ty-sharyn.info/. Enter G426 in the search box to learn more about \"Cawthorne Exercises for Vertigo: Care Instructions. \" Current as of: May 12, 2017 Content Version: 11.4 © 3257-4337 SEA. Care instructions adapted under license by Miradia (which disclaims liability or warranty for this information). If you have questions about a medical condition or this instruction, always ask your healthcare professional. [address] any warranty or liability for your use of this information. Introducing [facility]! Samantha Means introduces [employer] patient portal. Now you can access parts of your medical record, email your doctor's office, and request medication refills online. 1. In your internet browser, go to [email or web address] 2. Click on the First Time User? Click Here link in the Sign In box. You will see the New Member Sign Up page. 3. Enter your Invisible Access Code exactly as it appears below. You will not need to use this code after youve completed the sign-up process. If you do not sign up before the expiration date, you must request a new code. · Invisible Access Code: [email or web address] Expires: 5/30/2018 11:49 AM 
 
4. Enter the last four digits of your Social Security Number (xxxx) and Date of Birth (mm/dd/yyyy) as indicated and click Submit. You will be taken to the next sign-up page. 5. Create a DVDPlayt ID. This will be your Invisible login ID and cannot be changed, so think of one that is secure and easy to remember. 6. Create a Invisible password. You can change your password at any time. 7. Enter your Password Reset Question and Answer. This can be used at a later time if you forget your password. 8. Enter your e-mail address. You will receive e-mail notification when new information is available in [address] 9. Click Sign Up. You can now view and download portions of your medical record. 10. Click the Download Summary menu link to download a portable copy of your medical information. If you have questions, please visit the Frequently Asked Questions section of the Invisible website. Remember, Invisible is NOT to be used for urgent needs. For medical emergencies, dial 911. Now available from your iPhone and Android! Please provide this summary of care documentation to your next provider. Your primary care clinician is listed as Omar Keane. If you have any questions after today's visit, please call [phone].

## 2018-03-14 ENCOUNTER — TELEPHONE (OUTPATIENT)
Dept: INTERNAL MEDICINE CLINIC | Age: 78
End: 2018-03-14

## 2018-03-14 NOTE — TELEPHONE ENCOUNTER
Attempted to contact pt at cell number, no answer. Lvm for pt to return call to office at [phone]. Will continue to try to contact pt.

## 2018-03-14 NOTE — TELEPHONE ENCOUNTER
Please let pt know that just got INR results from 3/13/18. INR: 2.4. Continue the coumadin 5mg daily pm and recheck in 2 weeks. Please call us with the results.

## 2018-03-14 NOTE — TELEPHONE ENCOUNTER
Contacted pts wife at [facility] number. Two patient Identifiers confirmed. Advised pt per Dr Geoff Can notes. Pts wife verbalized understanding.

## 2018-03-27 ENCOUNTER — TELEPHONE (OUTPATIENT)
Dept: INTERNAL MEDICINE CLINIC | Age: 78
End: 2018-03-27

## 2018-03-27 NOTE — TELEPHONE ENCOUNTER
2 pt. Identifiers confirmed. S/W pt's wife about below. Pt. Was on O2 while walking c PT, but she thinks the O2 tank may not have been working properly when he was up. She thinks it is working properly now, and PT rechecked before they left. She believes his O2 sat was approx 98%. She denies pt. Has any CP, SOB, dizziness at this time and states that he is eating. No other questions/concerns at this time.

## 2018-03-27 NOTE — TELEPHONE ENCOUNTER
Gail from [address] [facility] called to inform you that the patient oxygen dropped to 83% with walking 30 feet

## 2018-04-02 ENCOUNTER — TELEPHONE (OUTPATIENT)
Dept: INTERNAL MEDICINE CLINIC | Age: 78
End: 2018-04-02

## 2018-04-02 NOTE — TELEPHONE ENCOUNTER
Attempted to reach patient regarding pt inr results. No answer; left message for pt to return call to the office at [phone].  Will continue to try to contact patient

## 2018-04-03 NOTE — TELEPHONE ENCOUNTER
Contacted pts wife at number. Two patient Identifiers confirmed. Advised pt per Dr Deloris Le notes. Pts wife verbalized understanding.

## 2018-04-17 ENCOUNTER — TELEPHONE (OUTPATIENT)
Dept: INTERNAL MEDICINE CLINIC | Age: 78
End: 2018-04-17

## 2018-04-17 DIAGNOSIS — J44.9 CHRONIC OBSTRUCTIVE PULMONARY DISEASE, UNSPECIFIED COPD TYPE (HCC): Primary | ICD-10-CM

## 2018-04-17 NOTE — TELEPHONE ENCOUNTER
Incoming from from [place]. Two patient Identifiers confirmed. She stated is currently on 3L 02 NC. She stated  while sitting 02 was 96-99 and when walking drops into the low 80 around 82. She stated pt is scheduled for PT x 2 a week and OT x 2 a week. She stated while pt walked approx 30 feet with 02 NC  levels began to fall. Inquired if he was seeing pulmonary. She stated his wife stated he has not seen  pulmonology in a while and she does not remember the name of the provider. Please advise.

## 2018-04-19 ENCOUNTER — TELEPHONE (OUTPATIENT)
Dept: INTERNAL MEDICINE CLINIC | Age: 78
End: 2018-04-19

## 2018-04-19 NOTE — TELEPHONE ENCOUNTER
Gail with [facility] called to let you know the patient's pulse ox dropped during ambulation.   States it went from 98% to 84%, this is while patient is on 3lts ofO2

## 2018-04-20 NOTE — TELEPHONE ENCOUNTER
Clearence Severe at number. Two patient Identifiers confirmed. Advised pt per Dr Alhaji Hannah notes regarding referral and increasing 02. Chandra Vazquez verbalized understanding.

## 2018-05-01 ENCOUNTER — TELEPHONE (OUTPATIENT)
Dept: INTERNAL MEDICINE CLINIC | Age: 78
End: 2018-05-01

## 2018-05-01 NOTE — TELEPHONE ENCOUNTER
Contacted pts wife at number. Two patient Identifiers confirmed. Advised pt per Dr Jose Ramon Torres notes. Pts wife verbalized understanding and stated that she will probably take it next week because the INR at home company gives them \" a hard time when they don't use it weekly and she does not want to have them take it. \"

## 2018-05-01 NOTE — TELEPHONE ENCOUNTER
Contacted pts wife  at [facility] number. Two patient Identifiers confirmed. Per pts wife INR was 2.7. Pt taking 5 mg coumadin daily. Please advise.

## 2018-05-08 ENCOUNTER — TELEPHONE (OUTPATIENT)
Dept: INTERNAL MEDICINE CLINIC | Age: 78
End: 2018-05-08

## 2018-05-08 NOTE — TELEPHONE ENCOUNTER
Unsuccessful attempt to reach [facility] for below. Left message for her to call back at her earliest convenience.

## 2018-05-08 NOTE — TELEPHONE ENCOUNTER
2 pt. Identifiers confirmed. Per. [place] nurse, pt. taking Lasix 20mg daily, lisinopril 2.5mg daily, metoprolol 25mg BID and his BP has been about 100/70's for last 2 weeks. Pt. is mostly asymptomatic but a little more tired. [place] nurse wanting to notify provider and ask if changes are necessary. Please advise.

## 2018-05-09 ENCOUNTER — TELEPHONE (OUTPATIENT)
Dept: INTERNAL MEDICINE CLINIC | Age: 78
End: 2018-05-09

## 2018-05-09 NOTE — TELEPHONE ENCOUNTER
Attempted to contact [facility] at number, no answer. Lvm on secure line per notes Dr Edy Wong  And advised to contact office at [phone]  if further clarification was needed.

## 2018-05-09 NOTE — TELEPHONE ENCOUNTER
Contacted pts wife at [facility] number. Two patient Identifiers confirmed. Advised pt per Dr Pilar Anderson notes. She stated HH comes on Tuesdays and maybe Fridays but she is not for sure. She stated they do not have a BP machine at home. Advised pts wife to record pts BP reading and advise via phone. Pts wife verbalized understanding.

## 2018-05-14 NOTE — TELEPHONE ENCOUNTER
Contacted Lexi at [facility]. Two patient Identifiers confirmed. Advised pt per Dr Cachorro Patricio notes to take metoprolol 1/2 tab bid. Binh Mojica  verbalized understanding.

## 2018-05-17 ENCOUNTER — TELEPHONE (OUTPATIENT)
Dept: INTERNAL MEDICINE CLINIC | Age: 78
End: 2018-05-17

## 2018-05-17 NOTE — TELEPHONE ENCOUNTER
Gail with [facility] called to let you know the patient's O2 dropped to 82%  while exercising. States is went back up to 99% not long after he was done with therapy.

## 2018-05-22 ENCOUNTER — PATIENT OUTREACH (OUTPATIENT)
Dept: INTERNAL MEDICINE CLINIC | Age: 78
End: 2018-05-22

## 2018-05-22 RX ORDER — FLUTICASONE FUROATE AND VILANTEROL 100; 25 UG/1; UG/1
1 POWDER RESPIRATORY (INHALATION) DAILY
COMMUNITY
Start: 2018-05-21 | End: 2018-11-06 | Stop reason: SDUPTHER

## 2018-05-22 RX ORDER — PREDNISONE 10 MG/1
TABLET ORAL DAILY
COMMUNITY
Start: 2018-05-21 | End: 2018-06-05

## 2018-05-22 RX ORDER — IPRATROPIUM BROMIDE AND ALBUTEROL SULFATE 2.5; .5 MG/3ML; MG/3ML
3 SOLUTION RESPIRATORY (INHALATION) EVERY 4 HOURS
COMMUNITY
Start: 2018-05-21 | End: 2018-06-06 | Stop reason: SDUPTHER

## 2018-05-22 RX ORDER — AZITHROMYCIN 500 MG/1
500 TABLET, FILM COATED ORAL DAILY
COMMUNITY
Start: 2018-05-21 | End: 2018-05-25

## 2018-05-22 NOTE — PROGRESS NOTES
Chronic Obstructive Pulmonary Disease Initial Follow Up Call    Dayne Morales RN contacted Alicja Andres by telephone to perform COPD Transitions of Care. I spoke with the wife , Ms. Lalito Ken . She was able to verify name and  as identifiers. Noted Priorities/Plan:  Diabetes Monitoring while on Prednisone, COPD education       Are you using a rescue inhaler? Type Albuterol every 4 hours, also has Duo-Neb to be used every 4 hours  Zone:(Pt Reported)  green   Provider Notified: no      Goals      Identification of barriers to adherence to a plan of care such as inability to afford medications, lack of insurance, lack of transportation, etc.      Patient  and wife verbalizes understanding of self -management goals of living with Diabetes.  Prevent complications post hospitalization. Needs addressed from pathway:   24-48 Hours/Initial   Review/Verification:    ? Identify care team   ? Disposition (Home, SNF, IRF LTC, assisted, Hospice)  ? DC Instructions, Education, and COPD Zones  ? Chin Kapadia in place. [facility])  (ML with [facility])  ? Evaluate DME needs; arrange home equipment   ? **portable tanks to get to appointments  ? Advanced care planning (e.g.: Palliative Care; Hospice - Need to do  ? Type of monitoring  ? Labs/Diagnostics to follow  ? Follow-up appts are arranged-  ? Spirometry Testing   ? Identify smoking status,  ? SSecond hand smoke exposure  ? Occupational exposure-   ? Radon, asbestos, etc.. ?             ? Identify transportation    Med Rec*   ? ISA  ? LORRIE  ? LAMA  ? LABA  ? Steroids- rinse mouth  ? ICS  ? LTOT  ? Antibiotics    Baseline Labs*  ? ABG  ? BMP  ? AAt  ? Sputum Culture      TATIANA Documentation:  ? Goals  ? Challenges/Plan  ? Symptoms      Education Disease Management:    ? Comorbidity Management  ? SHERRIE- CPAP/ BiPAP  ?  no diagnosis see if need to perform STOPBANG  ? Advance medical directive status   ? Cornet/ Flute   ? IS  ?  TCRS- DB  ? Abdominal/ purse lip breathing    Advanced Therapy:    ? Need for Non-invasive ventilator support? ? Vest    Surgery:  ? Lung Volume Reduction Surgery (LVRS)  ? Bronchoscopic Lung Volume Reduction (BLVR)  ? Bullectomy  ? Transplant         Pulmonologist consult while IP: yes    Palliative consult while IP:    no       Pulmonologist within 7 business days PCP within 14 business days   Name    Dr. Caroline Barrera   F/u date 5/24/18 5/31/18   Time 1030am 230pm       COPD Assessment Test (CAT)    I never cough 0 1 2 3 4 5 I cough all the time   I have no phelgm (mucus) 0 1 2 3 4 5 My chest is completely full of phelgm (mucus)   My chest does not feel tight at all    0 1 2 3 4 5 My chest feels tight   When I walk up a hill or one flight of stairs I am not breathless   0 1 2 3 4 5 When I walk up a hill or one flight of stairs I am very breathless   I am not limited doing any activities at home   0 1 2 3 4 5 I am very limited doing activities at home    I am confident leaving my home despite my condition  0 1 2 3 4 5 I am not at all confident leaving my home because of my lung condition   I sleep soundly  0 1 2 3 4 5 I don't sleep soundly because of my lung condition   I have lots of energy   0 1 2 3 4 5 I have no energy at all     TOTAL:                 GOLD class- 1, 2, 3, 4  FEV1  ? GOLD 1(mild)=FEV1 ?80%   predicted  ? GOLD 2 (Moderate)=50% ? FEV 1< 80%  predicted  ? GOLD 3(severe)= 30% ? FEV1 < 50% predicted  ?  GOLD 4 (very severe)=<30% predicted      COPD Medications: ISA; LORRIE; LAMA; LABA; ICS; PDE4 ; Macrolides ; O2    Group LABA LAMA LORRIE  prn ISA  prn LABA+ICS LAMA+ICS LABA+LAMA LABA+LAMA+ICS PDE-4 Inhibitor  Chronic bronchitis, FEV1<50% predicted   A.       x x        B       X x   X     C    x X  x  X     D  x     X     X X         Disposition of patient:  Home, Home Health      Services:  [address] and Contact information: [address] [facility]    Transportation: car - wife drives    DME: O2:  nebulizer: CPAP;   DME Company and Contact Information: not sure    Social support: wife and grandson    Does patient have an Advance Directive:  not on file     Advance Directive scanned into patients chart:  no    Patient reminded that there are physicians on call 24 hours a day / 7 days a week (M-F 5pm to 8am and from Friday 5pm until Monday 8a for the weekend) should the patient have questions or concerns. Patient reminded to call 911 if situation is emergent or patient feels the situation is emergent. Pt verbalizes understanding. \

## 2018-05-23 ENCOUNTER — TELEPHONE (OUTPATIENT)
Dept: INTERNAL MEDICINE CLINIC | Age: 78
End: 2018-05-23

## 2018-05-23 NOTE — TELEPHONE ENCOUNTER
Patient called in results INR 2.0 blood sugar: 292 was taken yesterday 05/22 Today 05/23 blood sugar: 512 call if is any questions

## 2018-05-23 NOTE — TELEPHONE ENCOUNTER
Attempted to contact pt at  number, no answer. Lvm for pt to return call to office at [phone]. Will continue to try to contact pt.        Re: To confirm BS and advised to go to ER if BS did read 512 this am

## 2018-05-23 NOTE — TELEPHONE ENCOUNTER
Contacted pt at [facility] number. Two patient Identifiers confirmed. Advised  Pt needed to go to ER. Pts wife stated pt had not c/o any issues and wanted to take BS while on the phone. Pts  BS while on the phone was 144. She stated BS this am was after he had eaten breakfast. Advised I would forward message but if pts BS was elevated in that range again they should immediately go to ER for evaluation. Advised per verbal Dr Errol Waddell to continue same dosage and recheck INR in one week. She verbalized understanding.

## 2018-05-24 ENCOUNTER — OFFICE VISIT (OUTPATIENT)
Dept: PULMONOLOGY | Facility: CLINIC | Age: 78
End: 2018-05-24

## 2018-05-24 VITALS
BODY MASS INDEX: 27.8 KG/M2 | HEART RATE: 90 BPM | SYSTOLIC BLOOD PRESSURE: 126 MMHG | WEIGHT: 173 LBS | DIASTOLIC BLOOD PRESSURE: 84 MMHG | OXYGEN SATURATION: 91 % | HEIGHT: 66 IN | RESPIRATION RATE: 16 BRPM

## 2018-05-24 DIAGNOSIS — J44.9 CHRONIC OBSTRUCTIVE PULMONARY DISEASE, UNSPECIFIED COPD TYPE (HCC): Primary | ICD-10-CM

## 2018-05-24 RX ORDER — AZITHROMYCIN 250 MG/1
TABLET, FILM COATED ORAL
Qty: 12 TAB | Refills: 2 | Status: SHIPPED | OUTPATIENT
Start: 2018-05-24 | End: 2018-05-29 | Stop reason: SDUPTHER

## 2018-05-24 NOTE — MR AVS SNAPSHOT
[facility] 
 
 
 [address] [id number] 
[phone] Patient: Samuel Taylor MRN: [id number] Caroline as Visit Information Date & Time Provider Department Dept. Phone Encounter #  
 5/24/2018 10:30 AM Savannah Borden MD [facility] [phone] Follow-up Instructions Return in about 4 weeks (around 6/21/2018). Your Appointments 6/21/2018  1:00 PM  
Follow Up with MD Eryn Hawkppard [facility] at [facility]) Appt Note: 4 week f/u from 5/24/18  
 [address] [facility]  
  
   
 [address]  
  
    
 7/3/2018 11:15 AM  
Office Visit with Jomar Hua MD  
[facility] ([facility]) Appt Note: 4 month f/u HTN/DM/lipids [address]  
  
   
 [address]  
  
    
  
 5/31/2018  2:30 PM  
TRANSITIONAL CARE MANAGEMENT with Jim Loyola MD  
[facility]) Appt Note: post hosp [facility] for copd [address]  
  
   
 [address]  
  
    
 8/21/2018  1:30 PM  
Any with Sun Olmedo MD  
[facility]) Appt Note: 1 year fu; Dr. Wilfrid Lombard template has changed - letter sent to pt w. new appt details [address] [place] [address]  
[phone]  
  
   
 [place] [id number] Upcoming Health Maintenance Date Due  
 EYE EXAM RETINAL OR DILATED Q1 7/25/2018 Influenza Age 5 to Adult 8/1/2018 FOOT EXAM Q1 8/10/2018 MEDICARE YEARLY EXAM 8/11/2018 HEMOGLOBIN A1C Q6M 9/1/2018 MICROALBUMIN Q1 10/19/2018 LIPID PANEL Q1 3/1/2019 GLAUCOMA SCREENING Q2Y 7/25/2019 COLONOSCOPY 11/24/2020 DTaP/Tdap/Td series (2 - Td) 8/1/2026 Allergies as of 5/24/2018  Review Complete On: 5/24/2018 By: Margo Crabtree MD  
 No Known Allergies Current Immunizations  Reviewed on 11/21/2017 Name Date Influenza High Dose Vaccine PF 9/25/2017, 11/5/2015 Influenza Vaccine 11/10/2016 12:00 AM, 9/14/2015, 9/16/2014 12:14 PM, 10/13/2010 Influenza Vaccine PF 1/29/2013  8:12 AM  
 Pneumococcal Conjugate (PCV-13) 9/14/2015 Pneumococcal Polysaccharide (PPSV-23) 8/14/2014 12:00 PM, 10/13/2010 12:00 AM  
  
 Not reviewed this visit You Were Diagnosed With   
  
 Codes Comments Chronic obstructive pulmonary disease, unspecified COPD type ([facility]    -  Primary ICD-10-CM: J44.9 ICD-9-CM: 110 Vitals BP Pulse Resp Height(growth percentile) Weight(growth percentile) SpO2  
 126/84 (BP 1 Location: Left arm, BP Patient Position: Sitting) 90 16 5' 6\" (1.676 m) 173 lb (78.5 kg) 91% BMI Smoking Status 27.92 kg/m2 Former Smoker Vitals History BMI and BSA Data Body Mass Index Body Surface Area  
 27.92 kg/m 2 1.91 m 2 Preferred Pharmacy Pharmacy Name Phone [facility] [address] [place] [address],[address] [phone] Your Updated Medication List  
  
   
This list is accurate as of 5/24/18 11:25 AM.  Always use your most recent med list.  
  
  
  
  
 albuterol 90 mcg/actuation inhaler Commonly known as:  VENTOLIN HFA Take 1-2 puffs every 4-6 hrs prn shortness of breath  
  
 albuterol-ipratropium 2.5 mg-0.5 mg/3 ml Nebu Commonly known as:  DUO-NEB  
3 mL by Nebulization route every four (4) hours. * azithromycin 500 mg Tab Commonly known as:  Ely Caller Take 500 mg by mouth daily. * azithromycin 250 mg tablet Commonly known as:  Ely Caller One tablet Mon/Wed/Fri  Indications: COPD  
  
 Blood-Glucose Meter monitoring kit Commonly known as:  Linda Barnard Check fasting sugars daily before breakfast. DX: 250.02  
  
 ferrous sulfate 325 mg (65 mg iron) tablet Take 1 Tab by mouth two (2) times a day. finasteride 5 mg tablet Commonly known as:  PROSCAR Take 1 Tab by mouth daily. fluticasone-vilanterol 100-25 mcg/dose inhaler Commonly known as:  BREO ELLIPTA Take 1 Puff by inhalation daily. furosemide 20 mg tablet Commonly known as:  LASIX Take 1 Tab by mouth daily. Indications: hypertension  
  
 glucose blood VI test strips strip Commonly known as:  FREESTYLE LITE STRIPS Check fasting sugars daily before breakfast. DX: E11.9 for freestyle lite meter  
  
 insulin glargine 100 unit/mL injection Commonly known as:  LANTUS U-100 INSULIN  
7 Units by SubCUTAneous route nightly. Lancets Misc Check fasting sugars daily before breakfast. DX: E11.9 for freestyle lite meter  
  
 lisinopril 2.5 mg tablet Commonly known as:  Velora Arvind Take 1 Tab by mouth daily. metoprolol tartrate 25 mg tablet Commonly known as:  LOPRESSOR Take 1 Tab by mouth two (2) times a day. Nebulizer & Compressor machine 1 Each. omeprazole 40 mg capsule Commonly known as:  PRILOSEC Take 1 Cap by mouth daily. potassium chloride 20 mEq tablet Commonly known as:  KLOR-CON M20 Take 1 Tab by mouth daily. predniSONE 10 mg tablet Commonly known as:  Mary Hilda Take  by mouth daily. 6 tabs x 2 days, 5 tabs x 2 days, 4 tabs x 2 days, 3 tabs x 2 days, 2 tabs x 2 days, 1 tab x 2 days. 1/2 tab x 2 days. Divided dosing. tamsulosin 0.4 mg capsule Commonly known as:  FLOMAX Take 1 Cap by mouth nightly. tiotropium bromide 2.5 mcg/actuation inhaler Commonly known as:  Herbert Rain Take 2 Puffs by inhalation daily. VITAMIN C 500 mg tablet Generic drug:  ascorbic acid (vitamin C) Take 500 mg by mouth two (2) times a day. warfarin 5 mg tablet Commonly known as:  COUMADIN Take 1 Tab by mouth every evening. * Notice: This list has 2 medication(s) that are the same as other medications prescribed for you. Read the directions carefully, and ask your doctor or other care provider to review them with you. Prescriptions Printed Refills  
 azithromycin (ZITHROMAX) 250 mg tablet 2 Sig: One tablet Mon/Wed/Fri  Indications: COPD Class: Print We Performed the Following REFERRAL TO PULMONARY REHAB [[id number] Custom] Follow-up Instructions Return in about 4 weeks (around 6/21/2018). Referral Information Referral ID Referred By Referred To  
  
 [id number] Mejia Juarez Not Available Visits Status Start Date End Date 1 New Request 5/24/18 5/24/19 If your referral has a status of pending review or denied, additional information will be sent to support the outcome of this decision. Patient Instructions Learning About Saving Energy When You Have a Chronic Condition Introduction Everyday tasks can be tiring when you have COPD, heart failure, or another long-term (chronic) condition. You may feel at times that you've lost your ability to live your life. But learning to conserve, or save, your energy can help you be less tired. Conserving your energy means finding ways of doing daily activities with as little effort as possible. With some small changes in the way you do things, you can get your tasks done more easily. Some treatments are available that might help. Pulmonary rehabilitation can teach you ways to breathe easier. Cardiac rehabilitation can help make your heart stronger. You also may want to see an occupational or physical therapist. The therapist can give you more tips on building strength and moving with less effort. What can you do to conserve your energy? Planning · Make a list of what you have to do every day. Group the tasks by location. · Do all the chores in one part of your house around the same time. · Go out for errands or do chores at the time of day when you have the most energy. · Plan rest periods into your day. Getting things done · Sit down as often as you can when you get dressed, do chores, or cook. · Use a cart with wheels to roll items, such as laundry, from one room to another. · Push or slide boxes or other large items instead of lifting them. Reaching and bending · Put things you use the most on shelves that are at the level of your waist or shoulder. · Use long-handled grabbers or other tools to reach items on a high shelf or to  things off the floor. Use long-handled dusters when you clean the house. · Use a raised toilet seat to avoid bending too far to sit or stand up. Eating · Eat several small meals instead of three larger meals. · If you get too tired to eat much, try to choose healthy foods that have more calories. Have a yogurt-and-fruit smoothie for breakfast. Put avocado on a sandwich. Or add cheese or peanut butter to snacks. · If you don't feel very hungry, try to eat first and drink water or other fluids later, after a meal. This can help keep you from losing weight. Sip small amounts of fluids if you need to drink while you eat. Having sex · Choose the time of day when you have more energy. · A gxxc-mz-gegw position for sex can be less tiring. Sometimes you may want to focus more on caressing. Watch closely for changes in your health, and be sure to contact your doctor if you have any problems. Where can you learn more? Go to http://ty-sharyn.info/. Enter H190 in the search box to learn more about \"Learning About Saving Energy When You Have a Chronic Condition. \" Current as of: May 12, 2017 Content Version: 11.4 © 0090-3819 Healthwise, Incorporated.  Care instructions adapted under license by [address] (which disclaims liability or warranty for this information). If you have questions about a medical condition or this instruction, always ask your healthcare professional. [address] any warranty or liability for your use of this information. Learning About COPD Triggers What are triggers? When you have COPD (chronic obstructive pulmonary disease), certain things can make your symptoms worse. These are called triggers. They include: · Cigarette smoke or air pollution. · Illnesses like colds, flu, or pneumonia. · Cleaning supplies or other chemicals. · Gases, particles, or fumes from wood or kerosene home heaters. Not all people have the same triggers. What may cause symptoms in one person may not be a problem for another person. How do triggers affect COPD? Triggers can make it harder for your lungs to work as they should and can lead to sudden difficulty breathing and other symptoms. When you are around a trigger, a COPD flare-up is more likely. If your symptoms are severe, you may need emergency treatment or have to go to the hospital for treatment. If you know what your triggers are and can avoid them, you can reduce how often you have flare-ups and how much COPD affects your life. It's also important to be active and to take your daily medicines as prescribed. This helps prevent flare-ups too. What can you do to avoid triggers? The first thing is to know your triggers. When you are having symptoms, note the things around you that might be causing them. Then look for patterns in what may be triggering your symptoms. When you have your list of possible triggers, work with your doctor to find ways to avoid them. Here are some ways to avoid a few common triggers. · Do not smoke or allow others to smoke around you. If you need help quitting, talk to your doctor about stop-smoking programs and medicines. These can increase your chances of quitting for good. · If there is a lot of pollution, pollen, or dust outside, stay at home and keep your windows closed. Use an air conditioner or air filter in your home. Check your local weather report or newspaper for air quality and pollen reports. · Get a flu vaccine every year. Talk to your doctor about getting a pneumococcal shot. Wash your hands often to prevent infections. How can you manage a flare-up? Do not panic if you start to have a COPD flare-up. · If you have a COPD action plan, follow the plan. In general: ¨ Use your quick-relief inhaler as directed by your doctor. If your symptoms do not get better after you use your medicine, have someone take you to the emergency room. Call an ambulance if needed. ¨ Use a spacer with your metered-dose inhaler (MDI). If you have a nebulizer for inhaled medicine, use it. A spacer or nebulizer may help get more medicine to your lungs. ¨ If your doctor has given you other inhaled medicines or steroid pills, take them as directed. ¨ If your doctor has given you a prescription for an antibiotic, fill it if you need to. ¨ Call your doctor if you have to use your antibiotic or steroid pills. Where can you learn more? Go to http://ty-sharyn.info/. Enter L703 in the search box to learn more about \"Learning About COPD Triggers. \" Current as of: May 12, 2017 Content Version: 11.4 © 3939-9342 [employer]. Care instructions adapted under license by [employer] (which disclaims liability or warranty for this information). If you have questions about a medical condition or this instruction, always ask your healthcare professional. [place] [address] any warranty or liability for your use of this information. Introducing [facility]!    
 Jossie Gomez introduces [employer] patient portal. Now you can access parts of your medical record, email your doctor's office, and request medication refills online. 1. In your internet browser, go to [email or web address]. [employer] 2. Click on the First Time User? Click Here link in the Sign In box. You will see the New Member Sign Up page. 3. Enter your Advanced Telemetry Access Code exactly as it appears below. You will not need to use this code after youve completed the sign-up process. If you do not sign up before the expiration date, you must request a new code. · Advanced Telemetry Access Code: [email or web address] Expires: 5/30/2018 12:49 PM 
 
4. Enter the last four digits of your Social Security Number (xxxx) and Date of Birth (mm/dd/yyyy) as indicated and click Submit. You will be taken to the next sign-up page. 5. Create a Advanced Telemetry ID. This will be your Advanced Telemetry login ID and cannot be changed, so think of one that is secure and easy to remember. 6. Create a Advanced Telemetry password. You can change your password at any time. 7. Enter your Password Reset Question and Answer. This can be used at a later time if you forget your password. 8. Enter your e-mail address. You will receive e-mail notification when new information is available in [address] 9. Click Sign Up. You can now view and download portions of your medical record. 10. Click the Download Summary menu link to download a portable copy of your medical information. If you have questions, please visit the Frequently Asked Questions section of the Advanced Telemetry website. Remember, Advanced Telemetry is NOT to be used for urgent needs. For medical emergencies, dial 911. Now available from your iPhone and Android! Please provide this summary of care documentation to your next provider. Your primary care clinician is listed as Omar Keane. If you have any questions after today's visit, please call [phone].

## 2018-05-24 NOTE — PATIENT INSTRUCTIONS
Learning About Saving Energy When You Have a Chronic Condition  Introduction    Everyday tasks can be tiring when you have COPD, heart failure, or another long-term (chronic) condition. You may feel at times that you've lost your ability to live your life. But learning to conserve, or save, your energy can help you be less tired. Conserving your energy means finding ways of doing daily activities with as little effort as possible. With some small changes in the way you do things, you can get your tasks done more easily. Some treatments are available that might help. Pulmonary rehabilitation can teach you ways to breathe easier. Cardiac rehabilitation can help make your heart stronger. You also may want to see an occupational or physical therapist. The therapist can give you more tips on building strength and moving with less effort. What can you do to conserve your energy? Planning  · Make a list of what you have to do every day. Group the tasks by location. · Do all the chores in one part of your house around the same time. · Go out for errands or do chores at the time of day when you have the most energy. · Plan rest periods into your day. Getting things done  · Sit down as often as you can when you get dressed, do chores, or cook. · Use a cart with wheels to roll items, such as laundry, from one room to another. · Push or slide boxes or other large items instead of lifting them. Reaching and bending  · Put things you use the most on shelves that are at the level of your waist or shoulder. · Use long-handled grabbers or other tools to reach items on a high shelf or to  things off the floor. Use long-handled dusters when you clean the house. · Use a raised toilet seat to avoid bending too far to sit or stand up. Eating  · Eat several small meals instead of three larger meals. · If you get too tired to eat much, try to choose healthy foods that have more calories.  Have a yogurt-and-fruit smoothie for breakfast. Put avocado on a sandwich. Or add cheese or peanut butter to snacks. · If you don't feel very hungry, try to eat first and drink water or other fluids later, after a meal. This can help keep you from losing weight. Sip small amounts of fluids if you need to drink while you eat. Having sex  · Choose the time of day when you have more energy. · A [id number] position for sex can be less tiring. Sometimes you may want to focus more on caressing. Watch closely for changes in your health, and be sure to contact your doctor if you have any problems. Where can you learn more? Go to http://ty-sharyn.info/. Enter H190 in the search box to learn more about \"Learning About Saving Energy When You Have a Chronic Condition. \"  Current as of: May 12, 2017  Content Version: 11.4  © 8927-5528 [employer]. Care instructions adapted under license by [employer] (which disclaims liability or warranty for this information). If you have questions about a medical condition or this instruction, always ask your healthcare professional. [place] [address] any warranty or liability for your use of this information. Learning About COPD Triggers  What are triggers? When you have COPD (chronic obstructive pulmonary disease), certain things can make your symptoms worse. These are called triggers. They include:  · Cigarette smoke or air pollution. · Illnesses like colds, flu, or pneumonia. · Cleaning supplies or other chemicals. · Gases, particles, or fumes from wood or kerosene home heaters. Not all people have the same triggers. What may cause symptoms in one person may not be a problem for another person. How do triggers affect COPD? Triggers can make it harder for your lungs to work as they should and can lead to sudden difficulty breathing and other symptoms. When you are around a trigger, a COPD flare-up is more likely.  If your symptoms are severe, you may need emergency treatment or have to go to the hospital for treatment. If you know what your triggers are and can avoid them, you can reduce how often you have flare-ups and how much COPD affects your life. It's also important to be active and to take your daily medicines as prescribed. This helps prevent flare-ups too. What can you do to avoid triggers? The first thing is to know your triggers. When you are having symptoms, note the things around you that might be causing them. Then look for patterns in what may be triggering your symptoms. When you have your list of possible triggers, work with your doctor to find ways to avoid them. Here are some ways to avoid a few common triggers. · Do not smoke or allow others to smoke around you. If you need help quitting, talk to your doctor about stop-smoking programs and medicines. These can increase your chances of quitting for good. · If there is a lot of pollution, pollen, or dust outside, stay at home and keep your windows closed. Use an air conditioner or air filter in your home. Check your local weather report or newspaper for air quality and pollen reports. · Get a flu vaccine every year. Talk to your doctor about getting a pneumococcal shot. Wash your hands often to prevent infections. How can you manage a flare-up? Do not panic if you start to have a COPD flare-up. · If you have a COPD action plan, follow the plan. In general:  ¨ Use your quick-relief inhaler as directed by your doctor. If your symptoms do not get better after you use your medicine, have someone take you to the emergency room. Call an ambulance if needed. ¨ Use a spacer with your metered-dose inhaler (MDI). If you have a nebulizer for inhaled medicine, use it. A spacer or nebulizer may help get more medicine to your lungs. ¨ If your doctor has given you other inhaled medicines or steroid pills, take them as directed.   ¨ If your doctor has given you a prescription for an antibiotic, fill it if you need to. ¨ Call your doctor if you have to use your antibiotic or steroid pills. Where can you learn more? Go to http://ty-sharyn.info/. Enter B470 in the search box to learn more about \"Learning About COPD Triggers. \"  Current as of: May 12, 2017  Content Version: 11.4  © 4166-0169 [employer]. Care instructions adapted under license by [employer] (which disclaims liability or warranty for this information). If you have questions about a medical condition or this instruction, always ask your healthcare professional. [place] [address] any warranty or liability for your use of this information.

## 2018-05-24 NOTE — PROGRESS NOTES
Chief Complaint   Patient presents with    Cough     patient states that this past weekend he had an episode of shortness of breath and went to the ER. He has a history of COPD and is on 3L of oxygen via nasul canula. Patient reports that for about 1-2 weeks he has been having a non-productive cough. 1. Have you been to the ER, urgent care clinic since your last visit? Hospitalized since your last visit? No    2. Have you seen or consulted any other health care providers outside of the [address] [place] [address] since your last visit? Include any pap smears or colon screening.  No

## 2018-05-24 NOTE — PROGRESS NOTES
[facility]  Pulmonary, Critical Care, and Sleep Medicine      Pulmonary Office Initial Consultation    Name: Marion Stauffer     : 1940     Date: 2018        Subjective:   Patient has been referred for evaluation of: COPD. Patient is a 65 y.o. male that started smoking around age 12. He stopped smoking about 1 year ago. Over those some 63 years he smoked between 1 and 2 packs per day. He also worked for the [employer] for a number of years but does not know of any asbestos exposure. He has COPD with evidence of chronic bronchitis by previous CT scans. He has severe COPD based on CO2 retention. He wears supplemental oxygen. He has a history of PE and is on chronic anticoagulation. At baseline he can walk 25 yards with a walker. He can walk perhaps 25 feet by himself. He was recently admitted to the hospital for an exacerbation of COPD. The trigger for the exacerbation remains unclear. He denies any sputum production stating that he is unable to clear any material from his chest.  He is not bothered by regular cough. He has clear rhinitis during the day which he attributes to the regular nasal cannula use. Denies any chest pains, syncope or dizziness. He has generalized weakness likely from a combination of severe pulmonary disease and general inactivity. He denies any focal neurologic deficits. He has no problems with reflux or aspiration. He denies lower extremity edema. He has increased urination recently because of probable steroid-induced hyperglycemia. Review of systems  He denies any new neurologic deficits. He has no pharyngitis or laryngitis. He denies any palpable lymphadenopathy. He reliably has dyspnea on exertion but denies chest pain with exertion. No orthopnea or PND. He has no abdominal pain or diarrhea. He denies any dysuria or hematuria. He has no peculiar skin lesions. He denies any fevers, night sweats or unexplained weight loss.   He has not seen any adverse effects of taking azithromycin after discharge from the hospital.    Past medical history  Tobacco abuse  COPD with FEV1 of 0.61 L or 25% predicted 9/14 and panlobular emphysema  Pulmonary emboli on chronic anticoagulation  Obstructive sleep apnea with CPAP noncompliance. AHI 12. 11/16  Paroxysmal atrial fibrillation  Type 2 diabetes mellitus with stage III chronic kidney disease  Dyslipidemia  Hypertension  Pulmonary hypertension likely secondary  Gastroesophageal reflux disease  Prostatic hypertrophy    Past Surgical History:   Procedure Laterality Date    HX CATARACT REMOVAL Left 4/2015    by Dr. Bill Christian  late 9512'[id number]     No Known Allergies    Current Outpatient Prescriptions   Medication Sig    azithromycin (ZITHROMAX) 250 mg tablet One tablet Mon/Wed/Fri  Indications: COPD    azithromycin (ZITHROMAX) 500 mg tab Take 500 mg by mouth daily.  fluticasone-vilanterol (BREO ELLIPTA) 100-25 mcg/dose inhaler Take 1 Puff by inhalation daily.  albuterol-ipratropium (DUO-NEB) 2.5 mg-0.5 mg/3 ml nebu 3 mL by Nebulization route every four (4) hours.  predniSONE (DELTASONE) 10 mg tablet Take  by mouth daily. 6 tabs x 2 days, 5 tabs x 2 days, 4 tabs x 2 days, 3 tabs x 2 days, 2 tabs x 2 days, 1 tab x 2 days. 1/2 tab x 2 days. Divided dosing.  insulin glargine (LANTUS) 100 unit/mL injection 7 Units by SubCUTAneous route nightly.  finasteride (PROSCAR) 5 mg tablet Take 1 Tab by mouth daily.  omeprazole (PRILOSEC) 40 mg capsule Take 1 Cap by mouth daily.  potassium chloride (KLOR-CON M20) 20 mEq tablet Take 1 Tab by mouth daily.  ferrous sulfate 325 mg (65 mg iron) tablet Take 1 Tab by mouth two (2) times a day.  tamsulosin (FLOMAX) 0.4 mg capsule Take 1 Cap by mouth nightly.  warfarin (COUMADIN) 5 mg tablet Take 1 Tab by mouth every evening.  lisinopril (PRINIVIL, ZESTRIL) 2.5 mg tablet Take 1 Tab by mouth daily.     furosemide (LASIX) 20 mg tablet Take 1 Tab by mouth daily. Indications: hypertension    metoprolol tartrate (LOPRESSOR) 25 mg tablet Take 1 Tab by mouth two (2) times a day.  tiotropium bromide (SPIRIVA RESPIMAT) 2.5 mcg/actuation inhaler Take 2 Puffs by inhalation daily.  albuterol (VENTOLIN HFA) 90 mcg/actuation inhaler Take 1-2 puffs every 4-6 hrs prn shortness of breath    ascorbic acid (VITAMIN C) 500 mg tablet Take 500 mg by mouth two (2) times a day.  Lancets misc Check fasting sugars daily before breakfast. DX: E11.9 for freestyle lite meter    Nebulizer & Compressor machine 1 Each.  glucose blood VI test strips (FREESTYLE LITE STRIPS) strip Check fasting sugars daily before breakfast. DX: E11.9 for freestyle lite meter    Blood-Glucose Meter (ONE TOUCH ULTRA 2) monitoring kit Check fasting sugars daily before breakfast. DX: 250.02     No current facility-administered medications for this visit. Social History     Social History    Marital status:      Spouse name: N/A    Number of children: N/A    Years of education: N/A     Occupational History    Not on file. Social History Main Topics    Smoking status: Former Smoker     Packs/day: 1.0 - 2.0     Years: 58     Types: Cigarettes     Quit date: 8/19/2015    Smokeless tobacco: Never Used      Comment: Pt counseled to continue to not smoke.     Alcohol use No    Drug use: No    Sexual activity: Not Currently     Other Topics Concern    Not on file     Social History Narrative     Family History   Problem Relation Age of Onset    Cancer Father      he does not know   No family history of pulmonary disease      Objective:     Visit Vitals    /84 (BP 1 Location: Left arm, BP Patient Position: Sitting)    Pulse 90    Resp 16    Ht 5' 6\" (1.676 m)    Wt 78.5 kg (173 lb)    SpO2 91%    BMI 27.92 kg/m2        Physical Exam:   General: Alert, oriented and in no respiratory distress at rest.  Affect is normal.  Seated in a wheelchair. HEENT: Sclera anicteric, EOM intact  Neck: No lymphadenopathy or jugular venous distention. Supple. CVS: S1S2 appear normal.  Tones are distant. No appreciable gallop. Regular rate and rhythm despite history of PAF. No murmurs  RS: Very diminished AE bilaterally, no tactile fremitus or egophony, no accessory muscle use. No wheezes, rales or rhonchi  Abd: soft, non tender  Neuro: non focal, awake, alert  Extrm: no leg edema, clubbing or cyanosis  Skin: no facial rash    Data review:   Chest x-ray 5/18/18: No infiltrate. Suggestion of enlarged pulmonary arteries. Limited echocardiogram 5/18/18: Normal left ventricular ejection fraction. Evidence of RV enlargement  Chest CT 3/8/16: Severe centrilobular emphysema with diffuse bronchial wall thickening. Evidence of bronchial malacia. Labs 5/18/18: WBC 8.1, hemoglobin 11.9. Sodium 148, potassium 4.4, HC03 34, BUN 16 and creatinine 0.7.   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 fib on chronic anticoagulation  · Presumed benign lung nodules  · Tobacco abuse with none ×1 year  · Obstructive sleep apnea with CPAP noncompliance  · Pulmonary arterial hypertension, secondary  · Diabetes mellitus with steroid-induced hyperglycemia  · General weakness and debilitation      RECOMMENDATIONS:   · Pulmonary rehab  · Nebulized all medications if possible  · Scheduled azithromycin  · Follow Coumadin levels with the addition of azithromycin    The patient's disease is quite advanced and I suspect that no intervention will be of particular benefit. Increasing his activity is likely to be better for him than any medication. Follow-up Disposition: 1 month return to clinic. Health maintenance screens deferred to Primary care provider.      Oscar Ackerman MD

## 2018-05-29 ENCOUNTER — TELEPHONE (OUTPATIENT)
Dept: INTERNAL MEDICINE CLINIC | Age: 78
End: 2018-05-29

## 2018-05-29 DIAGNOSIS — J44.9 CHRONIC OBSTRUCTIVE PULMONARY DISEASE, UNSPECIFIED COPD TYPE (HCC): ICD-10-CM

## 2018-05-29 RX ORDER — AZITHROMYCIN 250 MG/1
TABLET, FILM COATED ORAL
Qty: 12 TAB | Refills: 2 | Status: SHIPPED | OUTPATIENT
Start: 2018-05-29 | End: 2018-06-03

## 2018-05-29 NOTE — TELEPHONE ENCOUNTER
Script was printed initially, reordered so that it will go electronically as pharmacy will not accept verbal orders per Radha.

## 2018-05-29 NOTE — TELEPHONE ENCOUNTER
2 pt. Identifiers confirmed. Pt's wife called to report pt's INR is 2.0 and he takes coumadin 5mg daily. Per Dr. Shanna Fairbanks, continue same dose daily, recheck in 1 week and call c results. No other questions/concerns at this time.

## 2018-05-31 ENCOUNTER — OFFICE VISIT (OUTPATIENT)
Dept: INTERNAL MEDICINE CLINIC | Age: 78
End: 2018-05-31

## 2018-05-31 VITALS
RESPIRATION RATE: 18 BRPM | WEIGHT: 173 LBS | SYSTOLIC BLOOD PRESSURE: 134 MMHG | OXYGEN SATURATION: 88 % | HEART RATE: 81 BPM | BODY MASS INDEX: 27.8 KG/M2 | HEIGHT: 66 IN | TEMPERATURE: 98.1 F | DIASTOLIC BLOOD PRESSURE: 70 MMHG

## 2018-05-31 DIAGNOSIS — J44.9 CHRONIC OBSTRUCTIVE PULMONARY DISEASE, UNSPECIFIED COPD TYPE (HCC): Primary | ICD-10-CM

## 2018-05-31 NOTE — MR AVS SNAPSHOT
[facility] 
 
 
 [address] [address] 
[phone] Patient: Jet Estevez MRN: [id number] Corire Rivera Visit Information Date & Time Provider Department Dept. Phone Encounter #  
 5/31/2018  2:30 PM Herb Drake [facility] [employer] [phone] [id number] Follow-up Instructions Return in about 3 months (around 8/31/2018). Your Appointments 6/21/2018  1:00 PM  
Follow Up with MD Lamar [facility]) Appt Note: 4 week f/u from 5/24/18  
 [address] [facility] [address]  
  
   
 [address]  
  
    
 7/3/2018 11:15 AM  
Office Visit with Renae Le MD  
[facility] Appt Note: 4 month f/u HTN/DM/lipids [address]  
  
   
 [address] [facility]  
  
    
  
 8/21/2018  1:30 PM  
Any with Ml Yepez MD  
[facility] [place] [address] [place] [address] [facility] Appt Note: 1 year fu; Dr. Kalen Best template has changed - letter sent to pt w. new appt details [address] [facility] [address]  
[phone]  
  
   
 [place] [id number] Upcoming Health Maintenance Date Due  
 EYE EXAM RETINAL OR DILATED Q1 7/25/2018 Influenza Age 5 to Adult 8/1/2018 FOOT EXAM Q1 8/10/2018 MEDICARE YEARLY EXAM 8/11/2018 HEMOGLOBIN A1C Q6M 9/1/2018 MICROALBUMIN Q1 10/19/2018 LIPID PANEL Q1 3/1/2019 GLAUCOMA SCREENING Q2Y 7/25/2019 COLONOSCOPY 11/24/2020 DTaP/Tdap/Td series (2 - Td) 8/1/2026 Allergies as of 5/31/2018  Review Complete On: 5/31/2018 By: Herb Drake MD  
 No Known Allergies Current Immunizations  Reviewed on 11/21/2017 Name Date Influenza High Dose Vaccine PF 9/25/2017, 11/5/2015 Influenza Vaccine 11/10/2016 12:00 AM, 9/14/2015, 9/16/2014 12:14 PM, 10/13/2010 Influenza Vaccine PF 1/29/2013  8:12 AM  
 Pneumococcal Conjugate (PCV-13) 9/14/2015 Pneumococcal Polysaccharide (PPSV-23) 8/14/2014 12:00 PM, 10/13/2010 12:00 AM  
  
 Not reviewed this visit You Were Diagnosed With   
  
 Codes Comments Chronic obstructive pulmonary disease, unspecified COPD type ([facility]    -  Primary ICD-10-CM: J44.9 ICD-9-CM: 430 Vitals BP Pulse Temp Resp Height(growth percentile) Weight(growth percentile) 134/70 (BP 1 Location: Left arm, BP Patient Position: Sitting) 81 98.1 °F (36.7 °C) (Oral) 18 5' 6\" (1.676 m) 173 lb (78.5 kg) SpO2 BMI Smoking Status (!) 88% 27.92 kg/m2 Former Smoker BMI and BSA Data Body Mass Index Body Surface Area  
 27.92 kg/m 2 1.91 m 2 Preferred Pharmacy Pharmacy Name Phone [address], [address] [phone] Your Updated Medication List  
  
   
This list is accurate as of 5/31/18  3:15 PM.  Always use your most recent med list.  
  
  
  
  
 albuterol 90 mcg/actuation inhaler Commonly known as:  VENTOLIN HFA Take 1-2 puffs every 4-6 hrs prn shortness of breath  
  
 albuterol-ipratropium 2.5 mg-0.5 mg/3 ml Nebu Commonly known as:  DUO-NEB  
3 mL by Nebulization route every four (4) hours. azithromycin 250 mg tablet Commonly known as:  Cherrie Croak One tablet Mon/Wed/Fri  Indications: COPD Blood-Glucose Meter monitoring kit Commonly known as:  Katie Kessler Check fasting sugars daily before breakfast. DX: 250.02  
  
 ferrous sulfate 325 mg (65 mg iron) tablet Take 1 Tab by mouth two (2) times a day. finasteride 5 mg tablet Commonly known as:  PROSCAR Take 1 Tab by mouth daily. fluticasone-vilanterol 100-25 mcg/dose inhaler Commonly known as:  BREO ELLIPTA Take 1 Puff by inhalation daily. furosemide 20 mg tablet Commonly known as:  LASIX Take 1 Tab by mouth daily. Indications: hypertension  
  
 glucose blood VI test strips strip Commonly known as:  FREESTYLE LITE STRIPS Check fasting sugars daily before breakfast. DX: E11.9 for freestyle lite meter  
  
 insulin glargine 100 unit/mL injection Commonly known as:  LANTUS U-100 INSULIN  
7 Units by SubCUTAneous route nightly. Lancets Misc Check fasting sugars daily before breakfast. DX: E11.9 for freestyle lite meter  
  
 lisinopril 2.5 mg tablet Commonly known as:  Pecola Cypher Take 1 Tab by mouth daily. metoprolol tartrate 25 mg tablet Commonly known as:  LOPRESSOR Take 1 Tab by mouth two (2) times a day. Nebulizer & Compressor machine 1 Each. omeprazole 40 mg capsule Commonly known as:  PRILOSEC Take 1 Cap by mouth daily. potassium chloride 20 mEq tablet Commonly known as:  KLOR-CON M20 Take 1 Tab by mouth daily. predniSONE 10 mg tablet Commonly known as:  Manolo Noss Take  by mouth daily. 6 tabs x 2 days, 5 tabs x 2 days, 4 tabs x 2 days, 3 tabs x 2 days, 2 tabs x 2 days, 1 tab x 2 days. 1/2 tab x 2 days. Divided dosing. tamsulosin 0.4 mg capsule Commonly known as:  FLOMAX Take 1 Cap by mouth nightly. tiotropium bromide 2.5 mcg/actuation inhaler Commonly known as:  Meg Flaming Take 2 Puffs by inhalation daily. VITAMIN C 500 mg tablet Generic drug:  ascorbic acid (vitamin C) Take 500 mg by mouth two (2) times a day. warfarin 5 mg tablet Commonly known as:  COUMADIN Take 1 Tab by mouth every evening. Follow-up Instructions Return in about 3 months (around 8/31/2018). Introducing [facility]! [facility] [employer] introduces Over[employer] patient portal. Now you can access parts of your medical record, email your doctor's office, and request medication refills online. 1. In your internet browser, go to [email or web address] 2. Click on the First Time User? Click Here link in the Sign In box. You will see the New Member Sign Up page. 3. Enter your Polimetrix Access Code exactly as it appears below. You will not need to use this code after youve completed the sign-up process. If you do not sign up before the expiration date, you must request a new code. · Polimetrix Access Code: [email or web address] Expires: 8/29/2018  3:15 PM 
 
4. Enter the last four digits of your Social Security Number (xxxx) and Date of Birth (mm/dd/yyyy) as indicated and click Submit. You will be taken to the next sign-up page. 5. Create a Logoworkst ID. This will be your Polimetrix login ID and cannot be changed, so think of one that is secure and easy to remember. 6. Create a Polimetrix password. You can change your password at any time. 7. Enter your Password Reset Question and Answer. This can be used at a later time if you forget your password. 8. Enter your e-mail address. You will receive e-mail notification when new information is available in [address] 9. Click Sign Up. You can now view and download portions of your medical record. 10. Click the Download Summary menu link to download a portable copy of your medical information. If you have questions, please visit the Frequently Asked Questions section of the Pol[employer] website. Remember, Polimetrix is NOT to be used for urgent needs. For medical emergencies, dial 911. Now available from your iPhone and Android! Please provide this summary of care documentation to your next provider. Your primary care clinician is listed as Omar Keane. If you have any questions after today's visit, please call [phone].

## 2018-05-31 NOTE — PROGRESS NOTES
FAMILY MEDICINE CLINIC NOTE    S: The patient presents for follow up after a recent hospital admission from 5/18/18-5/21/18 at [facility] for COPD exacerbation. Discharge summary reviewed. The patient was managed with a course of oral steroids and given azithromycin. He was also started on duoneb and breo ellipta. He is home O2 dependent and has SHERRIE, previously on CPAP but this was discontinued secondary to non-compliance. He has completed his course of azithromycin. He was seen by pulmonology recently who recommended pulmonary rehab as well as discontinuing breo and spiriva and starting regular scheduled azithromycin 250 mg M/W/F. He will be finished with his course of prednisone in 4 days. He states that he is breathing at baseline, utilizing 3 L of home O2, denies angina, dyspnea, palpitations or edema. Current Outpatient Prescriptions on File Prior to Visit   Medication Sig Dispense Refill    azithromycin (ZITHROMAX) 250 mg tablet One tablet Mon/Wed/Fri  Indications: COPD 12 Tab 2    fluticasone-vilanterol (BREO ELLIPTA) 100-25 mcg/dose inhaler Take 1 Puff by inhalation daily.  albuterol-ipratropium (DUO-NEB) 2.5 mg-0.5 mg/3 ml nebu 3 mL by Nebulization route every four (4) hours.  predniSONE (DELTASONE) 10 mg tablet Take  by mouth daily. 6 tabs x 2 days, 5 tabs x 2 days, 4 tabs x 2 days, 3 tabs x 2 days, 2 tabs x 2 days, 1 tab x 2 days. 1/2 tab x 2 days. Divided dosing.  insulin glargine (LANTUS) 100 unit/mL injection 7 Units by SubCUTAneous route nightly. 3 Vial 3    finasteride (PROSCAR) 5 mg tablet Take 1 Tab by mouth daily. 90 Tab 3    omeprazole (PRILOSEC) 40 mg capsule Take 1 Cap by mouth daily. 90 Cap 3    potassium chloride (KLOR-CON M20) 20 mEq tablet Take 1 Tab by mouth daily. 90 Tab 3    ferrous sulfate 325 mg (65 mg iron) tablet Take 1 Tab by mouth two (2) times a day. 180 Tab 3    tamsulosin (FLOMAX) 0.4 mg capsule Take 1 Cap by mouth nightly.  90 Cap 3    warfarin (COUMADIN) 5 mg tablet Take 1 Tab by mouth every evening. 90 Tab 3    lisinopril (PRINIVIL, ZESTRIL) 2.5 mg tablet Take 1 Tab by mouth daily. 90 Tab 3    furosemide (LASIX) 20 mg tablet Take 1 Tab by mouth daily. Indications: hypertension 90 Tab 3    metoprolol tartrate (LOPRESSOR) 25 mg tablet Take 1 Tab by mouth two (2) times a day. 180 Tab 3    Lancets misc Check fasting sugars daily before breakfast. DX: E11.9 for freestyle lite meter 100 Each 11    Nebulizer & Compressor machine 1 Each.  tiotropium bromide (SPIRIVA RESPIMAT) 2.5 mcg/actuation inhaler Take 2 Puffs by inhalation daily.  albuterol (VENTOLIN HFA) 90 mcg/actuation inhaler Take 1-2 puffs every 4-6 hrs prn shortness of breath 3 Inhaler 3    glucose blood VI test strips (FREESTYLE LITE STRIPS) strip Check fasting sugars daily before breakfast. DX: E11.9 for freestyle lite meter 100 Strip 11    ascorbic acid (VITAMIN C) 500 mg tablet Take 500 mg by mouth two (2) times a day.  Blood-Glucose Meter (ONE TOUCH ULTRA 2) monitoring kit Check fasting sugars daily before breakfast. DX: 250.02 1 Kit 0     No current facility-administered medications on file prior to visit.         Past Medical History:   Diagnosis Date    Advance directive discussed with patient     COPD with emphysema ([address]     CVA (cerebrovascular accident) ([facility] 7/11/2012    possible    Diabetes mellitus ([address]     Drowsiness     Enlarged prostate     Essential hypertension, benign     Eye exam, routine 07/26/2016    Dr. Aparna Denton repeat in 1 yr    H/O colonoscopy 11/24/15    Dr. 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 Social History Main Topics    Smoking status: Former Smoker     Packs/day: 1.00     Years: 45.00     Types: Cigarettes     Quit date: 8/19/2015    Smokeless tobacco: Never Used      Comment: Pt counseled to continue to not smoke.  Alcohol use No    Drug use: No    Sexual activity: Not Currently     Other Topics Concern    Not on file     Social History Narrative       Family History   Problem Relation Age of Onset    Cancer Father      he does not know       O:  Visit Vitals    /70 (BP 1 Location: Left arm, BP Patient Position: Sitting)    Pulse 81    Temp 98.1 °F (36.7 °C) (Oral)    Resp 18    Ht 5' 6\" (1.676 m)    Wt 173 lb (78.5 kg)    SpO2 (!) 88%    BMI 27.92 kg/m2     NAD, comfortable  Ambulating in wheelchair  NC O2 in place  RRR, no murmurs  Mild bilateral inspiratory wheeze  No edema    65 y.o. male      ICD-10-CM ICD-9-CM    1.  Chronic obstructive pulmonary disease, unspecified COPD type ([facility] J44.9 496 Continue home O2  Start scheduled azithromycin   Follow up with pulmonology  Complete course of prednisone

## 2018-06-05 ENCOUNTER — TELEPHONE (OUTPATIENT)
Dept: INTERNAL MEDICINE CLINIC | Age: 78
End: 2018-06-05

## 2018-06-05 NOTE — TELEPHONE ENCOUNTER
Incoming call from Alba Rawls with [facility]. 2 pt identifiers confirmed. Reporting PT/INR results. PT(32.6), INR(2.7), BS(138 and last ate at 0900). Reports that pt was in the hospital the first time for pneumonia and again recently for bronchitis. Pt is still on abx for this. Needs refill on nebulizer solution for chest tightness. Notified will give a call back with recommendations on anticoagulation therapy. Verbalized understanding. No further questions or concerns at this time.

## 2018-06-06 RX ORDER — IPRATROPIUM BROMIDE AND ALBUTEROL SULFATE 2.5; .5 MG/3ML; MG/3ML
3 SOLUTION RESPIRATORY (INHALATION) EVERY 4 HOURS
Qty: 30 NEBULE | Refills: 5 | Status: SHIPPED | OUTPATIENT
Start: 2018-06-06 | End: 2020-09-30

## 2018-06-06 NOTE — TELEPHONE ENCOUNTER
Continue same coumadin.  Recheck one week    RX sent to [facility] for the duoneb via nebulizer

## 2018-06-06 NOTE — TELEPHONE ENCOUNTER
Unsuccessful attempt to reach pt for below. Left message for them to call back at their earliest convenience. 2 pt. Identifiers confirmed. [address] notified of below. No other questions/concerns at this time.

## 2018-06-21 ENCOUNTER — PATIENT OUTREACH (OUTPATIENT)
Dept: INTERNAL MEDICINE CLINIC | Age: 78
End: 2018-06-21

## 2018-06-21 NOTE — PROGRESS NOTES
Follow-Up      Date/Time:  6/21/2018 3:44 PM    History:  Patient was admitted to [facility] in May for COPD Exacerbation. He was discharged home on Prednisone and several inhalers. He was seen by Dr. Mynor López at Pulmonary office on May 24th who recommended increasing activity level as much as patient can tolerate. His wfie states that the therapist and OT are coming to house and working with him and he needs a lot of encouragement to increase his activity level. He missed his appt today with the Pulmonologist and this has been rescheduled to July 12th. He also has a f/u appt with Dr. Chery Nunn on July 3rd. He has finished his oral prednisone at this time. Goals      Identification of barriers to adherence to a plan of care such as inability to afford medications, lack of insurance, lack of transportation, etc.            No barriers identified at this time       Patient  and wife verbalizes understanding of self -management goals of living with Diabetes. Wife checks BS on a regular basis -       Prevent complications post hospitalization. 6/21/18 - Aware to call if develops increasing SOB, fever, chills, or increased sputum           Aware that physicians are on call 24 hours a day or to go to ED if develops acute illness or acute SOB. Will continue to follow.

## 2018-06-26 ENCOUNTER — TELEPHONE (OUTPATIENT)
Dept: INTERNAL MEDICINE CLINIC | Age: 78
End: 2018-06-26

## 2018-06-26 NOTE — TELEPHONE ENCOUNTER
Patient wife called in INR results as 2.4 patient is on 5 mg of Coumadian and his glucose was 105 please return call with new orders

## 2018-06-26 NOTE — TELEPHONE ENCOUNTER
Called pt and talked to wife. Told her for pt to continue the coumadin 5mg daily pm and since has appt. With me on 7/3/18, will recheck INR then. Glucose looks good. She verbalized understanding.

## 2018-06-28 ENCOUNTER — DOCUMENTATION ONLY (OUTPATIENT)
Dept: INTERNAL MEDICINE CLINIC | Age: 78
End: 2018-06-28

## 2018-06-28 NOTE — PROGRESS NOTES
Nori Solomon with [facility] called to let us know that they will be re certifying patient for continued PT for another 4 weeks will send over orders for signature

## 2018-07-03 ENCOUNTER — TELEPHONE (OUTPATIENT)
Dept: INTERNAL MEDICINE CLINIC | Age: 78
End: 2018-07-03

## 2018-07-03 ENCOUNTER — OFFICE VISIT (OUTPATIENT)
Dept: INTERNAL MEDICINE CLINIC | Age: 78
End: 2018-07-03

## 2018-07-03 VITALS
HEIGHT: 66 IN | HEART RATE: 82 BPM | BODY MASS INDEX: 26.74 KG/M2 | OXYGEN SATURATION: 99 % | TEMPERATURE: 95.5 F | RESPIRATION RATE: 18 BRPM | WEIGHT: 166.4 LBS | DIASTOLIC BLOOD PRESSURE: 69 MMHG | SYSTOLIC BLOOD PRESSURE: 135 MMHG

## 2018-07-03 DIAGNOSIS — E78.5 DYSLIPIDEMIA: ICD-10-CM

## 2018-07-03 DIAGNOSIS — Z79.01 ANTICOAGULATED ON COUMADIN: ICD-10-CM

## 2018-07-03 DIAGNOSIS — Z12.5 SCREENING PSA (PROSTATE SPECIFIC ANTIGEN): ICD-10-CM

## 2018-07-03 DIAGNOSIS — I10 BENIGN HYPERTENSION WITHOUT CHF: ICD-10-CM

## 2018-07-03 DIAGNOSIS — E11.9 DIABETES MELLITUS, STABLE (HCC): Primary | ICD-10-CM

## 2018-07-03 LAB
INR BLD: 2.9
PT POC: 35.2 SECONDS
VALID INTERNAL CONTROL?: YES

## 2018-07-03 NOTE — TELEPHONE ENCOUNTER
Called Gem Chandra with [facility] in regards to ICD 10 codes for pt most recent hospital stay. 2 pt identifiers confirmed, she stated that pt informed her that he was seen at a hospital in June with pneumonia, stated understanding and that the most recent notes are from a hospital stay in May at [facility] so she should have the codes, Gem Chandra asked if he was seen at [place] stated understanding and that there are no notes in his chart in regards to a possible hopsital encounter for June at [place]. She stated understanding and that she will keep checking and if she finds out anything she will let us know. No other questions or concerns noted at this time.

## 2018-07-03 NOTE — MR AVS SNAPSHOT
[address] [place] [address] 
 
 
 [address] [address] 
[phone] Patient: Jose Elias Hopkins MRN: [id number] Harshad Pans Visit Information Date & Time Provider Department Dept. Phone Encounter #  
 7/3/2018 11:15 AM MD Peter Graham [address] [phone] [id number] Follow-up Instructions Return in about 3 months (around 10/3/2018) for f/u DM/HTN/lipids. Your Appointments 7/12/2018  1:45 PM  
Follow Up with Surjit Seth MD  
[facility]) Appt Note: 4 week f/u from 5/24/18; pt r/s to later date [facility] [address] [facility] [address]  
  
   
 [facility] [address]  
  
    
 8/30/2018 10:30 AM  
Office Visit with MD Peter Mccarty [address] ([facility]) Appt Note: 3 month f/u  
 [address]  
  
   
 [address] [facility]  
  
    
  
 8/21/2018  1:30 PM  
Any with Khadar Singh MD  
[facility]) Appt Note: 1 year fu; Dr. Richard foster has changed - letter sent to pt osman loaiza appt details [address] [place] [address]  
[phone]  
  
   
 [place] [address] Upcoming Health Maintenance Date Due  
 EYE EXAM RETINAL OR DILATED Q1 7/25/2018 Influenza Age 5 to Adult 8/1/2018 FOOT EXAM Q1 8/10/2018 HEMOGLOBIN A1C Q6M 9/1/2018 MICROALBUMIN Q1 10/19/2018 LIPID PANEL Q1 3/1/2019 GLAUCOMA SCREENING Q2Y 7/25/2019 COLONOSCOPY 11/24/2020 DTaP/Tdap/Td series (2 - Td) 8/1/2026 Allergies as of 7/3/2018  Review Complete On: 7/3/2018 By: Calli Quinones MD  
 No Known Allergies Current Immunizations  Reviewed on 7/3/2018 Name Date Influenza High Dose Vaccine PF 9/25/2017, 11/5/2015 Influenza Vaccine 11/10/2016 12:00 AM, 9/14/2015, 9/16/2014 12:14 PM, 10/13/2010 Influenza Vaccine PF 1/29/2013  8:12 AM  
 Pneumococcal Conjugate (PCV-13) 9/14/2015 Pneumococcal Polysaccharide (PPSV-23) 8/14/2014 12:00 PM, 10/13/2010 12:00 AM  
  
 Reviewed by Duane Guerrero PHARMD on 7/3/2018 at 10:26 AM  
You Were Diagnosed With   
  
 Codes Comments Anticoagulated on Coumadin    -  Primary ICD-10-CM: Z51.81, Z79.01 
ICD-9-CM: V58.83, V58.61 Benign hypertension without CHF     ICD-10-CM: I10 
ICD-9-CM: 401.1 Dyslipidemia     ICD-10-CM: E78.5 ICD-9-CM: 272.4 Diabetes mellitus, stable ([facility]     ICD-10-CM: E11.9 ICD-9-CM: 250.00 Screening PSA (prostate specific antigen)     ICD-10-CM: Z12.5 ICD-9-CM: V76.44 Vitals BP Pulse Temp Resp Height(growth percentile) Weight(growth percentile) 135/69 (BP 1 Location: Right arm, BP Patient Position: Sitting) 82 95.5 °F (35.3 °C) (Oral) 18 5' 6\" (1.676 m) 166 lb 6.4 oz (75.5 kg) SpO2 BMI Smoking Status 99% 26.86 kg/m2 Former Smoker Vitals History BMI and BSA Data Body Mass Index Body Surface Area  
 26.86 kg/m 2 1.88 m 2 Preferred Pharmacy Pharmacy Name Phone [address] [place] [address],[address] [phone] Your Updated Medication List  
  
   
This list is accurate as of 7/3/18 11:50 AM.  Always use your most recent med list.  
  
  
  
  
 albuterol 90 mcg/actuation inhaler Commonly known as:  VENTOLIN HFA Take 1-2 puffs every 4-6 hrs prn shortness of breath  
  
 albuterol-ipratropium 2.5 mg-0.5 mg/3 ml Nebu Commonly known as:  DUO-NEB  
3 mL by Nebulization route every four (4) hours. Blood-Glucose Meter monitoring kit Commonly known as:  Emely Champion Check fasting sugars daily before breakfast. DX: 250.02  
  
 ferrous sulfate 325 mg (65 mg iron) tablet Take 1 Tab by mouth two (2) times a day. finasteride 5 mg tablet Commonly known as:  PROSCAR Take 1 Tab by mouth daily. fluticasone-vilanterol 100-25 mcg/dose inhaler Commonly known as:  BREO ELLIPTA Take 1 Puff by inhalation daily. furosemide 20 mg tablet Commonly known as:  LASIX Take 1 Tab by mouth daily. Indications: hypertension  
  
 glucose blood VI test strips strip Commonly known as:  FREESTYLE LITE STRIPS Check fasting sugars daily before breakfast. DX: E11.9 for freestyle lite meter  
  
 insulin glargine 100 unit/mL injection Commonly known as:  LANTUS U-100 INSULIN  
7 Units by SubCUTAneous route nightly. Lancets Misc Check fasting sugars daily before breakfast. DX: E11.9 for freestyle lite meter  
  
 lisinopril 2.5 mg tablet Commonly known as:  Donnald Code Take 1 Tab by mouth daily. metoprolol tartrate 25 mg tablet Commonly known as:  LOPRESSOR Take 1 Tab by mouth two (2) times a day. Nebulizer & Compressor machine 1 Each. omeprazole 40 mg capsule Commonly known as:  PRILOSEC Take 1 Cap by mouth daily. potassium chloride 20 mEq tablet Commonly known as:  KLOR-CON M20 Take 1 Tab by mouth daily. tamsulosin 0.4 mg capsule Commonly known as:  FLOMAX Take 1 Cap by mouth nightly. tiotropium bromide 2.5 mcg/actuation inhaler Commonly known as:  Irene Ferries Take 2 Puffs by inhalation daily. VITAMIN C 500 mg tablet Generic drug:  ascorbic acid (vitamin C) Take 500 mg by mouth two (2) times a day. warfarin 5 mg tablet Commonly known as:  COUMADIN Take 1 Tab by mouth every evening. We Performed the Following AMB POC PT/INR [51632 CPT(R)] Follow-up Instructions Return in about 3 months (around 10/3/2018) for f/u DM/HTN/lipids. To-Do List   
 07/17/2018 Lab:  HEMOGLOBIN A1C WITH EAG   
  
 07/17/2018 Lab:  LIPID PANEL   
  
 07/17/2018 Lab: METABOLIC PANEL, COMPREHENSIVE   
  
 07/17/2018 Lab:  PSA SCREENING (SCREENING) Patient Instructions 1) follow-up in 3 months or sooner if worsening symptoms. 2) Continue the coumadin 5mg daily pm and recheck in 1 week. Introducing [facility] SERVICES! [employer] introduces [employer] patient portal. Now you can access parts of your medical record, email your doctor's office, and request medication refills online. 1. In your internet browser, go to [email or web address] 2. Click on the First Time User? Click Here link in the Sign In box. You will see the New Member Sign Up page. 3. Enter your [employer] Access Code exactly as it appears below. You will not need to use this code after youve completed the sign-up process. If you do not sign up before the expiration date, you must request a new code. · [employer] Access Code: [email or web address] Expires: 8/29/2018  3:15 PM 
 
4. Enter the last four digits of your Social Security Number (xxxx) and Date of Birth (mm/dd/yyyy) as indicated and click Submit. You will be taken to the next sign-up page. 5. Create a [employer] ID. This will be your [employer] login ID and cannot be changed, so think of one that is secure and easy to remember. 6. Create a [employer] password. You can change your password at any time. 7. Enter your Password Reset Question and Answer. This can be used at a later time if you forget your password. 8. Enter your e-mail address. You will receive e-mail notification when new information is available in [address] 9. Click Sign Up. You can now view and download portions of your medical record. 10. Click the Download Summary menu link to download a portable copy of your medical information. If you have questions, please visit the Frequently Asked Questions section of the [employer] website. Remember, [employer] is NOT to be used for urgent needs. For medical emergencies, dial 911. Now available from your iPhone and Android! Please provide this summary of care documentation to your next provider. Your primary care clinician is listed as Omar Keane. If you have any questions after today's visit, please call [phone].

## 2018-07-03 NOTE — TELEPHONE ENCOUNTER
Claudette Goldsmith from [employer] called and stated that she needs diagnoses code for the patients last hospital visit.  Please call [phone]

## 2018-07-03 NOTE — PATIENT INSTRUCTIONS
1) follow-up in 3 months or sooner if worsening symptoms. 2) Continue the coumadin 5mg daily pm and recheck in 1 week.

## 2018-07-03 NOTE — PROGRESS NOTES
ROOM # 1    Kev Stephenson presents today for   Chief Complaint   Patient presents with    Anticoagulation     pt/inr check       Kev Stephenson preferred language for health care discussion is english/other. Is someone accompanying this pt? Yes, wife    Is the patient using any DME equipment during [address]? Yes, wheelchair and 3LNC    Depression Screening:  PHQ over the last two weeks 7/3/2018 5/31/2018 3/1/2018 9/25/2017 8/10/2017 12/12/2016 8/30/2016   Little interest or pleasure in doing things Not at all Not at all Not at all Not at all Not at all Not at all Not at all   Feeling down, depressed or hopeless Not at all Not at all Not at all Not at all Not at all Not at all Not at all   Total Score PHQ 2 0 0 0 0 0 0 0       Learning Assessment:  Learning Assessment 7/3/2018 8/14/2014   PRIMARY LEARNER Patient Patient   HIGHEST LEVEL OF EDUCATION - PRIMARY LEARNER  [address] PRIMARY LEARNER [address] [facility] [facility] CAREGIVER No -   PRIMARY LANGUAGE ENGLISH ENGLISH   LEARNER PREFERENCE PRIMARY READING LISTENING     DEMONSTRATION -   ANSWERED BY patient self   RELATIONSHIP SELF SELF       Abuse Screening:  Abuse Screening Questionnaire 7/3/2018 8/10/2017 5/12/2015   Do you ever feel afraid of your partner? N N N   Are you in a relationship with someone who physically or mentally threatens you? N N N   Is it safe for you to go home? Niraj Lord       Fall Risk  Fall Risk Assessment, last 12 mths 7/3/2018 5/31/2018 3/1/2018 10/19/2017 9/25/2017 8/10/2017 12/12/2016   Able to walk? Yes Yes Yes Yes Yes Yes Yes   Fall in past 12 months? No Yes Yes No Yes Yes Yes   Fall with injury? - No Yes - Yes No Yes   Number of falls in past 12 months - 3 3 - 3 2 3   Fall Risk Score - 3 4 - 4 2 4       Health Maintenance reviewed and discussed per provider.  Yes    Kev Stephenson is due for   Health Maintenance Due   Topic Date Due    EYE EXAM RETINAL OR DILATED Q1  07/25/2018   Pt's wife states eye exam due in Oct  Please order/place referral if appropriate. Advance Directive:  1. Do you have an advance directive in place? Patient Reply: no    2. If not, would you like material regarding how to put one in place? Patient Reply: no    Coordination of Care:  1. Have you been to the ER, urgent care clinic since your last visit? Hospitalized since your last visit? no    2. Have you seen or consulted any other health care providers outside of the [address] [place] [address] since your last visit? Include any pap smears or colon screening.  no

## 2018-07-03 NOTE — PROGRESS NOTES
Chief Complaint   Patient presents with    Anticoagulation     pt/inr check    Diabetes    Hypertension       HPI:     Kev Stephenson is a 65 y.o.  male with history of type 2 DM and hypertension here for the above complaint. He denies any chest pain, shortness of breath, abdominal pain, headaches or dizziness. He is on metoprolol 25mg 1/2 po bid. Type 2 DM: he does not remember his sugars nor does his wife who accompanied him. Anticoagulation: he takes coumadin 5mg daily pm. He has not missed any dosages, denies bleeding or changes to diet. Past Medical History:   Diagnosis Date    Advance directive discussed with patient     [facility]sema [facility]     CVA (cerebrovascular accident) ([facility] [address] 7/11/2012    possible    Diabetes mellitus ([address]     Drowsiness     Enlarged prostate     Essential hypertension, benign     Eye exam, routine 07/26/2016    Dr. Quinton Montiel repeat in 1 yr    H/O colonoscopy 11/24/15    Dr. 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 Deedee Cabello  late [id number]     Current Outpatient Prescriptions   Medication Sig    albuterol-ipratropium (DUO-NEB) 2.5 mg-0.5 mg/3 ml nebu 3 mL by Nebulization route every four (4) hours.     fluticasone-vilanterol (BREO ELLIPTA) 100-25 mcg/dose inhaler Take 1 Puff by inhalation daily.  insulin glargine (LANTUS) 100 unit/mL injection 7 Units by SubCUTAneous route nightly.  finasteride (PROSCAR) 5 mg tablet Take 1 Tab by mouth daily.  omeprazole (PRILOSEC) 40 mg capsule Take 1 Cap by mouth daily.  potassium chloride (KLOR-CON M20) 20 mEq tablet Take 1 Tab by mouth daily.  ferrous sulfate 325 mg (65 mg iron) tablet Take 1 Tab by mouth two (2) times a day.  tamsulosin (FLOMAX) 0.4 mg capsule Take 1 Cap by mouth nightly.  warfarin (COUMADIN) 5 mg tablet Take 1 Tab by mouth every evening.  lisinopril (PRINIVIL, ZESTRIL) 2.5 mg tablet Take 1 Tab by mouth daily.  furosemide (LASIX) 20 mg tablet Take 1 Tab by mouth daily. Indications: hypertension    metoprolol tartrate (LOPRESSOR) 25 mg tablet Take 1 Tab by mouth two (2) times a day. (Patient taking differently: Take 12.5 mg by mouth two (2) times a day.)    Lancets misc Check fasting sugars daily before breakfast. DX: E11.9 for freestyle lite meter    Nebulizer & Compressor machine 1 Each.  tiotropium bromide (SPIRIVA RESPIMAT) 2.5 mcg/actuation inhaler Take 2 Puffs by inhalation daily.  albuterol (VENTOLIN HFA) 90 mcg/actuation inhaler Take 1-2 puffs every 4-6 hrs prn shortness of breath    glucose blood VI test strips (FREESTYLE LITE STRIPS) strip Check fasting sugars daily before breakfast. DX: E11.9 for freestyle lite meter    ascorbic acid (VITAMIN C) 500 mg tablet Take 500 mg by mouth two (2) times a day.  Blood-Glucose Meter (ONE TOUCH ULTRA 2) monitoring kit Check fasting sugars daily before breakfast. DX: 250.02     No current facility-administered medications for this visit.       Health Maintenance   Topic Date Due    EYE EXAM RETINAL OR DILATED Q1  07/25/2018    Influenza Age 5 to Adult  08/01/2018    FOOT EXAM Q1  08/10/2018    HEMOGLOBIN A1C Q6M  09/01/2018    MICROALBUMIN Q1  10/19/2018    LIPID PANEL Q1  03/01/2019    GLAUCOMA SCREENING Q2Y  07/25/2019    COLONOSCOPY  11/24/2020    DTaP/Tdap/Td series (2 - Td) 08/01/2026    ZOSTER VACCINE AGE 60>  Addressed    Pneumococcal 65+ Low/Medium Risk  Completed     Immunization History   Administered Date(s) Administered    Influenza High Dose Vaccine PF 11/05/2015, 09/25/2017    Influenza Vaccine 10/13/2010, 09/16/2014, 09/14/2015, 11/10/2016    Influenza Vaccine PF 01/29/2013    Pneumococcal Conjugate (PCV-13) 09/14/2015    Pneumococcal Polysaccharide (PPSV-23) 10/13/2010, 08/14/2014     No LMP for male patient. Allergies and Intolerances:   No Known Allergies    Family History:   Family History   Problem Relation Age of Onset    Cancer Father      he does not know       Social History:   He  reports that he quit smoking about 2 years ago. His smoking use included Cigarettes. He has a 45.00 pack-year smoking history. He has never used smokeless tobacco.  He  reports that he does not drink alcohol. ·     Objective:   Physical exam:   Visit Vitals    /69 (BP 1 Location: Right arm, BP Patient Position: Sitting)    Pulse 82    Temp 95.5 °F (35.3 °C) (Oral)    Resp 18    Ht 5' 6\" (1.676 m)    Wt 166 lb 6.4 oz (75.5 kg)    SpO2 99%  Comment: 3LNC    BMI 26.86 kg/m2        Generally: Pleasant male in no acute distress, examined in wheelchair. Cardiac Exam: regular, rate, and rhythm. Normal S1 and S2. No murmurs, gallops, or rubs  Pulmonary exam: Clear to auscultation bilaterally  Abdominal exam: Positive bowel sounds in all four quadrants, soft, nondistended, nontender  Extremities: 2+ dorsalis pedis pulses bilaterally.  No pedal edema    bilaterally    LABS/Radiological Tests:  Lab Results   Component Value Date/Time    WBC 6.7 08/11/2017 10:55 AM    HGB 12.3 (L) 08/11/2017 10:55 AM    HCT 41.3 08/11/2017 10:55 AM    PLATELET 694 80/44/0525 10:55 AM     Lab Results   Component Value Date/Time    Sodium 144 03/01/2018 12:20 PM Potassium 4.3 03/01/2018 12:20 PM    Chloride 96 (L) 03/01/2018 12:20 PM    CO2 40 (H) 03/01/2018 12:20 PM    Glucose 111 (H) 03/01/2018 12:20 PM    BUN 15 03/01/2018 12:20 PM    Creatinine 0.56 (L) 03/01/2018 12:20 PM     Lab Results   Component Value Date/Time    Cholesterol, total 199 03/01/2018 12:20 PM    HDL Cholesterol 79 (H) 03/01/2018 12:20 PM    LDL, calculated 101.2 (H) 03/01/2018 12:20 PM    Triglyceride 94 03/01/2018 12:20 PM     No results found for: GPT  Component      Latest Ref Rng & Units 3/1/2018 3/1/2018          12:20 PM 12:20 PM   Cholesterol, total      <200 MG/DL  199   Triglyceride      <150 MG/DL  94   HDL Cholesterol      40 - 60 MG/DL  79 (H)   LDL, calculated      0 - 100 MG/DL  101.2 (H)   VLDL, calculated      MG/DL  18.8   CHOL/HDL Ratio      0 - 5.0    2.5   Hemoglobin A1c, (calculated)      4.2 - 5.6 % 5.3      Previous labs    Component      Latest Ref Rng & Units 7/3/2018          11:28 AM   VALID INTERNAL CONTROL POC       Yes   Prothrombin time (POC)      seconds 35.2   INR       2.9   Pt notified of results at [address] today. ASSESSMENT/PLAN:    1. Diabetes mellitus, stable ([facility]): we will see what the labs show. Continue diet, exercise and lantus -     HEMOGLOBIN A1C WITH EAG; Future    2. Benign hypertension without CHF: stable. Continue to lisinopril, diet and exercise and lopressor. 3. Anticoagulated on Coumadin: stable. Continue the coumadin 5mg daily pm and recheck in 1 week. He has home INR machine and the company will not allow pt to check more than a week at a time. -     AMB POC PT/INR    4. Dyslipidemia: we will see what the labs show. Continue diet and exercise for now. -     METABOLIC PANEL, COMPREHENSIVE; Future  -     LIPID PANEL; Future    5. Screening PSA (prostate specific antigen)  -     PSA SCREENING (SCREENING); Future    6. Patient verbalized understanding and agreement with the plan. 7. Patient was given an after-visit summary.     8.  Follow-up Disposition:  Return in about 3 months (around 10/3/2018) for f/u DM/HTN/lipids. or sooner if worsening symptoms.                 Corinne Ficks, MD

## 2018-07-10 ENCOUNTER — TELEPHONE (OUTPATIENT)
Dept: INTERNAL MEDICINE CLINIC | Age: 78
End: 2018-07-10

## 2018-07-10 NOTE — TELEPHONE ENCOUNTER
Incoming call from Didi Ford pt wife.   Pt INR is 2.4  Current Coumadin dose is 5mg daily     Please advise

## 2018-07-12 ENCOUNTER — OFFICE VISIT (OUTPATIENT)
Dept: PULMONOLOGY | Facility: CLINIC | Age: 78
End: 2018-07-12

## 2018-07-12 ENCOUNTER — HOSPITAL ENCOUNTER (OUTPATIENT)
Dept: LAB | Age: 78
Discharge: HOME | End: 2018-07-12
Payer: MEDICARE

## 2018-07-12 VITALS
RESPIRATION RATE: 16 BRPM | HEIGHT: 66 IN | SYSTOLIC BLOOD PRESSURE: 102 MMHG | OXYGEN SATURATION: 96 % | HEART RATE: 72 BPM | TEMPERATURE: 98.1 F | WEIGHT: 166 LBS | BODY MASS INDEX: 26.68 KG/M2 | DIASTOLIC BLOOD PRESSURE: 78 MMHG

## 2018-07-12 DIAGNOSIS — J44.9 PULMONARY HYPERTENSION DUE TO CHRONIC OBSTRUCTIVE PULMONARY DISEASE (HCC): ICD-10-CM

## 2018-07-12 DIAGNOSIS — Z12.5 SCREENING PSA (PROSTATE SPECIFIC ANTIGEN): ICD-10-CM

## 2018-07-12 DIAGNOSIS — E78.5 DYSLIPIDEMIA: ICD-10-CM

## 2018-07-12 DIAGNOSIS — J43.2 CENTRILOBULAR EMPHYSEMA (HCC): Primary | ICD-10-CM

## 2018-07-12 DIAGNOSIS — E11.9 DIABETES MELLITUS, STABLE (HCC): ICD-10-CM

## 2018-07-12 DIAGNOSIS — R53.1 WEAKNESS GENERALIZED: ICD-10-CM

## 2018-07-12 DIAGNOSIS — I27.23 PULMONARY HYPERTENSION DUE TO CHRONIC OBSTRUCTIVE PULMONARY DISEASE (HCC): ICD-10-CM

## 2018-07-12 LAB
ALBUMIN SERPL-MCNC: 3.5 G/DL (ref 3.4–5)
ALBUMIN/GLOB SERPL: 1.2 {RATIO} (ref 0.8–1.7)
ALP SERPL-CCNC: 68 U/L (ref 45–117)
ALT SERPL-CCNC: 15 U/L (ref 16–61)
ANION GAP SERPL CALC-SCNC: 4 MMOL/L (ref 3–18)
AST SERPL-CCNC: 16 U/L (ref 15–37)
BILIRUB SERPL-MCNC: 0.6 MG/DL (ref 0.2–1)
BUN SERPL-MCNC: 14 MG/DL (ref 7–18)
BUN/CREAT SERPL: 22 (ref 12–20)
CALCIUM SERPL-MCNC: 8.8 MG/DL (ref 8.5–10.1)
CHLORIDE SERPL-SCNC: 100 MMOL/L (ref 100–108)
CHOLEST SERPL-MCNC: 210 MG/DL
CO2 SERPL-SCNC: 40 MMOL/L (ref 21–32)
CREAT SERPL-MCNC: 0.65 MG/DL (ref 0.6–1.3)
EST. AVERAGE GLUCOSE BLD GHB EST-MCNC: 108 MG/DL
GLOBULIN SER CALC-MCNC: 2.9 G/DL (ref 2–4)
GLUCOSE SERPL-MCNC: 97 MG/DL (ref 74–99)
HBA1C MFR BLD: 5.4 % (ref 4.2–5.6)
HDLC SERPL-MCNC: 80 MG/DL (ref 40–60)
HDLC SERPL: 2.6 {RATIO} (ref 0–5)
LDLC SERPL CALC-MCNC: 110.2 MG/DL (ref 0–100)
LIPID PROFILE,FLP: ABNORMAL
POTASSIUM SERPL-SCNC: 4.5 MMOL/L (ref 3.5–5.5)
PROT SERPL-MCNC: 6.4 G/DL (ref 6.4–8.2)
PSA SERPL-MCNC: 1.8 NG/ML (ref 0–4)
SODIUM SERPL-SCNC: 144 MMOL/L (ref 136–145)
TRIGL SERPL-MCNC: 99 MG/DL (ref ?–150)
VLDLC SERPL CALC-MCNC: 19.8 MG/DL

## 2018-07-12 PROCEDURE — 36415 COLL VENOUS BLD VENIPUNCTURE: CPT | Performed by: INTERNAL MEDICINE

## 2018-07-12 PROCEDURE — 83036 HEMOGLOBIN GLYCOSYLATED A1C: CPT | Performed by: INTERNAL MEDICINE

## 2018-07-12 PROCEDURE — 80061 LIPID PANEL: CPT | Performed by: INTERNAL MEDICINE

## 2018-07-12 PROCEDURE — 84153 ASSAY OF PSA TOTAL: CPT | Performed by: INTERNAL MEDICINE

## 2018-07-12 PROCEDURE — 80053 COMPREHEN METABOLIC PANEL: CPT | Performed by: INTERNAL MEDICINE

## 2018-07-12 NOTE — PROGRESS NOTES
[facility]  Pulmonary, Critical Care, and Sleep Medicine      Pulmonary Office Progress Notes    Name: Robert Nolasco     : 1940     Date: 2018        Subjective:     Patient is a 65 y.o. male here for follow-up on severe COPD. Since he was last year the patient continued his pulmonary therapies. He underwent a therapeutic trial of azithromycin. He did not appreciate any change in his cough or other symptoms on azithromycin. He continues to have a coarse cough but he is unable to produce any sputum. There is a suggestion of postnasal drainage. He can only walk a few steps before needing to stop and catch his breath. He uses a walker to get around the home and is pushed in wheelchair when he goes outside. He is not been hospitalized for going to the emergency room since his last visit here. Current Outpatient Prescriptions   Medication Sig Dispense Refill    albuterol-ipratropium (DUO-NEB) 2.5 mg-0.5 mg/3 ml nebu 3 mL by Nebulization route every four (4) hours. 30 Nebule 5    fluticasone-vilanterol (BREO ELLIPTA) 100-25 mcg/dose inhaler Take 1 Puff by inhalation daily.  insulin glargine (LANTUS) 100 unit/mL injection 7 Units by SubCUTAneous route nightly. 3 Vial 3    Nebulizer & Compressor machine 1 Each.  tiotropium bromide (SPIRIVA RESPIMAT) 2.5 mcg/actuation inhaler Take 2 Puffs by inhalation daily.  albuterol (VENTOLIN HFA) 90 mcg/actuation inhaler Take 1-2 puffs every 4-6 hrs prn shortness of breath 3 Inhaler 3     Objective:     Visit Vitals    /78 (BP 1 Location: Right arm, BP Patient Position: Sitting)    Pulse 72    Temp 98.1 °F (36.7 °C) (Oral)    Resp 16    Ht 5' 6\" (1.676 m)    Wt 75.3 kg (166 lb)    SpO2 96%    BMI 26.79 kg/m2     Physical Exam   Constitutional: He is oriented to person, place, and time. No distress. Eyes: Conjunctivae and EOM are normal.   Neck: No tracheal deviation present. Cardiovascular: Regular rhythm. Exam reveals no gallop. Pulmonary/Chest: Effort normal. No stridor. No respiratory distress. He has no wheezes. He has no rales. He exhibits no tenderness. Breath sounds are diminished throughout. Chest wall excursion is limited. There is no respiratory distress while seated in a wheelchair. Abdominal: Soft. Neurological: He is alert and oriented to person, place, and time. Skin: He is not diaphoretic.    No pretibial rash no malar rash   Psychiatric: Affect and judgment normal.     Data review:     Office Visit on 07/03/2018   Component Date Value Ref Range Status    Sodium 03/01/2018 144  136 - 145 mmol/L Final    Potassium 03/01/2018 4.3  3.5 - 5.5 mmol/L Final    Chloride 03/01/2018 96* 100 - 108 mmol/L Final    CO2 03/01/2018 40* 21 - 32 mmol/L Final    Anion gap 03/01/2018 8  3.0 - 18 mmol/L Final    Glucose 03/01/2018 111* 74 - 99 mg/dL Final    BUN 03/01/2018 15  7.0 - 18 MG/DL Final    Creatinine 03/01/2018 0.56* 0.6 - 1.3 MG/DL Final     Patient Active Problem List   Diagnosis Code    Type II or unspecified type diabetes mellitus without mention of complication, not stated as uncontrolled E11.9    Essential hypertension, benign I10    Other and unspecified hyperlipidemia E78.5    Tobacco use disorder F17.200    Dizziness and giddiness R42    CVA (cerebrovascular accident) ([facility] I63.9    Saddle embolus of pulmonary artery without acute cor pulmonale ([facility]) I26.92    H/O colonoscopy Z98.890    COPD (chronic obstructive pulmonary disease) ([facility]) J44.9    Microhematuria R31.29    History of urinary retention Z87.898    Renal cyst, right N28.1    Enlarged prostate N40.0    Coagulation defect ([facility] [facility] D68.9    Advance directive discussed with patient Z70.80    Eye exam, routine Z01.00     IMPRESSION:   · Severe COPD with emphysema  · Marked debilitation and generalized weakness      RECOMMENDATIONS:   · Stop azithromycin  · Continue other pulmonary therapies  · Pulmonary rehab referral  · End-of-life discussions with family and POA. Given the advanced stage of his disease I asked him to consider his wishes should his respiratory status collapse. I recommended against intubation, particularly citing a very prolonged recovery time and high probability of discharge to a SNF rather than home. Follow-up Disposition: Return to clinic in 3 months    Mr. Adalberto Phelps has a reminder for a \"due or due soon\" health maintenance. I have asked that he contact his primary care provider for follow-up on this health maintenance.          Radha Roman MD

## 2018-07-12 NOTE — PROGRESS NOTES
Chief Complaint   Patient presents with    COPD     . Zoe Baldwin is here today to follow up from his last visit. He states he feels no changes since we last saw him. He currently denies chest pain, dizz, HA, SOB.     1. Have you been to the ER, urgent care clinic since your last visit? Hospitalized since your last visit? No    2. Have you seen or consulted any other health care providers outside of the [address] [place] [address] since your last visit? Include any pap smears or colon screening.  No

## 2018-07-12 NOTE — PATIENT INSTRUCTIONS
Learning About Durable Power of  for Health Care  What is a durable power of  for health care? A durable power of  for health care is one type of the legal forms called advance directives. It lets you decide who you want to make treatment decisions for you if you cannot speak or decide for yourself. The person you choose is called your health care agent. Another type of advance directive is a living will. It lets you write down what kinds of treatment or life support you want or do not want. What should you think about when choosing a health care agent? Choose your health care agent carefully. This person may or may not be a family member. Talk to the person before you make your final decision. Make sure he or she is comfortable with this responsibility. It's a good idea to choose someone who:  · Is at least 25years old. · Knows you well and understands what makes life meaningful for you. · Understands your Catholic and moral values. · Will do what you want, not what he or she wants. · Will be able to make difficult choices at a stressful time. · Will be able to refuse or stop treatment, if that is what you would want, even if you could die. · Will be firm and confident with health professionals if needed. · Will ask questions to get necessary information. · Lives near you or agrees to travel to you if needed. Your family may help you make medical decisions while you can still be part of that process. But it is important to choose one person to be your health care agent in case you are not able to make decisions for yourself. If you don't fill out the legal form and name a health care agent, the decisions your family can make may be limited. Who will make decisions for you if you do not have a health care agent? If you don't have a health care agent or a living will, your family members may disagree about your medical care.  And then some medical professionals who may not know you as well might have to make decisions for you. In some cases, a  makes the decisions. When you name a health care agent, it is very clear who has the power to make health decisions for you. How do you name a health care agent? You name your health care agent on a legal form. It is usually called a durable power of  for health care. Ask your hospital, state bar association, or office on aging where to find these forms. You must sign the form to make it legal. Some states require you to get the form notarized. This means that a person called a  watches you sign the form and then he or she signs the form. Some states also require that two or more witnesses sign the form. Be sure to tell your family members and doctors who your health care agent is. Keep your forms in a safe place. But make sure that your loved ones know where the forms are. This could be in your desk where you keep other important papers. Make sure your doctor has a copy of your forms. Where can you learn more? Go to http://ty-sharyn.info/. Enter [id number] in the search box to learn more about \"Learning About Durable Power of  for [facility]. \"  Current as of: October 6, 2017  Content Version: 11.7  © 1991-1059 [employer]. Care instructions adapted under license by [employer] (which disclaims liability or warranty for this information). If you have questions about a medical condition or this instruction, always ask your healthcare professional. [place] [address] any warranty or liability for your use of this information. Advance Care Planning With COPD: After Your Visit  Your Care Instructions  Taking care of yourself when you have chronic obstructive pulmonary disease (COPD) will help you feel better and live longer. But the disease gets worse over time.  If your health is getting worse, you may want to make decisions about end-of-life care. Planning for the end of your life does not mean that you are giving up. It is a way to make sure that your wishes are met. Also, being clear about your wishes will make it easier for your loved ones. Making plans while you still can may ease your mind. It may help make your final days less stressful and give them more meaning. As part of your planning, it is a good idea to write advance directives. These are legal papers that tell doctors and family members your values and wishes for care at the end of your life. They may include a living will and a durable power of . You don't need a  to write these papers. Make sure that your doctor has a copy of these on file. And give a copy to a family member or close friend. You can have end-of-life care at home or in a nursing home. No matter where you are, hospice may be able to help with your care. Hospice provides pain relief. And it can give emotional and spiritual support to you and your family. You may work with a team of health professionals. The team may include doctors, nurses, social workers, and counselors. At all stages of your disease, your team can give treatment to manage your symptoms and keep you comfortable. Follow-up care is a key part of your treatment and safety. Be sure to make and go to all appointments, and call your doctor if you are having problems. It's also a good idea to know your test results and keep a list of the medicines you take. How can you care for yourself at home? · Talk openly and honestly with your doctor. This is the best way to understand the decisions you will need to make as your health changes. Know that you can always change your mind. · Ask your doctor about life-support measures that are commonly used. These include tube feedings, breathing machines, and fluids given through a vein (IV). Understanding these treatments will help you decide whether you want them.   · You may decide that you would like to have life-supporting treatments for a limited time. This allows a trial period to see if they will help you. You may also decide that you want your doctor to take only certain measures to keep you alive. It is important to spell out these conditions so that your doctor and family understand them. · Talk to your doctor about an estimate of how long you are likely to live. Your doctor likely will not be able to tell you exactly how long you have, but he or she may be able to give you a general time frame. He or she may also be able to point to things (such as oxygen needs and trouble caring for yourself) that benjamin the advance of the disease. And he or she can give you an idea about what usually happens with COPD. · Ask a friend or family member to make decisions for you when you no longer can. Talk to this person about the kinds of treatments you want and those that you do not want. Make sure that this person understands your wishes. If this person is not your durable power of  named in your advance directive, talk with your durable power of  too. · If you have not already done so, prepare a list of advance directives. These are instructions to your doctor and family members about what kind of care you want if you can't speak or express yourself. Ask your doctor or your state health department for information on how to write advance directives. They may have forms you can use. ¨ Think about a do-not-resuscitate order, or DNR. This order asks that no extra treatments be used if your heart stops. Extra treatments include electrical shock to restart your heart, a machine to breathe for you, and medicines that can stimulate the heart. If you decide to have a DNR order, ask your doctor to write it. Place the order in your home where everyone can easily see it. ¨ Think about a living will. A living will explains your wishes in case you are in a coma or can't communicate.  Living briggs tell doctors to use or not use treatments that would keep you alive. ¨ Think about naming a person to make decisions about your care if you are not able to. This is called a durable power of . Most people ask a close friend or family member. Talk to this person about the kinds of treatments you want and those that you do not want. ¨ All of these forms are simple to change. Tell your doctor what you want to change. Ask him or her to make a note in your medical file. Give your family updated copies of the papers. When should you call for help? Be sure to contact your doctor if you have any questions. Where can you learn more? Go to [employer]  Enter G673 in the search box to learn more about \"Advance Care Planning With COPD: After Your Visit. \"   © 1771-5328 [employer]. Care instructions adapted under license by [employer] (which disclaims liability or warranty for this information). This care instruction is for use with your licensed healthcare professional. If you have questions about a medical condition or this instruction, always ask your healthcare professional. [place] [address] any warranty or liability for your use of this information.   Content Version: 29.3.201121; Current as of: September 30, 2013

## 2018-07-12 NOTE — MR AVS SNAPSHOT
Rashid Duttonjax 
 
 
 [address] [facility] [address] [address] 
[phone] Patient: Jonathon Yao MRN: [id number] Tiffanie Mckay Visit Information Date & Time Provider Department Dept. Phone Encounter #  
 7/12/2018  1:45 PM Kamilah Duron MD [facility] [phone] Follow-up Instructions Return in about 3 months (around 10/12/2018). Your Appointments 8/30/2018 10:30 AM  
Office Visit with Dimitrios Basurto MD  
[facility] [place] [facility]) Appt Note: 3 month f/u  
 [address]  
  
    
 10/4/2018 11:15 AM  
Office Visit with Dimitrios Basurto MD  
[facility] [place] [facility]) Appt Note: 3 month f/u combo clinic, lipids [address] [facility]  
[phone]  
  
    
  
 8/21/2018  1:30 PM  
Any with Mita Khan MD  
[facility] Appt Note: 1 year fu; Dr. Leeann Campoverde template has changed - letter sent to pt wTamiko loaiza appt details [address] [address]  
[phone]  
  
   
 [place] [address] Upcoming Health Maintenance Date Due  
 EYE EXAM RETINAL OR DILATED Q1 7/25/2018 FOOT EXAM Q1 8/10/2018 Influenza Age 5 to Adult 8/1/2018 MEDICARE YEARLY EXAM 8/11/2018 HEMOGLOBIN A1C Q6M 9/1/2018 MICROALBUMIN Q1 10/19/2018 LIPID PANEL Q1 3/1/2019 GLAUCOMA SCREENING Q2Y 7/25/2019 COLONOSCOPY 11/24/2020 DTaP/Tdap/Td series (2 - Td) 8/1/2026 Allergies as of 7/12/2018  Review Complete On: 7/12/2018 By: Kamilah Duron MD  
 No Known Allergies Current Immunizations  Reviewed on 7/3/2018 Name Date Influenza High Dose Vaccine PF 9/25/2017, 11/5/2015 Influenza Vaccine 11/10/2016 12:00 AM, 9/14/2015, 9/16/2014 12:14 PM, 10/13/2010 Influenza Vaccine PF 1/29/2013  8:12 AM  
 Pneumococcal Conjugate (PCV-13) 9/14/2015 Pneumococcal Polysaccharide (PPSV-23) 8/14/2014 12:00 PM, 10/13/2010 12:00 AM  
  
 Not reviewed this visit You Were Diagnosed With   
  
 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  
 26.79 kg/m 2 1.87 m 2 Preferred Pharmacy Pharmacy Name Phone [address] [place] [address],[address] [phone] Your Updated Medication List  
  
   
This list is accurate as of 7/12/18  2:46 PM.  Always use your most recent med list.  
  
  
  
  
 albuterol 90 mcg/actuation inhaler Commonly known as:  VENTOLIN HFA Take 1-2 puffs every 4-6 hrs prn shortness of breath  
  
 albuterol-ipratropium 2.5 mg-0.5 mg/3 ml Nebu Commonly known as:  DUO-NEB  
3 mL by Nebulization route every four (4) hours. Blood-Glucose Meter monitoring kit Commonly known as:  Ewa Ely Check fasting sugars daily before breakfast. DX: 250.02  
  
 ferrous sulfate 325 mg (65 mg iron) tablet Take 1 Tab by mouth two (2) times a day. finasteride 5 mg tablet Commonly known as:  PROSCAR Take 1 Tab by mouth daily. fluticasone-vilanterol 100-25 mcg/dose inhaler Commonly known as:  BREO ELLIPTA Take 1 Puff by inhalation daily. furosemide 20 mg tablet Commonly known as:  LASIX Take 1 Tab by mouth daily. Indications: hypertension  
  
 glucose blood VI test strips strip Commonly known as:  FREESTYLE LITE STRIPS Check fasting sugars daily before breakfast. DX: E11.9 for freestyle lite meter  
  
 insulin glargine 100 unit/mL injection Commonly known as:  LANTUS U-100 INSULIN  
7 Units by SubCUTAneous route nightly. Lancets Misc Check fasting sugars daily before breakfast. DX: E11.9 for freestyle lite meter  
  
 lisinopril 2.5 mg tablet Commonly known as:  Jacklynn Pares Take 1 Tab by mouth daily. metoprolol tartrate 25 mg tablet Commonly known as:  LOPRESSOR Take 1 Tab by mouth two (2) times a day. Nebulizer & Compressor machine 1 Each. omeprazole 40 mg capsule Commonly known as:  PRILOSEC Take 1 Cap by mouth daily. potassium chloride 20 mEq tablet Commonly known as:  KLOR-CON M20 Take 1 Tab by mouth daily. tamsulosin 0.4 mg capsule Commonly known as:  FLOMAX Take 1 Cap by mouth nightly. tiotropium bromide 2.5 mcg/actuation inhaler Commonly known as:  Unice Beau Take 2 Puffs by inhalation daily. VITAMIN C 500 mg tablet Generic drug:  ascorbic acid (vitamin C) Take 500 mg by mouth two (2) times a day. warfarin 5 mg tablet Commonly known as:  COUMADIN Take 1 Tab by mouth every evening. We Performed the Following REFERRAL TO PULMONARY REHAB [PME352 Custom] Follow-up Instructions Return in about 3 months (around 10/12/2018). Referral Information Referral ID Referred By Referred To  
  
 [id number] Chelsea Montgomery Not Available Visits Status Start Date End Date 1 New Request 7/12/18 7/12/19 If your referral has a status of pending review or denied, additional information will be sent to support the outcome of this decision. Patient Instructions Learning About Durable Power of  for Health Care What is a durable power of  for health care? A durable power of  for health care is one type of the legal forms called advance directives. It lets you decide who you want to make treatment decisions for you if you cannot speak or decide for yourself. The person you choose is called your health care agent. Another type of advance directive is a living will. It lets you write down what kinds of treatment or life support you want or do not want. What should you think about when choosing a health care agent? Choose your health care agent carefully. This person may or may not be a family member. Talk to the person before you make your final decision. Make sure he or she is comfortable with this responsibility. It's a good idea to choose someone who: · Is at least 25years old. · Knows you well and understands what makes life meaningful for you. · Understands your Cheondoism and moral values. · Will do what you want, not what he or she wants. · Will be able to make difficult choices at a stressful time. · Will be able to refuse or stop treatment, if that is what you would want, even if you could die. · Will be firm and confident with health professionals if needed. · Will ask questions to get necessary information. · Lives near you or agrees to travel to you if needed. Your family may help you make medical decisions while you can still be part of that process. But it is important to choose one person to be your health care agent in case you are not able to make decisions for yourself. If you don't fill out the legal form and name a health care agent, the decisions your family can make may be limited. Who will make decisions for you if you do not have a health care agent?  
If you don't have a health care agent or a living will, your family members may disagree about your medical care. And then some medical professionals who may not know you as well might have to make decisions for you. In some cases, a  makes the decisions. When you name a health care agent, it is very clear who has the power to make health decisions for you. How do you name a health care agent? You name your health care agent on a legal form. It is usually called a durable power of  for health care. Ask your hospital, state bar association, or office on aging where to find these forms. You must sign the form to make it legal. Some states require you to get the form notarized. This means that a person called a  watches you sign the form and then he or she signs the form. Some states also require that two or more witnesses sign the form. Be sure to tell your family members and doctors who your health care agent is. Keep your forms in a safe place. But make sure that your loved ones know where the forms are. This could be in your desk where you keep other important papers. Make sure your doctor has a copy of your forms. Where can you learn more? Go to http://ty-sharyn.info/. Enter [id number] in the search box to learn more about \"Learning About Durable Power of  for [facility]. \" Current as of: October 6, 2017 Content Version: 11.7 © 1177-1035 [employer], Incorporated. Care instructions adapted under license by [employer] (which disclaims liability or warranty for this information). If you have questions about a medical condition or this instruction, always ask your healthcare professional. [place] [address] any warranty or liability for your use of this information. Advance Care Planning With COPD: After Your Visit Your Care Instructions Taking care of yourself when you have chronic obstructive pulmonary disease (COPD) will help you feel better and live longer.  But the disease gets worse over time. If your health is getting worse, you may want to make decisions about end-of-life care. Planning for the end of your life does not mean that you are giving up. It is a way to make sure that your wishes are met. Also, being clear about your wishes will make it easier for your loved ones. Making plans while you still can may ease your mind. It may help make your final days less stressful and give them more meaning. As part of your planning, it is a good idea to write advance directives. These are legal papers that tell doctors and family members your values and wishes for care at the end of your life. They may include a living will and a durable power of . You don't need a  to write these papers. Make sure that your doctor has a copy of these on file. And give a copy to a family member or close friend. You can have end-of-life care at home or in a nursing home. No matter where you are, hospice may be able to help with your care. Hospice provides pain relief. And it can give emotional and spiritual support to you and your family. You may work with a team of health professionals. The team may include doctors, nurses, social workers, and counselors. At all stages of your disease, your team can give treatment to manage your symptoms and keep you comfortable. Follow-up care is a key part of your treatment and safety. Be sure to make and go to all appointments, and call your doctor if you are having problems. It's also a good idea to know your test results and keep a list of the medicines you take. How can you care for yourself at home? · Talk openly and honestly with your doctor. This is the best way to understand the decisions you will need to make as your health changes. Know that you can always change your mind. · Ask your doctor about life-support measures that are commonly used.  These include tube feedings, breathing machines, and fluids given through a vein (IV). Understanding these treatments will help you decide whether you want them. · You may decide that you would like to have life-supporting treatments for a limited time. This allows a trial period to see if they will help you. You may also decide that you want your doctor to take only certain measures to keep you alive. It is important to spell out these conditions so that your doctor and family understand them. · Talk to your doctor about an estimate of how long you are likely to live. Your doctor likely will not be able to tell you exactly how long you have, but he or she may be able to give you a general time frame. He or she may also be able to point to things (such as oxygen needs and trouble caring for yourself) that benjamin the advance of the disease. And he or she can give you an idea about what usually happens with COPD. · Ask a friend or family member to make decisions for you when you no longer can. Talk to this person about the kinds of treatments you want and those that you do not want. Make sure that this person understands your wishes. If this person is not your durable power of  named in your advance directive, talk with your durable power of  too. · If you have not already done so, prepare a list of advance directives. These are instructions to your doctor and family members about what kind of care you want if you can't speak or express yourself. Ask your doctor or your state health department for information on how to write advance directives. They may have forms you can use. ¨ Think about a do-not-resuscitate order, or DNR. This order asks that no extra treatments be used if your heart stops. Extra treatments include electrical shock to restart your heart, a machine to breathe for you, and medicines that can stimulate the heart. If you decide to have a DNR order, ask your doctor to write it. Place the order in your home where everyone can easily see it. ¨ Think about a living will. A living will explains your wishes in case you are in a coma or can't communicate. Living briggs tell doctors to use or not use treatments that would keep you alive. ¨ Think about naming a person to make decisions about your care if you are not able to. This is called a durable power of . Most people ask a close friend or family member. Talk to this person about the kinds of treatments you want and those that you do not want. ¨ All of these forms are simple to change. Tell your doctor what you want to change. Ask him or her to make a note in your medical file. Give your family updated copies of the papers. When should you call for help? Be sure to contact your doctor if you have any questions. Where can you learn more? Go to Time To Cater.be Enter G673 in the search box to learn more about \"Advance Care Planning With COPD: After Your Visit. \"  
© 1791-9457 Healthwise, Incorporated. Care instructions adapted under license by Tonie Quigley (which disclaims liability or warranty for this information). This care instruction is for use with your licensed healthcare professional. If you have questions about a medical condition or this instruction, always ask your healthcare professional. [address] any warranty or liability for your use of this information. Content Version: 78.1.200277; Current as of: September 30, 2013 Introducing [facility]! Tonie Quigley introduces fruux patient portal. Now you can access parts of your medical record, email your doctor's office, and request medication refills online. 1. In your internet browser, go to [email or web address] 2. Click on the First Time User? Click Here link in the Sign In box. You will see the New Member Sign Up page. 3. Enter your fruux Access Code exactly as it appears below.  You will not need to use this code after youve completed the sign-up process. If you do not sign up before the expiration date, you must request a new code. · [email or web address] Access Code: [email or web address] Expires: 8/29/2018  3:15 PM 
 
4. Enter the last four digits of your Social Security Number (xxxx) and Date of Birth (mm/dd/yyyy) as indicated and click Submit. You will be taken to the next sign-up page. 5. Create a InsideSales.com ID. This will be your InsideSales.com login ID and cannot be changed, so think of one that is secure and easy to remember. 6. Create a InsideSales.com password. You can change your password at any time. 7. Enter your Password Reset Question and Answer. This can be used at a later time if you forget your password. 8. Enter your e-mail address. You will receive e-mail notification when new information is available in [address]. 9. Click Sign Up. You can now view and download portions of your medical record. 10. Click the Download Summary menu link to download a portable copy of your medical information. If you have questions, please visit the Frequently Asked Questions section of the InsideSales.[email or web address] website. Remember, InsideSales.com is NOT to be used for urgent needs. For medical emergencies, dial 911. Now available from your iPhone and Android! Please provide this summary of care documentation to your next provider. Your primary care clinician is listed as Omar Keane. If you have any questions after today's visit, please call [phone].

## 2018-07-18 ENCOUNTER — TELEPHONE (OUTPATIENT)
Dept: INTERNAL MEDICINE CLINIC | Age: 78
End: 2018-07-18

## 2018-07-18 NOTE — TELEPHONE ENCOUNTER
Please let pt know that INR on 7/17/18 was 2.4. We just got the results today. Please call in the results so can get them same day. Continue coumadin 5mg daily pm and recheck in 1 week.

## 2018-07-18 NOTE — TELEPHONE ENCOUNTER
Contacted pt. Wife answered call. Verified access in Media. 2 pt identifiers confirmed. Notified to continue to call in INR results on same day. Notified also to continue coumadin 5 mg daily in the pm and we will recheck in one week. Verbalized understanding. No further questions or concerns at this time.

## 2018-07-19 ENCOUNTER — OFFICE VISIT (OUTPATIENT)
Dept: INTERNAL MEDICINE CLINIC | Age: 78
End: 2018-07-19

## 2018-07-19 VITALS
DIASTOLIC BLOOD PRESSURE: 72 MMHG | BODY MASS INDEX: 27.48 KG/M2 | SYSTOLIC BLOOD PRESSURE: 133 MMHG | HEIGHT: 66 IN | RESPIRATION RATE: 18 BRPM | OXYGEN SATURATION: 96 % | WEIGHT: 171 LBS | TEMPERATURE: 96.4 F | HEART RATE: 74 BPM
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
## 2018-07-19 NOTE — MR AVS SNAPSHOT
Matthew Ego 
 
 
 [address] [address] 
[phone] Patient: Janet Oliver MRN: [id number] Osvaldo Lynne Visit Information Date & Time Provider Department Dept. Phone Encounter #  
 7/19/2018  1:00 PM MD [address] [facility] [phone] [id number] Follow-up Instructions Return if symptoms worsen or fail to improve. Your Appointments 8/30/2018 10:30 AM  
Office Visit with MD [address] [place] [address]) Appt Note: 3 month f/u  
 [address]  
  
    
 10/4/2018 11:15 AM  
Office Visit with MD [address] [place] [address]) Appt Note: 3 month f/u combo clinic, lipids [address] [place] [phone]  
  
    
  
 8/21/2018  1:30 PM  
Any with Italo Allen MD  
[facility] of [address]) Appt Note: 1 year fu; Dr. Jodi foster has changed - letter sent to pt w. new appt details [address] [place] [address]  
[phone]  
  
   
 [place] [address] Upcoming Health Maintenance Date Due  
 EYE EXAM RETINAL OR DILATED Q1 7/25/2018 FOOT EXAM Q1 8/10/2018 Influenza Age 5 to Adult 8/1/2018 MEDICARE YEARLY EXAM 8/11/2018 MICROALBUMIN Q1 10/19/2018 HEMOGLOBIN A1C Q6M 1/12/2019 LIPID PANEL Q1 7/12/2019 GLAUCOMA SCREENING Q2Y 7/25/2019 COLONOSCOPY 11/24/2020 DTaP/Tdap/Td series (2 - Td) 8/1/2026 Allergies as of 7/19/2018  Review Complete On: 7/19/2018 By: Agustina Dickson MD  
 No Known Allergies Current Immunizations  Reviewed on 7/3/2018 Name Date Influenza High Dose Vaccine PF 9/25/2017, 11/5/2015 Influenza Vaccine 11/10/2016 12:00 AM, 9/14/2015, 9/16/2014 12:14 PM, 10/13/2010 Influenza Vaccine PF 1/29/2013  8:12 AM  
 Pneumococcal Conjugate (PCV-13) 9/14/2015 Pneumococcal Polysaccharide (PPSV-23) 8/14/2014 12:00 PM, 10/13/2010 12:00 AM  
  
 Not reviewed this visit Vitals BP Pulse Temp Resp Height(growth percentile) Weight(growth percentile) 133/72 (BP 1 Location: Right arm, BP Patient Position: Sitting) 74 96.4 °F (35.8 °C) (Oral) 18 5' 6\" (1.676 m) 171 lb (77.6 kg) SpO2 BMI Smoking Status 96% 27.6 kg/m2 Former Smoker Vitals History BMI and BSA Data Body Mass Index Body Surface Area  
 27.6 kg/m 2 1.9 m 2 Preferred Pharmacy Pharmacy Name Phone [address] [place] [address],[address] [phone] Your Updated Medication List  
  
   
This list is accurate as of 7/19/18  1:44 PM.  Always use your most recent med list.  
  
  
  
  
 albuterol 90 mcg/actuation inhaler Commonly known as:  VENTOLIN HFA Take 1-2 puffs every 4-6 hrs prn shortness of breath  
  
 albuterol-ipratropium 2.5 mg-0.5 mg/3 ml Nebu Commonly known as:  DUO-NEB  
3 mL by Nebulization route every four (4) hours. Blood-Glucose Meter monitoring kit Commonly known as:  ChangeTip Cheesh-Na Check fasting sugars daily before breakfast. DX: 250.02  
  
 ferrous sulfate 325 mg (65 mg iron) tablet Take 1 Tab by mouth two (2) times a day. finasteride 5 mg tablet Commonly known as:  PROSCAR Take 1 Tab by mouth daily. fluticasone-vilanterol 100-25 mcg/dose inhaler Commonly known as:  BREO ELLIPTA Take 1 Puff by inhalation daily. furosemide 20 mg tablet Commonly known as:  LASIX Take 1 Tab by mouth daily. Indications: hypertension  
  
 glucose blood VI test strips strip Commonly known as:  FREESTYLE LITE STRIPS Check fasting sugars daily before breakfast. DX: E11.9 for freestyle lite meter insulin glargine 100 unit/mL injection Commonly known as:  LANTUS U-100 INSULIN  
7 Units by SubCUTAneous route nightly. Lancets Misc Check fasting sugars daily before breakfast. DX: E11.9 for freestyle lite meter  
  
 lisinopril 2.5 mg tablet Commonly known as:  Rebecca Hu Take 1 Tab by mouth daily. metoprolol tartrate 25 mg tablet Commonly known as:  LOPRESSOR Take 1 Tab by mouth two (2) times a day. Nebulizer & Compressor machine 1 Each. omeprazole 40 mg capsule Commonly known as:  PRILOSEC Take 1 Cap by mouth daily. potassium chloride 20 mEq tablet Commonly known as:  KLOR-CON M20 Take 1 Tab by mouth daily. tamsulosin 0.4 mg capsule Commonly known as:  FLOMAX Take 1 Cap by mouth nightly. tiotropium bromide 2.5 mcg/actuation inhaler Commonly known as:  India Butt Take 2 Puffs by inhalation daily. VITAMIN C 500 mg tablet Generic drug:  ascorbic acid (vitamin C) Take 500 mg by mouth two (2) times a day. warfarin 5 mg tablet Commonly known as:  COUMADIN Take 1 Tab by mouth every evening. Follow-up Instructions Return if symptoms worsen or fail to improve. Patient Instructions 1) follow-up as needed or sooner if worsening symptoms. Introducing [facility]! [facility] introduces Yee Care patient portal. Now you can access parts of your medical record, email your doctor's office, and request medication refills online. 1. In your internet browser, go to [email or web address] International [email or web address] 2. Click on the First Time User? Click Here link in the Sign In box. You will see the New Member Sign Up page. 3. Enter your Yee Care Access Code exactly as it appears below. You will not need to use this code after youve completed the sign-up process. If you do not sign up before the expiration date, you must request a new code.  
 
· Yee Care Access Code: [email or web address] 
 Expires: 8/29/2018  3:15 PM 
 
4. Enter the last four digits of your Social Security Number (xxxx) and Date of Birth (mm/dd/yyyy) as indicated and click Submit. You will be taken to the next sign-up page. 5. Create a [employer]'s Plaza ID. This will be your [employer] login ID and cannot be changed, so think of one that is secure and easy to remember. 6. Create a [employer] Plaza password. You can change your password at any time. 7. Enter your Password Reset Question and Answer. This can be used at a later time if you forget your password. 8. Enter your e-mail address. You will receive e-mail notification when new information is available in [address] 9. Click Sign Up. You can now view and download portions of your medical record. 10. Click the Download Summary menu link to download a portable copy of your medical information. If you have questions, please visit the Frequently Asked Questions section of the [employer] website. Remember, [employer] is NOT to be used for urgent needs. For medical emergencies, dial 911. Now available from your iPhone and Android! Please provide this summary of care documentation to your next provider. Your primary care clinician is listed as Omar Keane. If you have any questions after today's visit, please call [phone].

## 2018-07-19 NOTE — PROGRESS NOTES
ROOM # 2    Sherrill Chaparro presents today for   Chief Complaint   Patient presents with   [facility] Follow Up     [facility] ED f/u       Sherrill Chaparro preferred language for health care discussion is english/other. Is someone accompanying this pt? Yes wife    Is the patient using any DME equipment during [address]? Yes, wheelchair    Depression Screening:  PHQ over the last two weeks 7/19/2018 7/19/2018 7/3/2018 5/31/2018 3/1/2018 9/25/2017 8/10/2017   Little interest or pleasure in doing things Not at all Not at all Not at all Not at all Not at all Not at all Not at all   Feeling down, depressed, irritable, or hopeless Not at all Not at all Not at all Not at all Not at all Not at all Not at all   Total Score PHQ 2 0 0 0 0 0 0 0       Learning Assessment:  Learning Assessment 7/3/2018 8/14/2014   PRIMARY LEARNER Patient Patient   HIGHEST LEVEL OF EDUCATION - PRIMARY LEARNER  [address] PRIMARY LEARNER [address] [facility] CAREGIVER No -   PRIMARY LANGUAGE ENGLISH ENGLISH   LEARNER PREFERENCE PRIMARY READING LISTENING     DEMONSTRATION -   ANSWERED BY patient self   RELATIONSHIP SELF SELF       Abuse Screening:  Abuse Screening Questionnaire 7/3/2018 8/10/2017 5/12/2015   Do you ever feel afraid of your partner? N N N   Are you in a relationship with someone who physically or mentally threatens you? N N N   Is it safe for you to go home? Caroline Pena       Fall Risk  Fall Risk Assessment, last 12 mths 7/19/2018 7/3/2018 5/31/2018 3/1/2018 10/19/2017 9/25/2017 8/10/2017   Able to walk? Yes Yes Yes Yes Yes Yes Yes   Fall in past 12 months? Yes No Yes Yes No Yes Yes   Fall with injury? - - No Yes - Yes No   Number of falls in past 12 months 5 - 3 3 - 3 2   Fall Risk Score - - 3 4 - 4 2       Health Maintenance reviewed and discussed per provider.  Yes    Sherrill Chaparro is due for   Health Maintenance Due   Topic Date Due    EYE EXAM RETINAL OR DILATED Q1  07/25/2018    FOOT EXAM Q1  08/10/2018     Please order/place referral if appropriate. Advance Directive:  1. Do you have an advance directive in place? Patient Reply: no    2. If not, would you like material regarding how to put one in place? Patient Reply: no    Coordination of Care:  1. Have you been to the ER, urgent care clinic since your last visit? Hospitalized since your last visit? Yes [address]    2. Have you seen or consulted any other health care providers outside of the [facility] since your last visit? Include any pap smears or colon screening.  no

## 2018-07-19 NOTE — PROGRESS NOTES
Chief Complaint   Patient presents with   [facility] Follow Up     [facility] ED f/u       HPI:     Shania Dunlap is a 65 y.o.  male with history of COPD, CVA and type 2 DM here for the above complaint. He denies any chest pains, shortness of breath, abdominal pain, headaches or dizziness. He went to [facility] ER from 7/13/18-7/15/18 for fall. He had extensive workup and hospital records were reviewed. They are not sure what caused the fall. Could be from dehydration. Knee and hip x-rays were fine. Showed arthritis. Labs okay except for slight anemia. They told him to hold his prilosec and lasx, duoneb, and prednisone. However, he is back taking these medications. He is not having any symptoms now and has not felt like falling since being discharged. He is keep hydrated. ECHO showed:  Component Name Value Ref Range   EF Echo 60     Result Impression   :   TECHNICALLY DIFFICULT PATIENT DUE TO HEART ORIENTATION. NORMAL LEFT VENTRICULAR CAVITY SIZE AND SYSTOLIC FUNCTION WITH AN EJECTION FRACTION OF 60%. MILD DIASTOLIC DYSFUNCTION. RIGHT HEART SIZE  MILDLY INCREASED AND RV SYSTOLIC FUNCTION IS NORMAL. NO HEMODYNAMICALLY SIGNIFICANT VALVULAR PATHOLOGY. ESTIMATED PULMONARY ARTERY PRESSURE IS 24.7 MMHG. NEGATIVE BUBBLE STUDY. MRI head:    IMPRESSION:    No evidence of an acute intracranial process. Moderate generalized volume loss with extensive burden chronic microvascular disease, including extensive chronic ischemic change basal ganglia, thalami and marianna. Remote infarcts right cerebellum. Lack of flow void right vertebral artery may suggest slow flow or occlusion      Carotid ultrasound:    Bilateral: A bilateral internal carotid artery Doppler study was performed. Right: B-mode imaging of the right internal carotid artery identifies mild atherosclerotic plaque.  The right internal carotid artery Doppler spectral analysis demonstrates peak systolic velocities less than 125 cm/sec and/or an internal to common carotid artery ratio less than 4.5.  YOF end diastolic velocity is less than 40 cm/sec.  This data is consistent with the presence of a mild (< 50%) stenosis of the right internal carotid artery.  Antegrade flow with a normal hemodynamic profile was present in the right vertebral artery. The spectral Doppler waveform in the subclavian artery is multiphasic. Left: B-mode imaging of the left internal carotid artery identifies mild atherosclerotic plaque. The left internal carotid artery Doppler spectral analysis demonstrates peak systolic velocities less than 125 cm/sec and/or an internal to common carotid artery ratio less than 0.8.  LIW end diastolic velocity is less than 40 cm/sec.  This data is consistent with the presence of a mild (< 50%) stenosis of the left internal carotid artery.  Antegrade flow with a normal hemodynamic profile was present in the left vertebral artery. The spectral Doppler waveform in the subclavian artery is multiphasic. Conclusions: Mild plaque (<50%) is present in the bilateral internal carotid arteries that is not associated with a hemodynamically significant stenosis.     Antegrade flow with a normal hemodynamic profile was present in both vertebral arteries. No significant change compared to previous study of 10/2010. Head CT:    Impression: No acute intracranial abnormalities by CT   Result Narrative   CT head without IV contrast    Comparison: September 18, 2017    All CT scans at this facility are performed using dose optimization technique as appropriate to a performed exam, to include automatic exposure control, adjustment of the MA and/or kV according to patient size (including appropriate matching for site-specific exams), or use of iterative reconstruction technique. Stable mild central atrophy and presumed small vessel ischemic disease of the white matter with old infarct in the right thalamus. No midline shift or extra-axial collection.  No intracranial hemorrhage, mass lesion or cortical infarct involving a major vessel. Visualized paranasal sinuses and mastoid air cells are clear. Impression:    No evidence of acute fracture. Subtle patchy area of sclerosis suggested in the femoral heads bilaterally possible avascular necrosis. Sharply circumscribed 4.8 cm mixed density predominantly sclerotic in the proximal right femoral diaphysis present by description 2006 exam but likely slightly larger consistent with fibrous dysplasia or other nonaggressive bone lesion   Result Narrative   BILATERAL HIPS    History: Bilateral hip pain, multiple recent falls    Comparison: Prior MR of the right hip will not retrieve from archive. Comparison therefore to written report from 2006. Findings: AP pelvis and bilateral frog views of the hips obtained. The bony pelvic ring is intact. The left hip joint space is unremarkable. The right hip joint space is unremarkable. There is no evidence of fracture or dislocation.  Patchy region of sclerosis and lucency seen in the femoral heads bilaterally. Sharply circumscribed oval region of sclerosis with some internal lucency demonstrated in the proximal femoral diaphysis measuring 4.8 cm. No periostitis. No cortical thinning. Impression:    Marked degenerative arthritis at the knee. Bone infarct, less likely enchondroma proximal tibia. Result Narrative   Left knee four views    CPT code: 90297    History: Left knee pain status post frequent falls, weakness    Comparison: None. Findings:Four views obtained. There is no evidence of fracture or dislocation.  The joint spaces are markedly narrowed especially the medial compartment of the femoral-tibial joint. Marginal spurring demonstrated. Spurring of the tibial eminences. Patchy regions of sclerosis in the proximal tibial diaphysis likely benign bone infarct.                Past Medical History:   Diagnosis Date    Advance directive discussed with patient     [facility]     CVA (cerebrovascular accident) ([facility] 7/11/2012    possible    Diabetes mellitus ([facility]     Drowsiness     Enlarged prostate     Essential hypertension, benign     Eye exam, routine 07/26/2016    Dr. Guerrier  repeat in 1 yr    H/O colonoscopy 11/24/15    Dr. 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 Jo-Ann Rodriguez  late 2589'B     Current Outpatient Prescriptions   Medication Sig    albuterol-ipratropium (DUO-NEB) 2.5 mg-0.5 mg/3 ml nebu 3 mL by Nebulization route every four (4) hours.  fluticasone-vilanterol (BREO ELLIPTA) 100-25 mcg/dose inhaler Take 1 Puff by inhalation daily.  insulin glargine (LANTUS) 100 unit/mL injection 7 Units by SubCUTAneous route nightly.  finasteride (PROSCAR) 5 mg tablet Take 1 Tab by mouth daily.  omeprazole (PRILOSEC) 40 mg capsule Take 1 Cap by mouth daily.  potassium chloride (KLOR-CON M20) 20 mEq tablet Take 1 Tab by mouth daily.  ferrous sulfate 325 mg (65 mg iron) tablet Take 1 Tab by mouth two (2) times a day.  tamsulosin (FLOMAX) 0.4 mg capsule Take 1 Cap by mouth nightly.  warfarin (COUMADIN) 5 mg tablet Take 1 Tab by mouth every evening.     lisinopril (PRINIVIL, ZESTRIL) 2.5 mg tablet Take 1 Tab by mouth daily.    furosemide (LASIX) 20 mg tablet Take 1 Tab by mouth daily. Indications: hypertension    metoprolol tartrate (LOPRESSOR) 25 mg tablet Take 1 Tab by mouth two (2) times a day. (Patient taking differently: Take 12.5 mg by mouth two (2) times a day.)    Lancets misc Check fasting sugars daily before breakfast. DX: E11.9 for freestyle lite meter    Nebulizer & Compressor machine 1 Each.  tiotropium bromide (SPIRIVA RESPIMAT) 2.5 mcg/actuation inhaler Take 2 Puffs by inhalation daily.  albuterol (VENTOLIN HFA) 90 mcg/actuation inhaler Take 1-2 puffs every 4-6 hrs prn shortness of breath    glucose blood VI test strips (FREESTYLE LITE STRIPS) strip Check fasting sugars daily before breakfast. DX: E11.9 for freestyle lite meter    ascorbic acid (VITAMIN C) 500 mg tablet Take 500 mg by mouth two (2) times a day.  Blood-Glucose Meter (ONE TOUCH ULTRA 2) monitoring kit Check fasting sugars daily before breakfast. DX: 250.02     No current facility-administered medications for this visit. Health Maintenance   Topic Date Due    EYE EXAM RETINAL OR DILATED Q1  07/25/2018    FOOT EXAM Q1  08/10/2018    Influenza Age 9 to Adult  08/01/2018    MEDICARE YEARLY EXAM  08/11/2018    MICROALBUMIN Q1  10/19/2018    HEMOGLOBIN A1C Q6M  01/12/2019    LIPID PANEL Q1  07/12/2019    GLAUCOMA SCREENING Q2Y  07/25/2019    COLONOSCOPY  11/24/2020    DTaP/Tdap/Td series (2 - Td) 08/01/2026    ZOSTER VACCINE AGE 60>  Addressed    Pneumococcal 65+ Low/Medium Risk  Completed     Immunization History   Administered Date(s) Administered    Influenza High Dose Vaccine PF 11/05/2015, 09/25/2017    Influenza Vaccine 10/13/2010, 09/16/2014, 09/14/2015, 11/10/2016    Influenza Vaccine PF 01/29/2013    Pneumococcal Conjugate (PCV-13) 09/14/2015    Pneumococcal Polysaccharide (PPSV-23) 10/13/2010, 08/14/2014     No LMP for male patient.         Allergies and Intolerances:   No Known Allergies    Family History: Family History   Problem Relation Age of Onset    Cancer Father      he does not know       Social History:   He  reports that he quit smoking about 2 years ago. His smoking use included Cigarettes. He has a 45.00 pack-year smoking history. He has never used smokeless tobacco.  He  reports that he does not drink alcohol. Objective:   Physical exam:   Visit Vitals    /72 (BP 1 Location: Right arm, BP Patient Position: Sitting)    Pulse 74    Temp 96.4 °F (35.8 °C) (Oral)    Resp 18    Ht 5' 6\" (1.676 m)    Wt 171 lb (77.6 kg)    SpO2 96%    BMI 27.6 kg/m2        Generally: Pleasant male in no acute distress  Cardiac Exam: regular, rate, and rhythm. Normal S1 and S2. No murmurs, gallops, or rubs  Pulmonary exam: Clear to auscultation bilaterally  Abdominal exam: Positive bowel sounds in all four quadrants, soft, nondistended, nontender  Extremities: 2+ dorsalis pedis pulses bilaterally. No pedal edema    bilaterally    LABS/Radiological Tests:  Lab Results   Component Value Date/Time    WBC 6.7 08/11/2017 10:55 AM    HGB 12.3 (L) 08/11/2017 10:55 AM    HCT 41.3 08/11/2017 10:55 AM    PLATELET 091 50/73/3839 10:55 AM     Lab Results   Component Value Date/Time    Sodium 144 07/12/2018 03:31 PM    Potassium 4.5 07/12/2018 03:31 PM    Chloride 100 07/12/2018 03:31 PM    CO2 40 (H) 07/12/2018 03:31 PM    Glucose 97 07/12/2018 03:31 PM    BUN 14 07/12/2018 03:31 PM    Creatinine 0.65 07/12/2018 03:31 PM     Lab Results   Component Value Date/Time    Cholesterol, total 210 (H) 07/12/2018 03:31 PM    HDL Cholesterol 80 (H) 07/12/2018 03:31 PM    LDL, calculated 110.2 (H) 07/12/2018 03:31 PM    Triglyceride 99 07/12/2018 03:31 PM     No results found for: GPT    Previous labs      ASSESSMENT/PLAN:    1. Fall, initial encounter: resolved. We will monitor. 2. Patient verbalized understanding and agreement with the plan. 3. Patient was given an after-visit summary.     4. He will f/u in 10/2018 and cancel 8/2018 appt. Follow-up Disposition:  Return if symptoms worsen or fail to improve. or sooner if worsening symptoms.                 Morgan Rivera MD

## 2018-07-23 ENCOUNTER — TELEPHONE (OUTPATIENT)
Dept: INTERNAL MEDICINE CLINIC | Age: 78
End: 2018-07-23

## 2018-07-23 ENCOUNTER — PATIENT OUTREACH (OUTPATIENT)
Dept: INTERNAL MEDICINE CLINIC | Age: 78
End: 2018-07-23

## 2018-07-23 NOTE — PROGRESS NOTES
Chronic Obstructive Pulmonary Disease Follow Up Call    I called today and spoke with wife Ms. Leonardo Benson. Verified Name and  as identifiers. Are you using a rescue inhaler? yes, Type Albuterol every 4-6 hrs as needed     Zone:(Pt Reported)  yellow   Provider Notified: no      Barriers to care? medication management - pt doesn't always take inhalers as often as he should per wife. PCP/Specialist follow up: Future Appointments  Date Time Provider Leidy Veloz   2018 1:30 PM Keyur Marie MD [address]   10/4/2018 11:15 AM Omar Moss MD [place] [address]       Transportation: Wife drives patient       Goals      Identification of barriers to adherence to a plan of care such as inability to afford medications, lack of insurance, lack of transportation, etc.            No barriers identified at this time    18 Medication compliance - per wife pt doesn't use inhalers as often as he should. She will encourage him to use more regularly.  Patient  and wife verbalizes understanding of self -management goals of living with Diabetes. Wife checks BS on a regular basis -    18 Wife continues to check Blood sugar.  Prevent complications post hospitalization. 18 - Aware to call if develops increasing SOB, fever, chills, or increased sputum            Wife states patient is doing very well. I did ask about ACP document. Wife thinks daughter has the document and will have her either mail us a copy or bring us a copy. Patient reminded that there are physicians on call 24 hours a day / 7 days a week (M-F 5pm to 8am and from Friday 5pm until Monday 8a for the weekend) should the patient have questions or concerns. Patient reminded to call 911 if situation is emergent or patient feels the situation is emergent. Pt verbalizes understanding.

## 2018-07-23 NOTE — TELEPHONE ENCOUNTER
Gail with [facility]. 2 pt identifiers confirmed. Gail calling to inform MD that pt oxygen saturation dropped from 98% to 90% during PT,  with 2 minutes of rest it went back to 98%. Pt was currently being administered 3L of continuous O2. Please be advised.

## 2018-07-24 NOTE — TELEPHONE ENCOUNTER
Spoke with umesh at [facility] and informed her to continue to monitor O2 level and let us know if it drops again.

## 2018-07-25 ENCOUNTER — TELEPHONE (OUTPATIENT)
Dept: INTERNAL MEDICINE CLINIC | Age: 78
End: 2018-07-25

## 2018-07-25 NOTE — TELEPHONE ENCOUNTER
Contacted pt. Wife answered call. Verified access in chart. 2 pt identifiers confirmed. Notified to continue Coumadin 5 mg daily in pm and recheck INR in one week. Verbalized understanding. No further questions or concerns at this time.

## 2018-07-31 ENCOUNTER — TELEPHONE (OUTPATIENT)
Dept: INTERNAL MEDICINE CLINIC | Age: 78
End: 2018-07-31

## 2018-07-31 NOTE — TELEPHONE ENCOUNTER
Mrs. Caesar Sanders called to give his INR , please call pt with order.      INR - 2.5     Coumadin- 5 MG

## 2018-08-01 NOTE — TELEPHONE ENCOUNTER
Pulmonary Rehab has made multiple attempts to contact patient but is never able to get through to either number provided. Messages have been left on both numbers for patient to call back and inform whether interested or not. No further attempts will be made at this time. Please contact patient and ask to contact Pulmonary Rehab if desired.  [phone]

## 2018-08-02 ENCOUNTER — TELEPHONE (OUTPATIENT)
Dept: INTERNAL MEDICINE CLINIC | Age: 78
End: 2018-08-02

## 2018-08-02 NOTE — TELEPHONE ENCOUNTER
2 pt. Identifiers confirmed. S/Wpt's wife who states pt. Is doing fine and does not have any CP or any SOB more than usual. No other questions/concerns at this time.

## 2018-08-02 NOTE — TELEPHONE ENCOUNTER
Responded to call as there was no available nurseCourtney the PT called on behalf of the patient to inform Dr Campbell Matters that patient had a transient drop of oxygen saturation to 80% on 3 L NSC, states patient was in mild distress, states the oxygen went back up to 95 % on 3 L and patient is stable and in no distress at this time,Oxygen is currently at 95% on 3 LNSC. States patient has a home health nurse. Advised monitor by home health nurse and call [phone] with any drop of oxygen and respiratory distress. Called and informed Dr Shannan Mahajan of above mentioned.  (Routing comment)

## 2018-08-03 ENCOUNTER — PATIENT OUTREACH (OUTPATIENT)
Dept: INTERNAL MEDICINE CLINIC | Age: 78
End: 2018-08-03

## 2018-08-08 ENCOUNTER — TELEPHONE (OUTPATIENT)
Dept: INTERNAL MEDICINE CLINIC | Age: 78
End: 2018-08-08

## 2018-08-08 NOTE — TELEPHONE ENCOUNTER
Gail with [facility] just called to let you know that during therapy today the patient's 02 level dropped to 85% on 4ltrs of )2 while walking. States after a short rest his levels went back to normal.  States this is just an [place] call, patient is not in distress.

## 2018-08-09 ENCOUNTER — PATIENT OUTREACH (OUTPATIENT)
Dept: INTERNAL MEDICINE CLINIC | Age: 78
End: 2018-08-09
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
## 2018-08-13 ENCOUNTER — TELEPHONE (OUTPATIENT)
Dept: INTERNAL MEDICINE CLINIC | Age: 78
End: 2018-08-13

## 2018-08-13 NOTE — TELEPHONE ENCOUNTER
Michael from [address] is calling to check on the status of a fax sent Wednesday for the patient to receive oxygen. Because the form has to be filled out in a particular way, He is calling to give exact directions, to avoid prolonging patient from getting oxygen. It must be written in ink, the flow setting must be circled, [place] \"nasal cannula and 24/7\", sign and date.  Fax [phone]

## 2018-08-14 ENCOUNTER — TELEPHONE (OUTPATIENT)
Dept: INTERNAL MEDICINE CLINIC | Age: 78
End: 2018-08-14

## 2018-08-14 ENCOUNTER — HOSPITAL ENCOUNTER (EMERGENCY)
Age: 78
Discharge: HOME | End: 2018-08-14
Attending: EMERGENCY MEDICINE | Admitting: EMERGENCY MEDICINE
Payer: MEDICARE

## 2018-08-14 ENCOUNTER — APPOINTMENT (OUTPATIENT)
Dept: CT IMAGING | Age: 78
End: 2018-08-14
Attending: EMERGENCY MEDICINE
Payer: MEDICARE

## 2018-08-14 VITALS
SYSTOLIC BLOOD PRESSURE: 145 MMHG | RESPIRATION RATE: 24 BRPM | DIASTOLIC BLOOD PRESSURE: 77 MMHG | OXYGEN SATURATION: 99 % | TEMPERATURE: 97.8 F | HEART RATE: 65 BPM
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
## 2018-08-14 LAB
ANION GAP SERPL CALC-SCNC: 1 MMOL/L (ref 3–18)
ATRIAL RATE: 70 BPM
BASOPHILS # BLD: 0 K/UL (ref 0–0.1)
BASOPHILS NFR BLD: 0 % (ref 0–2)
BUN SERPL-MCNC: 16 MG/DL (ref 7–18)
BUN/CREAT SERPL: 21 (ref 12–20)
CALCIUM SERPL-MCNC: 9 MG/DL (ref 8.5–10.1)
CALCULATED P AXIS, ECG09: 90 DEGREES
CALCULATED R AXIS, ECG10: 67 DEGREES
CALCULATED T AXIS, ECG11: 44 DEGREES
CHLORIDE SERPL-SCNC: 99 MMOL/L (ref 100–108)
CO2 SERPL-SCNC: 43 MMOL/L (ref 21–32)
CREAT SERPL-MCNC: 0.78 MG/DL (ref 0.6–1.3)
DIAGNOSIS, 93000: NORMAL
DIFFERENTIAL METHOD BLD: ABNORMAL
EOSINOPHIL # BLD: 0.1 K/UL (ref 0–0.4)
EOSINOPHIL NFR BLD: 2 % (ref 0–5)
ERYTHROCYTE [DISTWIDTH] IN BLOOD BY AUTOMATED COUNT: 12.9 % (ref 11.6–14.5)
GLUCOSE SERPL-MCNC: 174 MG/DL (ref 74–99)
HCT VFR BLD AUTO: 38.9 % (ref 36–48)
HGB BLD-MCNC: 11.8 G/DL (ref 13–16)
INR PPP: 2.3 (ref 0.8–1.2)
LYMPHOCYTES # BLD: 1.1 K/UL (ref 0.9–3.6)
LYMPHOCYTES NFR BLD: 15 % (ref 21–52)
MCH RBC QN AUTO: 31.6 PG (ref 24–34)
MCHC RBC AUTO-ENTMCNC: 30.3 G/DL (ref 31–37)
MCV RBC AUTO: 104.3 FL (ref 74–97)
MONOCYTES # BLD: 0.6 K/UL (ref 0.05–1.2)
MONOCYTES NFR BLD: 8 % (ref 3–10)
NEUTS SEG # BLD: 5.4 K/UL (ref 1.8–8)
NEUTS SEG NFR BLD: 75 % (ref 40–73)
P-R INTERVAL, ECG05: 154 MS
PLATELET # BLD AUTO: 176 K/UL (ref 135–420)
PMV BLD AUTO: 10.9 FL (ref 9.2–11.8)
POTASSIUM SERPL-SCNC: 4.1 MMOL/L (ref 3.5–5.5)
PROTHROMBIN TIME: 25.1 SEC (ref 11.5–15.2)
Q-T INTERVAL, ECG07: 436 MS
QRS DURATION, ECG06: 128 MS
QTC CALCULATION (BEZET), ECG08: 470 MS
RBC # BLD AUTO: 3.73 M/UL (ref 4.7–5.5)
SODIUM SERPL-SCNC: 143 MMOL/L (ref 136–145)
VENTRICULAR RATE, ECG03: 70 BPM
WBC # BLD AUTO: 7.2 K/UL (ref 4.6–13.2)

## 2018-08-14 PROCEDURE — 70450 CT HEAD/BRAIN W/O DYE: CPT

## 2018-08-14 PROCEDURE — 93005 ELECTROCARDIOGRAM TRACING: CPT

## 2018-08-14 PROCEDURE — 99285 EMERGENCY DEPT VISIT HI MDM: CPT

## 2018-08-14 PROCEDURE — 80048 BASIC METABOLIC PNL TOTAL CA: CPT | Performed by: EMERGENCY MEDICINE

## 2018-08-14 PROCEDURE — 85610 PROTHROMBIN TIME: CPT | Performed by: EMERGENCY MEDICINE

## 2018-08-14 PROCEDURE — 85025 COMPLETE CBC W/AUTO DIFF WBC: CPT | Performed by: EMERGENCY MEDICINE

## 2018-08-14 NOTE — ED PROVIDER NOTES
EMERGENCY DEPARTMENT HISTORY AND PHYSICAL EXAM    12:07 PM      Date: 8/14/2018  Patient Name: Nestor Ortiz    History of Presenting Illness     No chief complaint on file. History Provided By: Patient and EMS    Chief Complaint: Fall  Duration:  Minutes PTA  Timing:  Acute  Location: N/A as complaint is not pain  Quality: lightheaded, standing height  Severity: N/A as complaint is not pain  Modifying Factors: N/A as complaint is not pain  Associated Symptoms: lightheadedness      Additional History (Context): Nsetor Ortiz is a 65 y.o. male with diabetes, hypertension, hyperlipidemia, COPD, and PE who presents per EMS with fall from standing height occurring just PTA. Pt felt lightheaded after standing up and while pulling his pants up; states this does not happen often but occasionally and upon standing. He fell forward on the R side of chest, did not hit his head on the floor, and did not hit furniture. Denies recent illness, HA, N/V/D, SOB worse than baseline. Pt with no complaints of pain at the moment. Last fall a few wks ago. Pt on Coumadin per medication list. No hx MI.      PCP: Darrel Osorio MD    Current Outpatient Prescriptions   Medication Sig Dispense Refill    albuterol-ipratropium (DUO-NEB) 2.5 mg-0.5 mg/3 ml nebu 3 mL by Nebulization route every four (4) hours. 30 Nebule 5    fluticasone-vilanterol (BREO ELLIPTA) 100-25 mcg/dose inhaler Take 1 Puff by inhalation daily.  insulin glargine (LANTUS) 100 unit/mL injection 7 Units by SubCUTAneous route nightly. 3 Vial 3    finasteride (PROSCAR) 5 mg tablet Take 1 Tab by mouth daily. 90 Tab 3    omeprazole (PRILOSEC) 40 mg capsule Take 1 Cap by mouth daily. 90 Cap 3    potassium chloride (KLOR-CON M20) 20 mEq tablet Take 1 Tab by mouth daily. 90 Tab 3    ferrous sulfate 325 mg (65 mg iron) tablet Take 1 Tab by mouth two (2) times a day. 180 Tab 3    tamsulosin (FLOMAX) 0.4 mg capsule Take 1 Cap by mouth nightly.  90 Cap 3    warfarin (COUMADIN) 5 mg tablet Take 1 Tab by mouth every evening. 90 Tab 3    lisinopril (PRINIVIL, ZESTRIL) 2.5 mg tablet Take 1 Tab by mouth daily. 90 Tab 3    furosemide (LASIX) 20 mg tablet Take 1 Tab by mouth daily. Indications: hypertension 90 Tab 3    metoprolol tartrate (LOPRESSOR) 25 mg tablet Take 1 Tab by mouth two (2) times a day. (Patient taking differently: Take 12.5 mg by mouth two (2) times a day.) 180 Tab 3    Lancets misc Check fasting sugars daily before breakfast. DX: E11.9 for freestyle lite meter 100 Each 11    Nebulizer & Compressor machine 1 Each.  tiotropium bromide (SPIRIVA RESPIMAT) 2.5 mcg/actuation inhaler Take 2 Puffs by inhalation daily.  albuterol (VENTOLIN HFA) 90 mcg/actuation inhaler Take 1-2 puffs every 4-6 hrs prn shortness of breath 3 Inhaler 3    glucose blood VI test strips (FREESTYLE LITE STRIPS) strip Check fasting sugars daily before breakfast. DX: E11.9 for freestyle lite meter 100 Strip 11    ascorbic acid (VITAMIN C) 500 mg tablet Take 500 mg by mouth two (2) times a day.       Blood-Glucose Meter (ONE TOUCH ULTRA 2) monitoring kit Check fasting sugars daily before breakfast. DX: 250.02 1 Kit 0       Past History     Past Medical History:  Past Medical History:   Diagnosis Date    Advance directive discussed with patient     COPD with emphysema ([address]     CVA [facility]cerebrovascular [facility] 7/11/2012    possible    Diabetes mellitus ([address]     Drowsiness     Enlarged prostate     Essential hypertension, benign     Eye exam, routine 07/26/2016    Dr. Alayna Palencia repeat in 1 yr    H/O colonoscopy 11/24/15    Dr. 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 Dean Person  late 8403'T       Family History:  Family History   Problem Relation Age of Onset    Cancer Father      he does not know       Social History:  Social History   Substance Use Topics    Smoking status: Former Smoker     Packs/day: 1.00     Years: 45.00     Types: Cigarettes     Quit date: 8/19/2015    Smokeless tobacco: Never Used      Comment: Pt counseled to continue to not smoke.  Alcohol use No       Allergies:  No Known Allergies      Review of Systems       Review of Systems   HENT: Negative for congestion and sore throat. Respiratory: Negative for shortness of breath. Cardiovascular: Negative for chest pain. Gastrointestinal: Negative for diarrhea, nausea and vomiting. Musculoskeletal: Negative for back pain and neck pain. Neurological: Positive for light-headedness. Negative for headaches. All other systems reviewed and are negative. Physical Exam     Visit Vitals    /77    Pulse 65    Temp 97.8 °F (36.6 °C)    Resp 24    SpO2 99%         Physical Exam   Constitutional: He is oriented to person, place, and time. He appears well-developed and well-nourished. HENT:   Head: Normocephalic and atraumatic. Neck: Neck supple. No JVD present. Cardiovascular: Normal rate and regular rhythm. Pulmonary/Chest: Effort normal and breath sounds normal. No respiratory distress. Abdominal: Soft. He exhibits no distension. There is no tenderness. There is no rebound and no guarding. Musculoskeletal: He exhibits no edema. Neurological: He is alert and oriented to person, place, and time. No cranial nerve deficit. Strength 5/5 x4   Skin: Skin is warm and dry. No erythema. Psychiatric: Judgment normal.         Diagnostic Study Results     Labs -  Recent Results (from the past 12 hour(s))   EKG, 12 LEAD, INITIAL    Collection Time: 08/14/18  1:03 PM   Result Value Ref Range    Ventricular Rate 70 BPM    Atrial Rate 70 BPM    P-R Interval 154 ms    QRS Duration 128 ms    Q-T Interval 436 ms    QTC Calculation (Bezet) 470 ms    Calculated P Axis 90 degrees    Calculated R Axis 67 degrees    Calculated T Axis 44 degrees    Diagnosis       Normal sinus rhythm  Right bundle branch block  Septal infarct (cited on or before 19-JAN-2012)  Abnormal ECG  When compared with ECG of 19-JAN-2012 11:46,  Right bundle branch block is now present     CBC WITH AUTOMATED DIFF    Collection Time: 08/14/18  2:10 PM   Result Value Ref Range    WBC 7.2 4.6 - 13.2 K/uL    RBC 3.73 (L) 4.70 - 5.50 M/uL    HGB 11.8 (L) 13.0 - 16.0 g/dL    HCT 38.9 36.0 - 48.0 %    .3 (H) 74.0 - 97.0 FL    MCH 31.6 24.0 - 34.0 PG    MCHC 30.3 (L) 31.0 - 37.0 g/dL    RDW 12.9 11.6 - 14.5 %    PLATELET 404 314 - 274 K/uL    MPV 10.9 9.2 - 11.8 FL    NEUTROPHILS 75 (H) 40 - 73 %    LYMPHOCYTES 15 (L) 21 - 52 %    MONOCYTES 8 3 - 10 %    EOSINOPHILS 2 0 - 5 %    BASOPHILS 0 0 - 2 %    ABS. NEUTROPHILS 5.4 1.8 - 8.0 K/UL    ABS. LYMPHOCYTES 1.1 0.9 - 3.6 K/UL    ABS. MONOCYTES 0.6 0.05 - 1.2 K/UL    ABS. EOSINOPHILS 0.1 0.0 - 0.4 K/UL    ABS.  BASOPHILS 0.0 0.0 - 0.1 K/UL    DF AUTOMATED     PROTHROMBIN TIME + INR    Collection Time: 08/14/18  2:10 PM   Result Value Ref Range    Prothrombin time 25.1 (H) 11.5 - 15.2 sec    INR 2.3 (H) 0.8 - 1.2     METABOLIC PANEL, BASIC    Collection Time: 08/14/18  2:10 PM   Result Value Ref Range    Sodium 143 136 - 145 mmol/L    Potassium 4.1 3.5 - 5.5 mmol/L    Chloride 99 (L) 100 - 108 mmol/L    CO2 43 (HH) 21 - 32 mmol/L    Anion gap 1 (L) 3.0 - 18 mmol/L    Glucose 174 (H) 74 - 99 mg/dL    BUN 16 7.0 - 18 MG/DL    Creatinine 0.78 0.6 - 1.3 MG/DL    BUN/Creatinine ratio 21 (H) 12 - 20      GFR est AA >60 >60 ml/min/1.73m2    GFR est non-AA >60 >60 ml/min/1.73m2    Calcium 9.0 8.5 - 10.1 MG/DL       Radiologic Studies -   CT HEAD WO CONT   Final Result        Ct Head Wo Cont    Result Date: 8/14/2018  EXAM: CT HEAD WO CONT. CLINICAL INDICATION/HISTORY: Head injury moderate or severe acute, stable; fall, on coumadin. -Additional: None. TECHNIQUE: Unenhanced CT examination of the head was performed. Sagittal and coronal reformations were rendered from axial source images. This study is reviewed without comparison. One or more dose reduction techniques were used on this CT: automated exposure control, adjustment of the mAs and/or kVp according to patient's size, and iterative reconstruction techniques. The specific techniques utilized on this CT exam have been documented in the patient's electronic medical record. _______________ FINDINGS: BRAIN AND POSTERIOR FOSSA: There is no evidence of acute vascular territorial ischemia, intracranial hemorrhage, midline shift or mass effect. Periventricular and subcortical white matter hypoattenuation is most compatible with chronic microangiopathy. The ventricles and sulci are prominent, representing age-related involutional changes and volume loss. EXTRA-AXIAL SPACES AND MENINGES: There are no abnormal extra-axial fluid collections. CALVARIA and EXTRACALVARIAL SOFT TISSUES: There are no acute calvarial or extra-calvarial soft tissue findings of concern. OTHER: The visualized paranasal sinuses are essentially clear, as are the mastoid air cells bilaterally. Atherosclerotic intracranial vascular calcifications are noted. _______________     IMPRESSION: No acute intracranial abnormality superimposed on chronic microangiopathy.  _______________         Medical Decision Making   I am the first provider for this patient. I reviewed the vital signs, available nursing notes, past medical history, past surgical history, family history and social history. Vital Signs-Reviewed the patient's vital signs. Pulse Oximetry Analysis -  92 on L of O2 via NC (Interpretation) baseline status    Cardiac Monitor:  Rate:     EKG: Interpreted by the EP. Time Interpreted: 1303   Rate: 70   Rhythm: Normal Sinus Rhythm    Interpretation: nml axis, RBBB, no ST changes   Comparison: no prior    Records Reviewed: Nursing Notes (Time of Review: 12:07 PM)    ED Course: Progress Notes, Reevaluation, and Consults:    15:00 Discussed results, advised PCP follow up; discussed importance of slow position changes as I suspect this is contributing to his falls. Pt had large amount of stool, dark, guiaic neg. Provider Notes (Medical Decision Making):   66 y/o male presents after fall, due to age check labs, eval for dehydration, although suspect orthostatic. Denies head trauma, however is on coumadin so will ct head.   ekg to eval for arrhythmia. Diagnosis     Clinical Impression:   1. Fall, initial encounter        Disposition: Discharge     Follow-up Information     Follow up With Details Comments Contact Info    Omar Keane MD Schedule an appointment as soon as possible for a visit in 2 days As needed for follow up [address]  [place] [address] [address]  [phone]             Patient's Medications   Start Taking    No medications on file   Continue Taking    ALBUTEROL (VENTOLIN HFA) 90 MCG/ACTUATION INHALER    Take 1-2 puffs every 4-6 hrs prn shortness of breath    ALBUTEROL-IPRATROPIUM (DUO-NEB) 2.5 MG-0.5 MG/3 ML NEBU    3 mL by Nebulization route every four (4) hours. ASCORBIC ACID (VITAMIN C) 500 MG TABLET    Take 500 mg by mouth two (2) times a day.     BLOOD-GLUCOSE METER (ONE TOUCH ULTRA 2) MONITORING KIT    Check fasting sugars daily before breakfast. DX: 250.02 FERROUS SULFATE 325 MG (65 MG IRON) TABLET    Take 1 Tab by mouth two (2) times a day. FINASTERIDE (PROSCAR) 5 MG TABLET    Take 1 Tab by mouth daily. FLUTICASONE-VILANTEROL (BREO ELLIPTA) 100-25 MCG/DOSE INHALER    Take 1 Puff by inhalation daily. FUROSEMIDE (LASIX) 20 MG TABLET    Take 1 Tab by mouth daily. Indications: hypertension    GLUCOSE BLOOD VI TEST STRIPS (FREESTYLE LITE STRIPS) STRIP    Check fasting sugars daily before breakfast. DX: E11.9 for freestyle lite meter    INSULIN GLARGINE (LANTUS) 100 UNIT/ML INJECTION    7 Units by SubCUTAneous route nightly. LANCETS MISC    Check fasting sugars daily before breakfast. DX: E11.9 for freestyle lite meter    LISINOPRIL (PRINIVIL, ZESTRIL) 2.5 MG TABLET    Take 1 Tab by mouth daily. METOPROLOL TARTRATE (LOPRESSOR) 25 MG TABLET    Take 1 Tab by mouth two (2) times a day. NEBULIZER & COMPRESSOR MACHINE    1 Each. OMEPRAZOLE (PRILOSEC) 40 MG CAPSULE    Take 1 Cap by mouth daily. POTASSIUM CHLORIDE (KLOR-CON M20) 20 MEQ TABLET    Take 1 Tab by mouth daily. TAMSULOSIN (FLOMAX) 0.4 MG CAPSULE    Take 1 Cap by mouth nightly. TIOTROPIUM BROMIDE (SPIRIVA RESPIMAT) 2.5 MCG/ACTUATION INHALER    Take 2 Puffs by inhalation daily. WARFARIN (COUMADIN) 5 MG TABLET    Take 1 Tab by mouth every evening. These Medications have changed    No medications on file   Stop Taking    No medications on file     _______________________________    Attestations:  [address] [place] [address] acting as a scribe for and in the presence of Ambreen Brewer, DO      August 14, 2018 at 3:06 PM       Provider Attestation:      I personally performed the services described in the documentation, reviewed the documentation, as recorded by the scribe in my presence, and it accurately and completely records my words and actions.  August 14, 2018 at 3:06 PM - Ambreen Brewer, DO    _______________________________

## 2018-08-14 NOTE — DISCHARGE INSTRUCTIONS
How to Get Up Safely After a Fall: Care Instructions  Your Care Instructions    If you have injuries, health problems, or other reasons that may make it easy for you to fall at home, it is a good idea to learn how to get up safely after a fall. Learning how to get up correctly can help you avoid making an injury worse. Also, knowing what to do if you cannot get up can help you stay safe until help arrives. Follow-up care is a key part of your treatment and safety. Be sure to make and go to all appointments, and call your doctor if you are having problems. It's also a good idea to know your test results and keep a list of the medicines you take. How can you care for yourself after a fall? If you think you can get up  First lie still for a few minutes and think about how you feel. If your body feels okay and you think you can get up safely, follow the rest of the steps below:  1. Look for a chair or other piece of furniture that is close to you. 2. Roll onto your side and rest. Roll by turning your head in the direction you want to roll, move your shoulder and arm, then hip and leg in the same direction. 3. Lie still for a moment to let your blood pressure adjust.  4. Slowly push your upper body up, lift your head, and take a moment to rest.  5. Slowly get up on your hands and knees, and crawl to the chair or other stable piece of furniture. 6. Put your hands on the chair. 7. Move one foot forward, and place it flat on the floor. Your other leg should be bent with the knee on the floor. 8. Rise slowly, turn your body, and sit in the chair. Stay seated for a bit and think about how you feel. Call for help. Even if you feel okay, let someone know what happened to you. You might not know that you have a serious injury. If you cannot get up  1. If you think you are injured after a fall or you cannot get up, try not to panic. 2. Call out for help.   3. If you have a phone within reach or you have an emergency call device, use it to call for help. 4. If you do not have a phone within reach, try to slide yourself toward it. If you cannot get to the phone, try to slide toward a door or window or a place where you think you can be heard. 5. [place] or use an object to make noise so someone might hear you. 6. If you can reach something that you can use for a pillow, place it under your head. Try to stay warm by covering yourself with a blanket or clothing while you wait for help. When should you call for help? Call 911 anytime you think you may need emergency care. For example, call if:    · You passed out (lost consciousness).     · You cannot get up after a fall.     · You have severe pain.    Call your doctor now or seek immediate medical care if:    · You have new or worse pain.     · You are dizzy or lightheaded.     · You hit your head.    Watch closely for changes in your health, and be sure to contact your doctor if:    · You do not get better as expected. Where can you learn more? Go to http://ty-sharyn.info/. Enter U674 in the search box to learn more about \"How to Get Up Safely After a Fall: Care Instructions. \"  Current as of: May 12, 2017  Content Version: 11.7  © 0523-9103 [employer]. Care instructions adapted under license by [employer] (which disclaims liability or warranty for this information). If you have questions about a medical condition or this instruction, always ask your healthcare professional. [address] any warranty or liability for your use of this information. Preventing Falls: Care Instructions  Your Care Instructions    Getting around your home safely can be a challenge if you have injuries or health problems that make it easy for you to fall. Loose rugs and furniture in walkways are among the dangers for many older people who have problems walking or who have poor eyesight.  People who have conditions such as arthritis, osteoporosis, or dementia also have to be careful not to fall. You can make your home safer with a few simple measures. Follow-up care is a key part of your treatment and safety. Be sure to make and go to all appointments, and call your doctor if you are having problems. It's also a good idea to know your test results and keep a list of the medicines you take. How can you care for yourself at home? Taking care of yourself  · You may get dizzy if you do not drink enough water. To prevent dehydration, drink plenty of fluids, enough so that your urine is light yellow or clear like water. Choose water and other caffeine-free clear liquids. If you have kidney, heart, or liver disease and have to limit fluids, talk with your doctor before you increase the amount of fluids you drink. · Exercise regularly to improve your strength, muscle tone, and balance. Walk if you can. Swimming may be a good choice if you cannot walk easily. · Have your vision and hearing checked each year or any time you notice a change. If you have trouble seeing and hearing, you might not be able to avoid objects and could lose your balance. · Know the side effects of the medicines you take. Ask your doctor or pharmacist whether the medicines you take can affect your balance. Sleeping pills or sedatives can affect your balance. · Limit the amount of alcohol you drink. Alcohol can impair your balance and other senses. · Ask your doctor whether calluses or corns on your feet need to be removed. If you wear loose-fitting shoes because of calluses or corns, you can lose your balance and fall. · Talk to your doctor if you have numbness in your feet. Preventing falls at home  · Remove raised doorway thresholds, throw rugs, and clutter. Repair loose carpet or raised areas in the floor. · Move furniture and electrical cords to keep them out of walking paths.   · Use nonskid floor wax, and wipe up spills right away, especially on ceramic tile floors. · If you use a walker or cane, put rubber tips on it. If you use crutches, clean the bottoms of them regularly with an abrasive pad, such as steel wool. · Keep your house well lit, especially [place], and outside walkways. Use night-lights in areas such as hallways and bathrooms. Add extra light switches or use remote switches (such as switches that go on or off when you clap your hands) to make it easier to turn lights on if you have to get up during the night. · Install sturdy handrails on stairways. · Move items in your cabinets so that the things you use a lot are on the lower shelves (about waist level). · Keep a cordless phone and a flashlight with new batteries by your bed. If possible, put a phone in each of the main rooms of your house, or carry a cell phone in case you fall and cannot reach a phone. Or, you can wear a device around your neck or wrist. You push a button that sends a signal for help. · Wear low-heeled shoes that fit well and give your feet good support. Use footwear with nonskid soles. Check the heels and soles of your shoes for wear. Repair or replace worn heels or soles. · Do not wear socks without shoes on wood floors. · Walk on the grass when the sidewalks are slippery. If you live in an area that gets snow and ice in the winter, sprinkle salt on slippery steps and sidewalks. Preventing falls in the bath  · Install grab bars and nonskid mats inside and outside your shower or tub and near the toilet and sinks. · Use shower chairs and bath benches. · Use a hand-held shower head that will allow you to sit while showering. · Get into a tub or shower by putting the weaker leg in first. Get out of a tub or shower with your strong side first.  · Repair loose toilet seats and consider installing a raised toilet seat to make getting on and off the toilet easier. · Keep your bathroom door unlocked while you are in the shower.   Where can you learn more?  Go to http://ty-sharyn.info/. Enter 0476 79 69 [id number] in the search box to learn more about \"Preventing Falls: Care Instructions. \"  Current as of: May 12, 2017  Content Version: 11.7  © 5894-2945 [employer]. Care instructions adapted under license by [employer] (which disclaims liability or warranty for this information). If you have questions about a medical condition or this instruction, always ask your healthcare professional. [place] [address] any warranty or liability for your use of this information.

## 2018-08-14 NOTE — TELEPHONE ENCOUNTER
Ms. Marisela Phelan is calling to inform office that Mr. Marisela Phelan just fell and was taken to ER.

## 2018-08-14 NOTE — ED TRIAGE NOTES
EMS reports pt fell while putting on pants; denies pain or injury. Pt reports feeling lightheaded when stood up; hit light side when fell.

## 2018-08-15 ENCOUNTER — TELEPHONE (OUTPATIENT)
Dept: INTERNAL MEDICINE CLINIC | Age: 78
End: 2018-08-15

## 2018-08-15 NOTE — TELEPHONE ENCOUNTER
Continue 5mg daily pm of coumadin and recheck in 1 week. Give us a call. He can set up an appt. For ER f/u.

## 2018-08-15 NOTE — TELEPHONE ENCOUNTER
Pt contacted at home number. 2 pt identifiers confirmed. Pt wife informed of below. Pt wife verbalized understanding. Pt scheduled for Tuesday, August 21, 2018 01:30 PM for ER follow up.

## 2018-08-27 ENCOUNTER — TELEPHONE (OUTPATIENT)
Dept: INTERNAL MEDICINE CLINIC | Age: 78
End: 2018-08-27

## 2018-08-27 NOTE — TELEPHONE ENCOUNTER
Please let pt know that INR on 8/23/18 was 2.4. Continue coumadin 5mg daily pm and recheck in 1 week. Please call us with the results.

## 2018-08-27 NOTE — TELEPHONE ENCOUNTER
Contacted pt. Wife answered call. Verified access. 2 pt identifiers confirmed. Notified that INR is fine and to continue Coumadin 5 mg daily in the pm and recheck in one week. Call us with results. Wife verbalized understanding. No further questions or concerns at this time.

## 2018-08-30 ENCOUNTER — TELEPHONE (OUTPATIENT)
Dept: INTERNAL MEDICINE CLINIC | Age: 78
End: 2018-08-30

## 2018-08-30 NOTE — TELEPHONE ENCOUNTER
Patient's wife is calling in the patient's INR (2.4), states he is currently taking 5mg of coumadin. Patient's wife is also requesting for the patient to extend the home health & therapy.

## 2018-08-30 NOTE — TELEPHONE ENCOUNTER
Spoke with patient wife and she stated the [facility]. She said Nelly should send you over they notes. She did make donny for 9/11/18. She was given direction and understood.

## 2018-08-30 NOTE — TELEPHONE ENCOUNTER
Continue coumadin 5mg daily pm and recheck in 1 week. He needs to call us with the results. He was suppose to f/u with me for hospital f/u. Do they want to make an appt. For next week? Who is the [facility] agency? Is that what they are recommending?

## 2018-09-10 ENCOUNTER — TELEPHONE (OUTPATIENT)
Dept: INTERNAL MEDICINE CLINIC | Age: 78
End: 2018-09-10

## 2018-09-10 NOTE — TELEPHONE ENCOUNTER
INR 2.4 on 9/9/18. Just received results today. Continue coumadin 5mg daily pm and recheck in 1 week. Please call with results.

## 2018-09-10 NOTE — TELEPHONE ENCOUNTER
Pt contacted at home number. Spoke with pt wife. 2 pt identifiers confirmed. Pt wife informed of below. Pt wife verbalized understanding and states that pt has an appointment on 09/11/2018. No other questions at this time.

## 2018-09-11 ENCOUNTER — OFFICE VISIT (OUTPATIENT)
Dept: INTERNAL MEDICINE CLINIC | Age: 78
End: 2018-09-11

## 2018-09-11 VITALS
BODY MASS INDEX: 28.12 KG/M2 | OXYGEN SATURATION: 91 % | RESPIRATION RATE: 18 BRPM | HEART RATE: 68 BPM | TEMPERATURE: 96.6 F | WEIGHT: 175 LBS | HEIGHT: 66 IN | SYSTOLIC BLOOD PRESSURE: 141 MMHG | DIASTOLIC BLOOD PRESSURE: 72 MMHG

## 2018-09-11 DIAGNOSIS — R29.6 FREQUENT FALLS: Primary | ICD-10-CM

## 2018-09-11 DIAGNOSIS — R53.1 DECREASED STRENGTH, ENDURANCE, AND MOBILITY: ICD-10-CM

## 2018-09-11 DIAGNOSIS — Z74.09 DECREASED STRENGTH, ENDURANCE, AND MOBILITY: ICD-10-CM

## 2018-09-11 DIAGNOSIS — R26.89 IMBALANCE: ICD-10-CM

## 2018-09-11 DIAGNOSIS — I26.92 SADDLE EMBOLUS OF PULMONARY ARTERY WITHOUT ACUTE COR PULMONALE, UNSPECIFIED CHRONICITY (HCC): ICD-10-CM

## 2018-09-11 DIAGNOSIS — R68.89 DECREASED STRENGTH, ENDURANCE, AND MOBILITY: ICD-10-CM

## 2018-09-11 LAB
INR BLD: 3
PT POC: 36.4 SECONDS
VALID INTERNAL CONTROL?: YES

## 2018-09-11 NOTE — PROGRESS NOTES
Chief Complaint Patient presents with  Fall [facility] f/u from fall  Anticoagulation  
  pt/inr check HPI:  
 
Michaelle Weaver is a 77 y.o.  male with history of  Type 2 DM and hypertension here for the above complaint. Pt went to [facility] ER on 8/14/18 for fall. In last 6 months, he has had 3 falls. He has imbalance and poor endurance as well as dizziness sometimes when standing. ER records reviewed and head CT negative. Labs were okay. He has not had any falls since then. He said he was standing up and putting on pants and fell. He did not hit his head. He denies any chest pain, shortness of breath, abdominal pain, headaches. He has been taking coumadin 5mg daily pm and no bleeding or changes in diet. Wife, who accompanies him at visit, said he needs [facility] again for home PT due to imbalance and poor endurance and mobility. Past Medical History:  
Diagnosis Date  Advance directive discussed with patient  Benign localized prostatic hyperplasia with lower urinary tract symptoms (LUTS)  COPD with emphysema ([address]  CVA (cerebrovascular accident) ([address] 7/11/2012  
 possible  Diabetes mellitus ([address]  Drowsiness  Enlarged prostate  Essential hypertension, benign  Eye exam, routine 07/26/2016 Dr. Santiago Bolds repeat in 1 yr  H/O colonoscopy 11/24/15 Dr. Negar Walker (Digestive & Liver disease)Tubular adenoma/polyp bx, showed diverticulosis in the sigmoid/descending colon and internal hemorrhoids, 7mm polyp  History of urinary retention  History of urinary retention  Hoarseness  Hypercholesterolemia  Microhematuria  Other and unspecified hyperlipidemia  Pneumonia  Pulmonary embolus ([address]  Renal cyst, right  Saddle embolus of pulmonary artery without acute cor pulmonale ([address] 10/2015  Shortness of breath  Tobacco use disorder 8/10/2010  Type II or unspecified type diabetes mellitus without mention of complication, not stated as uncontrolled  Urinary retention Past Surgical History:  
Procedure Laterality Date  HX CATARACT REMOVAL Left 4/2015  
 by Dr. Mike Warner  HX CHOLECYSTECTOMY  late 1980's Current Outpatient Prescriptions Medication Sig  
 lisinopril (PRINIVIL, ZESTRIL) 2.5 mg tablet Take 1 Tab by mouth daily.  furosemide (LASIX) 20 mg tablet Take 1 Tab by mouth daily. Indications: hypertension  metoprolol tartrate (LOPRESSOR) 25 mg tablet Take 0.5 Tabs by mouth two (2) times a day.  albuterol-ipratropium (DUO-NEB) 2.5 mg-0.5 mg/3 ml nebu 3 mL by Nebulization route every four (4) hours.  fluticasone-vilanterol (BREO ELLIPTA) 100-25 mcg/dose inhaler Take 1 Puff by inhalation daily.  insulin glargine (LANTUS) 100 unit/mL injection 7 Units by SubCUTAneous route nightly.  finasteride (PROSCAR) 5 mg tablet Take 1 Tab by mouth daily.  omeprazole (PRILOSEC) 40 mg capsule Take 1 Cap by mouth daily.  potassium chloride (KLOR-CON M20) 20 mEq tablet Take 1 Tab by mouth daily.  ferrous sulfate 325 mg (65 mg iron) tablet Take 1 Tab by mouth two (2) times a day.  tamsulosin (FLOMAX) 0.4 mg capsule Take 1 Cap by mouth nightly.  warfarin (COUMADIN) 5 mg tablet Take 1 Tab by mouth every evening.  Lancets misc Check fasting sugars daily before breakfast. DX: E11.9 for freestyle lite meter  Nebulizer & Compressor machine 1 Each.  tiotropium bromide (SPIRIVA RESPIMAT) 2.5 mcg/actuation inhaler Take 2 Puffs by inhalation daily.  albuterol (VENTOLIN HFA) 90 mcg/actuation inhaler Take 1-2 puffs every 4-6 hrs prn shortness of breath  glucose blood VI test strips (FREESTYLE LITE STRIPS) strip Check fasting sugars daily before breakfast. DX: E11.9 for freestyle lite meter  ascorbic acid (VITAMIN C) 500 mg tablet Take 500 mg by mouth two (2) times a day.   
 Blood-Glucose Meter (ONE TOUCH ULTRA 2) monitoring kit Check fasting sugars daily before breakfast. DX: 250.02 No current facility-administered medications for this visit. Health Maintenance Topic Date Due  
 EYE EXAM RETINAL OR DILATED Q1  07/25/2018  
 FOOT EXAM Q1  08/10/2018  MEDICARE YEARLY EXAM  08/11/2018  Influenza Age 5 to Adult  10/01/2018 (Originally 8/1/2018)  MICROALBUMIN Q1  10/19/2018  HEMOGLOBIN A1C Q6M  01/12/2019  LIPID PANEL Q1  07/12/2019  GLAUCOMA SCREENING Q2Y  07/25/2019  COLONOSCOPY  11/24/2020  
 DTaP/Tdap/Td series (2 - Td) 08/01/2026  ZOSTER VACCINE AGE 60>  Addressed  Pneumococcal 65+ Low/Medium Risk  Completed Immunization History Administered Date(s) Administered  Influenza High Dose Vaccine PF 11/05/2015, 09/25/2017  Influenza Vaccine 10/13/2010, 09/16/2014, 09/14/2015, 11/10/2016  Influenza Vaccine PF 01/29/2013  Pneumococcal Conjugate (PCV-13) 09/14/2015  Pneumococcal Polysaccharide (PPSV-23) 10/13/2010, 08/14/2014 No LMP for male patient. Allergies and Intolerances:  
No Known Allergies Family History:  
Family History Problem Relation Age of Onset  Cancer Father   
  he does not know Social History: He  reports that he quit smoking about 3 years ago. His smoking use included Cigarettes. He has a 45.00 pack-year smoking history. He has never used smokeless tobacco.  He  reports that he does not drink alcohol. ·  
 
Objective:  
Physical exam:  
Visit Vitals  /72 (BP 1 Location: Right arm, BP Patient Position: Sitting)  Pulse 68  Temp 96.6 °F (35.9 °C) (Oral)  Resp 18  Ht 5' 6\" (1.676 m)  Wt 175 lb (79.4 kg)  SpO2 91% Comment: 3LNC  BMI 28.25 kg/m2 Generally: Pleasant male in no acute distress Cardiac Exam: regular, rate, and rhythm. Normal S1 and S2. No murmurs, gallops, or rubs Pulmonary exam: Clear to auscultation bilaterally Abdominal exam: Positive bowel sounds in all four quadrants, soft, nondistended, nontender Extremities: 2+ dorsalis pedis pulses bilaterally. No pedal edema  
 bilaterally LABS/Radiological Tests: 
Component Latest Ref Rng & Units 9/11/2018 8/14/2018  
 
     12:03 PM  2:10 PM  
VALID INTERNAL CONTROL POC Yes Prothrombin time (POC) seconds 36.4 INR 
     3.0 2.3 (H) Prothrombin time 11.5 - 15.2 sec  25.1 (H) Pt was notified at [address] today. Component Latest Ref Rng & Units 8/14/2018 8/14/2018 8/14/2018  
 
      2:10 PM  2:10 PM  2:10 PM  
WBC 
    4.6 - 13.2 K/uL   7.2  
RBC 
    4.70 - 5.50 M/uL   3.73 (L) HGB 13.0 - 16.0 g/dL   11.8 (L) HCT 
    36.0 - 48.0 %   38.9 MCV 
    74.0 - 97.0 FL   104.3 (H) MCH 
    24.0 - 34.0 PG   31.6 MCHC 31.0 - 37.0 g/dL   30.3 (L) RDW 
    11.6 - 14.5 %   12.9 PLATELET 
    482 - 210 K/uL   176 MPV 
    9.2 - 11.8 FL   10.9 NEUTROPHILS 
    40 - 73 %   75 (H) LYMPHOCYTES 
    21 - 52 %   15 (L) MONOCYTES 
    3 - 10 %   8 EOSINOPHILS 
    0 - 5 %   2  
BASOPHILS 
    0 - 2 %   0  
ABS. NEUTROPHILS 
    1.8 - 8.0 K/UL   5.4  
ABS. LYMPHOCYTES 
    0.9 - 3.6 K/UL   1.1  
ABS. MONOCYTES 
    0.05 - 1.2 K/UL   0.6  
ABS. EOSINOPHILS 
    0.0 - 0.4 K/UL   0.1 ABS. BASOPHILS 
    0.0 - 0.1 K/UL   0.0  
DF AUTOMATED Sodium 136 - 145 mmol/L 143 Potassium 3.5 - 5.5 mmol/L 4.1 Chloride 100 - 108 mmol/L 99 (L)    
CO2 
    21 - 32 mmol/L 43 (HH) Anion gap 3.0 - 18 mmol/L 1 (L) Glucose 74 - 99 mg/dL 174 (H) BUN 
    7.0 - 18 MG/DL 16    
Creatinine 
    0.6 - 1.3 MG/DL 0.78    
BUN/Creatinine ratio 12 - 20   21 (H) GFR est AA 
    >60 ml/min/1.73m2 >60    
GFR est non-AA 
    >60 ml/min/1.73m2 >60 Calcium 8.5 - 10.1 MG/DL 9.0 Prothrombin time 11.5 - 15.2 sec  25.1 (H) INR 
    0.8 - 1.2    2.3 (H) Previous labs ASSESSMENT/PLAN:   
 
 1. Frequent falls: none right now, but will refer to neurology for further evaluation.  
-     REFERRAL TO NEUROLOGY 2. Saddle embolus of pulmonary artery without acute cor pulmonale, unspecified chronicity ([address]): stable. Continue coumadin 5mg daily pm and recheck in 1 week. Give us a call. -     AMB POC PT/INR 
 
3. Imbalance 
-     [facility] 4. Decreased strength, endurance, and mobility 
-     [address] 5. Patient verbalized understanding and agreement with the plan. 6. Patient was given an after-visit summary. 7. Follow-up Disposition: 
Return if symptoms worsen or fail to improve. or sooner if worsening symptoms.  
 
 
 
 
 
 
 
Morgan Rivera MD

## 2018-09-11 NOTE — PATIENT INSTRUCTIONS
1) follow-up as needed or sooner if worsening symptoms. 2) continue coumadin 5mg daily and recheck in 1 week. Give us a call with results.

## 2018-09-11 NOTE — MR AVS SNAPSHOT
Kevin Bennett 
 
 
 [address] [id number] 
[phone] Patient: Salvatore Bruner MRN: [id number] Ivelisse Chambers Visit Information Date & Time Provider Department Dept. Phone Encounter #  
 9/11/2018 11:30 AM MD [facility] [address] [phone] [id number] Follow-up Instructions Return if symptoms worsen or fail to improve. Your Appointments 10/4/2018 11:15 AM  
Office Visit with MD Peter [facility] [address] [facility]) Appt Note: 3 month f/u combo clinic, lipids [address]  
  
    
 8/20/2019  1:40 PM  
ESTABLISHED PATIENT with Senait Munoz NP [facility] [place] [address] ([address]) Appt Note: 1 year fu  
 [address] [place] [address]  
[phone]  
  
   
 [place] [id number] Upcoming Health Maintenance Date Due  
 EYE EXAM RETINAL OR DILATED Q1 7/25/2018 FOOT EXAM Q1 8/10/2018 MEDICARE YEARLY EXAM 8/11/2018 Influenza Age 5 to Adult 10/1/2018* MICROALBUMIN Q1 10/19/2018 HEMOGLOBIN A1C Q6M 1/12/2019 LIPID PANEL Q1 7/12/2019 GLAUCOMA SCREENING Q2Y 7/25/2019 COLONOSCOPY 11/24/2020 DTaP/Tdap/Td series (2 - Td) 8/1/2026 *Topic was postponed. The date shown is not the original due date. Allergies as of 9/11/2018  Review Complete On: 9/11/2018 By: Jose L Munguia MD  
 No Known Allergies Current Immunizations  Reviewed on 9/10/2018 Name Date Influenza High Dose Vaccine PF 9/25/2017, 11/5/2015 Influenza Vaccine 11/10/2016 12:00 AM, 9/14/2015, 9/16/2014 12:14 PM, 10/13/2010 Influenza Vaccine PF 1/29/2013  8:12 AM  
 Pneumococcal Conjugate (PCV-13) 9/14/2015  Pneumococcal Polysaccharide (PPSV-23) 8/14/2014 12:00 PM, 10/13/2010 12:00 AM  
 Not reviewed this visit You Were Diagnosed With   
  
 Codes Comments Saddle embolus of pulmonary artery without acute cor pulmonale, unspecified chronicity (HCC)    -  Primary ICD-10-CM: I26.92 
ICD-9-CM: 415.13 Imbalance     ICD-10-CM: R26.89 
ICD-9-CM: 781.2 Decreased strength, endurance, and mobility     ICD-10-CM: R53.1, Z74.09 
ICD-9-CM: 780.79, 780.99 Frequent falls     ICD-10-CM: R29.6 ICD-9-CM: V15.88 Vitals BP Pulse Temp Resp Height(growth percentile) Weight(growth percentile) 141/72 (BP 1 Location: Right arm, BP Patient Position: Sitting) 68 96.6 °F (35.9 °C) (Oral) 18 5' 6\" (1.676 m) 175 lb (79.4 kg) SpO2 BMI Smoking Status 91% 28.25 kg/m2 Former Smoker Vitals History BMI and BSA Data Body Mass Index Body Surface Area  
 28.25 kg/m 2 1.92 m 2 Preferred Pharmacy Pharmacy Name Phone [facility] [address] [phone] Your Updated Medication List  
  
   
This list is accurate as of 9/11/18 12:12 PM.  Always use your most recent med list.  
  
  
  
  
 albuterol 90 mcg/actuation inhaler Commonly known as:  VENTOLIN HFA Take 1-2 puffs every 4-6 hrs prn shortness of breath  
  
 albuterol-ipratropium 2.5 mg-0.5 mg/3 ml Nebu Commonly known as:  DUO-NEB  
3 mL by Nebulization route every four (4) hours. Blood-Glucose Meter monitoring kit Commonly known as:  Missy English Check fasting sugars daily before breakfast. DX: 250.02  
  
 ferrous sulfate 325 mg (65 mg iron) tablet Take 1 Tab by mouth two (2) times a day. finasteride 5 mg tablet Commonly known as:  PROSCAR Take 1 Tab by mouth daily. fluticasone-vilanterol 100-25 mcg/dose inhaler Commonly known as:  BREO ELLIPTA Take 1 Puff by inhalation daily. furosemide 20 mg tablet Commonly known as:  LASIX Take 1 Tab by mouth daily. Indications: hypertension glucose blood VI test strips strip Commonly known as:  FREESTYLE LITE STRIPS Check fasting sugars daily before breakfast. DX: E11.9 for freestyle lite meter  
  
 insulin glargine 100 unit/mL injection Commonly known as:  LANTUS U-100 INSULIN  
7 Units by SubCUTAneous route nightly. Lancets Misc Check fasting sugars daily before breakfast. DX: E11.9 for freestyle lite meter  
  
 lisinopril 2.5 mg tablet Commonly known as:  Ronalee Ruffing Take 1 Tab by mouth daily. metoprolol tartrate 25 mg tablet Commonly known as:  LOPRESSOR Take 0.5 Tabs by mouth two (2) times a day. Nebulizer & Compressor machine 1 Each. omeprazole 40 mg capsule Commonly known as:  PRILOSEC Take 1 Cap by mouth daily. potassium chloride 20 mEq tablet Commonly known as:  KLOR-CON M20 Take 1 Tab by mouth daily. tamsulosin 0.4 mg capsule Commonly known as:  FLOMAX Take 1 Cap by mouth nightly. tiotropium bromide 2.5 mcg/actuation inhaler Commonly known as:  Vanice Divers Take 2 Puffs by inhalation daily. VITAMIN C 500 mg tablet Generic drug:  ascorbic acid (vitamin C) Take 500 mg by mouth two (2) times a day. warfarin 5 mg tablet Commonly known as:  COUMADIN Take 1 Tab by mouth every evening. We Performed the Following AMB POC PT/INR [69979 CPT(R)] [address] Comments:  
 Please evaluate for imbalance/endurance/decrease strength and needs physical therapy in  1 week. He wants [facility]. REFERRAL TO NEUROLOGY [[id number] Custom] Comments:  
 Please evaluate patient for imbalance and frequent falls in 1 week. Follow-up Instructions Return if symptoms worsen or fail to improve. Referral Information Referral ID Referred By Referred To  
  
 [id number] Alex NICHOLS Not Available Visits Status Start Date End Date 1 New Request 9/11/18 9/11/19 If your referral has a status of pending review or denied, additional information will be sent to support the outcome of this decision. Referral ID Referred By Referred To  
 [id number] Desmond Alvarado MD  
   [address] [facility] [address] [place] Eduardo Phone: [phone] Fax: [phone] Visits Status Start Date End Date 1 New Request 9/11/18 9/11/19 If your referral has a status of pending review or denied, additional information will be sent to support the outcome of this decision. Patient Instructions 1) follow-up as needed or sooner if worsening symptoms. 2) continue coumadin 5mg daily and recheck in 1 week. Give us a call with results. Introducing \[facility]\"" & HEALTH SERVICES! [facility] introduces [employer] patient portal. Now you can access parts of your medical record, email your doctor's office, and request medication refills online. 1. In your internet browser, go to [email or web address] 2. Click on the First Time User? Click Here link in the Sign In box. You will see the New Member Sign Up page. 3. Enter your [employer] Access Code exactly as it appears below. You will not need to use this code after youve completed the sign-up process. If you do not sign up before the expiration date, you must request a new code. · [employer] Access Code: [email or web address] Expires: 12/10/2018 12:11 PM 
 
4. Enter the last four digits of your Social Security Number (xxxx) and Date of Birth (mm/dd/yyyy) as indicated and click Submit. You will be taken to the next sign-up page. 5. Create a [employer] ID. This will be your [employer] login ID and cannot be changed, so think of one that is secure and easy to remember. 6. Create a [employer] password. You can change your password at any time. 7. Enter your Password Reset Question and Answer. This can be used at a later time if you forget your password. 8. Enter your e-mail address. You will receive e-mail notification when new information is available in [address] 9. Click Sign Up. You can now view and download portions of your medical record. 10. Click the Download Summary menu link to download a portable copy of your medical information. If you have questions, please visit the Frequently Asked Questions section of the [employer] website. Remember, [employer] is NOT to be used for urgent needs. For medical emergencies, dial 911. Now available from your iPhone and Android! Please provide this summary of care documentation to your next provider. Your primary care clinician is listed as Omar Keane. If you have any questions after today's visit, please call [phone].

## 2018-09-11 NOTE — PROGRESS NOTES
ROOM # 1 Mishel Driver presents today for Chief Complaint Patient presents with  [facility] f/u from fall  Anticoagulation  
  pt/inr check Mishel Driver preferred language for health care discussion is english/other. Is someone accompanying this pt? Yes, wife Is the patient using any DME equipment during OV? Yes, O2NC and wheelchair Depression Screening: PHQ over the last two weeks 9/11/2018 9/11/2018 7/19/2018 7/19/2018 7/3/2018 5/31/2018 3/1/2018 Little interest or pleasure in doing things Not at all Not at all Not at all Not at all Not at all Not at all Not at all Feeling down, depressed, irritable, or hopeless Not at all Not at all Not at all Not at all Not at all Not at all Not at all Total Score PHQ 2 0 0 0 0 0 0 0 Learning Assessment: 
Learning Assessment 7/3/2018 8/14/2014 PRIMARY LEARNER Patient Patient HIGHEST LEVEL OF EDUCATION - PRIMARY LEARNER  SOME COLLEGE SOME COLLEGE  
BARRIERS PRIMARY LEARNER NONE NONE  
CO-LEARNER CAREGIVER No - PRIMARY LANGUAGE ENGLISH ENGLISH  
LEARNER PREFERENCE PRIMARY READING LISTENING  
  DEMONSTRATION -  
ANSWERED BY patient self RELATIONSHIP SELF SELF Abuse Screening: 
Abuse Screening Questionnaire 7/3/2018 8/10/2017 5/12/2015 Do you ever feel afraid of your partner? N N N Are you in a relationship with someone who physically or mentally threatens you? N N N Is it safe for you to go home? Guy Paige Fall Risk Fall Risk Assessment, last 12 mths 9/11/2018 9/11/2018 7/19/2018 7/3/2018 5/31/2018 3/1/2018 10/19/2017 Able to walk? Yes Yes Yes Yes Yes Yes Yes Fall in past 12 months? Yes No Yes No Yes Yes No  
Fall with injury? Yes - - - No Yes - Number of falls in past 12 months 1 - 5 - 3 3 - Fall Risk Score 2 - - - 3 4 - Health Maintenance reviewed and discussed per provider. Yes Mishel Driver is due for Health Maintenance Due Topic Date Due  
 EYE EXAM RETINAL OR DILATED Q1  07/25/2018  FOOT EXAM Q1  08/10/2018  MEDICARE YEARLY EXAM  08/11/2018 Please order/place referral if appropriate. Advance Directive: 1. Do you have an advance directive in place? Patient Reply: no 
 
2. If not, would you like material regarding how to put one in place? Patient Reply: no 
 
Coordination of Care: 1. Have you been to the ER, urgent care clinic since your last visit? Hospitalized since your last visit? no 
 
2. Have you seen or consulted any other health care providers outside of the [address] [place] [address] since your last visit? Include any pap smears or colon screening.  no

## 2018-09-24 ENCOUNTER — TELEPHONE (OUTPATIENT)
Dept: INTERNAL MEDICINE CLINIC | Age: 78
End: 2018-09-24

## 2018-09-24 NOTE — TELEPHONE ENCOUNTER
INR: 3.0 on 9/20/18. Continue coumadin 5mg daily pm and recheck on Thursday. Call us with the results.

## 2018-09-24 NOTE — TELEPHONE ENCOUNTER
Attempted to contact pt at  number, no answer. Lvm for pt to return call to office at [phone]. Will continue to try to contact pt.

## 2018-09-25 NOTE — TELEPHONE ENCOUNTER
Pt contacted at home number. Spoke with pt wife. 2 pt identifiers confirmed. Pt wife informed of below. Pt wife verbalized understanding. No other questions at this time.

## 2018-09-28 ENCOUNTER — TELEPHONE (OUTPATIENT)
Dept: INTERNAL MEDICINE CLINIC | Age: 78
End: 2018-09-28

## 2018-09-28 NOTE — TELEPHONE ENCOUNTER
Please call pt or [facility] if can't get pt. If hit his head and having headaches and dizziness, blurred vision, pt needs to go to ER. Pt on coumadin.

## 2018-09-28 NOTE — TELEPHONE ENCOUNTER
[address] and she saw pt today and the fall happened on 9/25/18. The pt did not say he had a fall, but wife told the nurse. He has been having headaches, but not the worst of headache of his life. She said he has been stable today and did not have a headache today. He did his PT without any problems and vital signs stable. She did not seem concern about the fall that pt had. Called wife's cell number at [phone] and left message with pt's daughter to call back. Called [phone] & [phone] numbers without an response.

## 2018-09-28 NOTE — TELEPHONE ENCOUNTER
Called pt and talked to wife. She asked pt and he is not having any headaches, blurred vision or dizziness. He had rolled off the bed and she has not noticed any knots on his head. Told her if has a really bad headache, then needs to go to ER as pt is on coumadin. INR today 3.0. Told her for pt to continue 5mg daily pm coumadin and recheck in 1 week. mdINR wants pt to check INR every week. She was made aware about his appt. On 10/4/18. They will keep that appt. Pt's wife verbalized understanding of above.

## 2018-09-28 NOTE — TELEPHONE ENCOUNTER
[address] reporting Patient fell out of bed on 9/25/18. Patient reports continued headache. No further report.

## 2018-10-04 ENCOUNTER — OFFICE VISIT (OUTPATIENT)
Dept: INTERNAL MEDICINE CLINIC | Age: 78
End: 2018-10-04

## 2018-10-04 ENCOUNTER — TELEPHONE (OUTPATIENT)
Dept: INTERNAL MEDICINE CLINIC | Age: 78
End: 2018-10-04

## 2018-10-04 ENCOUNTER — HOSPITAL ENCOUNTER (OUTPATIENT)
Dept: LAB | Age: 78
Discharge: HOME | End: 2018-10-04
Payer: MEDICARE

## 2018-10-04 VITALS
OXYGEN SATURATION: 91 % | WEIGHT: 164.4 LBS | DIASTOLIC BLOOD PRESSURE: 71 MMHG | RESPIRATION RATE: 20 BRPM | BODY MASS INDEX: 26.42 KG/M2 | TEMPERATURE: 96.5 F | HEIGHT: 66 IN | SYSTOLIC BLOOD PRESSURE: 125 MMHG | HEART RATE: 79 BPM

## 2018-10-04 DIAGNOSIS — Z00.00 ENCOUNTER FOR MEDICARE ANNUAL WELLNESS EXAM: Primary | ICD-10-CM

## 2018-10-04 DIAGNOSIS — I26.92 SADDLE EMBOLUS OF PULMONARY ARTERY WITHOUT ACUTE COR PULMONALE, UNSPECIFIED CHRONICITY (HCC): ICD-10-CM

## 2018-10-04 DIAGNOSIS — Z23 NEED FOR SHINGLES VACCINE: ICD-10-CM

## 2018-10-04 DIAGNOSIS — J44.9 CHRONIC OBSTRUCTIVE PULMONARY DISEASE, UNSPECIFIED COPD TYPE (HCC): ICD-10-CM

## 2018-10-04 DIAGNOSIS — Z23 ENCOUNTER FOR IMMUNIZATION: ICD-10-CM

## 2018-10-04 DIAGNOSIS — N49.2 SCROTAL ABSCESS: ICD-10-CM

## 2018-10-04 DIAGNOSIS — E78.5 DYSLIPIDEMIA: ICD-10-CM

## 2018-10-04 DIAGNOSIS — E11.9 DIABETES MELLITUS, STABLE (HCC): ICD-10-CM

## 2018-10-04 DIAGNOSIS — I10 BENIGN HYPERTENSION WITHOUT CHF: ICD-10-CM

## 2018-10-04 LAB
ALBUMIN SERPL-MCNC: 3.3 G/DL (ref 3.4–5)
ALBUMIN/GLOB SERPL: 1.1 {RATIO} (ref 0.8–1.7)
ALP SERPL-CCNC: 69 U/L (ref 45–117)
ALT SERPL-CCNC: 12 U/L (ref 16–61)
ANION GAP SERPL CALC-SCNC: 5 MMOL/L (ref 3–18)
APPEARANCE UR: ABNORMAL
AST SERPL-CCNC: 7 U/L (ref 15–37)
BACTERIA URNS QL MICRO: ABNORMAL /HPF
BILIRUB SERPL-MCNC: 0.5 MG/DL (ref 0.2–1)
BILIRUB UR QL: NEGATIVE
BUN SERPL-MCNC: 16 MG/DL (ref 7–18)
BUN/CREAT SERPL: 25 (ref 12–20)
CALCIUM SERPL-MCNC: 8.9 MG/DL (ref 8.5–10.1)
CHLORIDE SERPL-SCNC: 100 MMOL/L (ref 100–108)
CHOLEST SERPL-MCNC: 189 MG/DL
CO2 SERPL-SCNC: 40 MMOL/L (ref 21–32)
COLOR UR: ABNORMAL
CREAT SERPL-MCNC: 0.64 MG/DL (ref 0.6–1.3)
CREAT UR-MCNC: 280.44 MG/DL (ref 30–125)
EPITH CASTS URNS QL MICRO: ABNORMAL /LPF (ref 0–5)
GLOBULIN SER CALC-MCNC: 3 G/DL (ref 2–4)
GLUCOSE SERPL-MCNC: 145 MG/DL (ref 74–99)
GLUCOSE UR STRIP.AUTO-MCNC: NEGATIVE MG/DL
HBA1C MFR BLD: 5.1 % (ref 4.2–5.6)
HDLC SERPL-MCNC: 60 MG/DL (ref 40–60)
HDLC SERPL: 3.2 {RATIO} (ref 0–5)
HGB UR QL STRIP: NEGATIVE
INR BLD: 4.1
KETONES UR QL STRIP.AUTO: ABNORMAL MG/DL
LDLC SERPL CALC-MCNC: 105 MG/DL (ref 0–100)
LEUKOCYTE ESTERASE UR QL STRIP.AUTO: ABNORMAL
LIPID PROFILE,FLP: ABNORMAL
MICROALBUMIN UR-MCNC: 2.4 MG/DL (ref 0–3)
MICROALBUMIN/CREAT UR-RTO: 9 MG/G (ref 0–30)
MUCOUS THREADS URNS QL MICRO: ABNORMAL /LPF
NITRITE UR QL STRIP.AUTO: NEGATIVE
PH UR STRIP: 5 [PH] (ref 5–8)
POTASSIUM SERPL-SCNC: 4.3 MMOL/L (ref 3.5–5.5)
PROT SERPL-MCNC: 6.3 G/DL (ref 6.4–8.2)
PROT UR STRIP-MCNC: NEGATIVE MG/DL
PT POC: 49.2 SECONDS
RBC #/AREA URNS HPF: ABNORMAL /HPF (ref 0–5)
SODIUM SERPL-SCNC: 145 MMOL/L (ref 136–145)
SP GR UR REFRACTOMETRY: >1.03 (ref 1–1.03)
TRIGL SERPL-MCNC: 120 MG/DL (ref ?–150)
URATE CRY URNS QL MICRO: ABNORMAL
UROBILINOGEN UR QL STRIP.AUTO: 0.2 EU/DL (ref 0.2–1)
VALID INTERNAL CONTROL?: YES
VLDLC SERPL CALC-MCNC: 24 MG/DL
WBC URNS QL MICRO: ABNORMAL /HPF (ref 0–4)

## 2018-10-04 PROCEDURE — 87077 CULTURE AEROBIC IDENTIFY: CPT | Performed by: INTERNAL MEDICINE

## 2018-10-04 PROCEDURE — 81001 URINALYSIS AUTO W/SCOPE: CPT | Performed by: INTERNAL MEDICINE

## 2018-10-04 PROCEDURE — 36415 COLL VENOUS BLD VENIPUNCTURE: CPT | Performed by: INTERNAL MEDICINE

## 2018-10-04 PROCEDURE — 83036 HEMOGLOBIN GLYCOSYLATED A1C: CPT | Performed by: INTERNAL MEDICINE

## 2018-10-04 PROCEDURE — 87070 CULTURE OTHR SPECIMN AEROBIC: CPT | Performed by: INTERNAL MEDICINE

## 2018-10-04 PROCEDURE — 80053 COMPREHEN METABOLIC PANEL: CPT | Performed by: INTERNAL MEDICINE

## 2018-10-04 PROCEDURE — 87186 SC STD MICRODIL/AGAR DIL: CPT | Performed by: INTERNAL MEDICINE

## 2018-10-04 PROCEDURE — 82043 UR ALBUMIN QUANTITATIVE: CPT | Performed by: INTERNAL MEDICINE

## 2018-10-04 PROCEDURE — 80061 LIPID PANEL: CPT | Performed by: INTERNAL MEDICINE

## 2018-10-04 RX ORDER — AMOXICILLIN AND CLAVULANATE POTASSIUM 875; 125 MG/1; MG/1
1 TABLET, FILM COATED ORAL EVERY 12 HOURS
Qty: 20 TAB | Refills: 0 | Status: SHIPPED | OUTPATIENT
Start: 2018-10-04 | End: 2018-10-14

## 2018-10-04 NOTE — PATIENT INSTRUCTIONS
1) follow-up in 3 months or sooner if worsening symptoms. 2) Hold coumadin today and tomorrow and starting on Saturday go back to coumadin 5mg daily pm. Recheck on Tuesday and call us with the results. Schedule of Personalized Health Plan (Provide Copy to Patient) The best way to stay healthy is to live a healthy lifestyle. A healthy lifestyle includes regular exercise, eating a well-balanced diet, keeping a healthy weight and not smoking. Regular physical exams and screening tests are another important way to take care of yourself. Preventive exams provided by health care providers can find health problems early when treatment works best and can keep you from getting certain diseases or illnesses. Preventive services include exams, lab tests, screenings, shots, monitoring and information to help you take care of your own health. All people over 65 should have a pneumonia shot. Pneumonia shots are usually only needed once in a lifetime unless your doctor decides differently. All people over 65 should have a yearly flu shot. People over 65 are at medium to high risk for Hepatitis B. Three shots are needed for complete protection. In addition to your physical exam, some screening tests are recommended: 
 
Bone mass measurement (dexa scan) is recommended every two years if you have certain risk factors, such as personal history of vertebral fracture or chronic steroid medication use Diabetes Mellitus screening is recommended every year. Glaucoma is an eye disease caused by high pressure in the eye. An eye exam is recommended every year. Cardiovascular screening tests that check your cholesterol and other blood fat (lipid) levels are recommended every five years. Colorectal Cancer screening tests help to find pre-cancerous polyps (growths in the colon) so they can be removed before they turn into cancer.   Tests ordered for screening depend on your personal and family history risk factors. Screening for Prostate Cancer is recommended yearly with a digital rectal exam and/or a PSA test 
 
Here is a list of your current Health Maintenance items with a due date: 
Health Maintenance Topic Date Due  Shingrix Vaccine Age 50> (1 of 2) 08/19/1989  Influenza Age 5 to Adult  08/01/2018  
 FOOT EXAM Q1  08/10/2018  MICROALBUMIN Q1  10/19/2018  
 EYE EXAM RETINAL OR DILATED Q1  04/19/2019 (Originally 7/25/2018)  HEMOGLOBIN A1C Q6M  01/12/2019  LIPID PANEL Q1  07/12/2019  GLAUCOMA SCREENING Q2Y  07/25/2019  MEDICARE YEARLY EXAM  10/05/2019  COLONOSCOPY  11/24/2020  
 DTaP/Tdap/Td series (2 - Td) 08/01/2026  Pneumococcal 65+ Low/Medium Risk  Completed Advance Care Planning: Care Instructions Your Care Instructions It can be hard to live with an illness that cannot be cured. But if your health is getting worse, you may want to make decisions about end-of-life care. Planning for the end of your life does not mean that you are giving up. It is a way to make sure that your wishes are met. Clearly stating your wishes can make it easier for your loved ones. Making plans while you are still able may also ease your mind and make your final days less stressful and more meaningful. Follow-up care is a key part of your treatment and safety. Be sure to make and go to all appointments, and call your doctor if you are having problems. It's also a good idea to know your test results and keep a list of the medicines you take. What can you do to plan for the end of life? · You can bring these issues up with your doctor. You do not need to wait until your doctor starts the conversation. You might start with \"I would not be willing to live with . Mickeal Rink Mickeal Rink Mickeal Rink \" When you complete this sentence it helps your doctor understand your wishes. · Talk openly and honestly with your doctor. This is the best way to understand the decisions you will need to make as your health changes.  Know that you can always change your mind. · Ask your doctor about commonly used life-support measures. These include tube feedings, breathing machines, and fluids given through a vein (IV). Understanding these treatments will help you decide whether you want them. · You may choose to have these life-supporting treatments for a limited time. This allows a trial period to see whether they will help you. You may also decide that you want your doctor to take only certain measures to keep you alive. It is important to spell out these conditions so that your doctor and family understand them. · Talk to your doctor about how long you are likely to live. He or she may be able to give you an idea of what usually happens with your specific illness. · Think about preparing papers that state your wishes. This way there will not be any confusion about what you want. You can change your instructions at any time. Which papers should you prepare? Advance directives are legal papers that tell doctors how you want to be cared for at the end of your life. You do not need a  to write these papers. Ask your doctor or your state health department for information on how to write your advance directives. They may have the forms for each of these types of papers. Make sure your doctor has a copy of these on file, and give a copy to a family member or close friend. · Consider a do-not-resuscitate order (DNR). This order asks that no extra treatments be done if your heart stops or you stop breathing. Extra treatments may include cardiopulmonary resuscitation (CPR), electrical shock to restart your heart, or a machine to breathe for you. If you decide to have a DNR order, ask your doctor to explain and write it. Place the order in your home where everyone can easily see it. · Consider a living will. A living will explains your wishes about life support and other treatments at the end of your life if you become unable to speak for yourself. Living briggs tell doctors to use or not use treatments that would keep you alive. You must have one or two witnesses or a notary present when you sign this form. · Consider a durable power of  for health care. This allows you to name a person to make decisions about your care if you are not able to. Most people ask a close friend or family member. Talk to this person about the kinds of treatments you want and those that you do not want. Make sure this person understands your wishes. These legal papers are simple to change. Tell your doctor what you want to change, and ask him or her to make a note in your medical file. Give your family updated copies of the papers. Where can you learn more? Go to http://ty-sharyn.info/. Enter P184 in the search box to learn more about \"Advance Care Planning: Care Instructions. \" Current as of: April 19, 2018 Content Version: 11.8 © 8715-3503 [employer]elt[employer]ight. Care instructions adapted under license by [employer] (which disclaims liability or warranty for this information). If you have questions about a medical condition or this instruction, always ask your healthcare professional. [place] [address] any warranty or liability for your use of this information. Deciding About IV Fluids or Tube Feedings When You Have a Terminal Illness Deciding About IV Fluids or Tube Feedings When You Have a Terminal Illness 
 Your Care Instructions 
 IV fluids and tube feedings can be used when you are no longer able to eat or drink by mouth. IV fluids are given through a needle placed in a vein. Liquid food can be given through a tube that goes down your nose into your stomach. Or it may be given through a tube that is surgically placed in your belly. 
 You get to decide if you want to have IV fluids or tube feedings if you can no longer eat or drink on your own. It will not cure your illness, and it can be uncomfortable. But it may also make you feel better or live longer. 
 Without IV fluids and tube feedings, your body will slow down naturally. Most likely, you will not feel hungry. And your doctor will take steps to keep you comfortable until you die. 
 The decision about whether to have IV fluids and tube feedings is a personal one. You may decide that you would want one but not the other. Be sure to talk it over with your doctor and loved ones. 
 Follow-up care is a key part of your treatment and safety. Be sure to make and go to all appointments, and call your doctor if you are having problems. It's also a good idea to know your test results and keep a list of the medicines you take. 
Harpreet Curry might you want IV fluids or tube feedings? 
  · It may help you recover from a short illness or injury.  
  · It may help improve the quality of your remaining time.  
  · You believe that every possible step should be taken to preserve life, regardless of quality of life.  
  · There is hope that there is or will soon be a cure for your condition.  
Harpreet Curry might you not want IV fluids or tube feedings? 
  · It cannot cure a terminal illness.  
  · It may prolong your life, but it would not make you more comfortable or increase the quality of the rest of your life.  
  · There are more risks than benefits. Risks include infection, pneumonia, and digestive problems such as diarrhea.  
  · You do not wish to be kept alive artificially. When should you call for help? 
 Be sure to contact your doctor if: 
  · You want more information about IV fluids and tube feedings.  
  · You want to change your decision about receiving IV fluids and tube feedings. Where can you learn more? Go to http://ty-sharyn.info/. Enter U432 in the search box to learn more about \"Deciding About IV Fluids or Tube Feedings When You Have a Terminal Illness. \" Current as of: April 19, 2018 Content Version: 11.8 © 5486-8433 Healthwise Incorporated. Care instructions adapted under license by [employer] (which disclaims liability or warranty for this information). If you have questions about a medical condition or this instruction, always ask your healthcare professional. [address] any warranty or liability for your use of this information. Deciding About Life-Prolonging Treatment Deciding About Life-Prolonging Treatment What is life-prolonging treatment? There are many kinds of treatment that can help you live longer. These may be needed for only a short time until your illness improves. Or you may use them over the long term to help keep you alive. Some treatments include the use of: · Medicines to slow the progress of certain diseases, such as heart disease, diabetes, cancer, AIDS, or Alzheimer's disease. · Antibiotics to treat serious infections, such as pneumonia. · Dialysis to clean your blood if your kidneys stop working. · A breathing machine to help you breathe if you can't breathe on your own. This machine pumps air into your lungs through a tube put into your throat. · A feeding tube or an intravenous (IV) line to give you food and fluids if you can't eat or drink. · Cardiopulmonary resuscitation (CPR) to try to restart your heart. The decision to receive treatments that may help you live longer is a personal one. You may want your doctor to do everything possible to keep you alive, even when your chance for recovery is small. Or you may choose to only have care to manage your pain and other symptoms. What are key points about this decision? · If there is a good chance that your illness can be cured or managed, your doctor may advise you to first try available treatments. If these don't work, then you might think about stopping treatment. · If you stop treatment, you will still receive care that focuses on pain relief and comfort.  
· A decision to stop treatment that keeps you alive does not have to be permanent. You can always change your mind if your health starts to improve. · Even though treatment focuses on helping you live longer, it may cause side effects that can greatly affect your quality of life. And it could affect how you spend time with your family and friends. · If you still have personal goals that you want to pursue, you may want treatment that keeps you alive long enough to reach them. Why might you choose life-prolonging treatment? · There is a good chance that your illness can be cured or managed. · You think you can manage the possible side effects of treatment. · You don't think treatment will get in the way of your quality of life. · You have personal goals that you still want to pursue and achieve. Why might you choose to stop life-prolonging treatment? · Your chance of surviving your illness is very low. · You have tried all possible treatments for your illness, but they have not helped. · You can no longer deal with the side effects of treatment. · You have already met the goals you set out to achieve in your life. Your decision Thinking about the facts and your feelings can help you make a decision that is right for you. Be sure you understand the benefits and risks of your options, and think about what else you need to do before you make the decision. Where can you learn more? Go to http://ty-sharyn.info/. Enter M171 in the search box to learn more about \"Deciding About Life-Prolonging Treatment. \" Current as of: October 6, 2017 Content Version: 11.8 © 0681-5494 Healthwise, Incorporated. Care instructions adapted under license by [employer] (which disclaims liability or warranty for this information). If you have questions about a medical condition or this instruction, always ask your healthcare professional. [address] any warranty or liability for your use of this information. Jorge Malloy [address] What is a living will? A living will is a legal form you use to write down the kind of care you want at the end of your life. It is used by the health professionals who will treat you if you aren't able to decide for yourself. If you put your wishes in writing, your loved ones and others will know what kind of care you want. They won't need to guess. This can ease your mind and be helpful to others. A living will is not the same as an estate or property will. An estate will explains what you want to happen with your money and property after you die. Is a living will a legal document? A living will is a legal document. Each state has its own laws about living briggs. If you move to another state, make sure that your living will is legal in the state where you now live. Or you might use a universal form that has been approved by many states. This kind of form can sometimes be completed and stored online. Your electronic copy will then be available wherever you have a connection to the Internet. In most cases, doctors will respect your wishes even if you have a form from a different state. · You don't need an  to complete a living will. But legal advice can be helpful if your state's laws are unclear, your health history is complicated, or your family can't agree on what should be in your living will. · You can change your living will at any time. Some people find that their wishes about end-of-life care change as their health changes. · In addition to making a living will, think about completing a medical power of  form. This form lets you name the person you want to make end-of-life treatment decisions for you (your \"health care agent\") if you're not able to. Many hospitals and nursing homes will give you the forms you need to complete a living will and a medical power of .  
· Your living will is used only if you can't make or communicate decisions for yourself anymore. If you become able to make decisions again, you can accept or refuse any treatment, no matter what you wrote in your living will. · Your state may offer an online registry. This is a place where you can store your living will online so the doctors and nurses who need to treat you can find it right away. What should you think about when creating a living will? Talk about your end-of-life wishes with your family members and your doctor. Let them know what you want. That way the people making decisions for you won't be surprised by your choices. Think about these questions as you make your living will: · Do you know enough about life support methods that might be used? If not, talk to your doctor so you know what might be done if you can't breathe on your own, your heart stops, or you're unable to swallow. · What things would you still want to be able to do after you receive life-support methods? Would you want to be able to walk? To speak? To eat on your own? To live without the help of machines? · If you have a choice, where do you want to be cared for? In your home? At a hospital or nursing home? · Do you want certain Adventism practices performed if you become very ill? · If you have a choice at the end of your life, where would you prefer to die? At home? In a hospital or nursing home? Somewhere else? · Would you prefer to be buried or cremated? · Do you want your organs to be donated after you die? What should you do with your living will? · Make sure that your family members and your health care agent have copies of your living will. · Give your doctor a copy of your living will to keep in your medical record. If you have more than one doctor, make sure that each one has a copy. · You may want to put a copy of your living will where it can be easily found. Where can you learn more? Go to http://ty-sharyn.info/.  
Enter X506 in the search box to learn more about \"Learning About Living Glenroy Clement. \" Current as of: April 19, 2018 Content Version: 11.8 © 5098-3793 [employer]. Care instructions adapted under license by [employer] (which disclaims liability or warranty for this information). If you have questions about a medical condition or this instruction, always ask your healthcare professional. [address] any warranty or liability for your use of this information. Advance Directives: Care Instructions Your Care Instructions An advance directive is a legal way to state your wishes at the end of your life. It tells your family and your doctor what to do if you can no longer say what you want. There are two main types of advance directives. You can change them any time that your wishes change. · A living will tells your family and your doctor your wishes about life support and other treatment. · A durable power of  for health care lets you name a person to make treatment decisions for you when you can't speak for yourself. This person is called a health care agent. If you do not have an advance directive, decisions about your medical care may be made by a doctor or a  who doesn't know you. It may help to think of an advance directive as a gift to the people who care for you. If you have one, they won't have to make tough decisions by themselves. Follow-up care is a key part of your treatment and safety. Be sure to make and go to all appointments, and call your doctor if you are having problems. It's also a good idea to know your test results and keep a list of the medicines you take. How can you care for yourself at home? · Discuss your wishes with your loved ones and your doctor. This way, there are no surprises. · Many states have a unique form. Or you might use a universal form that has been approved by many states. This kind of form can sometimes be completed and stored online.  Your electronic copy will then be available wherever you have a connection to the Internet. In most cases, doctors will respect your wishes even if you have a form from a different state. · You don't need a  to do an advance directive. But you may want to get legal advice. · Think about these questions when you prepare an advance directive: ¨ Who do you want to make decisions about your medical care if you are not able to? Many people choose a family member or close friend. ¨ Do you know enough about life support methods that might be used? If not, talk to your doctor so you understand. ¨ What are you most afraid of that might happen? You might be afraid of having pain, losing your independence, or being kept alive by machines. ¨ Where would you prefer to die? Choices include your home, a hospital, or a nursing home. ¨ Would you like to have information about hospice care to support you and your family? ¨ Do you want to donate organs when you die? ¨ Do you want certain Yazidi practices performed before you die? If so, put your wishes in the advance directive. · Read your advance directive every year, and make changes as needed. When should you call for help? Be sure to contact your doctor if you have any questions. Where can you learn more? Go to http://ty-sharyn.info/. Enter R264 in the search box to learn more about \"Advance Directives: Care Instructions. \" Current as of: April 19, 2018 Content Version: 11.8 © 5968-4102 [employer]ter[employer]. Care instructions adapted under license by [employer] (which disclaims liability or warranty for this information). If you have questions about a medical condition or this instruction, always ask your healthcare professional. [address] any warranty or liability for your use of this information.

## 2018-10-04 NOTE — TELEPHONE ENCOUNTER
2 pt. Identifiers confirmed. Ms. Bethany Colorado notified Mr. Bethany Colorado has an upcoming aptmt for his urgent referral c urology Tuesday 10/9/18 @ 1400. Number for them to reschedule if necessary is [phone].

## 2018-10-04 NOTE — PROGRESS NOTES
ROOM # 1 Yoel Ulrich presents today for Chief Complaint Patient presents with  Anticoagulation  
  pt/inr [facility] Annual Wellness Visit  Hypertension f/u  Diabetes f/u  Cholesterol Problem f/u  
 
 
Yoel Ulrich preferred language for health care discussion is english/other. Is someone accompanying this pt? yes Is the patient using any DME equipment during OV? Yes, wheelchair and O2 Depression Screening: PHQ over the last two weeks 9/11/2018 9/11/2018 7/19/2018 7/19/2018 7/3/2018 5/31/2018 3/1/2018 Little interest or pleasure in doing things Not at all Not at all Not at all Not at all Not at all Not at all Not at all Feeling down, depressed, irritable, or hopeless Not at all Not at all Not at all Not at all Not at all Not at all Not at all Total Score PHQ 2 0 0 0 0 0 0 0 Learning Assessment: 
Learning Assessment 7/3/2018 8/14/2014 PRIMARY LEARNER Patient Patient HIGHEST LEVEL OF EDUCATION - PRIMARY LEARNER  SOME COLLEGE SOME COLLEGE  
BARRIERS PRIMARY LEARNER NONE NONE  
CO-LEARNER CAREGIVER No - PRIMARY LANGUAGE ENGLISH ENGLISH  
LEARNER PREFERENCE PRIMARY READING LISTENING  
  DEMONSTRATION -  
ANSWERED BY patient self RELATIONSHIP SELF SELF Abuse Screening: 
Abuse Screening Questionnaire 7/3/2018 8/10/2017 5/12/2015 Do you ever feel afraid of your partner? N N N Are you in a relationship with someone who physically or mentally threatens you? N N N Is it safe for you to go home? Mary Jane Wilson Fall Risk Fall Risk Assessment, last 12 mths 9/11/2018 9/11/2018 7/19/2018 7/3/2018 5/31/2018 3/1/2018 10/19/2017 Able to walk? Yes Yes Yes Yes Yes Yes Yes Fall in past 12 months? Yes No Yes No Yes Yes No  
Fall with injury? Yes - - - No Yes - Number of falls in past 12 months 1 - 5 - 3 3 - Fall Risk Score 2 - - - 3 4 - Health Maintenance reviewed and discussed per provider. Yes Yoel Ulrich is due for Health Maintenance Due Topic Date Due  
  Shingrix Vaccine Age 50> (1 of 2) 08/19/1989  
 EYE EXAM RETINAL OR DILATED Q1  07/25/2018  Influenza Age 5 to Adult  08/01/2018  
 FOOT EXAM Q1  08/10/2018  MEDICARE YEARLY EXAM  08/11/2018  MICROALBUMIN Q1  10/19/2018 HM to be d/w provider Pt. To schedule foot exam c podiatry Pt. To schedule eye exam c Dr. Mulugeta Ly Please order/place referral if appropriate. Advance Directive: 1. Do you have an advance directive in place? Patient Reply: no 
 
2. If not, would you like material regarding how to put one in place? Patient Reply: no 
 
Coordination of Care: 1. Have you been to the ER, urgent care clinic since your last visit? Hospitalized since your last visit? no 
 
2. Have you seen or consulted any other health care providers outside of the [address] [place] [address] since your last visit? Include any pap smears or colon screening. no 
 
 
Immunization History Administered Date(s) Administered  Influenza High Dose Vaccine PF 11/05/2015, 09/25/2017  Influenza Vaccine 10/13/2010, 09/16/2014, 09/14/2015, 11/10/2016  Influenza Vaccine (Tri) Adjuvanted 10/04/2018  Influenza Vaccine PF 01/29/2013  Pneumococcal Conjugate (PCV-13) 09/14/2015  Pneumococcal Polysaccharide (PPSV-23) 10/13/2010, 08/14/2014 Immunization administered by Kamron Edmonds LPN with pt's consent. Patient tolerated procedure well. Pt. Observed for 10 mins. No reactions noted/reported, VIS given.

## 2018-10-04 NOTE — PROGRESS NOTES
Garret Lyman is a 77 y.o. male and presents for annual Medicare Wellness Visit. Problem List: Reviewed with patient and discussed risk factors. Patient Active Problem List  
Diagnosis Code  Type II or unspecified type diabetes mellitus without mention of complication, not stated as uncontrolled E11.9  
 Essential hypertension, benign I10  
 Other and unspecified hyperlipidemia E78.5  Tobacco use disorder F17.200  
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  
 H/O colonoscopy Z98.890  
 COPD (chronic obstructive pulmonary disease) ([facility] J44.9  Microhematuria R31.29  
 History of urinary retention [id number]  Renal cyst, right N28.1  Enlarged prostate N40.0  Coagulation defect ([facility] [facility] D68.9  Advance directive discussed with patient Z70.80  Eye exam, routine Z01.00 Current medical providers:  Patient Care Team: 
Camille Helms MD as PCP - General (Internal Medicine) Raj Brownlee RN as Nurse Navigator Maria E Browne MD (Ophthalmology) Mia Valladares MD (Pulmonary Disease) PSH: Reviewed with patient Past Surgical History:  
Procedure Laterality Date  HX CATARACT REMOVAL Left 4/2015  
 by Dr. Jett Rocha  HX CHOLECYSTECTOMY  late 1980's SH: Reviewed with patient Social History Substance Use Topics  Smoking status: Former Smoker Packs/day: 1.00 Years: 45.00 Types: Cigarettes Quit date: 8/19/2015  Smokeless tobacco: Never Used Comment: Pt counseled to continue to not smoke.  Alcohol use No  
 
 
FH: Reviewed with patient Family History Problem Relation Age of Onset  Cancer Father   
  he does not know Medications/Allergies: Reviewed with patient Current Outpatient Prescriptions on File Prior to Visit Medication Sig Dispense Refill  lisinopril (PRINIVIL, ZESTRIL) 2.5 mg tablet Take 1 Tab by mouth daily. 90 Tab 3  furosemide (LASIX) 20 mg tablet Take 1 Tab by mouth daily. Indications: hypertension 90 Tab 3  
 metoprolol tartrate (LOPRESSOR) 25 mg tablet Take 0.5 Tabs by mouth two (2) times a day. 90 Tab 3  
 albuterol-ipratropium (DUO-NEB) 2.5 mg-0.5 mg/3 ml nebu 3 mL by Nebulization route every four (4) hours. 30 Nebule 5  
 fluticasone-vilanterol (BREO ELLIPTA) 100-25 mcg/dose inhaler Take 1 Puff by inhalation daily.  insulin glargine (LANTUS) 100 unit/mL injection 7 Units by SubCUTAneous route nightly. 3 Vial 3  
 finasteride (PROSCAR) 5 mg tablet Take 1 Tab by mouth daily. 90 Tab 3  
 omeprazole (PRILOSEC) 40 mg capsule Take 1 Cap by mouth daily. 90 Cap 3  potassium chloride (KLOR-CON M20) 20 mEq tablet Take 1 Tab by mouth daily. 90 Tab 3  
 ferrous sulfate 325 mg (65 mg iron) tablet Take 1 Tab by mouth two (2) times a day. 180 Tab 3  
 tamsulosin (FLOMAX) 0.4 mg capsule Take 1 Cap by mouth nightly. 90 Cap 3  warfarin (COUMADIN) 5 mg tablet Take 1 Tab by mouth every evening. 90 Tab 3  Lancets misc Check fasting sugars daily before breakfast. DX: E11.9 for freestyle lite meter 100 Each 11  
 Nebulizer & Compressor machine 1 Each.  tiotropium bromide (SPIRIVA RESPIMAT) 2.5 mcg/actuation inhaler Take 2 Puffs by inhalation daily.  albuterol (VENTOLIN HFA) 90 mcg/actuation inhaler Take 1-2 puffs every 4-6 hrs prn shortness of breath 3 Inhaler 3  
 glucose blood VI test strips (FREESTYLE LITE STRIPS) strip Check fasting sugars daily before breakfast. DX: E11.9 for freestyle lite meter 100 Strip 11  
 ascorbic acid (VITAMIN C) 500 mg tablet Take 500 mg by mouth two (2) times a day.  Blood-Glucose Meter (ONE TOUCH ULTRA 2) monitoring kit Check fasting sugars daily before breakfast. DX: 250.02 1 Kit 0 No current facility-administered medications on file prior to visit. No Known Allergies Objective: 
Visit Vitals  /71 (BP 1 Location: Right arm, BP Patient Position: Sitting)  Pulse 79  Temp 96.5 °F (35.8 °C) (Oral)  Resp 20  
 Ht 5' 6\" (1.676 m)  Wt 164 lb 6.4 oz (74.6 kg)  SpO2 91% Comment: 3LNC  BMI 26.53 kg/m2 Body mass index is 26.53 kg/(m^2). Assessment of cognitive impairment: Alert and oriented x 3 Depression Screen: PHQ over the last two weeks 10/4/2018 Little interest or pleasure in doing things Not at all Feeling down, depressed, irritable, or hopeless Not at all Total Score PHQ 2 0 Fall Risk Assessment:   
Fall Risk Assessment, last 12 mths 10/4/2018 Able to walk? Yes Fall in past 12 months? No  
Fall with injury? -  
Number of falls in past 12 months - Fall Risk Score - Functional Ability:  
Does the patient exhibit a steady gait?  no How long did it take the patient to get up and walk from a sitting position? Unable to do. Pt in a wheelchair. Is the patient self reliant?  (ie can do own laundry, meals, household chores)  yes Does the patient handle his/her own medications?  no 
  
Does the patient handle his/her own money? yes Is the patients home safe (ie good lighting, handrails on stairs and bath, etc.)? yes Did you notice or did patient express any hearing difficulties? yes Did you notice or did patient express any vision difficulties?   no 
  
Were distance and reading eye charts used? no 
  
 
Advance Care Planning:  
Patient was offered the opportunity to discuss advance care planning:  yes Does patient have an Advance Directive:  no If no, did you provide information on [facility]ctions? yes Plan:   
 
Orders Placed This Encounter  Influenza Vaccine Inactivated (IIV)(FLUAD), Subunit, Adjuvanted, IM, (34915)  MICROALBUMIN, UR, RAND W/ MICROALB/CREAT RATIO  METABOLIC PANEL, COMPREHENSIVE  LIPID PANEL  
 HEMOGLOBIN A1C W/O EAG  
 URINALYSIS W/ RFLX MICROSCOPIC  
  AMB POC PT/INR  Administration fee () for Medicare insured patients  varicella-zoster recombinant, PF, (SHINGRIX, PF,) 50 mcg/0.5 mL susr injection Health Maintenance Topic Date Due  Shingrix Vaccine Age 50> (1 of 2) 08/19/1989  Influenza Age 5 to Adult  08/01/2018  
 FOOT EXAM Q1  08/10/2018  MICROALBUMIN Q1  10/19/2018  
 EYE EXAM RETINAL OR DILATED Q1  04/19/2019 (Originally 7/25/2018)  HEMOGLOBIN A1C Q6M  01/12/2019  LIPID PANEL Q1  07/12/2019  GLAUCOMA SCREENING Q2Y  07/25/2019  MEDICARE YEARLY EXAM  10/05/2019  COLONOSCOPY  11/24/2020  
 DTaP/Tdap/Td series (2 - Td) 08/01/2026  Pneumococcal 65+ Low/Medium Risk  Completed *Patient verbalized understanding and agreement with the plan. A copy of the After Visit Summary with personalized health plan was given to the patient today. Allergies and Intolerances:  
No Known Allergies Family History:  
Family History Problem Relation Age of Onset  Cancer Father   
  he does not know Social History: He  reports that he quit smoking about 3 years ago. His smoking use included Cigarettes. He has a 45.00 pack-year smoking history. He has never used smokeless tobacco.  He  reports that he does not drink alcohol. ·  
 
Objective:  
Physical exam:  
Visit Vitals  /71 (BP 1 Location: Right arm, BP Patient Position: Sitting)  Pulse 79  Temp 96.5 °F (35.8 °C) (Oral)  Resp 20  
 Ht 5' 6\" (1.676 m)  Wt 164 lb 6.4 oz (74.6 kg)  SpO2 91% Comment: 3LNC  BMI 26.53 kg/m2 Generally: Pleasant male in no acute distress Cardiac Exam: regular, rate, and rhythm. Normal S1 and S2. No murmurs, gallops, or rubs Pulmonary exam: Clear to auscultation bilaterally Abdominal exam: Positive bowel sounds in all four quadrants, soft, nondistended, nontender Extremities: 2+ dorsalis pedis pulses bilaterally. No pedal edema  
 bilaterally Right scrotal abscess that is oozing blood and pus. It is about the size of an orange. That has been there for a couple of days. LABS/Radiological Tests: 
Lab Results Component Value Date/Time WBC 7.2 08/14/2018 02:10 PM  
 HGB 11.8 (L) 08/14/2018 02:10 PM  
 HCT 38.9 08/14/2018 02:10 PM  
 PLATELET 338 65/46/0490 02:10 PM  
 
Lab Results Component Value Date/Time Sodium 143 08/14/2018 02:10 PM  
 Potassium 4.1 08/14/2018 02:10 PM  
 Chloride 99 (L) 08/14/2018 02:10 PM  
 CO2 43 (HH) 08/14/2018 02:10 PM  
 Glucose 174 (H) 08/14/2018 02:10 PM  
 BUN 16 08/14/2018 02:10 PM  
 Creatinine 0.78 08/14/2018 02:10 PM  
 
Lab Results Component Value Date/Time Cholesterol, total 210 (H) 07/12/2018 03:31 PM  
 HDL Cholesterol 80 (H) 07/12/2018 03:31 PM  
 LDL, calculated 110.2 (H) 07/12/2018 03:31 PM  
 Triglyceride 99 07/12/2018 03:31 PM  
 
No results found for: GPT Component Latest Ref Rng & Units 7/12/2018  
 
      3:31 PM  
Hemoglobin A1c, (calculated) 4.2 - 5.6 % 5.4 Est. average glucose 
    mg/dL 108 Previous labs Component Latest Ref Rng & Units 10/4/2018 9/11/2018 11:30 AM 12:03 PM  
VALID INTERNAL CONTROL POC Yes Yes Prothrombin time (POC) seconds 49.2 36.4 INR 
     4.1 3.0 Pt notified of results at [address] today. ASSESSMENT/PLAN:   
1. Encounter for Medicare annual wellness exam 
 
2. Diabetes mellitus, stable ([address]): we will see what the labs show. Continue diet, exercise and lantus.  
-     MICROALBUMIN, UR, RAND W/ MICROALB/CREAT RATIO; Future 
-     HEMOGLOBIN A1C W/O EAG; Future -     URINALYSIS W/ RFLX MICROSCOPIC; Future 3. Dyslipidemia: we will see what the labs show. Continue diet and exercise. -     METABOLIC PANEL, COMPREHENSIVE; Future -     LIPID PANEL; Future 4. Benign hypertension without CHF: stable. Continue the lisinopril, diet and exercise.   
 
5. Scrotal abscess: when I left and came back the wife said there is scrotal abscess that is draining on right scrotal area. Looked at it and huge orange abscess that we were able to excise some pus and blood, but still there. Will do ASAP referral to urology and start augmentin. Area was cleaned with saline and told to clean daily. Apply nonadherent dressing, gauze and neosporin daily. -     CULTURE, WOUND W GRAM STAIN ([facility] Only); Future 
-     REFERRAL TO UROLOGY 
-     amoxicillin-clavulanate (AUGMENTIN) 875-125 mg per tablet; Take 1 Tab by mouth every twelve (12) hours for 10 days. 6. Chronic obstructive pulmonary disease, unspecified COPD type ([facility]): stable. Continue the breo, spiriva, albuterol, and duoneb. Assessment & Plan: 
Stable, based on history, physical exam and review of pertinent labs, studies and medications; meds reconciled; continue current treatment plan. Key COPD Medications   
    
  
 albuterol-ipratropium (DUO-NEB) 2.5 mg-0.5 mg/3 ml nebu  (Taking) 3 mL by Nebulization route every four (4) hours. fluticasone-vilanterol (BREO ELLIPTA) 100-25 mcg/dose inhaler  (Taking) Take 1 Puff by inhalation daily. tiotropium bromide (SPIRIVA RESPIMAT) 2.5 mcg/actuation inhaler  (Taking) Take 2 Puffs by inhalation daily. albuterol (VENTOLIN HFA) 90 mcg/actuation inhaler  (Taking) Take 1-2 puffs every 4-6 hrs prn shortness of breath Lab Results Component Value Date/Time WBC 7.2 08/14/2018 02:10 PM  
 HGB 11.8 08/14/2018 02:10 PM  
 HCT 38.9 08/14/2018 02:10 PM  
 PLATELET 091 43/41/6831 02:10 PM  
 
 
 
7. Saddle embolus of pulmonary artery without acute cor pulmonale, unspecified chronicity ([facility] [address]): he will hold coumadin today and tomorrow and starting on Saturday take coumadin 5mg daily pm and recheck on Tuesday. Assessment & Plan: 
Stable, based on history, physical exam and review of pertinent labs, studies and medications; meds reconciled; continue current treatment plan. Key Peripheral Vascular Disease Meds warfarin (COUMADIN) 5 mg tablet  (Taking) Take 1 Tab by mouth every evening. Lab Results Component Value Date/Time WBC 7.2 2018 02:10 PM  
 HGB 11.8 2018 02:10 PM  
 HCT 38.9 2018 02:10 PM  
 PLATELET 950  02:10 PM  
 Creatinine 0.78 2018 02:10 PM  
 BUN 16 2018 02:10 PM  
 INR 2.3 2018 02:10 PM  
 INR, External 2.4 2018 INR POC 4.1 10/04/2018 11:30 AM  
 Prothrombin time 25.1 2018 02:10 PM  
 Cholesterol, total 210 2018 03:31 PM  
 HDL Cholesterol 80 2018 03:31 PM  
 LDL, calculated 110.2 2018 03:31 PM  
 Triglyceride 99 2018 03:31 PM  
 
 
 
8. Need for shingles vaccine 
-     varicella-zoster recombinant, PF, (SHINGRIX, PF,) 50 mcg/0.5 mL susr injection; 0.5 mL by IntraMUSCular route once for 1 dose. 9. Encounter for immunization -     Influenza Vaccine Inactivated (IIV)(FLUAD), Subunit, Adjuvanted, IM, (94606) -     Administration fee () for Medicare insured patients 10. Requested Prescriptions Signed Prescriptions Disp Refills  varicella-zoster recombinant, PF, (SHINGRIX, PF,) 50 mcg/0.5 mL susr injection 0.5 mL 1 Si.5 mL by IntraMUSCular route once for 1 dose.  amoxicillin-clavulanate (AUGMENTIN) 875-125 mg per tablet 20 Tab 0 Sig: Take 1 Tab by mouth every twelve (12) hours for 10 days. 11. Patient verbalized understanding and agreement with the plan. 12. Patient was given an after-visit summary. 13.  
Follow-up Disposition: 
Return in about 3 months (around 2019) for f/u DM/HTN/lipids. or sooner if worsening symptoms.  
 
 
 
 
 
 
 
Harry Gary MD

## 2018-10-04 NOTE — ASSESSMENT & PLAN NOTE
Stable, based on history, physical exam and review of pertinent labs, studies and medications; meds reconciled; continue current treatment plan. Key Peripheral Vascular Disease Meds   
    
  
 warfarin (COUMADIN) 5 mg tablet  (Taking) Take 1 Tab by mouth every evening. Lab Results Component Value Date/Time WBC 7.2 08/14/2018 02:10 PM  
 HGB 11.8 08/14/2018 02:10 PM  
 HCT 38.9 08/14/2018 02:10 PM  
 PLATELET 863 88/59/9528 02:10 PM  
 Creatinine 0.78 08/14/2018 02:10 PM  
 BUN 16 08/14/2018 02:10 PM  
 INR 2.3 08/14/2018 02:10 PM  
 INR, External 2.4 08/30/2018  INR POC 4.1 10/04/2018 11:30 AM  
 Prothrombin time 25.1 08/14/2018 02:10 PM  
 Cholesterol, total 210 07/12/2018 03:31 PM  
 HDL Cholesterol 80 07/12/2018 03:31 PM  
 LDL, calculated 110.2 07/12/2018 03:31 PM  
 Triglyceride 99 07/12/2018 03:31 PM

## 2018-10-04 NOTE — ACP (ADVANCE CARE PLANNING)

## 2018-10-04 NOTE — MR AVS SNAPSHOT
[address] [place] [address] 
 
 
 [address] [facility] 83 [address] [phone] Patient: Mariama Patrick MRN: [id number] Chloe Quinn Visit Information Date & Time Provider Department Dept. Phone Encounter #  
 10/4/2018 11:15 AM Lindsey Foster MD [facility] [phone] [id number] Follow-up Instructions Return in about 3 months (around 1/4/2019) for f/u DM/HTN/lipids. Your Appointments 8/20/2019  1:40 PM  
ESTABLISHED PATIENT with Carlos Kaur NP [facility]. [address] ([facility]) Appt Note: 1 year fu  
 [address] [place] [address]  
[phone]  
  
   
 [place] [id number] Upcoming Health Maintenance Date Due Shingrix Vaccine Age 50> (1 of 2) 8/19/1989 Influenza Age 5 to Adult 8/1/2018 FOOT EXAM Q1 8/10/2018 MICROALBUMIN Q1 10/19/2018 EYE EXAM RETINAL OR DILATED Q1 4/19/2019* HEMOGLOBIN A1C Q6M 1/12/2019 LIPID PANEL Q1 7/12/2019 GLAUCOMA SCREENING Q2Y 7/25/2019 MEDICARE YEARLY EXAM 10/5/2019 COLONOSCOPY 11/24/2020 DTaP/Tdap/Td series (2 - Td) 8/1/2026 *Topic was postponed. The date shown is not the original due date. Allergies as of 10/4/2018  Review Complete On: 10/4/2018 By: Jose Daniel Dupree MD  
 No Known Allergies Current Immunizations  Reviewed on 9/10/2018 Name Date Influenza High Dose Vaccine PF 9/25/2017, 11/5/2015 Influenza Vaccine 11/10/2016 12:00 AM, 9/14/2015, 9/16/2014 12:14 PM, 10/13/2010 Influenza Vaccine (Tri) Adjuvanted  Incomplete Influenza Vaccine PF 1/29/2013  8:12 AM  
 Pneumococcal Conjugate (PCV-13) 9/14/2015 Pneumococcal Polysaccharide (PPSV-23) 8/14/2014 12:00 PM, 10/13/2010 12:00 AM  
  
 Not reviewed this visit You Were Diagnosed With   
  
 Codes Comments Encounter for immunization    -  Primary ICD-10-CM: U82 ICD-9-CM: V03.89   
 Encounter for Medicare annual wellness exam     ICD-10-CM: Z00.00 ICD-9-CM: V70.0 Anticoagulated on Coumadin     ICD-10-CM: Z51.81, Z79.01 
ICD-9-CM: V58.83, V58.61 Diabetes mellitus, stable ([facility] [address]     ICD-10-CM: E11.9 ICD-9-CM: 250.00 Dyslipidemia     ICD-10-CM: E78.5 ICD-9-CM: 272.4 Benign hypertension without CHF     ICD-10-CM: I10 
ICD-9-CM: 401.1 Need for shingles vaccine     ICD-10-CM: N23 ICD-9-CM: V04.89 Vitals BP Pulse Temp Resp Height(growth percentile) Weight(growth percentile) 125/71 (BP 1 Location: Right arm, BP Patient Position: Sitting) 79 96.5 °F (35.8 °C) (Oral) 20 5' 6\" (1.676 m) 164 lb 6.4 oz (74.6 kg) SpO2 BMI Smoking Status 91% 26.53 kg/m2 Former Smoker Vitals History BMI and BSA Data Body Mass Index Body Surface Area  
 26.53 kg/m 2 1.86 m 2 Preferred Pharmacy Pharmacy Name Phone [facility] [phone] Your Updated Medication List  
  
   
This list is accurate as of 10/4/18 11:45 AM.  Always use your most recent med list.  
  
  
  
  
 albuterol 90 mcg/actuation inhaler Commonly known as:  VENTOLIN HFA Take 1-2 puffs every 4-6 hrs prn shortness of breath  
  
 albuterol-ipratropium 2.5 mg-0.5 mg/3 ml Nebu Commonly known as:  DUO-NEB  
3 mL by Nebulization route every four (4) hours. Blood-Glucose Meter monitoring kit Commonly known as:  [place] Check fasting sugars daily before breakfast. DX: 250.02  
  
 ferrous sulfate 325 mg (65 mg iron) tablet Take 1 Tab by mouth two (2) times a day. finasteride 5 mg tablet Commonly known as:  PROSCAR Take 1 Tab by mouth daily. fluticasone-vilanterol 100-25 mcg/dose inhaler Commonly known as:  BREO ELLIPTA Take 1 Puff by inhalation daily. furosemide 20 mg tablet Commonly known as:  LASIX Take 1 Tab by mouth daily. Indications: hypertension glucose blood VI test strips strip Commonly known as:  FREESTYLE LITE STRIPS Check fasting sugars daily before breakfast. DX: E11.9 for freestyle lite meter  
  
 insulin glargine 100 unit/mL injection Commonly known as:  LANTUS U-100 INSULIN  
7 Units by SubCUTAneous route nightly. Lancets Misc Check fasting sugars daily before breakfast. DX: E11.9 for freestyle lite meter  
  
 lisinopril 2.5 mg tablet Commonly known as:  Durward Kinds Take 1 Tab by mouth daily. metoprolol tartrate 25 mg tablet Commonly known as:  LOPRESSOR Take 0.5 Tabs by mouth two (2) times a day. Nebulizer & Compressor machine 1 Each. omeprazole 40 mg capsule Commonly known as:  PRILOSEC Take 1 Cap by mouth daily. potassium chloride 20 mEq tablet Commonly known as:  KLOR-CON M20 Take 1 Tab by mouth daily. tamsulosin 0.4 mg capsule Commonly known as:  FLOMAX Take 1 Cap by mouth nightly. tiotropium bromide 2.5 mcg/actuation inhaler Commonly known as:  Geoff Garay Take 2 Puffs by inhalation daily. varicella-zoster recombinant (PF) 50 mcg/0.5 mL Susr injection Commonly known as:  SHINGRIX (PF)  
0.5 mL by IntraMUSCular route once for 1 dose. VITAMIN C 500 mg tablet Generic drug:  ascorbic acid (vitamin C) Take 500 mg by mouth two (2) times a day. warfarin 5 mg tablet Commonly known as:  COUMADIN Take 1 Tab by mouth every evening. Prescriptions Printed Refills  
 varicella-zoster recombinant, PF, (SHINGRIX, PF,) 50 mcg/0.5 mL susr injection 1 Si.5 mL by IntraMUSCular route once for 1 dose. Class: Print Route: IntraMUSCular We Performed the Following ADMIN INFLUENZA VIRUS VAC [ [facility]] AMB POC PT/INR [86061 CPT(R)] INFLUENZA VACCINE INACTIVATED (IIV), SUBUNIT, ADJUVANTED, IM [id number] CPT(R)] Follow-up Instructions Return in about 3 months (around 1/4/2019) for f/u DM/HTN/lipids. To-Do List   
 10/04/2018 Lab:  HEMOGLOBIN A1C W/O EAG   
  
 10/04/2018 Lab:  LIPID PANEL   
  
 10/04/2018 Lab:  METABOLIC PANEL, COMPREHENSIVE   
  
 10/04/2018 Lab:  MICROALBUMIN, UR, RAND W/ MICROALB/CREAT RATIO   
  
 10/04/2018 Lab:  URINALYSIS W/ RFLX MICROSCOPIC Patient Instructions 1) follow-up in 3 months or sooner if worsening symptoms. Schedule of Personalized Health Plan (Provide Copy to Patient) The best way to stay healthy is to live a healthy lifestyle. A healthy lifestyle includes regular exercise, eating a well-balanced diet, keeping a healthy weight and not smoking. Regular physical exams and screening tests are another important way to take care of yourself. Preventive exams provided by health care providers can find health problems early when treatment works best and can keep you from getting certain diseases or illnesses. Preventive services include exams, lab tests, screenings, shots, monitoring and information to help you take care of your own health. All people over 65 should have a pneumonia shot. Pneumonia shots are usually only needed once in a lifetime unless your doctor decides differently. All people over 65 should have a yearly flu shot. People over 65 are at medium to high risk for Hepatitis B. Three shots are needed for complete protection. In addition to your physical exam, some screening tests are recommended: 
 
Bone mass measurement (dexa scan) is recommended every two years if you have certain risk factors, such as personal history of vertebral fracture or chronic steroid medication use Diabetes Mellitus screening is recommended every year. Glaucoma is an eye disease caused by high pressure in the eye. An eye exam is recommended every year.   
 
Cardiovascular screening tests that check your cholesterol and other blood fat (lipid) levels are recommended every five years. Colorectal Cancer screening tests help to find pre-cancerous polyps (growths in the colon) so they can be removed before they turn into cancer. Tests ordered for screening depend on your personal and family history risk factors. Screening for Prostate Cancer is recommended yearly with a digital rectal exam and/or a PSA test 
 
Here is a list of your current Health Maintenance items with a due date: 
Health Maintenance Topic Date Due  Shingrix Vaccine Age 50> (1 of 2) 08/19/1989  Influenza Age 5 to Adult  08/01/2018  
 FOOT EXAM Q1  08/10/2018  MICROALBUMIN Q1  10/19/2018  
 EYE EXAM RETINAL OR DILATED Q1  04/19/2019 (Originally 7/25/2018)  HEMOGLOBIN A1C Q6M  01/12/2019  LIPID PANEL Q1  07/12/2019  GLAUCOMA SCREENING Q2Y  07/25/2019  MEDICARE YEARLY EXAM  10/05/2019  COLONOSCOPY  11/24/2020  
 DTaP/Tdap/Td series (2 - Td) 08/01/2026  Pneumococcal 65+ Low/Medium Risk  Completed Advance Care Planning: Care Instructions Your Care Instructions It can be hard to live with an illness that cannot be cured. But if your health is getting worse, you may want to make decisions about end-of-life care. Planning for the end of your life does not mean that you are giving up. It is a way to make sure that your wishes are met. Clearly stating your wishes can make it easier for your loved ones. Making plans while you are still able may also ease your mind and make your final days less stressful and more meaningful. Follow-up care is a key part of your treatment and safety. Be sure to make and go to all appointments, and call your doctor if you are having problems. It's also a good idea to know your test results and keep a list of the medicines you take. What can you do to plan for the end of life? · You can bring these issues up with your doctor.  You do not need to wait until your doctor starts the conversation. You might start with \"I would not be willing to live with . Theangelika Gordillo Theadore Duy Theadore Duy \" When you complete this sentence it helps your doctor understand your wishes. · Talk openly and honestly with your doctor. This is the best way to understand the decisions you will need to make as your health changes. Know that you can always change your mind. · Ask your doctor about commonly used life-support measures. These include tube feedings, breathing machines, and fluids given through a vein (IV). Understanding these treatments will help you decide whether you want them. · You may choose to have these life-supporting treatments for a limited time. This allows a trial period to see whether they will help you. You may also decide that you want your doctor to take only certain measures to keep you alive. It is important to spell out these conditions so that your doctor and family understand them. · Talk to your doctor about how long you are likely to live. He or she may be able to give you an idea of what usually happens with your specific illness. · Think about preparing papers that state your wishes. This way there will not be any confusion about what you want. You can change your instructions at any time. Which papers should you prepare? Advance directives are legal papers that tell doctors how you want to be cared for at the end of your life. You do not need a  to write these papers. Ask your doctor or your state health department for information on how to write your advance directives. They may have the forms for each of these types of papers. Make sure your doctor has a copy of these on file, and give a copy to a family member or close friend. · Consider a do-not-resuscitate order (DNR). This order asks that no extra treatments be done if your heart stops or you stop breathing.  Extra treatments may include cardiopulmonary resuscitation (CPR), electrical shock to restart your heart, or a machine to breathe for you. If you decide to have a DNR order, ask your doctor to explain and write it. Place the order in your home where everyone can easily see it. · Consider a living will. A living will explains your wishes about life support and other treatments at the end of your life if you become unable to speak for yourself. Living briggs tell doctors to use or not use treatments that would keep you alive. You must have one or two witnesses or a notary present when you sign this form. · Consider a durable power of  for health care. This allows you to name a person to make decisions about your care if you are not able to. Most people ask a close friend or family member. Talk to this person about the kinds of treatments you want and those that you do not want. Make sure this person understands your wishes. These legal papers are simple to change. Tell your doctor what you want to change, and ask him or her to make a note in your medical file. Give your family updated copies of the papers. Where can you learn more? Go to http://ty-sharyn.info/. Enter P184 in the search box to learn more about \"Advance Care Planning: Care Instructions. \" Current as of: April 19, 2018 Content Version: 11.8 © 7865-5319 [employer]wise[employer]. Care instructions adapted under license by [employer] (which disclaims liability or warranty for this information). If you have questions about a medical condition or this instruction, always ask your healthcare professional. [place] [address] any warranty or liability for your use of this information. Deciding About IV Fluids or Tube Feedings When You Have a Terminal Illness Deciding About IV Fluids or Tube Feedings When You Have a Terminal Illness 
 Your Care Instructions 
 IV fluids and tube feedings can be used when you are no longer able to eat or drink by mouth. IV fluids are given through a needle placed in a vein. Liquid food can be given through a tube that goes down your nose into your stomach. Or it may be given through a tube that is surgically placed in your belly. 
 You get to decide if you want to have IV fluids or tube feedings if you can no longer eat or drink on your own. It will not cure your illness, and it can be uncomfortable. But it may also make you feel better or live longer. 
 Without IV fluids and tube feedings, your body will slow down naturally. Most likely, you will not feel hungry. And your doctor will take steps to keep you comfortable until you die. 
 The decision about whether to have IV fluids and tube feedings is a personal one. You may decide that you would want one but not the other. Be sure to talk it over with your doctor and loved ones. 
 Follow-up care is a key part of your treatment and safety. Be sure to make and go to all appointments, and call your doctor if you are having problems. It's also a good idea to know your test results and keep a list of the medicines you take. 
Kevon Rocha might you want IV fluids or tube feedings? 
  · It may help you recover from a short illness or injury.  
  · It may help improve the quality of your remaining time.  
  · You believe that every possible step should be taken to preserve life, regardless of quality of life.  
  · There is hope that there is or will soon be a cure for your condition.  
Kevon Rocha might you not want IV fluids or tube feedings? 
  · It cannot cure a terminal illness.  
  · It may prolong your life, but it would not make you more comfortable or increase the quality of the rest of your life.  
  · There are more risks than benefits. Risks include infection, pneumonia, and digestive problems such as diarrhea.  
  · You do not wish to be kept alive artificially. When should you call for help? 
 Be sure to contact your doctor if:   · You want more information about IV fluids and tube feedings.  
  · You want to change your decision about receiving IV fluids and tube feedings. Where can you learn more? Go to http://ty-sharyn.info/. Enter A114 in the search box to learn more about \"Deciding About IV Fluids or Tube Feedings When You Have a Terminal Illness. \" Current as of: April 19, 2018 Content Version: 11.8 © 1800-4678 [employer]. Care instructions adapted under license by [employer] (which disclaims liability or warranty for this information). If you have questions about a medical condition or this instruction, always ask your healthcare professional. [address] any warranty or liability for your use of this information. Deciding About Life-Prolonging Treatment Deciding About Life-Prolonging Treatment What is life-prolonging treatment? There are many kinds of treatment that can help you live longer. These may be needed for only a short time until your illness improves. Or you may use them over the long term to help keep you alive. Some treatments include the use of: · Medicines to slow the progress of certain diseases, such as heart disease, diabetes, cancer, AIDS, or Alzheimer's disease. · Antibiotics to treat serious infections, such as pneumonia. · Dialysis to clean your blood if your kidneys stop working. · A breathing machine to help you breathe if you can't breathe on your own. This machine pumps air into your lungs through a tube put into your throat. · A feeding tube or an intravenous (IV) line to give you food and fluids if you can't eat or drink. · Cardiopulmonary resuscitation (CPR) to try to restart your heart. The decision to receive treatments that may help you live longer is a personal one. You may want your doctor to do everything possible to keep you alive, even when your chance for recovery is small.  Or you may choose to only have care to manage your pain and other symptoms. What are key points about this decision? · If there is a good chance that your illness can be cured or managed, your doctor may advise you to first try available treatments. If these don't work, then you might think about stopping treatment. · If you stop treatment, you will still receive care that focuses on pain relief and comfort. · A decision to stop treatment that keeps you alive does not have to be permanent. You can always change your mind if your health starts to improve. · Even though treatment focuses on helping you live longer, it may cause side effects that can greatly affect your quality of life. And it could affect how you spend time with your family and friends. · If you still have personal goals that you want to pursue, you may want treatment that keeps you alive long enough to reach them. Why might you choose life-prolonging treatment? · There is a good chance that your illness can be cured or managed. · You think you can manage the possible side effects of treatment. · You don't think treatment will get in the way of your quality of life. · You have personal goals that you still want to pursue and achieve. Why might you choose to stop life-prolonging treatment? · Your chance of surviving your illness is very low. · You have tried all possible treatments for your illness, but they have not helped. · You can no longer deal with the side effects of treatment. · You have already met the goals you set out to achieve in your life. Your decision Thinking about the facts and your feelings can help you make a decision that is right for you. Be sure you understand the benefits and risks of your options, and think about what else you need to do before you make the decision. Where can you learn more? Go to http://ty-sharyn.info/.  
Enter T365 in the search box to learn more about \"Deciding About Life-Prolonging Treatment. \" Current as of: October 6, 2017 Content Version: 11.8 © 9455-6781 [employer]. Care instructions adapted under license by [employer] (which disclaims liability or warranty for this information). If you have questions about a medical condition or this instruction, always ask your healthcare professional. [address] any warranty or liability for your use of this information. Jorge Malloy [address] What is a living will? A living will is a legal form you use to write down the kind of care you want at the end of your life. It is used by the health professionals who will treat you if you aren't able to decide for yourself. If you put your wishes in writing, your loved ones and others will know what kind of care you want. They won't need to guess. This can ease your mind and be helpful to others. A living will is not the same as an estate or property will. An estate will explains what you want to happen with your money and property after you die. Is a living will a legal document? A living will is a legal document. Each state has its own laws about living briggs. If you move to another state, make sure that your living will is legal in the state where you now live. Or you might use a universal form that has been approved by many states. This kind of form can sometimes be completed and stored online. Your electronic copy will then be available wherever you have a connection to the Internet. In most cases, doctors will respect your wishes even if you have a form from a different state. · You don't need an  to complete a living will. But legal advice can be helpful if your state's laws are unclear, your health history is complicated, or your family can't agree on what should be in your living will. · You can change your living will at any time.  Some people find that their wishes about end-of-life care change as their health changes. · In addition to making a living will, think about completing a medical power of  form. This form lets you name the person you want to make end-of-life treatment decisions for you (your \"health care agent\") if you're not able to. Many hospitals and nursing homes will give you the forms you need to complete a living will and a medical power of . · Your living will is used only if you can't make or communicate decisions for yourself anymore. If you become able to make decisions again, you can accept or refuse any treatment, no matter what you wrote in your living will. · Your state may offer an online registry. This is a place where you can store your living will online so the doctors and nurses who need to treat you can find it right away. What should you think about when creating a living will? Talk about your end-of-life wishes with your family members and your doctor. Let them know what you want. That way the people making decisions for you won't be surprised by your choices. Think about these questions as you make your living will: · Do you know enough about life support methods that might be used? If not, talk to your doctor so you know what might be done if you can't breathe on your own, your heart stops, or you're unable to swallow. · What things would you still want to be able to do after you receive life-support methods? Would you want to be able to walk? To speak? To eat on your own? To live without the help of machines? · If you have a choice, where do you want to be cared for? In your home? At a hospital or nursing home? · Do you want certain Anabaptist practices performed if you become very ill? · If you have a choice at the end of your life, where would you prefer to die? At home? In a hospital or nursing home? Somewhere else? · Would you prefer to be buried or cremated? · Do you want your organs to be donated after you die? What should you do with your living will? · Make sure that your family members and your health care agent have copies of your living will. · Give your doctor a copy of your living will to keep in your medical record. If you have more than one doctor, make sure that each one has a copy. · You may want to put a copy of your living will where it can be easily found. Where can you learn more? Go to http://ty-sharyn.info/. Enter Q554 in the search box to learn more about \"Learning About Living [place]roy. \" Current as of: April 19, 2018 Content Version: 11.8 © 4477-9507 [employer]. Care instructions adapted under license by [employer] (which disclaims liability or warranty for this information). If you have questions about a medical condition or this instruction, always ask your healthcare professional. [place] [address] any warranty or liability for your use of this information. Advance Directives: Care Instructions Your Care Instructions An advance directive is a legal way to state your wishes at the end of your life. It tells your family and your doctor what to do if you can no longer say what you want. There are two main types of advance directives. You can change them any time that your wishes change. · A living will tells your family and your doctor your wishes about life support and other treatment. · A durable power of  for health care lets you name a person to make treatment decisions for you when you can't speak for yourself. This person is called a health care agent. If you do not have an advance directive, decisions about your medical care may be made by a doctor or a  who doesn't know you. It may help to think of an advance directive as a gift to the people who care for you. If you have one, they won't have to make tough decisions by themselves. Follow-up care is a key part of your treatment and safety. Be sure to make and go to all appointments, and call your doctor if you are having problems. It's also a good idea to know your test results and keep a list of the medicines you take. How can you care for yourself at home? · Discuss your wishes with your loved ones and your doctor. This way, there are no surprises. · Many states have a unique form. Or you might use a universal form that has been approved by many states. This kind of form can sometimes be completed and stored online. Your electronic copy will then be available wherever you have a connection to the Internet. In most cases, doctors will respect your wishes even if you have a form from a different state. · You don't need a  to do an advance directive. But you may want to get legal advice. · Think about these questions when you prepare an advance directive: ¨ Who do you want to make decisions about your medical care if you are not able to? Many people choose a family member or close friend. ¨ Do you know enough about life support methods that might be used? If not, talk to your doctor so you understand. ¨ What are you most afraid of that might happen? You might be afraid of having pain, losing your independence, or being kept alive by machines. ¨ Where would you prefer to die? Choices include your home, a hospital, or a nursing home. ¨ Would you like to have information about hospice care to support you and your family? ¨ Do you want to donate organs when you die? ¨ Do you want certain Sikhism practices performed before you die? If so, put your wishes in the advance directive. · Read your advance directive every year, and make changes as needed. When should you call for help? Be sure to contact your doctor if you have any questions. Where can you learn more? Go to http://ty-sharyn.info/. Enter R264 in the search box to learn more about \"Advance Directives: Care Instructions. \" Current as of: April 19, 2018 Content Version: 11.8 © 6275-1888 Healthwise, [employer]. Care instructions adapted under license by [employer] (which disclaims liability or warranty for this information). If you have questions about a medical condition or this instruction, always ask your healthcare professional. [address] any warranty or liability for your use of this information. Introducing \[facility] [place] [facility]\"" & HEALTH SERVICES! [facility] introduces [employer] patient portal. Now you can access parts of your medical record, email your doctor's office, and request medication refills online. 1. In your internet browser, go to [email or web address] 2. Click on the First Time User? Click Here link in the Sign In box. You will see the New Member Sign Up page. 3. Enter your [employer] Access Code exactly as it appears below. You will not need to use this code after youve completed the sign-up process. If you do not sign up before the expiration date, you must request a new code. · [employer] Access Code: [email or web address] Expires: 12/10/2018 12:11 PM 
 
4. Enter the last four digits of your Social Security Number (xxxx) and Date of Birth (mm/dd/yyyy) as indicated and click Submit. You will be taken to the next sign-up page. 5. Create a [employer] ID. This will be your [employer] login ID and cannot be changed, so think of one that is secure and easy to remember. 6. Create a [employer] password. You can change your password at any time. 7. Enter your Password Reset Question and Answer. This can be used at a later time if you forget your password. 8. Enter your e-mail address. You will receive e-mail notification when new information is available in [address] 9. Click Sign Up. You can now view and download portions of your medical record. 10. Click the Download Summary menu link to download a portable copy of your medical information. If you have questions, please visit the Frequently Asked Questions section of the [employer] website. Remember, [employer] is NOT to be used for urgent needs. For medical emergencies, dial 911. Now available from your iPhone and Android! Please provide this summary of care documentation to your next provider. Your primary care clinician is listed as Omar Keane. If you have any questions after today's visit, please call [phone].

## 2018-10-04 NOTE — ASSESSMENT & PLAN NOTE
Stable, based on history, physical exam and review of pertinent labs, studies and medications; meds reconciled; continue current treatment plan. Key COPD Medications   
    
  
 albuterol-ipratropium (DUO-NEB) 2.5 mg-0.5 mg/3 ml nebu  (Taking) 3 mL by Nebulization route every four (4) hours. fluticasone-vilanterol (BREO ELLIPTA) 100-25 mcg/dose inhaler  (Taking) Take 1 Puff by inhalation daily. tiotropium bromide (SPIRIVA RESPIMAT) 2.5 mcg/actuation inhaler  (Taking) Take 2 Puffs by inhalation daily. albuterol (VENTOLIN HFA) 90 mcg/actuation inhaler  (Taking) Take 1-2 puffs every 4-6 hrs prn shortness of breath Lab Results Component Value Date/Time  WBC 7.2 08/14/2018 02:10 PM  
 HGB 11.8 08/14/2018 02:10 PM  
 HCT 38.9 08/14/2018 02:10 PM  
 PLATELET 666 90/08/0827 02:10 PM

## 2018-10-04 NOTE — PROGRESS NOTES
Chief Complaint Patient presents with  Anticoagulation  
  pt/inr [facility] Annual Wellness Visit  Hypertension f/u  Diabetes f/u  Cholesterol Problem f/u HPI:  
 
Kevin Ramirez is a 77 y.o.  male with history of type 2 DM and COPD  here for the above complaint. He is taking 5mg daily pm of coumadin. He denies any bleeding and no changes to diet and not taking extra coumadin. Type 2 DM: sugar range: numbers are good. He has not checked them in 2 days. He was told to check his sugars daily before breakfast, lunch & dinner. He denies any chest pain, shortness of breath, abdominal pain, headaches or dizziness. Past Medical History:  
Diagnosis Date  Advance directive discussed with patient  Benign localized prostatic hyperplasia with lower urinary tract symptoms (LUTS)  COPD with emphysema ([address]  CVA (cerebrovascular accident) ([address] 7/11/2012  
 possible  Diabetes mellitus ([address]  Drowsiness  Enlarged prostate  Essential hypertension, benign  Eye exam, routine 07/26/2016 Dr. Kaitlin Disla repeat in 1 yr  H/O colonoscopy 11/24/15 Dr. 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  
Procedure Laterality Date  HX CATARACT REMOVAL Left 4/2015  
 by Dr. Kaitlin Disla  HX CHOLECYSTECTOMY  late 1980's Current Outpatient Prescriptions Medication Sig  varicella-zoster recombinant, PF, (SHINGRIX, PF,) 50 mcg/0.5 mL susr injection 0.5 mL by IntraMUSCular route once for 1 dose.  amoxicillin-clavulanate (AUGMENTIN) 875-125 mg per tablet Take 1 Tab by mouth every twelve (12) hours for 10 days.  lisinopril (PRINIVIL, ZESTRIL) 2.5 mg tablet Take 1 Tab by mouth daily.  furosemide (LASIX) 20 mg tablet Take 1 Tab by mouth daily. Indications: hypertension  metoprolol tartrate (LOPRESSOR) 25 mg tablet Take 0.5 Tabs by mouth two (2) times a day.  albuterol-ipratropium (DUO-NEB) 2.5 mg-0.5 mg/3 ml nebu 3 mL by Nebulization route every four (4) hours.  fluticasone-vilanterol (BREO ELLIPTA) 100-25 mcg/dose inhaler Take 1 Puff by inhalation daily.  insulin glargine (LANTUS) 100 unit/mL injection 7 Units by SubCUTAneous route nightly.  finasteride (PROSCAR) 5 mg tablet Take 1 Tab by mouth daily.  omeprazole (PRILOSEC) 40 mg capsule Take 1 Cap by mouth daily.  potassium chloride (KLOR-CON M20) 20 mEq tablet Take 1 Tab by mouth daily.  ferrous sulfate 325 mg (65 mg iron) tablet Take 1 Tab by mouth two (2) times a day.  tamsulosin (FLOMAX) 0.4 mg capsule Take 1 Cap by mouth nightly.  warfarin (COUMADIN) 5 mg tablet Take 1 Tab by mouth every evening.  Lancets misc Check fasting sugars daily before breakfast. DX: E11.9 for freestyle lite meter  Nebulizer & Compressor machine 1 Each.  tiotropium bromide (SPIRIVA RESPIMAT) 2.5 mcg/actuation inhaler Take 2 Puffs by inhalation daily.  albuterol (VENTOLIN HFA) 90 mcg/actuation inhaler Take 1-2 puffs every 4-6 hrs prn shortness of breath  glucose blood VI test strips (FREESTYLE LITE STRIPS) strip Check fasting sugars daily before breakfast. DX: E11.9 for freestyle lite meter  ascorbic acid (VITAMIN C) 500 mg tablet Take 500 mg by mouth two (2) times a day.  Blood-Glucose Meter (ONE TOUCH ULTRA 2) monitoring kit Check fasting sugars daily before breakfast. DX: 250.02 No current facility-administered medications for this visit. Health Maintenance Topic Date Due  Shingrix Vaccine Age 50> (1 of 2) 08/19/1989  Influenza Age 5 to Adult  08/01/2018  
 FOOT EXAM Q1  08/10/2018  MICROALBUMIN Q1  10/19/2018  
 EYE EXAM RETINAL OR DILATED Q1  04/19/2019 (Originally 7/25/2018)  HEMOGLOBIN A1C Q6M  01/12/2019  LIPID PANEL Q1  07/12/2019  GLAUCOMA SCREENING Q2Y  07/25/2019  MEDICARE YEARLY EXAM  10/05/2019  COLONOSCOPY  11/24/2020  
 DTaP/Tdap/Td series (2 - Td) 08/01/2026  Pneumococcal 65+ Low/Medium Risk  Completed Immunization History Administered Date(s) Administered  Influenza High Dose Vaccine PF 11/05/2015, 09/25/2017  Influenza Vaccine 10/13/2010, 09/16/2014, 09/14/2015, 11/10/2016  Influenza Vaccine (Tri) Adjuvanted 10/04/2018  Influenza Vaccine PF 01/29/2013  Pneumococcal Conjugate (PCV-13) 09/14/2015  Pneumococcal Polysaccharide (PPSV-23) 10/13/2010, 08/14/2014 No LMP for male patient. Allergies and Intolerances:  
No Known Allergies Family History:  
Family History Problem Relation Age of Onset  Cancer Father   
  he does not know Social History: He  reports that he quit smoking about 3 years ago. His smoking use included Cigarettes. He has a 45.00 pack-year smoking history. He has never used smokeless tobacco.  He  reports that he does not drink alcohol. ·  
 
Objective:  
Physical exam:  
Visit Vitals  /71 (BP 1 Location: Right arm, BP Patient Position: Sitting)  Pulse 79  Temp 96.5 °F (35.8 °C) (Oral)  Resp 20  
 Ht 5' 6\" (1.676 m)  Wt 164 lb 6.4 oz (74.6 kg)  SpO2 91% Comment: 3LNC  BMI 26.53 kg/m2 Generally: Pleasant male in no acute distress Cardiac Exam: regular, rate, and rhythm. Normal S1 and S2. No murmurs, gallops, or rubs Pulmonary exam: Clear to auscultation bilaterally Abdominal exam: Positive bowel sounds in all four quadrants, soft, nondistended, nontender Extremities: 2+ dorsalis pedis pulses bilaterally. No pedal edema  
 bilaterally Right scrotal abscess that is oozing blood and pus. It is about the size of an orange. That has been there for a couple of days. LABS/Radiological Tests: 
Lab Results Component Value Date/Time WBC 7.2 08/14/2018 02:10 PM  
 HGB 11.8 (L) 08/14/2018 02:10 PM  
 HCT 38.9 08/14/2018 02:10 PM  
 PLATELET 601 96/76/8877 02:10 PM  
 
Lab Results Component Value Date/Time Sodium 143 08/14/2018 02:10 PM  
 Potassium 4.1 08/14/2018 02:10 PM  
 Chloride 99 (L) 08/14/2018 02:10 PM  
 CO2 43 (HH) 08/14/2018 02:10 PM  
 Glucose 174 (H) 08/14/2018 02:10 PM  
 BUN 16 08/14/2018 02:10 PM  
 Creatinine 0.78 08/14/2018 02:10 PM  
 
Lab Results Component Value Date/Time Cholesterol, total 210 (H) 07/12/2018 03:31 PM  
 HDL Cholesterol 80 (H) 07/12/2018 03:31 PM  
 LDL, calculated 110.2 (H) 07/12/2018 03:31 PM  
 Triglyceride 99 07/12/2018 03:31 PM  
 
No results found for: GPT Component Latest Ref Rng & Units 7/12/2018  
 
      3:31 PM  
Hemoglobin A1c, (calculated) 4.2 - 5.6 % 5.4 Est. average glucose 
    mg/dL 108 Previous labs Component Latest Ref Rng & Units 10/4/2018 9/11/2018 11:30 AM 12:03 PM  
VALID INTERNAL CONTROL POC Yes Yes Prothrombin time (POC) seconds 49.2 36.4 INR 
     4.1 3.0 Pt notified of results at [address] today. ASSESSMENT/PLAN:   
1. Encounter for Medicare annual wellness exam 
 
2. Diabetes mellitus, stable ([facility]): we will see what the labs show. Continue diet, exercise and lantus.  
-     MICROALBUMIN, UR, RAND W/ MICROALB/CREAT RATIO; Future 
-     HEMOGLOBIN A1C W/O EAG; Future -     URINALYSIS W/ RFLX MICROSCOPIC; Future 3. Dyslipidemia: we will see what the labs show. Continue diet and exercise. -     METABOLIC PANEL, COMPREHENSIVE; Future -     LIPID PANEL; Future 4. Benign hypertension without CHF: stable. Continue the lisinopril, diet and exercise. 5. Scrotal abscess: when I left and came back the wife said there is scrotal abscess that is draining on right scrotal area. Looked at it and huge orange abscess that we were able to excise some pus and blood, but still there. Will do ASAP referral to urology and start augmentin. Area was cleaned with saline and told to clean daily. Apply nonadherent dressing, gauze and neosporin daily. -     CULTURE, WOUND W GRAM STAIN ([facility] Only); Future 
-     REFERRAL TO UROLOGY 
-     amoxicillin-clavulanate (AUGMENTIN) 875-125 mg per tablet; Take 1 Tab by mouth every twelve (12) hours for 10 days. 6. Chronic obstructive pulmonary disease, unspecified COPD type ([facility] stable. Continue the breo, spiriva, albuterol, and duoneb. Assessment & Plan: 
Stable, based on history, physical exam and review of pertinent labs, studies and medications; meds reconciled; continue current treatment plan. Key COPD Medications   
    
  
 albuterol-ipratropium (DUO-NEB) 2.5 mg-0.5 mg/3 ml nebu  (Taking) 3 mL by Nebulization route every four (4) hours. fluticasone-vilanterol (BREO ELLIPTA) 100-25 mcg/dose inhaler  (Taking) Take 1 Puff by inhalation daily. tiotropium bromide (SPIRIVA RESPIMAT) 2.5 mcg/actuation inhaler  (Taking) Take 2 Puffs by inhalation daily. albuterol (VENTOLIN HFA) 90 mcg/actuation inhaler  (Taking) Take 1-2 puffs every 4-6 hrs prn shortness of breath Lab Results Component Value Date/Time WBC 7.2 08/14/2018 02:10 PM  
 HGB 11.8 08/14/2018 02:10 PM  
 HCT 38.9 08/14/2018 02:10 PM  
 PLATELET 696 34/05/3708 02:10 PM  
 
 
 
7.  Saddle embolus of pulmonary artery without acute cor pulmonale, unspecified chronicity ([facility]): he will hold coumadin today and tomorrow and starting on Saturday take coumadin 5mg daily pm and recheck on Tuesday. Assessment & Plan: 
Stable, based on history, physical exam and review of pertinent labs, studies and medications; meds reconciled; continue current treatment plan. Key Peripheral Vascular Disease Meds   
    
  
 warfarin (COUMADIN) 5 mg tablet  (Taking) Take 1 Tab by mouth every evening. Lab Results Component Value Date/Time WBC 7.2 2018 02:10 PM  
 HGB 11.8 2018 02:10 PM  
 HCT 38.9 2018 02:10 PM  
 PLATELET 132  02:10 PM  
 Creatinine 0.78 2018 02:10 PM  
 BUN 16 2018 02:10 PM  
 INR 2.3 2018 02:10 PM  
 INR, External 2.4 2018 INR POC 4.1 10/04/2018 11:30 AM  
 Prothrombin time 25.1 2018 02:10 PM  
 Cholesterol, total 210 2018 03:31 PM  
 HDL Cholesterol 80 2018 03:31 PM  
 LDL, calculated 110.2 2018 03:31 PM  
 Triglyceride 99 2018 03:31 PM  
 
 
 
8. Need for shingles vaccine 
-     varicella-zoster recombinant, PF, (SHINGRIX, PF,) 50 mcg/0.5 mL susr injection; 0.5 mL by IntraMUSCular route once for 1 dose. 9. Encounter for immunization -     Influenza Vaccine Inactivated (IIV)(FLUAD), Subunit, Adjuvanted, IM, (51397) -     Administration fee () for Medicare insured patients 10. Requested Prescriptions Signed Prescriptions Disp Refills  varicella-zoster recombinant, PF, (SHINGRIX, PF,) 50 mcg/0.5 mL susr injection 0.5 mL 1 Si.5 mL by IntraMUSCular route once for 1 dose.  amoxicillin-clavulanate (AUGMENTIN) 875-125 mg per tablet 20 Tab 0 Sig: Take 1 Tab by mouth every twelve (12) hours for 10 days. 11. Patient verbalized understanding and agreement with the plan. 12. Patient was given an after-visit summary. 13.  
Follow-up Disposition: 
Return in about 3 months (around 2019) for f/u DM/HTN/lipids. or sooner if worsening symptoms.  
 
 
 
 
 
 
 
Juanita Vo MD

## 2018-10-06 DIAGNOSIS — R82.90 ABNORMAL URINE: Primary | ICD-10-CM

## 2018-10-06 NOTE — PROGRESS NOTES
Please let pt know that labs were normal except: 
 
1) glucose up at 145, but HgA1c was 5.1. Is he willing to stop his insulin and take metformin 500mg 1/2 po bid? Work on diet and exercise. 2) albumin low. Increase protein in diet. 3) LDL little up at 105 and needs to be 100 or less. Work on diet and exercise. 4) urine showed lots of WBC's and some bacteria. Is he having any f/c/ns, dysuria, hematuria, increase urgency/frequency? Can he come back to give urine ctx?

## 2018-10-08 ENCOUNTER — HOSPITAL ENCOUNTER (OUTPATIENT)
Dept: LAB | Age: 78
Discharge: HOME | End: 2018-10-08
Payer: MEDICARE

## 2018-10-08 ENCOUNTER — TELEPHONE (OUTPATIENT)
Dept: INTERNAL MEDICINE CLINIC | Age: 78
End: 2018-10-08

## 2018-10-08 DIAGNOSIS — R82.90 ABNORMAL URINE: ICD-10-CM

## 2018-10-08 LAB
BACTERIA SPEC CULT: ABNORMAL
GRAM STN SPEC: ABNORMAL
GRAM STN SPEC: ABNORMAL
SERVICE CMNT-IMP: ABNORMAL

## 2018-10-08 PROCEDURE — 87086 URINE CULTURE/COLONY COUNT: CPT | Performed by: INTERNAL MEDICINE

## 2018-10-08 NOTE — TELEPHONE ENCOUNTER
----- Message from Jaquan Bansal MD sent at 10/6/2018 11:54 AM EDT -----  See result note below and also how is his scrotal abscess doing?

## 2018-10-08 NOTE — TELEPHONE ENCOUNTER
Omar Yu MD  P [facility] Nurse [facility]                     See result note below and also how is his scrotal abscess doing?                  2 Pt Identifiers verified. Spoke w/wife in re:to above note. States scrotal abscess is better and he has an appt.  With the urologist 10-9-18

## 2018-10-08 NOTE — TELEPHONE ENCOUNTER
----- Message from Margarito Mustafa MD sent at 10/6/2018 11:54 AM EDT -----  See result note below and also how is his scrotal abscess doing?

## 2018-10-08 NOTE — TELEPHONE ENCOUNTER
----- Message from Eleuterio Manzo MD sent at 10/6/2018 11:48 AM EDT -----  Please let pt know that labs were normal except:    1) glucose up at 145, but HgA1c was 5.1. Is he willing to stop his insulin and take metformin 500mg 1/2 po bid? Work on diet and exercise. 2) albumin low. Increase protein in diet. 3) LDL little up at 105 and needs to be 100 or less. Work on diet and exercise. 4) urine showed lots of WBC's and some bacteria. Is he having any f/c/ns, dysuria, hematuria, increase urgency/frequency? Can he come back to give urine ctx?

## 2018-10-09 NOTE — PROGRESS NOTES
Pt is seeing urology today, 10/9/18. Per wife on 10/8/18 telephone note, the scrotal abscess is better.

## 2018-10-10 ENCOUNTER — TELEPHONE (OUTPATIENT)
Dept: INTERNAL MEDICINE CLINIC | Age: 78
End: 2018-10-10

## 2018-10-10 DIAGNOSIS — E11.9 DIABETES MELLITUS, STABLE (HCC): Primary | ICD-10-CM

## 2018-10-10 NOTE — TELEPHONE ENCOUNTER
INR: 3.0. Please let pt know to continue taking coumadin 5mg daily pm and recheck in 1 week. Please give us a call with results.

## 2018-10-11 LAB
BACTERIA SPEC CULT: NORMAL
SERVICE CMNT-IMP: NORMAL

## 2018-10-11 RX ORDER — METFORMIN HYDROCHLORIDE 500 MG/1
250 TABLET ORAL 2 TIMES DAILY WITH MEALS
Qty: 90 TAB | Refills: 3 | Status: SHIPPED | OUTPATIENT
Start: 2018-10-11 | End: 2018-10-16 | Stop reason: SDUPTHER

## 2018-10-11 NOTE — TELEPHONE ENCOUNTER
Unable to send electronically metformin to [address] Other listed pharmacy in chart is [facility]. Sent rx there, but if there is another pharmacy I can send electronically so he can start this sooner, please let me know. Pt needs to update us with sugar readings on Monday 10/15/18. Requested Prescriptions     Signed Prescriptions Disp Refills    metFORMIN (GLUCOPHAGE) 500 mg tablet 90 Tab 3     Sig: Take 0.5 Tabs by mouth two (2) times daily (with meals).      Authorizing Provider: Perico Dunne

## 2018-10-11 NOTE — TELEPHONE ENCOUNTER
2 pt. Identifiers confirmed. Pt. Notified of below. She would also like the Rx sent for metformin (pt. To be d/c'd from lantus). No other questions/concerns at this time.

## 2018-10-12 NOTE — TELEPHONE ENCOUNTER
Attempted to contact pt at  number, no answer. Lvm for pt to return call to office at [phone]. Will continue to try to contact pt.

## 2018-10-16 ENCOUNTER — TELEPHONE (OUTPATIENT)
Dept: INTERNAL MEDICINE CLINIC | Age: 78
End: 2018-10-16

## 2018-10-16 DIAGNOSIS — E11.9 DIABETES MELLITUS, STABLE (HCC): ICD-10-CM

## 2018-10-16 RX ORDER — METFORMIN HYDROCHLORIDE 500 MG/1
250 TABLET ORAL 2 TIMES DAILY WITH MEALS
Qty: 90 TAB | Refills: 3 | Status: SHIPPED | OUTPATIENT
Start: 2018-10-16 | End: 2019-06-11 | Stop reason: SDUPTHER

## 2018-10-16 NOTE — TELEPHONE ENCOUNTER
Continue 5mg daily pm and recheck in 1 week. Printed rx for:    Requested Prescriptions     Signed Prescriptions Disp Refills    metFORMIN (GLUCOPHAGE) 500 mg tablet 90 Tab 3     Sig: Take 0.5 Tabs by mouth two (2) times daily (with meals). Authorizing Provider: Nirmala Pierce     This is ready for .

## 2018-10-16 NOTE — TELEPHONE ENCOUNTER
Pt wife contacted at home number. 2 pt identifiers confirmed. Pt wife informed of below regarding prescription for Metformin. Pt wife would like prescription printed and she will come in and  and take to [facility]. Pt wife states she has not checked pt sugars this week but she will check today and call office with update.

## 2018-10-17 ENCOUNTER — TELEPHONE (OUTPATIENT)
Dept: INTERNAL MEDICINE CLINIC | Age: 78
End: 2018-10-17

## 2018-10-17 NOTE — TELEPHONE ENCOUNTER
Attempted to reach patient regarding prescription and INR instructions. No answer; left message for pt to return call to the office at [phone].  Will continue to try to contact patient

## 2018-10-17 NOTE — TELEPHONE ENCOUNTER
Patient's wife called stating [facility] sent her the metformin. She will disregard the print out prescription for metformin.

## 2018-10-18 ENCOUNTER — TELEPHONE (OUTPATIENT)
Dept: INTERNAL MEDICINE CLINIC | Age: 78
End: 2018-10-18

## 2018-10-22 NOTE — TELEPHONE ENCOUNTER
I talked to the wife and discussed the ultrasound findings of 3 cm collection suggestive of abscess which needs to be drained. As per wife patient is feeling fine and does not have fever, chills or rigors. I told them to come to ER for admission and likely incision and drainage. I told the wife in case he develops fever, chills or rigors he needs to go to ER urgently.

## 2018-10-24 ENCOUNTER — TELEPHONE (OUTPATIENT)
Dept: INTERNAL MEDICINE CLINIC | Age: 78
End: 2018-10-24

## 2018-10-24 NOTE — TELEPHONE ENCOUNTER
Will do another referral to PT to see if medicare will cover. Continue the coumadin 5mg daily pm and recheck in 1 week. Monitor sugars.

## 2018-10-24 NOTE — TELEPHONE ENCOUNTER
Incoming call from pt. 2 pt identifiers confirmed. Pt wife states that pt PT is ending because his Medicare will only cover 4-6 weeks. Pt has has PT for 6 weeks and he is being discharged. Pt wife would like to know if there is any way he can continue PT. Pt wife will also like to report INR  INR 3.0  Current dose of Coumadin is 5 mg daily. Blood sugar 138    Please be advised.

## 2018-10-26 NOTE — TELEPHONE ENCOUNTER
Raad Gates MD 2 days ago         Will do another referral to PT to see if medicare will cover.      Continue the coumadin 5mg daily pm and recheck in 1 week.      Monitor sugars. 2 Pt Identifiers verified. Relayed above message to pt.'s wife. Verbalized understanding.

## 2018-11-12 ENCOUNTER — TELEPHONE (OUTPATIENT)
Dept: INTERNAL MEDICINE CLINIC | Age: 78
End: 2018-11-12

## 2018-11-12 NOTE — TELEPHONE ENCOUNTER
INR on 11/9/18 was 3.0. Please let pt know to continue the coumadin 5mg daily pm and recheck on Thursday. Call us with the results.

## 2018-11-12 NOTE — TELEPHONE ENCOUNTER
Attempted to contact pt at [facility] number, busy. Contacted pt cell spoke with Mrs Phyllis Ocasio. Two patient Identifiers confirmed. Advised pt per Dr Brenda Javier notes. Pts wife verbalized understanding.

## 2018-11-26 DIAGNOSIS — N28.1 RENAL CYST, RIGHT: ICD-10-CM

## 2018-11-26 DIAGNOSIS — R31.29 MICROHEMATURIA: ICD-10-CM

## 2018-11-26 DIAGNOSIS — N40.0 ENLARGED PROSTATE: ICD-10-CM

## 2018-11-26 DIAGNOSIS — Z87.898 HISTORY OF URINARY RETENTION: ICD-10-CM

## 2018-11-26 RX ORDER — TAMSULOSIN HYDROCHLORIDE 0.4 MG/1
0.4 CAPSULE ORAL
OUTPATIENT
Start: 2018-11-26

## 2018-11-26 RX ORDER — LANOLIN ALCOHOL/MO/W.PET/CERES
325 CREAM (GRAM) TOPICAL 2 TIMES DAILY
Qty: 180 TAB | Refills: 3 | Status: SHIPPED | OUTPATIENT
Start: 2018-11-26 | End: 2019-11-20 | Stop reason: SDUPTHER

## 2018-11-26 RX ORDER — POTASSIUM CHLORIDE 20 MEQ/1
20 TABLET, EXTENDED RELEASE ORAL DAILY
Qty: 90 TAB | Refills: 3 | Status: SHIPPED | OUTPATIENT
Start: 2018-11-26 | End: 2020-09-30

## 2018-11-26 RX ORDER — FINASTERIDE 5 MG/1
5 TABLET, FILM COATED ORAL DAILY
Qty: 90 TAB | Refills: 3 | Status: SHIPPED | OUTPATIENT
Start: 2018-11-26 | End: 2019-11-20 | Stop reason: SDUPTHER

## 2018-11-26 RX ORDER — WARFARIN SODIUM 5 MG/1
5 TABLET ORAL EVERY EVENING
Qty: 90 TAB | Refills: 3 | Status: SHIPPED | OUTPATIENT
Start: 2018-11-26 | End: 2019-11-20 | Stop reason: SDUPTHER

## 2018-11-26 RX ORDER — OMEPRAZOLE 40 MG/1
40 CAPSULE, DELAYED RELEASE ORAL DAILY
Qty: 90 CAP | Refills: 3 | Status: SHIPPED | OUTPATIENT
Start: 2018-11-26 | End: 2019-11-20 | Stop reason: SDUPTHER

## 2018-11-26 NOTE — TELEPHONE ENCOUNTER
I have not prescribed the flomax. I think his urologist has been prescribing this. Denied the flomax. He should contact them to get the refill. Sent electronically the other medications. :      Requested Prescriptions     Signed Prescriptions Disp Refills    finasteride (PROSCAR) 5 mg tablet 90 Tab 3     Sig: Take 1 Tab by mouth daily. Authorizing Provider: Manan NICHOLS    omeprazole (PRILOSEC) 40 mg capsule 90 Cap 3     Sig: Take 1 Cap by mouth daily. Authorizing Provider: Talha Has    potassium chloride (KLOR-CON M20) 20 mEq tablet 90 Tab 3     Sig: Take 1 Tab by mouth daily. Authorizing Provider: Kristina Love ferrous sulfate 325 mg (65 mg iron) tablet 180 Tab 3     Sig: Take 1 Tab by mouth two (2) times a day. Authorizing Provider: Kristina Love warfarin (COUMADIN) 5 mg tablet 90 Tab 3     Sig: Take 1 Tab by mouth every evening. Authorizing Provider: Manan NICHOLS     Refused Prescriptions Disp Refills    tamsulosin (FLOMAX) 0.4 mg capsule       Si Cap.      Refused By: Talha Laboy     Reason for Refusal: Medication never prescribed for the patient

## 2018-11-26 NOTE — TELEPHONE ENCOUNTER
Patient's daughter is calling in for refills. Last OV 10/09/18, next scheduled 19. Requested Prescriptions     Pending Prescriptions Disp Refills    finasteride (PROSCAR) 5 mg tablet 90 Tab 3     Sig: Take 1 Tab by mouth daily.  omeprazole (PRILOSEC) 40 mg capsule 90 Cap 3     Sig: Take 1 Cap by mouth daily.  potassium chloride (KLOR-CON M20) 20 mEq tablet 90 Tab 3     Sig: Take 1 Tab by mouth daily.  ferrous sulfate 325 mg (65 mg iron) tablet 180 Tab 3     Sig: Take 1 Tab by mouth two (2) times a day.  tamsulosin (FLOMAX) 0.4 mg capsule       Si Cap.  warfarin (COUMADIN) 5 mg tablet 90 Tab 3     Sig: Take 1 Tab by mouth every evening.

## 2018-11-27 ENCOUNTER — TELEPHONE (OUTPATIENT)
Dept: INTERNAL MEDICINE CLINIC | Age: 78
End: 2018-11-27

## 2018-11-27 NOTE — TELEPHONE ENCOUNTER
Called pt and talked to wife. Just got INR results from 11/23/18 and it was 3.0. She cannot check his INR today, but she will call tomorrow and give us the results. She said he has a cough and wondered if he can take coriciden HBP for cough. Told her that is fine and update us with cough tomorrow, 11/28/18.

## 2018-11-28 NOTE — TELEPHONE ENCOUNTER
Called pt and talked to his wife. Pt's INR: 3.0. He will continue to take coumadin 5mg daily pm and recheck in 1 week. She will call us with the results. She verbalized understanding.

## 2018-12-03 ENCOUNTER — TELEPHONE (OUTPATIENT)
Dept: INTERNAL MEDICINE CLINIC | Age: 78
End: 2018-12-03

## 2018-12-03 NOTE — TELEPHONE ENCOUNTER
Please let pt know that INR on 12/2/18 was 3.0. Continue coumadin 5mg daily pm and recheck in 1 week and call us with the results.

## 2018-12-05 NOTE — TELEPHONE ENCOUNTER
Attempted to contact pt at  and mobile number, no answer. Lvm for pt to return call to office at [phone]. Will continue to try to contact pt.

## 2018-12-07 NOTE — TELEPHONE ENCOUNTER
2 Pt Identifiers verified. Notified wife of below message in re:PT/INR from . Verbalized understanding.

## 2018-12-19 ENCOUNTER — TELEPHONE (OUTPATIENT)
Dept: INTERNAL MEDICINE CLINIC | Age: 78
End: 2018-12-19

## 2018-12-19 NOTE — TELEPHONE ENCOUNTER
Please let pt know that just got his INR results from 12/18/18. INR: 3.0. Continue coumadin 5mg daily pm and recheck in 1 week. Please call us with the results.

## 2019-01-02 ENCOUNTER — TELEPHONE (OUTPATIENT)
Dept: INTERNAL MEDICINE CLINIC | Age: 79
End: 2019-01-02

## 2019-01-02 NOTE — TELEPHONE ENCOUNTER
Please let pt know that INR done on 12/31/18 is 3.0. Continue the coumadin 5mg daily pm and recheck in 1 week. Please call us with the results.

## 2019-01-14 ENCOUNTER — TELEPHONE (OUTPATIENT)
Dept: INTERNAL MEDICINE CLINIC | Age: 79
End: 2019-01-14

## 2019-01-14 NOTE — TELEPHONE ENCOUNTER
Please let pt know that INR: 3.0. Continue coumadin 5mg daily pm and recheck in 1 week. Please call us with the results.

## 2019-01-22 ENCOUNTER — TELEPHONE (OUTPATIENT)
Dept: INTERNAL MEDICINE CLINIC | Age: 79
End: 2019-01-22

## 2019-01-22 NOTE — TELEPHONE ENCOUNTER
2 pt. Identifiers confirmed. Pt's wife notified of below. She did confirm that he takes 5mg daily. She was advised they should call with the results. No other questions/concerns at this time.

## 2019-01-22 NOTE — TELEPHONE ENCOUNTER
Please let pt know that INR done on 1/21/19 was 3.0. Pt should be taking coumadin 5mg daily pm. Please confirm with pt. If so, continue coumadin 5mg daily pm and recheck in 1 week. Please reiterate for pt to call us with the results.

## 2019-02-04 ENCOUNTER — TELEPHONE (OUTPATIENT)
Dept: INTERNAL MEDICINE CLINIC | Age: 79
End: 2019-02-04

## 2019-02-04 NOTE — TELEPHONE ENCOUNTER
INR on 1/31/19 is 3.0. Continue coumadin 5mg daily pm and recheck in 1 week. Call us with the results.

## 2019-02-04 NOTE — TELEPHONE ENCOUNTER
Attempted to contact patient this afternoon. No answer; left voicemail requesting return call to office at [phone].

## 2019-02-12 ENCOUNTER — TELEPHONE (OUTPATIENT)
Dept: INTERNAL MEDICINE CLINIC | Age: 79
End: 2019-02-12

## 2019-02-12 NOTE — TELEPHONE ENCOUNTER
Attempted to contact patient this afternoon. No answer; left voicemail requesting return call to office at [phone].

## 2019-02-13 NOTE — TELEPHONE ENCOUNTER
Outgoing call to patient this morning. Two patient identifiers confirmed. Advised patient per provider to continue the coumadin 5 mg daily pm and recheck in 1 week. Call us with the results. Patient verbalized understanding.

## 2019-02-18 ENCOUNTER — DOCUMENTATION ONLY (OUTPATIENT)
Dept: INTERNAL MEDICINE CLINIC | Age: 79
End: 2019-02-18

## 2019-02-18 NOTE — PROGRESS NOTES
Pharmacist Note: Immunization Update    Patient: Eri Lakhani (79 y.o., 1940)     Patient's immunization history was updated to reflect information contained in the [place] and/or outside immunization/pharmacy records were reconciled within [facility]. Health Maintenance schedule updated when appropriate.     Current immunizations now reflect:       Immunization History   Administered Date(s) Administered    Influenza High Dose Vaccine PF 11/05/2015, 09/25/2017    Influenza Vaccine 10/13/2010, 09/16/2014, 09/14/2015, 11/10/2016    Influenza Vaccine (Tri) Adjuvanted 10/04/2018    Influenza Vaccine PF 01/29/2013    Pneumococcal Conjugate (PCV-13) 09/14/2015    Pneumococcal Polysaccharide (PPSV-23) 10/13/2010, 08/14/2014    Zoster Recombinant 10/22/2018       Grace Quinn, PharmD, BCACP

## 2019-02-19 ENCOUNTER — OFFICE VISIT (OUTPATIENT)
Dept: INTERNAL MEDICINE CLINIC | Age: 79
End: 2019-02-19

## 2019-02-19 ENCOUNTER — HOSPITAL ENCOUNTER (OUTPATIENT)
Dept: LAB | Age: 79
Discharge: HOME | End: 2019-02-19
Payer: MEDICARE

## 2019-02-19 VITALS
DIASTOLIC BLOOD PRESSURE: 76 MMHG | TEMPERATURE: 95.6 F | WEIGHT: 155 LBS | SYSTOLIC BLOOD PRESSURE: 144 MMHG | HEART RATE: 80 BPM | OXYGEN SATURATION: 95 % | RESPIRATION RATE: 18 BRPM | BODY MASS INDEX: 24.91 KG/M2 | HEIGHT: 66 IN

## 2019-02-19 DIAGNOSIS — I26.92 SADDLE EMBOLUS OF PULMONARY ARTERY WITHOUT ACUTE COR PULMONALE, UNSPECIFIED CHRONICITY (HCC): Primary | ICD-10-CM

## 2019-02-19 DIAGNOSIS — E11.9 DIABETES MELLITUS, STABLE (HCC): ICD-10-CM

## 2019-02-19 DIAGNOSIS — I10 BENIGN HYPERTENSION WITHOUT CHF: ICD-10-CM

## 2019-02-19 DIAGNOSIS — I63.9 CEREBROVASCULAR ACCIDENT (CVA), UNSPECIFIED MECHANISM (HCC): ICD-10-CM

## 2019-02-19 DIAGNOSIS — E78.5 DYSLIPIDEMIA: ICD-10-CM

## 2019-02-19 LAB
ALBUMIN SERPL-MCNC: 3.7 G/DL (ref 3.4–5)
ALBUMIN/GLOB SERPL: 1.2 {RATIO} (ref 0.8–1.7)
ALP SERPL-CCNC: 66 U/L (ref 45–117)
ALT SERPL-CCNC: 15 U/L (ref 16–61)
ANION GAP SERPL CALC-SCNC: 7 MMOL/L (ref 3–18)
AST SERPL-CCNC: 8 U/L (ref 15–37)
BILIRUB SERPL-MCNC: 0.5 MG/DL (ref 0.2–1)
BUN SERPL-MCNC: 12 MG/DL (ref 7–18)
BUN/CREAT SERPL: 19 (ref 12–20)
CALCIUM SERPL-MCNC: 9.1 MG/DL (ref 8.5–10.1)
CHLORIDE SERPL-SCNC: 98 MMOL/L (ref 100–108)
CHOLEST SERPL-MCNC: 185 MG/DL
CO2 SERPL-SCNC: 37 MMOL/L (ref 21–32)
CREAT SERPL-MCNC: 0.64 MG/DL (ref 0.6–1.3)
EST. AVERAGE GLUCOSE BLD GHB EST-MCNC: 111 MG/DL
GLOBULIN SER CALC-MCNC: 3 G/DL (ref 2–4)
GLUCOSE SERPL-MCNC: 121 MG/DL (ref 74–99)
HBA1C MFR BLD: 5.5 % (ref 4.2–5.6)
HDLC SERPL-MCNC: 76 MG/DL (ref 40–60)
HDLC SERPL: 2.4 {RATIO} (ref 0–5)
INR BLD: 2
LDLC SERPL CALC-MCNC: 85.4 MG/DL (ref 0–100)
LIPID PROFILE,FLP: ABNORMAL
POTASSIUM SERPL-SCNC: 4.5 MMOL/L (ref 3.5–5.5)
PROT SERPL-MCNC: 6.7 G/DL (ref 6.4–8.2)
PT POC: 24.2 SECONDS
SODIUM SERPL-SCNC: 142 MMOL/L (ref 136–145)
TRIGL SERPL-MCNC: 118 MG/DL (ref ?–150)
TSH SERPL DL<=0.05 MIU/L-ACNC: 1.4 UIU/ML (ref 0.36–3.74)
VALID INTERNAL CONTROL?: YES
VLDLC SERPL CALC-MCNC: 23.6 MG/DL

## 2019-02-19 PROCEDURE — 36415 COLL VENOUS BLD VENIPUNCTURE: CPT

## 2019-02-19 PROCEDURE — 80061 LIPID PANEL: CPT

## 2019-02-19 PROCEDURE — 84443 ASSAY THYROID STIM HORMONE: CPT

## 2019-02-19 PROCEDURE — 80053 COMPREHEN METABOLIC PANEL: CPT

## 2019-02-19 PROCEDURE — 83036 HEMOGLOBIN GLYCOSYLATED A1C: CPT

## 2019-02-19 NOTE — PROGRESS NOTES
Chief Complaint   Patient presents with    Anticoagulation     pt/inr check       HPI:     Kirt Kent is a 77 y.o.  male with history of CVA and COPD and type 2 DM   here for the above complaint. He denies any chest pain, shortness of breath, abdominal pain, headaches or dizziness. Type 2 DM: they don't know what his sugar levels are. He has not been taking them. Coumadin monitoring: He has not missed any dosages of coumadin. He denies any bleeding. He is currently taking coumadin 5mg daily pm.         Past Medical History:   Diagnosis Date    Advance directive discussed with patient     Benign localized prostatic hyperplasia with lower urinary tract symptoms (LUTS)     COPD with emphysema ([address]     CVA (cerebrovascular accident) ([address] 7/11/2012    possible    Diabetes mellitus ([address]     Drowsiness     Enlarged prostate     Essential hypertension, benign     Eye exam, routine 07/26/2016    Dr. Rafael Green repeat in 1 yr    H/O colonoscopy 11/24/15    Dr. 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 Marlon You  late 2602'T     Current Outpatient Medications   Medication Sig    tamsulosin (FLOMAX) 0.4 mg capsule Take 1 Cap by mouth daily.     finasteride (PROSCAR) 5 mg tablet Take 1 Tab by mouth daily.  omeprazole (PRILOSEC) 40 mg capsule Take 1 Cap by mouth daily.  potassium chloride (KLOR-CON M20) 20 mEq tablet Take 1 Tab by mouth daily.  ferrous sulfate 325 mg (65 mg iron) tablet Take 1 Tab by mouth two (2) times a day.  warfarin (COUMADIN) 5 mg tablet Take 1 Tab by mouth every evening.  albuterol (PROVENTIL HFA, VENTOLIN HFA, PROAIR HFA) 90 mcg/actuation inhaler 2 Puffs.  fluticasone-vilanterol (BREO ELLIPTA) 100-25 mcg/dose inhaler 1 Puff.  metoprolol tartrate (LOPRESSOR) 25 mg tablet 25 mg.    metFORMIN (GLUCOPHAGE) 500 mg tablet Take 0.5 Tabs by mouth two (2) times daily (with meals).  lisinopril (PRINIVIL, ZESTRIL) 5 mg tablet     furosemide (LASIX) 20 mg tablet Take 1 Tab by mouth daily. Indications: hypertension    albuterol-ipratropium (DUO-NEB) 2.5 mg-0.5 mg/3 ml nebu 3 mL by Nebulization route every four (4) hours.  Lancets misc Check fasting sugars daily before breakfast. DX: E11.9 for freestyle lite meter    Nebulizer & Compressor machine 1 Each.  tiotropium bromide (SPIRIVA RESPIMAT) 2.5 mcg/actuation inhaler Take 2 Puffs by inhalation daily.  glucose blood VI test strips (FREESTYLE LITE STRIPS) strip Check fasting sugars daily before breakfast. DX: E11.9 for freestyle lite meter    ascorbic acid (VITAMIN C) 500 mg tablet Take 500 mg by mouth two (2) times a day.  Blood-Glucose Meter (ONE TOUCH ULTRA 2) monitoring kit Check fasting sugars daily before breakfast. DX: 250.02     No current facility-administered medications for this visit.       Health Maintenance   Topic Date Due    FOOT EXAM Q1  08/10/2018    Shingrix Vaccine Age 50> (2 of 2) 12/17/2018    HEMOGLOBIN A1C Q6M  04/04/2019    GLAUCOMA SCREENING Q2Y  07/25/2019    EYE EXAM RETINAL OR DILATED  07/25/2019    MICROALBUMIN Q1  10/04/2019    LIPID PANEL Q1  10/04/2019    MEDICARE YEARLY EXAM  10/05/2019    COLONOSCOPY  11/24/2020    DTaP/Tdap/Td series (2 - Td) 08/01/2026    Pneumococcal 65+ Low/Medium Risk  Completed    Influenza Age 5 to Adult  Completed     Immunization History   Administered Date(s) Administered    Influenza High Dose Vaccine PF 11/05/2015, 09/25/2017    Influenza Vaccine 10/13/2010, 09/16/2014, 09/14/2015, 11/10/2016    Influenza Vaccine (Tri) Adjuvanted 10/04/2018    Influenza Vaccine PF 01/29/2013    Pneumococcal Conjugate (PCV-13) 09/14/2015    Pneumococcal Polysaccharide (PPSV-23) 10/13/2010, 08/14/2014    Zoster Recombinant 10/22/2018     No LMP for male patient. Allergies and Intolerances:   No Known Allergies    Family History:   Family History   Problem Relation Age of Onset    Cancer Father         he does not know       Social History:   He  reports that he quit smoking about 3 years ago. His smoking use included cigarettes. He has a 45.00 pack-year smoking history. he has never used smokeless tobacco.  He  reports that he does not drink alcohol. ·     Objective:   Physical exam:   Visit Vitals  /76 (BP 1 Location: Right arm, BP Patient Position: Sitting)   Pulse 80   Temp 95.6 °F (35.3 °C) (Oral)   Resp 18   Ht 5' 6\" (1.676 m)   Wt 155 lb (70.3 kg)   SpO2 95% Comment: on 3L NC   BMI 25.02 kg/m²        Generally: Pleasant male in no acute distress  Cardiac Exam: regular, rate, and rhythm. Normal S1 and S2. No murmurs, gallops, or rubs  Pulmonary exam: Clear to auscultation bilaterally  Abdominal exam: Positive bowel sounds in all four quadrants, soft, nondistended, nontender  Extremities: 2+ dorsalis pedis pulses bilaterally.  No pedal edema    bilaterally    LABS/Radiological Tests:  Component      Latest Ref Rng & Units 2/19/2019 10/9/2018 10/8/2018           2:04 PM  2:30 PM  3:29 PM   Sodium      136 - 145 mmol/L      Potassium      3.5 - 5.5 mmol/L      Chloride      100 - 108 mmol/L      CO2      21 - 32 mmol/L      Anion gap      3.0 - 18 mmol/L      Glucose      74 - 99 mg/dL      BUN      7.0 - 18 MG/DL      Creatinine      0.6 - 1.3 MG/DL      BUN/Creatinine ratio      12 - 20        GFR est AA      >60 ml/min/1.73m2      GFR est non-AA      >60 ml/min/1.73m2      Calcium      8.5 - 10.1 MG/DL      Bilirubin, total      0.2 - 1.0 MG/DL      ALT (SGPT)      16 - 61 U/L      AST      15 - 37 U/L      Alk.  phosphatase      45 - 117 U/L      Protein, total      6.4 - 8.2 g/dL      Albumin      3.4 - 5.0 g/dL      Globulin      2.0 - 4.0 g/dL      A-G Ratio      0.8 - 1.7        Color            Appearance            Specific gravity      1.005 - 1.030      pH (UA)      5.0 - 8.0        Protein      NEG mg/dL      Glucose      NEG mg/dL      Ketone      NEG mg/dL      Bilirubin      NEG        Blood      NEG        Urobilinogen      0.2 - 1.0 EU/dL      Nitrites      NEG        Leukocyte Esterase      NEG        Color (UA POC)        Viri    Clarity (UA POC)        Clear    Glucose (UA POC)      Negative  Negative    Bilirubin (UA POC)      Negative  Negative    Ketones (UA POC)      Negative  Trace    Specific gravity (UA POC)      1.001 - 1.035  1.030    Blood (UA POC)      Negative  Negative    pH (UA POC)      4.6 - 8.0  5.5    Protein (UA POC)      Negative  1+    Urobilinogen (UA POC)      0.2 - 1  0.2 mg/dL    Nitrites (UA POC)      Negative  Negative    Leukocyte esterase (UA POC)      Negative  Negative    Cholesterol, total      <200 MG/DL      Triglyceride      <150 MG/DL      HDL Cholesterol      40 - 60 MG/DL      LDL, calculated      0 - 100 MG/DL      VLDL, calculated      MG/DL      CHOL/HDL Ratio      0 - 5.0        Special Requests:         NO SPECIAL REQUESTS   GRAM STAIN            Culture result:         NO GROWTH 2 DAYS   WBC      0 - 4 /hpf      RBC      0 - 5 /hpf      Epithelial cells      0 - 5 /lpf      Bacteria      NEG /hpf      Mucus      NEG /lpf      Uric acid crystals      NEG      VALID INTERNAL CONTROL POC       Yes     Prothrombin time (POC)      seconds 24.2     INR       2.0 Microalbumin,urine random      0 - 3.0 MG/DL      Creatinine, urine      30 - 125 mg/dL      Microalbumin/Creat. Ratio      0 - 30 mg/g      Hemoglobin A1c, (calculated)      4.2 - 5.6 %        Component      Latest Ref Rng & Units 10/4/2018          11:57 AM   Sodium      136 - 145 mmol/L    Potassium      3.5 - 5.5 mmol/L    Chloride      100 - 108 mmol/L    CO2      21 - 32 mmol/L    Anion gap      3.0 - 18 mmol/L    Glucose      74 - 99 mg/dL    BUN      7.0 - 18 MG/DL    Creatinine      0.6 - 1.3 MG/DL    BUN/Creatinine ratio      12 - 20      GFR est AA      >60 ml/min/1.73m2    GFR est non-AA      >60 ml/min/1.73m2    Calcium      8.5 - 10.1 MG/DL    Bilirubin, total      0.2 - 1.0 MG/DL    ALT (SGPT)      16 - 61 U/L    AST      15 - 37 U/L    Alk.  phosphatase      45 - 117 U/L    Protein, total      6.4 - 8.2 g/dL    Albumin      3.4 - 5.0 g/dL    Globulin      2.0 - 4.0 g/dL    A-G Ratio      0.8 - 1.7      Color          Appearance          Specific gravity      1.005 - 1.030    pH (UA)      5.0 - 8.0      Protein      NEG mg/dL    Glucose      NEG mg/dL    Ketone      NEG mg/dL    Bilirubin      NEG      Blood      NEG      Urobilinogen      0.2 - 1.0 EU/dL    Nitrites      NEG      Leukocyte Esterase      NEG      Color (UA POC)          Clarity (UA POC)          Glucose (UA POC)      Negative    Bilirubin (UA POC)      Negative    Ketones (UA POC)      Negative    Specific gravity (UA POC)      1.001 - 1.035    Blood (UA POC)      Negative    pH (UA POC)      4.6 - 8.0    Protein (UA POC)      Negative    Urobilinogen (UA POC)      0.2 - 1    Nitrites (UA POC)      Negative    Leukocyte esterase (UA POC)      Negative    Cholesterol, total      <200 MG/DL    Triglyceride      <150 MG/DL    HDL Cholesterol      40 - 60 MG/DL    LDL, calculated      0 - 100 MG/DL    VLDL, calculated      MG/DL    CHOL/HDL Ratio      0 - 5.0      Special Requests:          GRAM STAIN          Culture result:       FEW DIPHTHEROIDS (TWO MORPHOTYPES) (A)   WBC      0 - 4 /hpf    RBC      0 - 5 /hpf    Epithelial cells      0 - 5 /lpf    Bacteria      NEG /hpf    Mucus      NEG /lpf    Uric acid crystals      NEG    VALID INTERNAL CONTROL POC          Prothrombin time (POC)      seconds    INR          Microalbumin,urine random      0 - 3.0 MG/DL    Creatinine, urine      30 - 125 mg/dL    Microalbumin/Creat. Ratio      0 - 30 mg/g    Hemoglobin A1c, (calculated)      4.2 - 5.6 %      Component      Latest Ref Rng & Units 10/4/2018          11:57 AM   Sodium      136 - 145 mmol/L    Potassium      3.5 - 5.5 mmol/L    Chloride      100 - 108 mmol/L    CO2      21 - 32 mmol/L    Anion gap      3.0 - 18 mmol/L    Glucose      74 - 99 mg/dL    BUN      7.0 - 18 MG/DL    Creatinine      0.6 - 1.3 MG/DL    BUN/Creatinine ratio      12 - 20      GFR est AA      >60 ml/min/1.73m2    GFR est non-AA      >60 ml/min/1.73m2    Calcium      8.5 - 10.1 MG/DL    Bilirubin, total      0.2 - 1.0 MG/DL    ALT (SGPT)      16 - 61 U/L    AST      15 - 37 U/L    Alk.  phosphatase      45 - 117 U/L    Protein, total      6.4 - 8.2 g/dL    Albumin      3.4 - 5.0 g/dL    Globulin      2.0 - 4.0 g/dL    A-G Ratio      0.8 - 1.7      Color          Appearance          Specific gravity      1.005 - 1.030    pH (UA)      5.0 - 8.0      Protein      NEG mg/dL    Glucose      NEG mg/dL    Ketone      NEG mg/dL    Bilirubin      NEG      Blood      NEG      Urobilinogen      0.2 - 1.0 EU/dL    Nitrites      NEG      Leukocyte Esterase      NEG      Color (UA POC)          Clarity (UA POC)          Glucose (UA POC)      Negative    Bilirubin (UA POC)      Negative    Ketones (UA POC)      Negative    Specific gravity (UA POC)      1.001 - 1.035    Blood (UA POC)      Negative    pH (UA POC)      4.6 - 8.0    Protein (UA POC)      Negative    Urobilinogen (UA POC)      0.2 - 1    Nitrites (UA POC)      Negative    Leukocyte esterase (UA POC)      Negative Cholesterol, total      <200 MG/DL    Triglyceride      <150 MG/DL    HDL Cholesterol      40 - 60 MG/DL    LDL, calculated      0 - 100 MG/DL    VLDL, calculated      MG/DL    CHOL/HDL Ratio      0 - 5.0      Special Requests:          GRAM STAIN       NO ORGANISMS SEEN   Culture result:       RARE MYROIDES SPECIES (A)   WBC      0 - 4 /hpf    RBC      0 - 5 /hpf    Epithelial cells      0 - 5 /lpf    Bacteria      NEG /hpf    Mucus      NEG /lpf    Uric acid crystals      NEG    VALID INTERNAL CONTROL POC          Prothrombin time (POC)      seconds    INR          Microalbumin,urine random      0 - 3.0 MG/DL    Creatinine, urine      30 - 125 mg/dL    Microalbumin/Creat. Ratio      0 - 30 mg/g    Hemoglobin A1c, (calculated)      4.2 - 5.6 %      Component      Latest Ref Rng & Units 10/4/2018          11:57 AM   Sodium      136 - 145 mmol/L    Potassium      3.5 - 5.5 mmol/L    Chloride      100 - 108 mmol/L    CO2      21 - 32 mmol/L    Anion gap      3.0 - 18 mmol/L    Glucose      74 - 99 mg/dL    BUN      7.0 - 18 MG/DL    Creatinine      0.6 - 1.3 MG/DL    BUN/Creatinine ratio      12 - 20      GFR est AA      >60 ml/min/1.73m2    GFR est non-AA      >60 ml/min/1.73m2    Calcium      8.5 - 10.1 MG/DL    Bilirubin, total      0.2 - 1.0 MG/DL    ALT (SGPT)      16 - 61 U/L    AST      15 - 37 U/L    Alk.  phosphatase      45 - 117 U/L    Protein, total      6.4 - 8.2 g/dL    Albumin      3.4 - 5.0 g/dL    Globulin      2.0 - 4.0 g/dL    A-G Ratio      0.8 - 1.7      Color          Appearance          Specific gravity      1.005 - 1.030    pH (UA)      5.0 - 8.0      Protein      NEG mg/dL    Glucose      NEG mg/dL    Ketone      NEG mg/dL    Bilirubin      NEG      Blood      NEG      Urobilinogen      0.2 - 1.0 EU/dL    Nitrites      NEG      Leukocyte Esterase      NEG      Color (UA POC)          Clarity (UA POC)          Glucose (UA POC)      Negative    Bilirubin (UA POC)      Negative    Ketones (UA POC)      Negative    Specific gravity (UA POC)      1.001 - 1.035    Blood (UA POC)      Negative    pH (UA POC)      4.6 - 8.0    Protein (UA POC)      Negative    Urobilinogen (UA POC)      0.2 - 1    Nitrites (UA POC)      Negative    Leukocyte esterase (UA POC)      Negative    Cholesterol, total      <200 MG/DL    Triglyceride      <150 MG/DL    HDL Cholesterol      40 - 60 MG/DL    LDL, calculated      0 - 100 MG/DL    VLDL, calculated      MG/DL    CHOL/HDL Ratio      0 - 5.0      Special Requests:       NO SPECIAL REQUESTS   GRAM STAIN       FEW WBC'S   Culture result:       RARE STAPHYLOCOCCUS SPECIES, COAGULASE NEGATIVE (A)   WBC      0 - 4 /hpf    RBC      0 - 5 /hpf    Epithelial cells      0 - 5 /lpf    Bacteria      NEG /hpf    Mucus      NEG /lpf    Uric acid crystals      NEG    VALID INTERNAL CONTROL POC          Prothrombin time (POC)      seconds    INR          Microalbumin,urine random      0 - 3.0 MG/DL    Creatinine, urine      30 - 125 mg/dL    Microalbumin/Creat. Ratio      0 - 30 mg/g    Hemoglobin A1c, (calculated)      4.2 - 5.6 %      Component      Latest Ref Rng & Units 10/4/2018 10/4/2018 10/4/2018          11:57 AM 11:57 AM 11:57 AM   Sodium      136 - 145 mmol/L      Potassium      3.5 - 5.5 mmol/L      Chloride      100 - 108 mmol/L      CO2      21 - 32 mmol/L      Anion gap      3.0 - 18 mmol/L      Glucose      74 - 99 mg/dL      BUN      7.0 - 18 MG/DL      Creatinine      0.6 - 1.3 MG/DL      BUN/Creatinine ratio      12 - 20        GFR est AA      >60 ml/min/1.73m2      GFR est non-AA      >60 ml/min/1.73m2      Calcium      8.5 - 10.1 MG/DL      Bilirubin, total      0.2 - 1.0 MG/DL      ALT (SGPT)      16 - 61 U/L      AST      15 - 37 U/L      Alk.  phosphatase      45 - 117 U/L      Protein, total      6.4 - 8.2 g/dL      Albumin      3.4 - 5.0 g/dL      Globulin      2.0 - 4.0 g/dL      A-G Ratio      0.8 - 1.7        Color         DARK YELLOW   Appearance         CLOUDY Specific gravity      1.005 - 1.030   >1.030 (H)   pH (UA)      5.0 - 8.0     5.0   Protein      NEG mg/dL   NEGATIVE   Glucose      NEG mg/dL   NEGATIVE   Ketone      NEG mg/dL   TRACE (A)   Bilirubin      NEG     NEGATIVE   Blood      NEG     NEGATIVE   Urobilinogen      0.2 - 1.0 EU/dL   0.2   Nitrites      NEG     NEGATIVE   Leukocyte Esterase      NEG     MODERATE (A)   Color (UA POC)            Clarity (UA POC)            Glucose (UA POC)      Negative      Bilirubin (UA POC)      Negative      Ketones (UA POC)      Negative      Specific gravity (UA POC)      1.001 - 1.035      Blood (UA POC)      Negative      pH (UA POC)      4.6 - 8.0      Protein (UA POC)      Negative      Urobilinogen (UA POC)      0.2 - 1      Nitrites (UA POC)      Negative      Leukocyte esterase (UA POC)      Negative      Cholesterol, total      <200 MG/DL      Triglyceride      <150 MG/DL      HDL Cholesterol      40 - 60 MG/DL      LDL, calculated      0 - 100 MG/DL      VLDL, calculated      MG/DL      CHOL/HDL Ratio      0 - 5.0        Special Requests:            GRAM STAIN            Culture result:            WBC      0 - 4 /hpf  4 to 10    RBC      0 - 5 /hpf  0 to 3    Epithelial cells      0 - 5 /lpf  2+    Bacteria      NEG /hpf  1+ (A)    Mucus      NEG /lpf  4+ (A)    Uric acid crystals      NEG  2+ (A)    VALID INTERNAL CONTROL POC            Prothrombin time (POC)      seconds      INR            Microalbumin,urine random      0 - 3.0 MG/DL 2.40     Creatinine, urine      30 - 125 mg/dL 280.44 (H)     Microalbumin/Creat.  Ratio      0 - 30 mg/g 9     Hemoglobin A1c, (calculated)      4.2 - 5.6 %        Component      Latest Ref Rng & Units 10/4/2018 10/4/2018 10/4/2018 10/4/2018          11:57 AM 11:57 AM 11:57 AM 11:30 AM   Sodium      136 - 145 mmol/L  145     Potassium      3.5 - 5.5 mmol/L  4.3     Chloride      100 - 108 mmol/L  100     CO2      21 - 32 mmol/L  40 (H)     Anion gap      3.0 - 18 mmol/L  5 Glucose      74 - 99 mg/dL  145 (H)     BUN      7.0 - 18 MG/DL  16     Creatinine      0.6 - 1.3 MG/DL  0.64     BUN/Creatinine ratio      12 - 20    25 (H)     GFR est AA      >60 ml/min/1.73m2  >60     GFR est non-AA      >60 ml/min/1.73m2  >60     Calcium      8.5 - 10.1 MG/DL  8.9     Bilirubin, total      0.2 - 1.0 MG/DL  0.5     ALT (SGPT)      16 - 61 U/L  12 (L)     AST      15 - 37 U/L  7 (L)     Alk.  phosphatase      45 - 117 U/L  69     Protein, total      6.4 - 8.2 g/dL  6.3 (L)     Albumin      3.4 - 5.0 g/dL  3.3 (L)     Globulin      2.0 - 4.0 g/dL  3.0     A-G Ratio      0.8 - 1.7    1.1     Color             Appearance             Specific gravity      1.005 - 1.030       pH (UA)      5.0 - 8.0         Protein      NEG mg/dL       Glucose      NEG mg/dL       Ketone      NEG mg/dL       Bilirubin      NEG         Blood      NEG         Urobilinogen      0.2 - 1.0 EU/dL       Nitrites      NEG         Leukocyte Esterase      NEG         Color (UA POC)             Clarity (UA POC)             Glucose (UA POC)      Negative       Bilirubin (UA POC)      Negative       Ketones (UA POC)      Negative       Specific gravity (UA POC)      1.001 - 1.035       Blood (UA POC)      Negative       pH (UA POC)      4.6 - 8.0       Protein (UA POC)      Negative       Urobilinogen (UA POC)      0.2 - 1       Nitrites (UA POC)      Negative       Leukocyte esterase (UA POC)      Negative       Cholesterol, total      <200 MG/      Triglyceride      <150 MG/      HDL Cholesterol      40 - 60 MG/DL 60      LDL, calculated      0 - 100 MG/ (H)      VLDL, calculated      MG/DL 24      CHOL/HDL Ratio      0 - 5.0   3.2      Special Requests:             GRAM STAIN             Culture result:             WBC      0 - 4 /hpf       RBC      0 - 5 /hpf       Epithelial cells      0 - 5 /lpf       Bacteria      NEG /hpf       Mucus      NEG /lpf       Uric acid crystals      NEG       VALID INTERNAL CONTROL POC          Yes   Prothrombin time (POC)      seconds    49.2   INR          4.1   Microalbumin,urine random      0 - 3.0 MG/DL       Creatinine, urine      30 - 125 mg/dL       Microalbumin/Creat. Ratio      0 - 30 mg/g       Hemoglobin A1c, (calculated)      4.2 - 5.6 %   5.1      Component      Latest Ref Rng & Units 9/11/2018          12:03 PM   Sodium      136 - 145 mmol/L    Potassium      3.5 - 5.5 mmol/L    Chloride      100 - 108 mmol/L    CO2      21 - 32 mmol/L    Anion gap      3.0 - 18 mmol/L    Glucose      74 - 99 mg/dL    BUN      7.0 - 18 MG/DL    Creatinine      0.6 - 1.3 MG/DL    BUN/Creatinine ratio      12 - 20      GFR est AA      >60 ml/min/1.73m2    GFR est non-AA      >60 ml/min/1.73m2    Calcium      8.5 - 10.1 MG/DL    Bilirubin, total      0.2 - 1.0 MG/DL    ALT (SGPT)      16 - 61 U/L    AST      15 - 37 U/L    Alk.  phosphatase      45 - 117 U/L    Protein, total      6.4 - 8.2 g/dL    Albumin      3.4 - 5.0 g/dL    Globulin      2.0 - 4.0 g/dL    A-G Ratio      0.8 - 1.7      Color          Appearance          Specific gravity      1.005 - 1.030    pH (UA)      5.0 - 8.0      Protein      NEG mg/dL    Glucose      NEG mg/dL    Ketone      NEG mg/dL    Bilirubin      NEG      Blood      NEG      Urobilinogen      0.2 - 1.0 EU/dL    Nitrites      NEG      Leukocyte Esterase      NEG      Color (UA POC)          Clarity (UA POC)          Glucose (UA POC)      Negative    Bilirubin (UA POC)      Negative    Ketones (UA POC)      Negative    Specific gravity (UA POC)      1.001 - 1.035    Blood (UA POC)      Negative    pH (UA POC)      4.6 - 8.0    Protein (UA POC)      Negative    Urobilinogen (UA POC)      0.2 - 1    Nitrites (UA POC)      Negative    Leukocyte esterase (UA POC)      Negative    Cholesterol, total      <200 MG/DL    Triglyceride      <150 MG/DL    HDL Cholesterol      40 - 60 MG/DL    LDL, calculated      0 - 100 MG/DL    VLDL, calculated      MG/DL    CHOL/HDL Ratio      0 - 5.0      Special Requests:          GRAM STAIN          Culture result:          WBC      0 - 4 /hpf    RBC      0 - 5 /hpf    Epithelial cells      0 - 5 /lpf    Bacteria      NEG /hpf    Mucus      NEG /lpf    Uric acid crystals      NEG    VALID INTERNAL CONTROL POC       Yes   Prothrombin time (POC)      seconds 36.4   INR       3.0   Microalbumin,urine random      0 - 3.0 MG/DL    Creatinine, urine      30 - 125 mg/dL    Microalbumin/Creat. Ratio      0 - 30 mg/g    Hemoglobin A1c, (calculated)      4.2 - 5.6 %      Previous labs    INR results discussed with pt at [facility] today, 2/19/19. ASSESSMENT/PLAN:    1. Saddle embolus of pulmonary artery without acute cor pulmonale, unspecified chronicity (HCC)stable. Continue coumadin 5mg daily pm and recheck in 1 week. He will call us with the results. -     AMB POC PT/INR    2. Cerebrovascular accident (CVA), unspecified mechanism ([address]): stable. Continue the     3. Diabetes mellitus, stable (HCC):we will see what the labs show. Continue diet, exercise and metformin.   -     METABOLIC PANEL, COMPREHENSIVE; Future  -     LIPID PANEL; Future  -     TSH 3RD GENERATION; Future  -     HEMOGLOBIN A1C WITH EAG; Future    4. Benign hypertension without CHF: improving. Continue the lisinopril,  Lopressor, diet and exercise. 5. Dyslipidemia: we will see what the labs show. Continue diet and exercise. 6. Patient verbalized understanding and agreement with the plan. 7. Patient was given an after-visit summary. 8. Follow-up Disposition:  Return in about 3 months (around 5/19/2019) for f/u DM/HTN/lipids. or sooner if worsening symptoms.                 Willey Osgood, MD

## 2019-02-19 NOTE — PATIENT INSTRUCTIONS
1) Continue coumadin 5mg daily pm and recheck in 1 week. Give us a call with the readings. 2) Follow-up in 3 months or sooner if worsening symptoms.

## 2019-02-19 NOTE — PROGRESS NOTES
ROOM # 1    Olinda Love presents today for   Chief Complaint   Patient presents with    Anticoagulation     pt/inr check       Olinda Sender preferred language for health care discussion is english/other. Is someone accompanying this pt? Yes, wife    Is the patient using any DME equipment during [address]? Yes, wheelchair and O2    Depression Screening:  3 most recent [facility] Screens 10/4/2018 9/11/2018 9/11/2018 7/19/2018 7/19/2018 7/3/2018 5/31/2018   Little interest or pleasure in doing things Not at all Not at all Not at all Not at all Not at all Not at all Not at all   Feeling down, depressed, irritable, or hopeless Not at all Not at all Not at all Not at all Not at all Not at all Not at all   Total Score PHQ 2 0 0 0 0 0 0 0       Learning Assessment:  Learning Assessment 7/3/2018 8/14/2014   PRIMARY LEARNER Patient Patient   HIGHEST LEVEL OF EDUCATION - PRIMARY LEARNER  [facility] CAREGIVER No -   PRIMARY LANGUAGE ENGLISH ENGLISH   LEARNER PREFERENCE PRIMARY READING LISTENING     DEMONSTRATION -   ANSWERED BY patient self   RELATIONSHIP SELF SELF       Abuse Screening:  Abuse Screening Questionnaire 2/19/2019 7/3/2018 8/10/2017 5/12/2015   Do you ever feel afraid of your partner? N N N N   Are you in a relationship with someone who physically or mentally threatens you? N N N N   Is it safe for you to go home? Modesta Barnard       Fall Risk  Fall Risk Assessment, last 12 mths 10/4/2018 9/11/2018 9/11/2018 7/19/2018 7/3/2018 5/31/2018 3/1/2018   Able to walk? Yes Yes Yes Yes Yes Yes Yes   Fall in past 12 months? No Yes No Yes No Yes Yes   Fall with injury? - Yes - - - No Yes   Number of falls in past 12 months - 1 - 5 - 3 3   Fall Risk Score - 2 - - - 3 4       Health Maintenance reviewed and discussed per provider.  Yes    Olinda Love is due for   Health Maintenance Due   Topic Date Due    FOOT EXAM Q1  08/10/2018    Shingrix Vaccine Age 50> (2 of 2) 12/17/2018    to be d/w provider      Please order/place referral if appropriate. Advance Directive:  1. Do you have an advance directive in place? Patient Reply: no    2. If not, would you like material regarding how to put one in place? Patient Reply: no    Coordination of Care:  1. Have you been to the ER, urgent care clinic since your last visit? Hospitalized since your last visit? no    2. Have you seen or consulted any other health care providers outside of the [address] [place] [address] since your last visit? Include any pap smears or colon screening.  no

## 2019-02-25 ENCOUNTER — TELEPHONE (OUTPATIENT)
Dept: INTERNAL MEDICINE CLINIC | Age: 79
End: 2019-02-25

## 2019-02-25 NOTE — TELEPHONE ENCOUNTER
Please let pt know that just got INR results from 2/23/19 and it was 2.1. Continue coumadin 5mg daily pm and recheck in 1 week and give us a call with the results.

## 2019-03-09 ENCOUNTER — TELEPHONE (OUTPATIENT)
Dept: INTERNAL MEDICINE CLINIC | Age: 79
End: 2019-03-09

## 2019-03-09 NOTE — TELEPHONE ENCOUNTER
Called pt at [phone] and left message on VM that INR: 2.2 and needs continue coumadin 5mg daily pm and recheck in 1 week and call us with results and call us back at [phone]. Called pt at [phone] and left message on VM to call the office back at [phone].

## 2019-03-11 NOTE — TELEPHONE ENCOUNTER
Attempted to contact pt at  number, no answer. Lvm for pt to return call to office at [phone]. Will continue to try to contact pt.

## 2019-03-21 ENCOUNTER — TELEPHONE (OUTPATIENT)
Dept: INTERNAL MEDICINE CLINIC | Age: 79
End: 2019-03-21

## 2019-03-21 NOTE — TELEPHONE ENCOUNTER
Please let pt know that INR: 3.0 on 3/18/19. Continue coumadin 5mg daily pm and recheck in 1 week. Call us with results.

## 2019-03-27 ENCOUNTER — TELEPHONE (OUTPATIENT)
Dept: INTERNAL MEDICINE CLINIC | Age: 79
End: 2019-03-27

## 2019-03-27 NOTE — TELEPHONE ENCOUNTER
2 pt. Identifiers confirmed. Young Suggs from [facility] called to report pt's out of range INR of 3.1 today. Please advise.

## 2019-03-28 NOTE — TELEPHONE ENCOUNTER
2 pt. Identifiers confirmed. Pt's wife, Kamilah Bellamy, called as nurse is at home with them. She wanted to report pt's BP of 110/90 today, tues 100/60, mon 120/80. Right now 90/70 standing. Pt. Is asymptomatic (no CP, SOB, dizziness). Pt. Only takes metoprolol in the AM which was already taken today and lasix. Pt's wife advised that this could be mild orthostatic hypotension, he should stay hydrated while on the lasix. Also BP's are on low end of normal. Continue to monitor BP's daily, poss before BP meds to know if she needs to call to hold his meds or not. 130's/80's continue to give, 90/70 would not give. She should call if she ever questions  him receiving the medication. No other questions/concerns at this time.

## 2019-03-28 NOTE — TELEPHONE ENCOUNTER
Attempted to contact pt at  number, no answer. Lvm for pt to return call to office at [phone] . Will continue to try to contact pt.

## 2019-03-28 NOTE — TELEPHONE ENCOUNTER
Spoke with patient wife and 2 patient identifiers was confirmed. Patient wife  was given results below and verbalized understanding . Patient wife has no questions/concerns  at this time.

## 2019-04-06 ENCOUNTER — TELEPHONE (OUTPATIENT)
Dept: INTERNAL MEDICINE CLINIC | Age: 79
End: 2019-04-06

## 2019-04-06 NOTE — TELEPHONE ENCOUNTER
Pt's INR on 4/4/19 was 3.0. Please let pt know to continue coumadin 5mg daily pm and recheck on Thursday. Give us a call with results.

## 2019-04-08 NOTE — TELEPHONE ENCOUNTER
Attempted to contact pt at  number, no answer. Lvm for pt to return call to office at [phone]. Will continue to try to contact pt.

## 2019-04-10 NOTE — TELEPHONE ENCOUNTER
Attempted to contact patient at  number, no answer. Lvm for patient to return call to office at [phone]. Will continue to try to contact patient.

## 2019-04-10 NOTE — TELEPHONE ENCOUNTER
Incoming call from patient wife at home number. 2 patient identifiers confirmed. Patient wife informed of below. Patient wife verbalized understanding. No other questions at this time.

## 2019-04-18 ENCOUNTER — TELEPHONE (OUTPATIENT)
Dept: INTERNAL MEDICINE CLINIC | Age: 79
End: 2019-04-18

## 2019-04-18 NOTE — TELEPHONE ENCOUNTER
Called patient to inform him of Dr. Soham Ortega recommendations. Patient did not answer the phone and a message was left to contact the office at his earliest convenience.

## 2019-04-18 NOTE — TELEPHONE ENCOUNTER
Incoming call from [facility]. Pt's INR today was 3.1. Please let pt know to continue coumadin 5mg daily pm and recheck in 1 week. He needs to call us with the results.

## 2019-04-29 ENCOUNTER — TELEPHONE (OUTPATIENT)
Dept: INTERNAL MEDICINE CLINIC | Age: 79
End: 2019-04-29

## 2019-04-29 RX ORDER — TAMSULOSIN HYDROCHLORIDE 0.4 MG/1
0.4 CAPSULE ORAL DAILY
Qty: 90 CAP | Refills: 3 | Status: SHIPPED | OUTPATIENT
Start: 2019-04-29 | End: 2020-05-05 | Stop reason: SDUPTHER

## 2019-04-29 NOTE — TELEPHONE ENCOUNTER
Please let pt know that INR on 4/27/19 was 3.1. Continue coumadin 5mg daily pm and recheck in 1 week. Call with the results.

## 2019-04-29 NOTE — TELEPHONE ENCOUNTER
Attempted to contact pt at  number, no answer. Lvm for pt to return call to office at [phone] . Will continue to try to contact pt.

## 2019-05-07 ENCOUNTER — OFFICE VISIT (OUTPATIENT)
Dept: INTERNAL MEDICINE CLINIC | Age: 79
End: 2019-05-07

## 2019-05-07 VITALS
HEIGHT: 66 IN | TEMPERATURE: 97.7 F | OXYGEN SATURATION: 96 % | SYSTOLIC BLOOD PRESSURE: 132 MMHG | WEIGHT: 148 LBS | BODY MASS INDEX: 23.78 KG/M2 | DIASTOLIC BLOOD PRESSURE: 81 MMHG | HEART RATE: 80 BPM | RESPIRATION RATE: 16 BRPM

## 2019-05-07 DIAGNOSIS — Z74.09 DECREASED MOBILITY AND ENDURANCE: Primary | ICD-10-CM

## 2019-05-07 DIAGNOSIS — Z79.01 CHRONIC ANTICOAGULATION: ICD-10-CM

## 2019-05-07 LAB
INR BLD: 2.4
PT POC: 28.8 SECONDS
VALID INTERNAL CONTROL?: YES

## 2019-05-07 NOTE — PROGRESS NOTES
Chief Complaint   Patient presents with    Elevated Blood Pressure    Fatigue       HPI:     Vijaya Kamara is a 77 y.o.  male with history of  COPD and CVA and PE here for the above complaint. He is on coumadin 5mg daily pm and denies any bleeding, changes in diet or missing any dosages. Home health was concerned about his blood pressure being elevated and he was tiring out. They wanted him to be evaluated as well as have more HH PT for decrease endurance and mobility. Blood pressure today was 112/60. He denies any chest pain, shortness of breath, abdominal pain, headaches or dizziness.                Past Medical History:   Diagnosis Date    Advance directive discussed with patient     Benign localized prostatic hyperplasia with lower urinary tract symptoms (LUTS)     COPD with emphysema ([address]     CVA (cerebrovascular accident) ([address] 7/11/2012    possible    Diabetes mellitus ([address]     Drowsiness     Enlarged prostate     Essential hypertension, benign     Eye exam, routine 07/26/2016    Dr. Aiden Pearson repeat in 1 yr    H/O colonoscopy 11/24/15    Dr. 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 Tiara Sarabia  late [address]     Current Outpatient Medications   Medication Sig  tamsulosin (FLOMAX) 0.4 mg capsule Take 1 Cap by mouth daily.  finasteride (PROSCAR) 5 mg tablet Take 1 Tab by mouth daily.  omeprazole (PRILOSEC) 40 mg capsule Take 1 Cap by mouth daily.  potassium chloride (KLOR-CON M20) 20 mEq tablet Take 1 Tab by mouth daily.  ferrous sulfate 325 mg (65 mg iron) tablet Take 1 Tab by mouth two (2) times a day.  warfarin (COUMADIN) 5 mg tablet Take 1 Tab by mouth every evening.  albuterol (PROVENTIL HFA, VENTOLIN HFA, PROAIR HFA) 90 mcg/actuation inhaler 2 Puffs.  fluticasone-vilanterol (BREO ELLIPTA) 100-25 mcg/dose inhaler 1 Puff.  metoprolol tartrate (LOPRESSOR) 25 mg tablet 25 mg.    metFORMIN (GLUCOPHAGE) 500 mg tablet Take 0.5 Tabs by mouth two (2) times daily (with meals).  lisinopril (PRINIVIL, ZESTRIL) 5 mg tablet     furosemide (LASIX) 20 mg tablet Take 1 Tab by mouth daily. Indications: hypertension    albuterol-ipratropium (DUO-NEB) 2.5 mg-0.5 mg/3 ml nebu 3 mL by Nebulization route every four (4) hours.  Lancets misc Check fasting sugars daily before breakfast. DX: E11.9 for freestyle lite meter    Nebulizer & Compressor machine 1 Each.  tiotropium bromide (SPIRIVA RESPIMAT) 2.5 mcg/actuation inhaler Take 2 Puffs by inhalation daily.  glucose blood VI test strips (FREESTYLE LITE STRIPS) strip Check fasting sugars daily before breakfast. DX: E11.9 for freestyle lite meter    ascorbic acid (VITAMIN C) 500 mg tablet Take 500 mg by mouth two (2) times a day.  Blood-Glucose Meter (ONE TOUCH ULTRA 2) monitoring kit Check fasting sugars daily before breakfast. DX: 250.02     No current facility-administered medications for this visit.       Health Maintenance   Topic Date Due    FOOT EXAM Q1  08/10/2018    Shingrix Vaccine Age 50> (2 of 2) 12/17/2018    GLAUCOMA SCREENING Q2Y  07/25/2019    EYE EXAM RETINAL OR DILATED  07/25/2019    Influenza Age 9 to Adult  08/01/2019    HEMOGLOBIN A1C Q6M  08/19/2019    MICROALBUMIN Q1 10/04/2019    MEDICARE YEARLY EXAM  10/05/2019    LIPID PANEL Q1  02/19/2020    COLONOSCOPY  11/24/2020    DTaP/Tdap/Td series (2 - Td) 08/01/2026    Pneumococcal 65+ years  Completed     Immunization History   Administered Date(s) Administered    Influenza High Dose Vaccine PF 11/05/2015, 09/25/2017    Influenza Vaccine 10/13/2010, 09/16/2014, 09/14/2015, 11/10/2016    Influenza Vaccine (Tri) Adjuvanted 10/04/2018    Influenza Vaccine PF 01/29/2013    Pneumococcal Conjugate (PCV-13) 09/14/2015    Pneumococcal Polysaccharide (PPSV-23) 10/13/2010, 08/14/2014    Zoster Recombinant 10/22/2018     No LMP for male patient. Allergies and Intolerances:   No Known Allergies    Family History:   Family History   Problem Relation Age of Onset    Cancer Father         he does not know       Social History:   He  reports that he quit smoking about 3 years ago. His smoking use included cigarettes. He has a 45.00 pack-year smoking history. He has never used smokeless tobacco.  He  reports that he does not drink alcohol. ·     Objective:   Physical exam:   Visit Vitals  /81 (BP 1 Location: Left arm, BP Patient Position: Sitting)   Pulse 80   Temp 97.7 °F (36.5 °C) (Oral)   Resp 16   Ht 5' 6\" (1.676 m)   Wt 148 lb (67.1 kg)   SpO2 96% Comment: on 3L NC   BMI 23.89 kg/m²        Generally: Pleasant male in no acute distress  Cardiac Exam: regular, rate, and rhythm. Normal S1 and S2. No murmurs, gallops, or rubs  Pulmonary exam: Clear to auscultation bilaterally  Abdominal exam: Positive bowel sounds in all four quadrants, soft, nondistended, nontender  Extremities: 2+ dorsalis pedis pulses bilaterally.  No pedal edema    bilaterally    LABS/Radiological Tests:  Component      Latest Ref Rng & Units 5/7/2019 2/19/2019           3:24 PM  2:04 PM   VALID INTERNAL CONTROL POC       Yes Yes   Prothrombin time (POC)      seconds 28.8 24.2   INR       2.4 2.0     All lab results  were discussed and reviewed with the patient. ASSESSMENT/PLAN:    1. Decreased mobility and endurance  -     REFERRAL TO HOME HEALTH    2. Chronic anticoagulation: stable. Continue coumadin 5mg daily pm and recheck in 1 week. She will give us a call with the results. -     AMB POC PT/INR    3. Patient verbalized understanding and agreement with the plan. 4. Patient was given an after-visit summary. 5.   Follow-up and Dispositions    · Return in about 1 month (around 6/7/2019) for f/u DM/HTN/lipids cancel 5/21/19 OV  or sooner if worsening symptoms.                      Bernadette Zapata MD

## 2019-05-07 NOTE — PROGRESS NOTES
ROOM # 1  Identified pt with two pt identifiers(name and ). Reviewed record in preparation for visit and have obtained necessary documentation. Chief Complaint   Patient presents with    Elevated Blood Pressure    Fatigue      Aryan Said preferred language for health care discussion is english/other. Is the patient using any DME equipment during OV? YES/ wheelchair & oxygen tank     Dajadelma Said is due for:  Health Maintenance Due   Topic    FOOT EXAM Q1     Shingrix Vaccine Age 50> (2 of 2)     Health Maintenance reviewed and discussed per provider  Please order/place referral if appropriate. Advance Directive:  1. Do you have an advance directive in place? Patient Reply: NO    2. If not, would you like material regarding how to put one in place? NO    Coordination of Care:  1. Have you been to the ER, urgent care clinic since your last visit? Hospitalized since your last visit? NO    2. Have you seen or consulted any other health care providers outside of the [address] [place] [address] since your last visit? Include any pap smears or colon screening. NO    Patient is accompanied by wife I have received verbal consent from Aryan Said to discuss any/all medical information while they are present in the room.     Learning Assessment:  Learning Assessment 7/3/2018 2014   PRIMARY LEARNER Patient Patient   HIGHEST LEVEL OF EDUCATION - PRIMARY LEARNER  SOME COLLEGE SOME COLLEGE   BARRIERS PRIMARY LEARNER NONE NONE   CO-LEARNER CAREGIVER No -   PRIMARY LANGUAGE ENGLISH ENGLISH   LEARNER PREFERENCE PRIMARY READING LISTENING     DEMONSTRATION -   ANSWERED BY patient self   RELATIONSHIP SELF SELF     Depression Screening:  3 most recent [facility] Screens 10/4/2018 2018 2018 2018 2018 7/3/2018 2018   Little interest or pleasure in doing things Not at all Not at all Not at all Not at all Not at all Not at all Not at all   Feeling down, depressed, irritable, or hopeless Not at all Not at all Not at all Not at all Not at all Not at all Not at all   Total Score PHQ 2 0 0 0 0 0 0 0     Abuse Screening:  Abuse Screening Questionnaire 2/19/2019 7/3/2018 8/10/2017 5/12/2015   Do you ever feel afraid of your partner? N N N N   Are you in a relationship with someone who physically or mentally threatens you? N N N N   Is it safe for you to go home? [facility]     Fall Risk  Fall Risk Assessment, last 12 mths 10/4/2018 9/11/2018 9/11/2018 7/19/2018 7/3/2018 5/31/2018 3/1/2018   Able to walk? Yes Yes Yes Yes Yes Yes Yes   Fall in past 12 months? No Yes No Yes No Yes Yes   Fall with injury?  - Yes - - - No Yes   Number of falls in past 12 months - 1 - 5 - 3 3   Fall Risk Score - 2 - - - 3 4

## 2019-05-07 NOTE — PATIENT INSTRUCTIONS
1) Continue coumadin 5mg daily pm and recheck in 1 week and call us with the results. 2) Follow-up in 1 month or sooner if worsening symptoms.

## 2019-05-08 ENCOUNTER — HOME HEALTH ADMISSION (OUTPATIENT)
Dept: HOME HEALTH SERVICES | Facility: HOME HEALTH | Age: 79
End: 2019-05-08

## 2019-05-10 ENCOUNTER — HOME CARE VISIT (OUTPATIENT)
Dept: HOME HEALTH SERVICES | Facility: HOME HEALTH | Age: 79
End: 2019-05-10

## 2019-05-11 ENCOUNTER — HOME CARE VISIT (OUTPATIENT)
Dept: SCHEDULING | Facility: HOME HEALTH | Age: 79
End: 2019-05-11

## 2019-05-12 ENCOUNTER — HOME CARE VISIT (OUTPATIENT)
Dept: HOME HEALTH SERVICES | Facility: HOME HEALTH | Age: 79
End: 2019-05-12

## 2019-05-13 ENCOUNTER — HOME CARE VISIT (OUTPATIENT)
Dept: SCHEDULING | Facility: HOME HEALTH | Age: 79
End: 2019-05-13

## 2019-05-13 ENCOUNTER — HOME CARE VISIT (OUTPATIENT)
Dept: HOME HEALTH SERVICES | Facility: HOME HEALTH | Age: 79
End: 2019-05-13

## 2019-05-13 VITALS
TEMPERATURE: 98.2 F | SYSTOLIC BLOOD PRESSURE: 130 MMHG | HEART RATE: 84 BPM | OXYGEN SATURATION: 92 % | DIASTOLIC BLOOD PRESSURE: 66 MMHG

## 2019-05-21 ENCOUNTER — TELEPHONE (OUTPATIENT)
Dept: INTERNAL MEDICINE CLINIC | Age: 79
End: 2019-05-21

## 2019-05-21 NOTE — TELEPHONE ENCOUNTER
Spoke with patient wife  and  2 patient identifiers confirmed. Advised patient wife of information below. Patient wife understood and had no further questions.

## 2019-05-21 NOTE — TELEPHONE ENCOUNTER
Please let pt know that INR on 5/20/19 was 2.5. Continue coumadin 5mg daily pm and recheck in 1 week.

## 2019-06-05 ENCOUNTER — TELEPHONE (OUTPATIENT)
Dept: INTERNAL MEDICINE CLINIC | Age: 79
End: 2019-06-05

## 2019-06-05 NOTE — TELEPHONE ENCOUNTER
Pt's INR is 3.0 on 6/4/19. Continue coumadin 5mg daily pm and recheck in 1 week. Call us with the results.

## 2019-06-11 ENCOUNTER — TELEPHONE (OUTPATIENT)
Dept: INTERNAL MEDICINE CLINIC | Age: 79
End: 2019-06-11

## 2019-06-11 ENCOUNTER — OFFICE VISIT (OUTPATIENT)
Dept: INTERNAL MEDICINE CLINIC | Age: 79
End: 2019-06-11

## 2019-06-11 ENCOUNTER — HOSPITAL ENCOUNTER (OUTPATIENT)
Dept: LAB | Age: 79
Discharge: HOME | End: 2019-06-11
Payer: MEDICARE

## 2019-06-11 VITALS
OXYGEN SATURATION: 98 % | SYSTOLIC BLOOD PRESSURE: 106 MMHG | HEART RATE: 85 BPM | HEIGHT: 66 IN | RESPIRATION RATE: 17 BRPM | TEMPERATURE: 97.4 F | WEIGHT: 148 LBS | BODY MASS INDEX: 23.78 KG/M2 | DIASTOLIC BLOOD PRESSURE: 72 MMHG

## 2019-06-11 DIAGNOSIS — Z51.81 ANTICOAGULATION MANAGEMENT ENCOUNTER: ICD-10-CM

## 2019-06-11 DIAGNOSIS — E78.5 DYSLIPIDEMIA: ICD-10-CM

## 2019-06-11 DIAGNOSIS — Z79.01 ANTICOAGULATION MANAGEMENT ENCOUNTER: ICD-10-CM

## 2019-06-11 DIAGNOSIS — E11.9 DIABETES MELLITUS, STABLE (HCC): ICD-10-CM

## 2019-06-11 DIAGNOSIS — E11.9 DIABETES MELLITUS, STABLE (HCC): Primary | ICD-10-CM

## 2019-06-11 DIAGNOSIS — I10 BENIGN HYPERTENSION WITHOUT CHF: ICD-10-CM

## 2019-06-11 LAB
ALBUMIN SERPL-MCNC: 3.7 G/DL (ref 3.4–5)
ALBUMIN/GLOB SERPL: 1.3 {RATIO} (ref 0.8–1.7)
ALP SERPL-CCNC: 60 U/L (ref 45–117)
ALT SERPL-CCNC: 14 U/L (ref 16–61)
ANION GAP SERPL CALC-SCNC: 5 MMOL/L (ref 3–18)
AST SERPL-CCNC: 11 U/L (ref 15–37)
BILIRUB SERPL-MCNC: 0.6 MG/DL (ref 0.2–1)
BUN SERPL-MCNC: 16 MG/DL (ref 7–18)
BUN/CREAT SERPL: 19 (ref 12–20)
CALCIUM SERPL-MCNC: 8.9 MG/DL (ref 8.5–10.1)
CHLORIDE SERPL-SCNC: 100 MMOL/L (ref 100–108)
CHOLEST SERPL-MCNC: 213 MG/DL
CO2 SERPL-SCNC: 37 MMOL/L (ref 21–32)
CREAT SERPL-MCNC: 0.84 MG/DL (ref 0.6–1.3)
EST. AVERAGE GLUCOSE BLD GHB EST-MCNC: 103 MG/DL
GLOBULIN SER CALC-MCNC: 2.9 G/DL (ref 2–4)
GLUCOSE SERPL-MCNC: 126 MG/DL (ref 74–99)
HBA1C MFR BLD: 5.2 % (ref 4.2–5.6)
HDLC SERPL-MCNC: 80 MG/DL (ref 40–60)
HDLC SERPL: 2.7 {RATIO} (ref 0–5)
INR PPP: 1.6 (ref 0.8–1.2)
LDLC SERPL CALC-MCNC: 115.6 MG/DL (ref 0–100)
LIPID PROFILE,FLP: ABNORMAL
POTASSIUM SERPL-SCNC: 4 MMOL/L (ref 3.5–5.5)
PROT SERPL-MCNC: 6.6 G/DL (ref 6.4–8.2)
PROTHROMBIN TIME: 19 SEC (ref 11.5–15.2)
SODIUM SERPL-SCNC: 142 MMOL/L (ref 136–145)
TRIGL SERPL-MCNC: 87 MG/DL (ref ?–150)
VLDLC SERPL CALC-MCNC: 17.4 MG/DL

## 2019-06-11 PROCEDURE — 80061 LIPID PANEL: CPT

## 2019-06-11 PROCEDURE — 85610 PROTHROMBIN TIME: CPT

## 2019-06-11 PROCEDURE — 36415 COLL VENOUS BLD VENIPUNCTURE: CPT

## 2019-06-11 PROCEDURE — 80053 COMPREHEN METABOLIC PANEL: CPT

## 2019-06-11 PROCEDURE — 83036 HEMOGLOBIN GLYCOSYLATED A1C: CPT

## 2019-06-11 RX ORDER — FUROSEMIDE 20 MG/1
20 TABLET ORAL DAILY
Qty: 90 TAB | Refills: 3 | Status: SHIPPED | OUTPATIENT
Start: 2019-06-11 | End: 2019-07-30 | Stop reason: SDUPTHER

## 2019-06-11 RX ORDER — METOPROLOL TARTRATE 25 MG/1
12.5 TABLET, FILM COATED ORAL 2 TIMES DAILY
Qty: 90 TAB | Refills: 3 | Status: SHIPPED | OUTPATIENT
Start: 2019-06-11 | End: 2019-07-30 | Stop reason: SDUPTHER

## 2019-06-11 RX ORDER — METFORMIN HYDROCHLORIDE 500 MG/1
250 TABLET ORAL
Qty: 45 TAB | Refills: 3 | Status: SHIPPED | OUTPATIENT
Start: 2019-06-11 | End: 2019-09-16 | Stop reason: SDUPTHER

## 2019-06-11 RX ORDER — LISINOPRIL 5 MG/1
2.5 TABLET ORAL DAILY
Qty: 45 TAB | Refills: 3 | Status: SHIPPED | OUTPATIENT
Start: 2019-06-11 | End: 2019-07-30 | Stop reason: SDUPTHER

## 2019-06-11 NOTE — TELEPHONE ENCOUNTER
[facility] * @[facility] stated patient oxygen level decrease to  87% also patient heart rate increase to 121 Mrs. Mitra Mc stated pt have a 11:30 appt with Dr. Magy Sanchez

## 2019-06-11 NOTE — PROGRESS NOTES
Chief Complaint   Patient presents with    Diabetes     1 month fu     Hypertension     1 month fu    Cholesterol Problem     1 month fu       HPI:     Adrian Cochran is a 77 y.o.  male with history of type 2 DM, PE, and hypertension  here for the above complaint. He denies any chest pain, shortness of breath, abdominal pain, headaches or dizziness. PT came by and oxygen was 87%. At [address] today it is 98% on 3 L. Type 2 DM: per pt sugars are okay. Past Medical History:   Diagnosis Date    Advance directive discussed with patient     Benign localized prostatic hyperplasia with lower urinary tract symptoms (LUTS)     COPD with emphysema ([address]     CVA (cerebrovascular accident) ([address] 7/11/2012    possible    Diabetes mellitus ([address]     Drowsiness     Enlarged prostate     Essential hypertension, benign     Eye exam, routine 07/26/2016    Dr. Tera Han repeat in 1 yr    H/O colonoscopy 11/24/15    Dr. 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 Jo-Ann Barry  late 4117'C     Current Outpatient Medications   Medication Sig    furosemide (LASIX) 20 mg tablet Take 1 Tab by mouth daily.  metFORMIN (GLUCOPHAGE) 500 mg tablet Take 0.5 Tabs by mouth daily (with breakfast).  metoprolol tartrate (LOPRESSOR) 25 mg tablet Take 0.5 Tabs by mouth two (2) times a day.  lisinopril (PRINIVIL, ZESTRIL) 5 mg tablet Take 0.5 Tabs by mouth daily.  warfarin (COUMADIN) 5 mg tablet Take 5 mg by mouth daily.  omeprazole-sodium bicarbonate (OMEPPI) 40-1.1 mg-gram capsule Take 1 Cap by mouth daily.  potassium chloride (K-DUR, KLOR-CON) 20 mEq tablet Take 20 mEq by mouth daily.  finasteride (PROSCAR) 5 mg tablet Take 5 mg by mouth daily.  tamsulosin (FLOMAX) 0.4 mg capsule Take 0.4 mg by mouth daily.  ferrous sulfate 325 mg (65 mg iron) tablet Take 325 mg by mouth two (2) times a day.  OXYGEN-AIR DELIVERY SYSTEMS 3 L by Nasal route continuous.  tamsulosin (FLOMAX) 0.4 mg capsule Take 1 Cap by mouth daily.  finasteride (PROSCAR) 5 mg tablet Take 1 Tab by mouth daily.  omeprazole (PRILOSEC) 40 mg capsule Take 1 Cap by mouth daily.  potassium chloride (KLOR-CON M20) 20 mEq tablet Take 1 Tab by mouth daily.  ferrous sulfate 325 mg (65 mg iron) tablet Take 1 Tab by mouth two (2) times a day.  warfarin (COUMADIN) 5 mg tablet Take 1 Tab by mouth every evening.  albuterol (PROVENTIL HFA, VENTOLIN HFA, PROAIR HFA) 90 mcg/actuation inhaler 2 Puffs.  fluticasone-vilanterol (BREO ELLIPTA) 100-25 mcg/dose inhaler 1 Puff.  albuterol-ipratropium (DUO-NEB) 2.5 mg-0.5 mg/3 ml nebu 3 mL by Nebulization route every four (4) hours.  Lancets misc Check fasting sugars daily before breakfast. DX: E11.9 for freestyle lite meter    Nebulizer & Compressor machine 1 Each.  tiotropium bromide (SPIRIVA RESPIMAT) 2.5 mcg/actuation inhaler Take 2 Puffs by inhalation daily.  glucose blood VI test strips (FREESTYLE LITE STRIPS) strip Check fasting sugars daily before breakfast. DX: E11.9 for freestyle lite meter    ascorbic acid (VITAMIN C) 500 mg tablet Take 500 mg by mouth two (2) times a day.     Blood-Glucose Meter (ONE TOUCH ULTRA 2) monitoring kit Check fasting sugars daily before breakfast. DX: 250.02     No current facility-administered medications for this visit. Health Maintenance   Topic Date Due    FOOT EXAM Q1  08/10/2018    Shingrix Vaccine Age 50> (2 of 2) 12/17/2018    GLAUCOMA SCREENING Q2Y  07/25/2019    EYE EXAM RETINAL OR DILATED  07/25/2019    Influenza Age 5 to Adult  08/01/2019    HEMOGLOBIN A1C Q6M  08/19/2019    MICROALBUMIN Q1  10/04/2019    MEDICARE YEARLY EXAM  10/05/2019    LIPID PANEL Q1  02/19/2020    COLONOSCOPY  11/24/2020    DTaP/Tdap/Td series (2 - Td) 08/01/2026    Pneumococcal 65+ years  Completed     Immunization History   Administered Date(s) Administered    Influenza High Dose Vaccine PF 11/05/2015, 09/25/2017    Influenza Vaccine 10/13/2010, 09/16/2014, 09/14/2015, 11/10/2016    Influenza Vaccine (Tri) Adjuvanted 10/04/2018    Influenza Vaccine PF 01/29/2013    Pneumococcal Conjugate (PCV-13) 09/14/2015    Pneumococcal Polysaccharide (PPSV-23) 10/13/2010, 08/14/2014    Zoster Recombinant 10/22/2018     No LMP for male patient. Allergies and Intolerances:   No Known Allergies    Family History:   Family History   Problem Relation Age of Onset    Cancer Father         he does not know       Social History:   He  reports that he quit smoking about 3 years ago. His smoking use included cigarettes. He has a 45.00 pack-year smoking history. He has never used smokeless tobacco.  He  reports that he does not drink alcohol. Objective:   Physical exam:   Visit Vitals  /72 (BP 1 Location: Right arm, BP Patient Position: Sitting)   Pulse 85   Temp 97.4 °F (36.3 °C) (Oral)   Resp 17   Ht 5' 6\" (1.676 m)   Wt 148 lb (67.1 kg)   SpO2 98%   BMI 23.89 kg/m²        Generally: Pleasant male in no acute distress  Cardiac Exam: regular, rate, and rhythm. Normal S1 and S2.  No murmurs, gallops, or rubs  Pulmonary exam: Clear to auscultation bilaterally  Abdominal exam: Positive bowel sounds in all four quadrants, soft, nondistended, nontender  Extremities: 2+ dorsalis pedis pulses bilaterally. No pedal edema    bilaterally    LABS/Radiological Tests:  Component      Latest Ref Rng & Units 5/7/2019 2/19/2019 2/19/2019 2/19/2019           3:24 PM  2:30 PM  2:30 PM  2:30 PM   Sodium      136 - 145 mmol/L       Potassium      3.5 - 5.5 mmol/L       Chloride      100 - 108 mmol/L       CO2      21 - 32 mmol/L       Anion gap      3.0 - 18 mmol/L       Glucose      74 - 99 mg/dL       BUN      7.0 - 18 MG/DL       Creatinine      0.6 - 1.3 MG/DL       BUN/Creatinine ratio      12 - 20         GFR est AA      >60 ml/min/1.73m2       GFR est non-AA      >60 ml/min/1.73m2       Calcium      8.5 - 10.1 MG/DL       Bilirubin, total      0.2 - 1.0 MG/DL       ALT (SGPT)      16 - 61 U/L       AST      15 - 37 U/L       Alk.  phosphatase      45 - 117 U/L       Protein, total      6.4 - 8.2 g/dL       Albumin      3.4 - 5.0 g/dL       Globulin      2.0 - 4.0 g/dL       A-G Ratio      0.8 - 1.7         Cholesterol, total      <200 MG/DL    185   Triglyceride      <150 MG/DL    118   HDL Cholesterol      40 - 60 MG/DL    76 (H)   LDL, calculated      0 - 100 MG/DL    85.4   VLDL, calculated      MG/DL    23.6   CHOL/HDL Ratio      0 - 5.0      2.4   VALID INTERNAL CONTROL POC       Yes      Prothrombin time (POC)      seconds 28.8      INR       2.4      Hemoglobin A1c, (calculated)      4.2 - 5.6 %  5.5     Est. average glucose      mg/dL  111     TSH      0.36 - 3.74 uIU/mL   1.40      Component      Latest Ref Rng & Units 2/19/2019           2:30 PM   Sodium      136 - 145 mmol/L 142   Potassium      3.5 - 5.5 mmol/L 4.5   Chloride      100 - 108 mmol/L 98 (L)   CO2      21 - 32 mmol/L 37 (H)   Anion gap      3.0 - 18 mmol/L 7   Glucose      74 - 99 mg/dL 121 (H)   BUN      7.0 - 18 MG/DL 12   Creatinine      0.6 - 1.3 MG/DL 0.64   BUN/Creatinine ratio      12 - 20 19   GFR est AA      >60 ml/min/1.73m2 >60   GFR est non-AA      >60 ml/min/1.73m2 >60   Calcium      8.5 - 10.1 MG/DL 9.1   Bilirubin, total      0.2 - 1.0 MG/DL 0.5   ALT (SGPT)      16 - 61 U/L 15 (L)   AST      15 - 37 U/L 8 (L)   Alk. phosphatase      45 - 117 U/L 66   Protein, total      6.4 - 8.2 g/dL 6.7   Albumin      3.4 - 5.0 g/dL 3.7   Globulin      2.0 - 4.0 g/dL 3.0   A-G Ratio      0.8 - 1.7   1.2   Cholesterol, total      <200 MG/DL    Triglyceride      <150 MG/DL    HDL Cholesterol      40 - 60 MG/DL    LDL, calculated      0 - 100 MG/DL    VLDL, calculated      MG/DL    CHOL/HDL Ratio      0 - 5.0      VALID INTERNAL CONTROL POC          Prothrombin time (POC)      seconds    INR          Hemoglobin A1c, (calculated)      4.2 - 5.6 %    Est. average glucose      mg/dL    TSH      0.36 - 3.74 uIU/mL              Previous labs      ASSESSMENT/PLAN:    1. Diabetes mellitus, stable ([facility]): we will see what the labs show. Continue diet, exercise and metformin.  -     HEMOGLOBIN A1C WITH EAG; Future    2. Dyslipidemia: we will see what the labs show. Continue diet, exercise. -     METABOLIC PANEL, COMPREHENSIVE; Future  -     LIPID PANEL; Future    3. Benign hypertension without CHF: stable. Continue metoprolol, lisinopril, and lasix. 4. Anticoagulation management encounter  -     PROTHROMBIN TIME + INR; Future      5. Other orders  -     furosemide (LASIX) 20 mg tablet; Take 1 Tab by mouth daily. -     metFORMIN (GLUCOPHAGE) 500 mg tablet; Take 0.5 Tabs by mouth daily (with breakfast). -     metoprolol tartrate (LOPRESSOR) 25 mg tablet; Take 0.5 Tabs by mouth two (2) times a day. -     lisinopril (PRINIVIL, ZESTRIL) 5 mg tablet; Take 0.5 Tabs by mouth daily. 6.     Requested Prescriptions     Signed Prescriptions Disp Refills    furosemide (LASIX) 20 mg tablet 90 Tab 3     Sig: Take 1 Tab by mouth daily.  metFORMIN (GLUCOPHAGE) 500 mg tablet 45 Tab 3     Sig: Take 0.5 Tabs by mouth daily (with breakfast).     metoprolol tartrate (LOPRESSOR) 25 mg tablet 90 Tab 3     Sig: Take 0.5 Tabs by mouth two (2) times a day.  lisinopril (PRINIVIL, ZESTRIL) 5 mg tablet 45 Tab 3     Sig: Take 0.5 Tabs by mouth daily. 7. Patient verbalized understanding and agreement with the plan. 8. Patient was given an after-visit summary. 9.   Follow-up and Dispositions    Return in about 3 months (around 9/11/2019) for f/u DM/HTN/lipids  or sooner if worsening symptoms.   ·                    Chas Anderson MD

## 2019-06-11 NOTE — PROGRESS NOTES
ROOM # 1  Identified pt with two pt identifiers(name and ). Reviewed record in preparation for visit and have obtained necessary documentation. Chief Complaint   Patient presents with    Diabetes     1 month fu     Hypertension     1 month fu    Cholesterol Problem     1 month fu      Shania Dunlap preferred language for health care discussion is english/other. Is the patient using any DME equipment during OV? YES/ oxygen tank & wheelchair      Shania Dunlap is due for:  Health Maintenance Due   Topic    FOOT EXAM Q1     Shingrix Vaccine Age 50> (2 of 2)   [facility] GLAUCOMA SCREENING Q2Y      Health Maintenance reviewed and discussed per provider  Please order/place referral if appropriate. Advance Directive:  1. Do you have an advance directive in place? Patient Reply: NO    2. If not, would you like material regarding how to put one in place? NO    Coordination of Care:  1. Have you been to the ER, urgent care clinic since your last visit? Hospitalized since your last visit? NO    2. Have you seen or consulted any other health care providers outside of the [address] [place] [address] since your last visit? Include any pap smears or colon screening. NO    Patient is accompanied by wife I have received verbal consent from Shania Dunlap to discuss any/all medical information while they are present in the room.     Learning Assessment:  Learning Assessment 7/3/2018 2014   PRIMARY LEARNER Patient Patient   HIGHEST LEVEL OF EDUCATION - PRIMARY LEARNER  SOME COLLEGE SOME COLLEGE   BARRIERS PRIMARY LEARNER NONE NONE   CO-LEARNER CAREGIVER No -   PRIMARY LANGUAGE ENGLISH ENGLISH   LEARNER PREFERENCE PRIMARY READING LISTENING     DEMONSTRATION -   ANSWERED BY patient self   RELATIONSHIP SELF SELF     Depression Screening:  3 most recent [facility] 36 Screens 10/4/2018 2018 2018 2018 2018 7/3/2018 2018   Little interest or pleasure in doing things Not at all Not at all Not at all Not at all Not at all Not at all Not at all   Feeling down, depressed, irritable, or hopeless Not at all Not at all Not at all Not at all Not at all Not at all Not at all   Total Score PHQ 2 0 0 0 0 0 0 0     Abuse Screening:  Abuse Screening Questionnaire 2/19/2019 7/3/2018 8/10/2017 5/12/2015   Do you ever feel afraid of your partner? N N N N   Are you in a relationship with someone who physically or mentally threatens you? N N N N   Is it safe for you to go home? Asher Adames     Fall Risk  Fall Risk Assessment, last 12 mths 10/4/2018 9/11/2018 9/11/2018 7/19/2018 7/3/2018 5/31/2018 3/1/2018   Able to walk? Yes Yes Yes Yes Yes Yes Yes   Fall in past 12 months? No Yes No Yes No Yes Yes   Fall with injury?  - Yes - - - No Yes   Number of falls in past 12 months - 1 - 5 - 3 3   Fall Risk Score - 2 - - - 3 4

## 2019-06-12 NOTE — PROGRESS NOTES
Called pt yesterday, 6/11/19 @ 5:13pm ([phone]) regarding INR results and talked to wife. Told her about 's INR's results. He will take 7.5mg on 6/11/19 and go back to regular dose of 5mg daily pm on 6/12/19 and recheck in 1 week. They will call us with the results. She verbalized understanding.

## 2019-06-30 ENCOUNTER — TELEPHONE (OUTPATIENT)
Dept: INTERNAL MEDICINE CLINIC | Age: 79
End: 2019-06-30

## 2019-06-30 NOTE — TELEPHONE ENCOUNTER
Please let pt know that INR on 6/25/19 was 2.5 and on 6/19/19 was 2.5.. Continue coumadin 5mg daily pm and recheck on Wed. Call us with the results.

## 2019-07-01 NOTE — TELEPHONE ENCOUNTER
Attempted to contact patient at  number, no answer. Lvm for patient to return call to office at [phone]. Will continue to try to contact patient.

## 2019-07-08 ENCOUNTER — TELEPHONE (OUTPATIENT)
Dept: INTERNAL MEDICINE CLINIC | Age: 79
End: 2019-07-08

## 2019-07-08 NOTE — TELEPHONE ENCOUNTER
Received INR results from 7/4/19. INR: 2.4. Continue coumadin 5mg daily pm and recheck in 1 week. Please call us with the results.

## 2019-07-09 NOTE — TELEPHONE ENCOUNTER
Call made to pt, spoke with wife. Advised to continue taking 5 mg daily coumadin and re-check in 1 week. Wife verbalized understanding and had no further questions or concerns.

## 2019-07-17 ENCOUNTER — TELEPHONE (OUTPATIENT)
Dept: INTERNAL MEDICINE CLINIC | Age: 79
End: 2019-07-17

## 2019-07-17 NOTE — TELEPHONE ENCOUNTER
Please let pt know that INR on 7/16/19 was 2.4. Continue coumadin 5mg daily pm and recheck in 1 week. Please call us with the results.

## 2019-07-17 NOTE — TELEPHONE ENCOUNTER
Attempted to contact pt at  number, no answer. Lvm for pt to return call to office at [phone] . Will continue to try to contact pt.

## 2019-07-20 NOTE — TELEPHONE ENCOUNTER
Patient wife contacted at home number. 2 patient identifiers confirmed. Patient wife informed of below. Patient wife verbalized understanding. No other questions at this time.

## 2019-07-24 ENCOUNTER — TELEPHONE (OUTPATIENT)
Dept: INTERNAL MEDICINE CLINIC | Age: 79
End: 2019-07-24

## 2019-07-24 NOTE — TELEPHONE ENCOUNTER
INR on 7/23/19 was 2.4. Continue coumadin 5mg daily pm and recheck in 1 week. Pt needs to call us with results.

## 2019-07-30 RX ORDER — METOPROLOL TARTRATE 25 MG/1
12.5 TABLET, FILM COATED ORAL 2 TIMES DAILY
Qty: 90 TAB | Refills: 3 | Status: SHIPPED | OUTPATIENT
Start: 2019-07-30 | End: 2020-05-05 | Stop reason: SDUPTHER

## 2019-07-30 RX ORDER — FUROSEMIDE 20 MG/1
20 TABLET ORAL DAILY
Qty: 90 TAB | Refills: 3 | Status: SHIPPED | OUTPATIENT
Start: 2019-07-30 | End: 2020-05-05 | Stop reason: SDUPTHER

## 2019-07-30 RX ORDER — LISINOPRIL 5 MG/1
2.5 TABLET ORAL DAILY
Qty: 45 TAB | Refills: 3 | Status: SHIPPED | OUTPATIENT
Start: 2019-07-30 | End: 2020-02-12 | Stop reason: ALTCHOICE

## 2019-07-30 NOTE — TELEPHONE ENCOUNTER
Patient's daughter is requesting refill. States her father is out of medication. Requested Prescriptions     Pending Prescriptions Disp Refills    furosemide (LASIX) 20 mg tablet 90 Tab 3     Sig: Take 1 Tab by mouth daily.  metoprolol tartrate (LOPRESSOR) 25 mg tablet 90 Tab 3     Sig: Take 0.5 Tabs by mouth two (2) times a day.  lisinopril (PRINIVIL, ZESTRIL) 5 mg tablet 45 Tab 3     Sig: Take 0.5 Tabs by mouth daily.

## 2019-08-05 ENCOUNTER — TELEPHONE (OUTPATIENT)
Dept: INTERNAL MEDICINE CLINIC | Age: 79
End: 2019-08-05

## 2019-08-05 NOTE — TELEPHONE ENCOUNTER
Pt's INR on 8/3/19 was 2.5 . Take coumadin 5mg daily pm and recheck in 1 week. Please call us with the results.

## 2019-08-19 ENCOUNTER — TELEPHONE (OUTPATIENT)
Dept: INTERNAL MEDICINE CLINIC | Age: 79
End: 2019-08-19

## 2019-08-19 NOTE — TELEPHONE ENCOUNTER
Please let pt know that INR on 8/14/19 was 2.4. Please continue taking coumadin 5mg daily pm and recheck in  1 week. Please give us a call with results.

## 2019-08-26 ENCOUNTER — TELEPHONE (OUTPATIENT)
Dept: INTERNAL MEDICINE CLINIC | Age: 79
End: 2019-08-26

## 2019-08-26 NOTE — TELEPHONE ENCOUNTER
Please let pt know that INR on 8/22/19 was 2.4. Continue coumadin 5mg daily pm and recheck in 1 week. Call us with results.

## 2019-08-28 NOTE — TELEPHONE ENCOUNTER
Attempted to contact pt at  number, no answer. Lvm for pt to return call to office at [phone] . Will continue to try to contact pt.

## 2019-09-17 ENCOUNTER — HOSPITAL ENCOUNTER (OUTPATIENT)
Dept: LAB | Age: 79
Discharge: HOME | End: 2019-09-17
Payer: MEDICARE

## 2019-09-17 ENCOUNTER — OFFICE VISIT (OUTPATIENT)
Dept: INTERNAL MEDICINE CLINIC | Age: 79
End: 2019-09-17

## 2019-09-17 VITALS
HEART RATE: 91 BPM | TEMPERATURE: 97.6 F | BODY MASS INDEX: 22.5 KG/M2 | WEIGHT: 140 LBS | SYSTOLIC BLOOD PRESSURE: 130 MMHG | HEIGHT: 66 IN | DIASTOLIC BLOOD PRESSURE: 80 MMHG | OXYGEN SATURATION: 99 % | RESPIRATION RATE: 16 BRPM

## 2019-09-17 DIAGNOSIS — E11.9 DIABETES MELLITUS, STABLE (HCC): ICD-10-CM

## 2019-09-17 DIAGNOSIS — D50.9 IRON DEFICIENCY ANEMIA, UNSPECIFIED IRON DEFICIENCY ANEMIA TYPE: ICD-10-CM

## 2019-09-17 DIAGNOSIS — E78.5 DYSLIPIDEMIA: ICD-10-CM

## 2019-09-17 DIAGNOSIS — Z51.81 ANTICOAGULATION MANAGEMENT ENCOUNTER: ICD-10-CM

## 2019-09-17 DIAGNOSIS — I10 BENIGN HYPERTENSION WITHOUT CHF: ICD-10-CM

## 2019-09-17 DIAGNOSIS — E11.9 DIABETES MELLITUS, STABLE (HCC): Primary | ICD-10-CM

## 2019-09-17 DIAGNOSIS — Z79.01 ANTICOAGULATION MANAGEMENT ENCOUNTER: ICD-10-CM

## 2019-09-17 LAB
ALBUMIN SERPL-MCNC: 3.7 G/DL (ref 3.4–5)
ALBUMIN/GLOB SERPL: 1.3 {RATIO} (ref 0.8–1.7)
ALP SERPL-CCNC: 55 U/L (ref 45–117)
ALT SERPL-CCNC: 28 U/L (ref 16–61)
ANION GAP SERPL CALC-SCNC: 4 MMOL/L (ref 3–18)
AST SERPL-CCNC: 17 U/L (ref 10–38)
BILIRUB SERPL-MCNC: 0.6 MG/DL (ref 0.2–1)
BUN SERPL-MCNC: 15 MG/DL (ref 7–18)
BUN/CREAT SERPL: 19 (ref 12–20)
CALCIUM SERPL-MCNC: 9.6 MG/DL (ref 8.5–10.1)
CHLORIDE SERPL-SCNC: 102 MMOL/L (ref 100–111)
CHOLEST SERPL-MCNC: 192 MG/DL
CO2 SERPL-SCNC: 36 MMOL/L (ref 21–32)
CREAT SERPL-MCNC: 0.77 MG/DL (ref 0.6–1.3)
ERYTHROCYTE [DISTWIDTH] IN BLOOD BY AUTOMATED COUNT: 13.6 % (ref 11.6–14.5)
EST. AVERAGE GLUCOSE BLD GHB EST-MCNC: 114 MG/DL
FERRITIN SERPL-MCNC: 669 NG/ML (ref 8–388)
GLOBULIN SER CALC-MCNC: 2.8 G/DL (ref 2–4)
GLUCOSE SERPL-MCNC: 137 MG/DL (ref 74–99)
HBA1C MFR BLD: 5.6 % (ref 4.2–5.6)
HCT VFR BLD AUTO: 40.2 % (ref 36–48)
HDLC SERPL-MCNC: 78 MG/DL (ref 40–60)
HDLC SERPL: 2.5 {RATIO} (ref 0–5)
HGB BLD-MCNC: 12.3 G/DL (ref 13–16)
INR BLD: 1.8
IRON SATN MFR SERPL: 37 %
IRON SERPL-MCNC: 79 UG/DL (ref 50–175)
LDLC SERPL CALC-MCNC: 92.6 MG/DL (ref 0–100)
LIPID PROFILE,FLP: ABNORMAL
MCH RBC QN AUTO: 31.6 PG (ref 24–34)
MCHC RBC AUTO-ENTMCNC: 30.6 G/DL (ref 31–37)
MCV RBC AUTO: 103.3 FL (ref 74–97)
PLATELET # BLD AUTO: 222 K/UL (ref 135–420)
PMV BLD AUTO: 12.3 FL (ref 9.2–11.8)
POTASSIUM SERPL-SCNC: 4.2 MMOL/L (ref 3.5–5.5)
PROT SERPL-MCNC: 6.5 G/DL (ref 6.4–8.2)
PT POC: 21.3 SECONDS
RBC # BLD AUTO: 3.89 M/UL (ref 4.7–5.5)
SODIUM SERPL-SCNC: 142 MMOL/L (ref 136–145)
TIBC SERPL-MCNC: 215 UG/DL (ref 250–450)
TRIGL SERPL-MCNC: 107 MG/DL (ref ?–150)
VALID INTERNAL CONTROL?: YES
VLDLC SERPL CALC-MCNC: 21.4 MG/DL
WBC # BLD AUTO: 7.5 K/UL (ref 4.6–13.2)

## 2019-09-17 PROCEDURE — 85027 COMPLETE CBC AUTOMATED: CPT

## 2019-09-17 PROCEDURE — 80053 COMPREHEN METABOLIC PANEL: CPT

## 2019-09-17 PROCEDURE — 80061 LIPID PANEL: CPT

## 2019-09-17 PROCEDURE — 83540 ASSAY OF IRON: CPT

## 2019-09-17 PROCEDURE — 82728 ASSAY OF FERRITIN: CPT

## 2019-09-17 PROCEDURE — 83036 HEMOGLOBIN GLYCOSYLATED A1C: CPT

## 2019-09-17 PROCEDURE — 84466 ASSAY OF TRANSFERRIN: CPT

## 2019-09-17 NOTE — PROGRESS NOTES
Rm: 1    Chief Complaint   Patient presents with    Diabetes    Hypertension    Cholesterol Problem     Depression Screening:  3 most recent PHQ Screens 9/17/2019 10/4/2018 9/11/2018 9/11/2018 7/19/2018 7/19/2018 7/3/2018   Little interest or pleasure in doing things Not at all Not at all Not at all Not at all Not at all Not at all Not at all   Feeling down, depressed, irritable, or hopeless Not at all Not at all Not at all Not at all Not at all Not at all Not at all   Total Score PHQ 2 0 0 0 0 0 0 0       Learning Assessment:  Learning Assessment 7/3/2018 8/14/2014   PRIMARY LEARNER Patient Patient   HIGHEST LEVEL OF EDUCATION - PRIMARY LEARNER  [address] CAREGIVER No -   PRIMARY LANGUAGE ENGLISH ENGLISH   LEARNER PREFERENCE PRIMARY READING LISTENING     DEMONSTRATION -   ANSWERED BY patient self   RELATIONSHIP SELF SELF       Abuse Screening:  Abuse Screening Questionnaire 2/19/2019 7/3/2018 8/10/2017 5/12/2015   Do you ever feel afraid of your partner? N N N N   Are you in a relationship with someone who physically or mentally threatens you? N N N N   Is it safe for you to go home? [facility] reviewed and discussed per provider: yes     Coordination of Care:    1. Have you been to the ER, urgent care clinic since your last visit? Hospitalized since your last visit? no    2. Have you seen or consulted any other health care providers outside of the [address] [place] [address] since your last visit? Include any pap smears or colon screening.  no

## 2019-09-17 NOTE — PROGRESS NOTES
Chief Complaint   Patient presents with    Diabetes    Hypertension    Cholesterol Problem       HPI:     Tammy Phillip is a [de-identified] y.o.  male with history of COPD, type 2 DM, hypertension   here for the above complaint. He had eaten greens recently, but no bleeding or bruising. He ate yesterday some greens. He denies any chest pain, shortness of breath, abdominal pain, headaches or dizziness. Pt's wife does not think he will benefit from the surgery, but is willing to see neurosurgery to hear what they have to say and weight risks and benefits of the surgery. We will let Dr. Shawn Avila office know. However, I think the risks are higher than the benefits. Type  2 DM: He does not know the numbers, but okay. Pt is accompanied by wife.        Past Medical History:   Diagnosis Date    Advance directive discussed with patient     Benign localized prostatic hyperplasia with lower urinary tract symptoms (LUTS)     COPD with emphysema ([address]     CVA (cerebrovascular accident) ([address] 7/11/2012    possible    Diabetes mellitus ([address]     Drowsiness     Enlarged prostate     Essential hypertension, benign     Eye exam, routine 07/26/2016    Dr. Padilla Gilbert repeat in 1 yr    H/O colonoscopy 11/24/15    Dr. 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 Katharina Hamilton  late 0386'[id number]     Current Outpatient Medications   Medication Sig    metFORMIN (GLUCOPHAGE) 500 mg tablet TAKE ONE-HALF (1/2) TABLET TWICE A DAY WITH MEALS    tamsulosin (FLOMAX) 0.4 mg capsule Take 2 Caps by mouth daily (after dinner).  furosemide (LASIX) 20 mg tablet Take 1 Tab by mouth daily.  metoprolol tartrate (LOPRESSOR) 25 mg tablet Take 0.5 Tabs by mouth two (2) times a day.  lisinopril (PRINIVIL, ZESTRIL) 5 mg tablet Take 0.5 Tabs by mouth daily.  warfarin (COUMADIN) 5 mg tablet Take 5 mg by mouth daily.  omeprazole-sodium bicarbonate (OMEPPI) 40-1.1 mg-gram capsule Take 1 Cap by mouth daily.  potassium chloride (K-DUR, KLOR-CON) 20 mEq tablet Take 20 mEq by mouth daily.  finasteride (PROSCAR) 5 mg tablet Take 5 mg by mouth daily.  ferrous sulfate 325 mg (65 mg iron) tablet Take 325 mg by mouth two (2) times a day.  OXYGEN-AIR DELIVERY SYSTEMS 3 L by Nasal route continuous.  tamsulosin (FLOMAX) 0.4 mg capsule Take 1 Cap by mouth daily.  finasteride (PROSCAR) 5 mg tablet Take 1 Tab by mouth daily.  omeprazole (PRILOSEC) 40 mg capsule Take 1 Cap by mouth daily.  potassium chloride (KLOR-CON M20) 20 mEq tablet Take 1 Tab by mouth daily.  ferrous sulfate 325 mg (65 mg iron) tablet Take 1 Tab by mouth two (2) times a day.  warfarin (COUMADIN) 5 mg tablet Take 1 Tab by mouth every evening.  albuterol (PROVENTIL HFA, VENTOLIN HFA, PROAIR HFA) 90 mcg/actuation inhaler 2 Puffs.  fluticasone-vilanterol (BREO ELLIPTA) 100-25 mcg/dose inhaler 1 Puff.  albuterol-ipratropium (DUO-NEB) 2.5 mg-0.5 mg/3 ml nebu 3 mL by Nebulization route every four (4) hours.  Lancets misc Check fasting sugars daily before breakfast. DX: E11.9 for freestyle lite meter    Nebulizer & Compressor machine 1 Each.     tiotropium bromide (SPIRIVA RESPIMAT) 2.5 mcg/actuation inhaler Take 2 Puffs by inhalation daily.  glucose blood VI test strips (FREESTYLE LITE STRIPS) strip Check fasting sugars daily before breakfast. DX: E11.9 for freestyle lite meter    ascorbic acid (VITAMIN C) 500 mg tablet Take 500 mg by mouth two (2) times a day.  Blood-Glucose Meter (ONE TOUCH ULTRA 2) monitoring kit Check fasting sugars daily before breakfast. DX: 250.02     No current facility-administered medications for this visit. Health Maintenance   Topic Date Due    FOOT EXAM Q1  08/10/2018    Shingrix Vaccine Age 50> (2 of 2) 12/17/2018    GLAUCOMA SCREENING Q2Y  07/25/2019    EYE EXAM RETINAL OR DILATED  07/25/2019    Influenza Age 5 to Adult  08/01/2019    MICROALBUMIN Q1  10/04/2019    MEDICARE YEARLY EXAM  10/05/2019    HEMOGLOBIN A1C Q6M  12/11/2019    LIPID PANEL Q1  06/11/2020    COLONOSCOPY  11/24/2020    DTaP/Tdap/Td series (2 - Td) 08/01/2026    Pneumococcal 65+ years  Completed     Immunization History   Administered Date(s) Administered    Influenza High Dose Vaccine PF 11/05/2015, 09/25/2017    Influenza Vaccine 10/13/2010, 09/16/2014, 09/14/2015, 11/10/2016    Influenza Vaccine (Tri) Adjuvanted 10/04/2018    Influenza Vaccine PF 01/29/2013    Pneumococcal Conjugate (PCV-13) 09/14/2015    Pneumococcal Polysaccharide (PPSV-23) 10/13/2010, 08/14/2014    Zoster Recombinant 10/22/2018     No LMP for male patient. Allergies and Intolerances:   No Known Allergies    Family History:   Family History   Problem Relation Age of Onset    Cancer Father         he does not know       Social History:   He  reports that he quit smoking about 4 years ago. His smoking use included cigarettes. He has a 45.00 pack-year smoking history. He has never used smokeless tobacco.  He  reports that he does not drink alcohol.             OBJECTIVE:   Physical exam:   Visit Vitals  /80 (BP 1 Location: Right arm, BP Patient Position: Sitting)   Pulse 91   Temp 97.6 °F (36.4 °C) (Oral) Resp 16   Ht 5' 6\" (1.676 m)   Wt 140 lb (63.5 kg)   SpO2 99% Comment: on 3 L NC   BMI 22.60 kg/m²        Generally: Pleasant female in no acute distress  Cardiac Exam: regular, rate, and rhythm. Normal S1 and S2. No murmurs, gallops, or rubs  Pulmonary exam: Clear to auscultation bilaterally  Abdominal exam: Positive bowel sounds in all four quadrants, soft, nondistended, nontender  Extremities: 2+ dorsalis pedis pulses bilaterally. No pedal edema    bilaterally    LABS/RADIOLOGICAL TESTS:  Component      Latest Ref Rng & Units 6/11/2019 6/11/2019 6/11/2019 6/11/2019          11:59 AM 11:59 AM 11:59 AM 11:59 AM   Sodium      136 - 145 mmol/L    142   Potassium      3.5 - 5.5 mmol/L    4.0   Chloride      100 - 108 mmol/L    100   CO2      21 - 32 mmol/L    37 (H)   Anion gap      3.0 - 18 mmol/L    5   Glucose      74 - 99 mg/dL    126 (H)   BUN      7.0 - 18 MG/DL    16   Creatinine      0.6 - 1.3 MG/DL    0.84   BUN/Creatinine ratio      12 - 20      19   GFR est AA      >60 ml/min/1.73m2    >60   GFR est non-AA      >60 ml/min/1.73m2    >60   Calcium      8.5 - 10.1 MG/DL    8.9   Bilirubin, total      0.2 - 1.0 MG/DL    0.6   ALT (SGPT)      16 - 61 U/L    14 (L)   AST      15 - 37 U/L    11 (L)   Alk.  phosphatase      45 - 117 U/L    60   Protein, total      6.4 - 8.2 g/dL    6.6   Albumin      3.4 - 5.0 g/dL    3.7   Globulin      2.0 - 4.0 g/dL    2.9   A-G Ratio      0.8 - 1.7      1.3   Cholesterol, total      <200 MG/DL   213 (H)    Triglyceride      <150 MG/DL   87    HDL Cholesterol      40 - 60 MG/DL   80 (H)    LDL, calculated      0 - 100 MG/DL   115.6 (H)    VLDL, calculated      MG/DL   17.4    CHOL/HDL Ratio      0 - 5.0     2.7    VALID INTERNAL CONTROL POC             Prothrombin time (POC)      seconds       INR      0.8 - 1.2   1.6 (H)      Hemoglobin A1c, (calculated)      4.2 - 5.6 %  5.2     Est. average glucose      mg/dL  103     Prothrombin time      11.5 - 15.2 sec 19.0 (H) Component      Latest Ref Rng & Units 5/7/2019           3:24 PM   Sodium      136 - 145 mmol/L    Potassium      3.5 - 5.5 mmol/L    Chloride      100 - 108 mmol/L    CO2      21 - 32 mmol/L    Anion gap      3.0 - 18 mmol/L    Glucose      74 - 99 mg/dL    BUN      7.0 - 18 MG/DL    Creatinine      0.6 - 1.3 MG/DL    BUN/Creatinine ratio      12 - 20      GFR est AA      >60 ml/min/1.73m2    GFR est non-AA      >60 ml/min/1.73m2    Calcium      8.5 - 10.1 MG/DL    Bilirubin, total      0.2 - 1.0 MG/DL    ALT (SGPT)      16 - 61 U/L    AST      15 - 37 U/L    Alk. phosphatase      45 - 117 U/L    Protein, total      6.4 - 8.2 g/dL    Albumin      3.4 - 5.0 g/dL    Globulin      2.0 - 4.0 g/dL    A-G Ratio      0.8 - 1.7      Cholesterol, total      <200 MG/DL    Triglyceride      <150 MG/DL    HDL Cholesterol      40 - 60 MG/DL    LDL, calculated      0 - 100 MG/DL    VLDL, calculated      MG/DL    CHOL/HDL Ratio      0 - 5.0      VALID INTERNAL CONTROL POC       Yes   Prothrombin time (POC)      seconds 28.8   INR      0.8 - 1.2   2.4   Hemoglobin A1c, (calculated)      4.2 - 5.6 %    Est. average glucose      mg/dL    Prothrombin time      11.5 - 15.2 sec      Previous labs  Component      Latest Ref Rng & Units 9/17/2019           9:09 AM   VALID INTERNAL CONTROL POC       Yes   Prothrombin time (POC)      seconds 21.3   INR       1.8   Pt notified of results at [address] today, 9/17/19. ASSESSMENT/PLAN:    1. Diabetes mellitus, stable (HCC):we will see what the labs show. Continue diet, exercise and metformin.  -     METABOLIC PANEL, COMPREHENSIVE; Future  -     LIPID PANEL; Future  -     HEMOGLOBIN A1C WITH EAG; Future    2. Benign hypertension without CHF: stable. Continue diet, exercise and lisinopril, lasix and lopressor. 3. Dyslipidemia:we will see what the labs show. Continue diet, exercise. 4. Anticoagulation management encounter: not well controlled due to eating greens.  Take 7.5mg (coumadin 5mg 1.5 tablets today) today and then tomorrow go back to coumadin 5mg daily pm and recheck in 1 week. -     AMB POC PT/INR    5. Iron deficiency anemia, unspecified iron deficiency anemia type: stable. Continue the iron. -     CBC W/O DIFF; Future  -     FERRITIN; Future  -     IRON PROFILE; Future  -     TRANSFERRIN; Future    6. Patient verbalized understanding and agreement with the plan. 7. Patient was given an after-visit summary. 8.   Follow-up and Dispositions    · Return in about 3 months (around 12/17/2019) for f/u DM/HTN/lipids  or sooner if worsening symptoms.                Pily Lunsford M.D.

## 2019-09-17 NOTE — PATIENT INSTRUCTIONS
1) Take coumadin 7.5mg (5mg table 1.5) today and then go back tomorrow, WEd. To 5mg daily PM and recheck in 1 week. 2) Follow-up in 3 months or sooner if worsening symptoms.

## 2019-09-18 ENCOUNTER — TELEPHONE (OUTPATIENT)
Dept: INTERNAL MEDICINE CLINIC | Age: 79
End: 2019-09-18

## 2019-09-18 LAB — TRANSFERRIN SERPL-MCNC: 168 MG/DL (ref 200–370)

## 2019-09-18 NOTE — TELEPHONE ENCOUNTER
Maurice Mcguire with [facility] called with patients reading from yesterday his INR was 1.8 please call patient with any new or updated orders

## 2019-09-18 NOTE — TELEPHONE ENCOUNTER
We already told him his INR recommendations at 9/17/19 OV. This was what pt was told yesterday. Take 7.5mg (coumadin 5mg 1.5 tablets today) today and then tomorrow go back to coumadin 5mg daily pm and recheck in 1 week.

## 2019-09-19 ENCOUNTER — TELEPHONE (OUTPATIENT)
Dept: INTERNAL MEDICINE CLINIC | Age: 79
End: 2019-09-19

## 2019-09-19 NOTE — TELEPHONE ENCOUNTER
Please let Dr. Beaulieu Ban that he is not be a good surgical candidate for the surgery. However, pt is willing to see the neurosurgeon to get information about the surgery and risks and benefits of it.

## 2019-09-24 NOTE — TELEPHONE ENCOUNTER
Dr. Chuck Reinoso   Physician   Provider Summary     Sex Title Provider type   Unknown MD Physician   Primary Contact Information     Phone Fax E-mail Address   [phone] Not available [address] [facility]    [address]    [place] [phone]

## 2019-09-27 NOTE — TELEPHONE ENCOUNTER
Attempted to contact Dr Cristina Ramos MA at [facility], left message for return call to office at [phone].

## 2019-09-30 ENCOUNTER — TELEPHONE (OUTPATIENT)
Dept: INTERNAL MEDICINE CLINIC | Age: 79
End: 2019-09-30

## 2019-09-30 NOTE — TELEPHONE ENCOUNTER
Call made to pt, spoke with Mrs. Neha Burris, she was advised of results and to recheck in 1 week. Mrs. Neha Burris verbalized understanding and had no further questions or concerns.

## 2019-09-30 NOTE — TELEPHONE ENCOUNTER
Please let pt know that INR on 9/27/19 was 2.0. Continue coumadin 5 mg daily pm and recheck in 1 week. Please call us with the results.

## 2019-10-10 ENCOUNTER — TELEPHONE (OUTPATIENT)
Dept: INTERNAL MEDICINE CLINIC | Age: 79
End: 2019-10-10

## 2019-10-21 ENCOUNTER — TELEPHONE (OUTPATIENT)
Dept: INTERNAL MEDICINE CLINIC | Age: 79
End: 2019-10-21

## 2019-10-21 NOTE — TELEPHONE ENCOUNTER
MD Inr faxed in patient INR results as 2.2 as of 10/16/19. Patient is taking 5mg of coumadin.  Please advise

## 2019-10-21 NOTE — TELEPHONE ENCOUNTER
MD INR faxed in patient INR results as 2.2 on 10/16/19. Patient is taking 5mg of coumadin.  Please advise

## 2019-10-22 NOTE — TELEPHONE ENCOUNTER
Patient wife contacted at home number. 2 patient identifiers confirmed. Patient wife informed of below. Patient wife verbalized understanding. Patient will recheck INR at normal time 10/23/2019, patient will call into office with results. No other questions at this time.

## 2019-10-24 ENCOUNTER — TELEPHONE (OUTPATIENT)
Dept: INTERNAL MEDICINE CLINIC | Age: 79
End: 2019-10-24

## 2019-10-24 NOTE — TELEPHONE ENCOUNTER
Incoming call from patient wife. 2 patient identifiers confirmed. Patient wife reports patient INR is 2.3  Patient current dose of coumadin is 5 mg daily.

## 2019-10-25 NOTE — TELEPHONE ENCOUNTER
Return call made to Tashia Gonzalez. She was advised to have pt continue same dosage and call with f/u results.   Monique verbalized understanding and had no further questions or concerns

## 2019-11-05 PROBLEM — R06.03 ACUTE RESPIRATORY DISTRESS: Status: ACTIVE | Noted: 2018-05-18

## 2019-11-05 PROBLEM — Z86.711 HISTORY OF PULMONARY EMBOLISM: Status: ACTIVE | Noted: 2018-07-13

## 2019-11-05 PROBLEM — R26.2 INABILITY TO WALK: Status: ACTIVE | Noted: 2018-07-13

## 2019-11-20 DIAGNOSIS — N28.1 RENAL CYST, RIGHT: ICD-10-CM

## 2019-11-20 DIAGNOSIS — Z87.898 HISTORY OF URINARY RETENTION: ICD-10-CM

## 2019-11-20 DIAGNOSIS — N40.0 ENLARGED PROSTATE: ICD-10-CM

## 2019-11-20 DIAGNOSIS — R31.29 MICROHEMATURIA: ICD-10-CM

## 2019-11-20 RX ORDER — POTASSIUM CHLORIDE 20 MEQ/1
20 TABLET, EXTENDED RELEASE ORAL DAILY
Qty: 90 TAB | Refills: 1 | Status: SHIPPED | OUTPATIENT
Start: 2019-11-20 | End: 2020-05-05 | Stop reason: SDUPTHER

## 2019-11-20 RX ORDER — FINASTERIDE 5 MG/1
5 TABLET, FILM COATED ORAL DAILY
Qty: 90 TAB | Refills: 1 | Status: SHIPPED | OUTPATIENT
Start: 2019-11-20 | End: 2020-05-05 | Stop reason: SDUPTHER

## 2019-11-20 RX ORDER — LANOLIN ALCOHOL/MO/W.PET/CERES
325 CREAM (GRAM) TOPICAL 2 TIMES DAILY
Qty: 180 TAB | Refills: 1 | Status: SHIPPED | OUTPATIENT
Start: 2019-11-20 | End: 2020-05-05 | Stop reason: SDUPTHER

## 2019-11-20 RX ORDER — OMEPRAZOLE 40 MG/1
40 CAPSULE, DELAYED RELEASE ORAL DAILY
Qty: 90 CAP | Refills: 1 | Status: SHIPPED | OUTPATIENT
Start: 2019-11-20 | End: 2020-05-05 | Stop reason: SDUPTHER

## 2019-11-20 RX ORDER — WARFARIN SODIUM 5 MG/1
5 TABLET ORAL EVERY EVENING
Qty: 90 TAB | Refills: 1 | Status: SHIPPED | OUTPATIENT
Start: 2019-11-20 | End: 2020-05-05 | Stop reason: SDUPTHER

## 2019-11-20 NOTE — TELEPHONE ENCOUNTER
Patient's daughter is calling in for refills. Last OV 9/17/19, no future appts scheduled. Informed patient's daughter that Dr. Fmailia Zapata will be leaving the practice. Requested Prescriptions     Pending Prescriptions Disp Refills    finasteride (PROSCAR) 5 mg tablet 90 Tab 3     Sig: Take 1 Tab by mouth daily.  omeprazole (PRILOSEC) 40 mg capsule 90 Cap 3     Sig: Take 1 Cap by mouth daily.  potassium chloride (K-DUR, KLOR-CON) 20 mEq tablet       Sig: Take 1 Tab by mouth daily.  ferrous sulfate 325 mg (65 mg iron) tablet 180 Tab 3     Sig: Take 1 Tab by mouth two (2) times a day.  warfarin (COUMADIN) 5 mg tablet 90 Tab 3     Sig: Take 1 Tab by mouth every evening.

## 2019-12-30 ENCOUNTER — TELEPHONE (OUTPATIENT)
Dept: INTERNAL MEDICINE CLINIC | Age: 79
End: 2019-12-30

## 2019-12-30 NOTE — TELEPHONE ENCOUNTER
Please let pt know that INR on 12/26/19 was 2.4. Continue the current dose of coumadin 5mg daily pm and recheck in 1 week. Please call with the results.

## 2019-12-31 NOTE — TELEPHONE ENCOUNTER
Attempted to contact pt at  number, no answer. m for pt to return call to office at [phone] . Will continue to try to contact pt.

## 2020-01-22 ENCOUNTER — TELEPHONE (OUTPATIENT)
Dept: INTERNAL MEDICINE CLINIC | Age: 80
End: 2020-01-22

## 2020-01-22 NOTE — TELEPHONE ENCOUNTER
Fax from [facility] report patient INR as 2.2 on 1/21/20 9:25am. Patient is taking 5mg of coumadin every evening.

## 2020-01-23 NOTE — TELEPHONE ENCOUNTER
Chart review reveals dx of prior PE. Goal INR 2-3. Continue current dosage of 5 mg daily. Continue routine f/u - per guidelines needs to recheck in 4 weeks.

## 2020-02-12 ENCOUNTER — HOSPITAL ENCOUNTER (OUTPATIENT)
Dept: LAB | Age: 80
Discharge: HOME | End: 2020-02-12
Payer: MEDICARE

## 2020-02-12 ENCOUNTER — OFFICE VISIT (OUTPATIENT)
Dept: INTERNAL MEDICINE CLINIC | Age: 80
End: 2020-02-12

## 2020-02-12 VITALS
HEART RATE: 81 BPM | DIASTOLIC BLOOD PRESSURE: 86 MMHG | RESPIRATION RATE: 18 BRPM | WEIGHT: 140 LBS | HEIGHT: 66 IN | SYSTOLIC BLOOD PRESSURE: 124 MMHG | BODY MASS INDEX: 22.5 KG/M2 | TEMPERATURE: 97.4 F | OXYGEN SATURATION: 96 %

## 2020-02-12 DIAGNOSIS — E78.5 HYPERLIPIDEMIA, UNSPECIFIED HYPERLIPIDEMIA TYPE: ICD-10-CM

## 2020-02-12 DIAGNOSIS — I10 ESSENTIAL HYPERTENSION: ICD-10-CM

## 2020-02-12 DIAGNOSIS — D64.9 CHRONIC ANEMIA: ICD-10-CM

## 2020-02-12 DIAGNOSIS — Z23 ENCOUNTER FOR IMMUNIZATION: ICD-10-CM

## 2020-02-12 DIAGNOSIS — J44.9 CHRONIC OBSTRUCTIVE PULMONARY DISEASE, UNSPECIFIED COPD TYPE (HCC): ICD-10-CM

## 2020-02-12 DIAGNOSIS — E11.9 TYPE 2 DIABETES MELLITUS WITHOUT COMPLICATION, WITHOUT LONG-TERM CURRENT USE OF INSULIN (HCC): Primary | ICD-10-CM

## 2020-02-12 DIAGNOSIS — E11.9 TYPE 2 DIABETES MELLITUS WITHOUT COMPLICATION, WITHOUT LONG-TERM CURRENT USE OF INSULIN (HCC): ICD-10-CM

## 2020-02-12 DIAGNOSIS — Z51.81 ANTICOAGULATION MANAGEMENT ENCOUNTER: ICD-10-CM

## 2020-02-12 DIAGNOSIS — I26.92 SADDLE EMBOLUS OF PULMONARY ARTERY WITHOUT ACUTE COR PULMONALE, UNSPECIFIED CHRONICITY (HCC): ICD-10-CM

## 2020-02-12 DIAGNOSIS — Z79.01 ANTICOAGULATION MANAGEMENT ENCOUNTER: ICD-10-CM

## 2020-02-12 LAB
ALBUMIN SERPL-MCNC: 3.3 G/DL (ref 3.4–5)
ALBUMIN/GLOB SERPL: 1.1 {RATIO} (ref 0.8–1.7)
ALP SERPL-CCNC: 49 U/L (ref 45–117)
ALT SERPL-CCNC: 27 U/L (ref 16–61)
ANION GAP SERPL CALC-SCNC: 2 MMOL/L (ref 3–18)
AST SERPL-CCNC: 21 U/L (ref 10–38)
BILIRUB SERPL-MCNC: 0.6 MG/DL (ref 0.2–1)
BUN SERPL-MCNC: 17 MG/DL (ref 7–18)
BUN/CREAT SERPL: 22 (ref 12–20)
CALCIUM SERPL-MCNC: 9.1 MG/DL (ref 8.5–10.1)
CHLORIDE SERPL-SCNC: 101 MMOL/L (ref 100–111)
CO2 SERPL-SCNC: 37 MMOL/L (ref 21–32)
CREAT SERPL-MCNC: 0.79 MG/DL (ref 0.6–1.3)
ERYTHROCYTE [DISTWIDTH] IN BLOOD BY AUTOMATED COUNT: 13.3 % (ref 11.6–14.5)
GLOBULIN SER CALC-MCNC: 2.9 G/DL (ref 2–4)
GLUCOSE SERPL-MCNC: 153 MG/DL (ref 74–99)
HBA1C MFR BLD: 5.2 % (ref 4.2–5.6)
HCT VFR BLD AUTO: 39.7 % (ref 36–48)
HGB BLD-MCNC: 12.5 G/DL (ref 13–16)
INR BLD: 1.7
MCH RBC QN AUTO: 31.9 PG (ref 24–34)
MCHC RBC AUTO-ENTMCNC: 31.5 G/DL (ref 31–37)
MCV RBC AUTO: 101.3 FL (ref 74–97)
PLATELET # BLD AUTO: 216 K/UL (ref 135–420)
PMV BLD AUTO: 12.1 FL (ref 9.2–11.8)
POTASSIUM SERPL-SCNC: 4.3 MMOL/L (ref 3.5–5.5)
PROT SERPL-MCNC: 6.2 G/DL (ref 6.4–8.2)
PT POC: 20.7 SECONDS
RBC # BLD AUTO: 3.92 M/UL (ref 4.7–5.5)
SODIUM SERPL-SCNC: 140 MMOL/L (ref 136–145)
VALID INTERNAL CONTROL?: YES
WBC # BLD AUTO: 7.8 K/UL (ref 4.6–13.2)

## 2020-02-12 PROCEDURE — 85027 COMPLETE CBC AUTOMATED: CPT

## 2020-02-12 PROCEDURE — 36415 COLL VENOUS BLD VENIPUNCTURE: CPT

## 2020-02-12 PROCEDURE — 83036 HEMOGLOBIN GLYCOSYLATED A1C: CPT

## 2020-02-12 PROCEDURE — 80053 COMPREHEN METABOLIC PANEL: CPT

## 2020-02-12 RX ORDER — ATORVASTATIN CALCIUM 10 MG/1
10 TABLET, FILM COATED ORAL DAILY
Qty: 90 TAB | Refills: 3 | Status: SHIPPED | OUTPATIENT
Start: 2020-02-12 | End: 2020-09-30 | Stop reason: DRUGHIGH

## 2020-02-12 NOTE — PATIENT INSTRUCTIONS
1) For diabetes, stop Metformin. 2) For blood pressure, stop Lisinopril. Hopefully this helps you not feel so dizzy when you stand up. 3) For Coumadin, take 1 1/2 tablets (7.5 mg) today, then go back to 5 mg daily dosing. Recheck in 2 weeks.

## 2020-02-12 NOTE — PROGRESS NOTES
Rm: 11    Chief Complaint   Patient presents with    [facility] Care    Diabetes    Hypertension    Cholesterol Problem     Depression Screening:  3 most recent [facility] Screens 2/12/2020 9/17/2019 10/4/2018 9/11/2018 9/11/2018 7/19/2018 7/19/2018   Little interest or pleasure in doing things Not at all Not at all Not at all Not at all Not at all Not at all Not at all   Feeling down, depressed, irritable, or hopeless Not at all Not at all Not at all Not at all Not at all Not at all Not at all   Total Score PHQ 2 0 0 0 0 0 0 0       Learning Assessment:  Learning Assessment 7/3/2018 8/14/2014   PRIMARY LEARNER Patient Patient   HIGHEST LEVEL OF EDUCATION - PRIMARY LEARNER  [address] [facility] CAREGIVER No -   PRIMARY LANGUAGE ENGLISH ENGLISH   LEARNER PREFERENCE PRIMARY READING LISTENING     DEMONSTRATION -   ANSWERED BY patient self   RELATIONSHIP SELF SELF       Abuse Screening:  Abuse Screening Questionnaire 2/19/2019 7/3/2018 8/10/2017 5/12/2015   Do you ever feel afraid of your partner? N N N N   Are you in a relationship with someone who physically or mentally threatens you? N N N N   Is it safe for you to go home? [facility]       Health Maintenance reviewed and discussed per provider: yes     Coordination of Care:    1. Have you been to the ER, urgent care clinic since your last visit? Hospitalized since your last visit? no    2. Have you seen or consulted any other health care providers outside of the [address] [place] [address] since your last visit? Include any pap smears or colon screening. No    Presented for high-dose Influenza Vaccine. Administered in right deltoid without complaints. Pt observed for 5 min. No adverse reaction noted.  Pt left office in stable condition

## 2020-02-12 NOTE — PROGRESS NOTES
HISTORY OF PRESENT ILLNESS  Michaelle Weaver is a [de-identified] y.o. male. HPI  Presents to establish with me. 1) DMII - taking Metformin 250 mg BID. Lab Results   Component Value Date/Time    Hemoglobin A1c 5.6 09/17/2019 10:20 AM    Hemoglobin A1c (POC) 6.1 01/29/2013 08:14 AM       2) HTN - well-controlled. Admits to some dizziness with standing. 3) HLD - no medication. Spouse thought he took Lipitor in the past. Unsure about this. Lab Results   Component Value Date/Time    Cholesterol, total 192 09/17/2019 10:20 AM    HDL Cholesterol 78 (H) 09/17/2019 10:20 AM    LDL, calculated 92.6 09/17/2019 10:20 AM    VLDL, calculated 21.4 09/17/2019 10:20 AM    Triglyceride 107 09/17/2019 10:20 AM    CHOL/HDL Ratio 2.5 09/17/2019 10:20 AM     4) COPD - on 24/7 oxygen. Does not have pulmonologist. Hx of following with Dr. Baldo Jules. - Admits to noncompliance with inhalers. Has these at home, but I am unsure as to who has been prescribing these. Wife finally admits that he often does not take these. 5) Chronic Coumadin - 2.2 on 1/21/2020. Taking 5 mg qhs.   - Admits sometimes misses this but reports compliance at present. I do have some question of this, however. Current Outpatient Medications   Medication Sig Dispense Refill    finasteride (PROSCAR) 5 mg tablet Take 1 Tab by mouth daily. 90 Tab 1    omeprazole (PRILOSEC) 40 mg capsule Take 1 Cap by mouth daily. 90 Cap 1    potassium chloride (K-DUR, KLOR-CON) 20 mEq tablet Take 1 Tab by mouth daily. 90 Tab 1    ferrous sulfate 325 mg (65 mg iron) tablet Take 1 Tab by mouth two (2) times a day. 180 Tab 1    warfarin (COUMADIN) 5 mg tablet Take 1 Tab by mouth every evening. 90 Tab 1    metFORMIN (GLUCOPHAGE) 500 mg tablet TAKE ONE-HALF (1/2) TABLET TWICE A DAY WITH MEALS 90 Tab 3    furosemide (LASIX) 20 mg tablet Take 1 Tab by mouth daily. 90 Tab 3    metoprolol tartrate (LOPRESSOR) 25 mg tablet Take 0.5 Tabs by mouth two (2) times a day.  90 Tab 3    lisinopril (PRINIVIL, ZESTRIL) 5 mg tablet Take 0.5 Tabs by mouth daily. 45 Tab 3    ferrous sulfate 325 mg (65 mg iron) tablet Take 325 mg by mouth two (2) times a day.  OXYGEN-AIR DELIVERY SYSTEMS 3 L by Nasal route continuous.  tamsulosin (FLOMAX) 0.4 mg capsule Take 1 Cap by mouth daily. 90 Cap 3    potassium chloride (KLOR-CON M20) 20 mEq tablet Take 1 Tab by mouth daily. 90 Tab 3    albuterol (PROVENTIL HFA, VENTOLIN HFA, PROAIR HFA) 90 mcg/actuation inhaler 2 Puffs.  fluticasone-vilanterol (BREO ELLIPTA) 100-25 mcg/dose inhaler 1 Puff.  albuterol-ipratropium (DUO-NEB) 2.5 mg-0.5 mg/3 ml nebu 3 mL by Nebulization route every four (4) hours. 30 Nebule 5    Lancets misc Check fasting sugars daily before breakfast. DX: E11.9 for freestyle lite meter 100 Each 11    Nebulizer & Compressor machine 1 Each.  tiotropium bromide (SPIRIVA RESPIMAT) 2.5 mcg/actuation inhaler Take 2 Puffs by inhalation daily.  glucose blood VI test strips (FREESTYLE LITE STRIPS) strip Check fasting sugars daily before breakfast. DX: E11.9 for freestyle lite meter 100 Strip 11    ascorbic acid (VITAMIN C) 500 mg tablet Take 500 mg by mouth two (2) times a day.  Blood-Glucose Meter (ONE TOUCH ULTRA 2) monitoring kit Check fasting sugars daily before breakfast. DX: 250.02 1 Kit 0       Review of Systems   Cardiovascular: Negative for chest pain and leg swelling. Neurological: Positive for dizziness (occasional with standing). Endo/Heme/Allergies: Does not bruise/bleed easily. Visit Vitals  /86 (BP 1 Location: Right arm, BP Patient Position: Sitting)   Pulse 81   Temp 97.4 °F (36.3 °C) (Oral)   Resp 18   Ht 5' 6\" (1.676 m)   Wt 140 lb (63.5 kg)   SpO2 96%   BMI 22.60 kg/m²     Wt Readings from Last 3 Encounters:   02/12/20 140 lb (63.5 kg)   11/05/19 140 lb (63.5 kg)   09/17/19 140 lb (63.5 kg)         Physical Exam  Constitutional:       General: He is not in acute distress.      Appearance: Normal appearance. He is normal weight. Comments: Sitting in a wheelchair. HENT:      Head: Normocephalic and atraumatic. Right Ear: Tympanic membrane, ear canal and external ear normal.      Left Ear: Tympanic membrane, ear canal and external ear normal.      Nose: Nose normal.      Mouth/Throat:      Mouth: Mucous membranes are moist.      Pharynx: Uvula midline. No oropharyngeal exudate or posterior oropharyngeal erythema. Tonsils: No tonsillar abscesses. Eyes:      General: No scleral icterus. Conjunctiva/sclera: Conjunctivae normal.      Pupils: Pupils are equal, round, and reactive to light. Neck:      Musculoskeletal: Neck supple. Cardiovascular:      Rate and Rhythm: Normal rate and regular rhythm. Pulses: Normal pulses. Dorsalis pedis pulses are 2+ on the right side and 2+ on the left side. Posterior tibial pulses are 2+ on the right side and 2+ on the left side. Heart sounds: Normal heart sounds. No murmur. No gallop. Pulmonary:      Effort: Pulmonary effort is normal. No respiratory distress. Breath sounds: Normal breath sounds. No decreased breath sounds, wheezing, rhonchi or rales. Musculoskeletal:      Right lower leg: No edema. Left lower leg: No edema. Lymphadenopathy:      Head:      Right side of head: No submandibular or tonsillar adenopathy. Left side of head: No submandibular or tonsillar adenopathy. Cervical: No cervical adenopathy. Upper Body:      Right upper body: No supraclavicular adenopathy. Left upper body: No supraclavicular adenopathy. Skin:     General: Skin is warm and dry. Neurological:      Mental Status: He is alert and oriented to person, place, and time. Psychiatric:         Speech: Speech normal.         Behavior: Behavior is cooperative.        Results for orders placed or performed in visit on 02/12/20   AMB POC PT/INR   Result Value Ref Range    VALID INTERNAL CONTROL POC Yes Prothrombin time (POC) 20.7 seconds    INR POC 1.7        ASSESSMENT and PLAN  Diagnoses and all orders for this visit:    1. Type 2 diabetes mellitus without complication, without long-term current use of insulin (HCC)  -     METABOLIC PANEL, COMPREHENSIVE; Future  -     HEMOGLOBIN A1C W/O EAG; Future  - D/C Metformin. Very well-controlled. 2. Chronic obstructive pulmonary disease, unspecified COPD type ([facility]  -     REFERRAL TO PULMONARY DISEASE   - Advised needs to follow with pulmonology and use prescribed medications. 3. Essential hypertension  - D/c Lisinopril. Cont Metoprolol. Cont Lasix. 4. Anticoagulation management encounter  5. Saddle embolus of pulmonary artery without acute cor pulmonale, unspecified chronicity (HCC)  -     AMB POC PT/INR  - Take 7.5 mg Coumadin tonight then resume 5 mg daily dosing. Make sure to take every day. Recheck 2 weeks. 6. Chronic anemia  -     CBC W/O DIFF; Future    7. Hyperlipidemia, unspecified hyperlipidemia type  -     atorvastatin (LIPITOR) 10 mg tablet; Take 1 Tab by mouth daily.   - Advised to take. 8. Encounter for immunization  -     INFLUENZA VACCINE INACTIVATED (IIV), SUBUNIT, ADJUVANTED, IM  -     ADMIN INFLUENZA VIRUS VAC      Follow-up and Dispositions    · Return in about 3 months (around 5/12/2020) for combo clinic (30 min). Patient Instructions   1) For diabetes, stop Metformin. 2) For blood pressure, stop Lisinopril. Hopefully this helps you not feel so dizzy when you stand up. 3) For Coumadin, take 1 1/2 tablets (7.5 mg) today, then go back to 5 mg daily dosing. Recheck in 2 weeks.

## 2020-02-21 ENCOUNTER — TELEPHONE (OUTPATIENT)
Dept: INTERNAL MEDICINE CLINIC | Age: 80
End: 2020-02-21

## 2020-02-21 NOTE — TELEPHONE ENCOUNTER
Please notify patient that 2/21/2020 looked good at 2.0. Please confirm 5 mg daily dosing. He needs to continue this. Recheck 4 weeks or when his machine requires recheck.

## 2020-02-25 NOTE — TELEPHONE ENCOUNTER
Attempted to contact pt at  number, no answer. Lvm for pt to return call to office at [phone] . Will continue to try to contact pt.

## 2020-02-26 NOTE — TELEPHONE ENCOUNTER
Delta Boys with patient wife and  2 patient identifiers confirmed. Advised patient wife  of information below. Patient wife understood and had no further questions.

## 2020-03-04 ENCOUNTER — TELEPHONE (OUTPATIENT)
Dept: INTERNAL MEDICINE CLINIC | Age: 80
End: 2020-03-04

## 2020-03-04 NOTE — TELEPHONE ENCOUNTER
Patient's daughter is calling in to speak with Jazmine in reference to a recent medication change that was made. Do not see anyone on the patient's PHI.

## 2020-03-11 NOTE — TELEPHONE ENCOUNTER
Attempted to call patient on 3/4/20 to see if patient will give the verbal ok for daughter to speak with Jazmine. Left message for patient to call back.

## 2020-03-13 ENCOUNTER — TELEPHONE (OUTPATIENT)
Dept: INTERNAL MEDICINE CLINIC | Age: 80
End: 2020-03-13

## 2020-03-13 NOTE — TELEPHONE ENCOUNTER
INR result received - 2.1. Please confirm patient is taking 5 mg daily. Continue same. Recheck 4 weeks.

## 2020-04-16 ENCOUNTER — VIRTUAL VISIT (OUTPATIENT)
Dept: INTERNAL MEDICINE CLINIC | Age: 80
End: 2020-04-16

## 2020-05-05 DIAGNOSIS — N28.1 RENAL CYST, RIGHT: ICD-10-CM

## 2020-05-05 DIAGNOSIS — Z87.898 HISTORY OF URINARY RETENTION: ICD-10-CM

## 2020-05-05 DIAGNOSIS — N40.0 ENLARGED PROSTATE: ICD-10-CM

## 2020-05-05 DIAGNOSIS — R31.29 MICROHEMATURIA: ICD-10-CM

## 2020-05-06 RX ORDER — FINASTERIDE 5 MG/1
5 TABLET, FILM COATED ORAL DAILY
Qty: 90 TAB | Refills: 1 | Status: SHIPPED | OUTPATIENT
Start: 2020-05-06 | End: 2020-09-30 | Stop reason: SDUPTHER

## 2020-05-06 RX ORDER — OMEPRAZOLE 40 MG/1
40 CAPSULE, DELAYED RELEASE ORAL DAILY
Qty: 90 CAP | Refills: 1 | Status: SHIPPED | OUTPATIENT
Start: 2020-05-06 | End: 2020-09-30 | Stop reason: SDUPTHER

## 2020-05-06 RX ORDER — METOPROLOL TARTRATE 25 MG/1
12.5 TABLET, FILM COATED ORAL 2 TIMES DAILY
Qty: 90 TAB | Refills: 3 | Status: SHIPPED | OUTPATIENT
Start: 2020-05-06 | End: 2020-09-30 | Stop reason: SDUPTHER

## 2020-05-06 RX ORDER — POTASSIUM CHLORIDE 20 MEQ/1
20 TABLET, EXTENDED RELEASE ORAL DAILY
Qty: 90 TAB | Refills: 1 | Status: SHIPPED | OUTPATIENT
Start: 2020-05-06 | End: 2020-09-30

## 2020-05-06 RX ORDER — LANOLIN ALCOHOL/MO/W.PET/CERES
325 CREAM (GRAM) TOPICAL 2 TIMES DAILY
Qty: 180 TAB | Refills: 1 | Status: SHIPPED | OUTPATIENT
Start: 2020-05-06 | End: 2020-09-30 | Stop reason: SDUPTHER

## 2020-05-06 RX ORDER — WARFARIN SODIUM 5 MG/1
5 TABLET ORAL EVERY EVENING
Qty: 90 TAB | Refills: 1 | Status: SHIPPED | OUTPATIENT
Start: 2020-05-06 | End: 2020-09-30 | Stop reason: DRUGHIGH

## 2020-05-06 RX ORDER — FUROSEMIDE 20 MG/1
20 TABLET ORAL DAILY
Qty: 90 TAB | Refills: 3 | Status: SHIPPED | OUTPATIENT
Start: 2020-05-06 | End: 2020-09-30

## 2020-05-06 RX ORDER — TAMSULOSIN HYDROCHLORIDE 0.4 MG/1
0.4 CAPSULE ORAL DAILY
Qty: 90 CAP | Refills: 3 | Status: SHIPPED | OUTPATIENT
Start: 2020-05-06 | End: 2020-09-30 | Stop reason: SDUPTHER

## 2020-05-06 NOTE — TELEPHONE ENCOUNTER
The last INR I have on him was from 3/13/2020. Please contact patient to have him check a reading. I believe he has an [address]

## 2020-05-06 NOTE — TELEPHONE ENCOUNTER
Disregard prior note. I see results scanned to chart without provider review. Incoming INR readings need same-day provider review. Please advise all staff.

## 2020-06-16 ENCOUNTER — TELEPHONE (OUTPATIENT)
Dept: INTERNAL MEDICINE CLINIC | Age: 80
End: 2020-06-16

## 2020-06-16 NOTE — TELEPHONE ENCOUNTER
Message received from home health. Called to let us know that the patient's wife tested positive for COVID so she does not want anyone in the house for at least a month so home health will stop for now. States once the wife is OK to allow people back in the house they will need a new order to resume care.

## 2020-06-17 NOTE — TELEPHONE ENCOUNTER
Noted. Is Mr. Mere Swartz having any symptoms of COVID? He is high risk for complication given his respiratory status.

## 2020-06-29 NOTE — TELEPHONE ENCOUNTER
Adrian home health physical therapist calling because pt's wife has COVID and is refusing to let anyone in the home. He wants to know if the should close case or keep open. I informed to keep open for now. [facility] states that they are having a hard time keeping case open if they are not seeing patient. Informed him to follow his protocol/procedure. Adrian verbalized understanding.

## 2020-09-24 ENCOUNTER — TELEPHONE (OUTPATIENT)
Dept: INTERNAL MEDICINE CLINIC | Age: 80
End: 2020-09-24

## 2020-09-24 NOTE — TELEPHONE ENCOUNTER
Please advise He will need to take 5 mg (1 tab) m-sat and 7.5 mg (1.5 tabs)Sundays. And recheck his INR in 2 weeks.

## 2020-09-24 NOTE — TELEPHONE ENCOUNTER
Jen Baptiste from [address] home care called and stated patient INR is 1.9    Patient is taking 5 mg daily  No missed doses   No change in diet.     Jen Baptiste number is [phone]      Please advise

## 2020-09-24 NOTE — TELEPHONE ENCOUNTER
Spoke with Pancho Esqueda nurse and relayed message for warfarin 5 mg m-sat and 7.5mg Sunday and recheck in 2 weeks [facility]  RBV

## 2020-09-28 ENCOUNTER — TELEPHONE (OUTPATIENT)
Dept: INTERNAL MEDICINE CLINIC | Age: 80
End: 2020-09-28

## 2020-09-28 NOTE — TELEPHONE ENCOUNTER
Katelynn Messina with [facility] was given direction she verbally repeat back and understood.

## 2020-09-28 NOTE — TELEPHONE ENCOUNTER
ΣΑΡΑΝΤΙ with [facility] called with the following results. PT 26.9 and INR 2.2.  Please advise

## 2020-09-30 ENCOUNTER — VIRTUAL VISIT (OUTPATIENT)
Dept: INTERNAL MEDICINE CLINIC | Age: 80
End: 2020-09-30
Payer: MEDICARE

## 2020-09-30 DIAGNOSIS — J44.9 COPD, SEVERE (HCC): ICD-10-CM

## 2020-09-30 DIAGNOSIS — I48.0 PAROXYSMAL ATRIAL FIBRILLATION (HCC): ICD-10-CM

## 2020-09-30 DIAGNOSIS — N40.0 ENLARGED PROSTATE: ICD-10-CM

## 2020-09-30 DIAGNOSIS — I67.9 CEREBROVASCULAR DISEASE: Primary | ICD-10-CM

## 2020-09-30 DIAGNOSIS — I10 ESSENTIAL HYPERTENSION: ICD-10-CM

## 2020-09-30 PROCEDURE — 99443 PR PHYS/QHP TELEPHONE EVALUATION 21-30 MIN: CPT | Performed by: PHYSICIAN ASSISTANT

## 2020-09-30 RX ORDER — TAMSULOSIN HYDROCHLORIDE 0.4 MG/1
0.8 CAPSULE ORAL DAILY
Qty: 180 CAP | Refills: 1 | Status: SHIPPED | OUTPATIENT
Start: 2020-09-30 | End: 2021-05-06 | Stop reason: SDUPTHER

## 2020-09-30 RX ORDER — LISINOPRIL 10 MG/1
10 TABLET ORAL DAILY
Qty: 90 TAB | Refills: 1 | Status: SHIPPED | OUTPATIENT
Start: 2020-09-30 | End: 2021-01-20

## 2020-09-30 RX ORDER — ASCORBIC ACID 500 MG
500 TABLET ORAL DAILY
Qty: 90 TAB | Refills: 1 | Status: SHIPPED | OUTPATIENT
Start: 2020-09-30 | End: 2021-05-06 | Stop reason: SDUPTHER

## 2020-09-30 RX ORDER — MECLIZINE HYDROCHLORIDE 25 MG/1
25 TABLET ORAL
COMMUNITY
End: 2020-09-30 | Stop reason: SDUPTHER

## 2020-09-30 RX ORDER — MECLIZINE HYDROCHLORIDE 25 MG/1
25 TABLET ORAL
Qty: 60 TAB | Refills: 0 | Status: SHIPPED
Start: 2020-09-30 | End: 2021-08-10

## 2020-09-30 RX ORDER — FLUTICASONE FUROATE AND VILANTEROL 100; 25 UG/1; UG/1
1 POWDER RESPIRATORY (INHALATION) DAILY
Qty: 3 INHALER | Refills: 1 | Status: SHIPPED | OUTPATIENT
Start: 2020-09-30

## 2020-09-30 RX ORDER — AMOXICILLIN 250 MG
1 CAPSULE ORAL DAILY
Qty: 90 TAB | Refills: 1 | Status: SHIPPED | OUTPATIENT
Start: 2020-09-30 | End: 2021-05-06 | Stop reason: SDUPTHER

## 2020-09-30 RX ORDER — ASPIRIN 81 MG/1
81 TABLET ORAL DAILY
Qty: 90 TAB | Refills: 1 | Status: SHIPPED | OUTPATIENT
Start: 2020-09-30 | End: 2021-05-10 | Stop reason: ALTCHOICE

## 2020-09-30 RX ORDER — ATORVASTATIN CALCIUM 40 MG/1
40 TABLET, FILM COATED ORAL DAILY
Qty: 90 TAB | Refills: 1 | Status: SHIPPED | OUTPATIENT
Start: 2020-09-30 | End: 2021-05-07 | Stop reason: SDUPTHER

## 2020-09-30 RX ORDER — METOPROLOL TARTRATE 25 MG/1
12.5 TABLET, FILM COATED ORAL 2 TIMES DAILY
Qty: 90 TAB | Refills: 1 | Status: SHIPPED | OUTPATIENT
Start: 2020-09-30 | End: 2021-05-06 | Stop reason: SDUPTHER

## 2020-09-30 RX ORDER — AMOXICILLIN 250 MG
1 CAPSULE ORAL DAILY
COMMUNITY
End: 2020-09-30 | Stop reason: SDUPTHER

## 2020-09-30 RX ORDER — WARFARIN SODIUM 5 MG/1
5 TABLET ORAL EVERY EVENING
Qty: 90 TAB | Refills: 1 | Status: SHIPPED | OUTPATIENT
Start: 2020-09-30 | End: 2021-05-06 | Stop reason: SDUPTHER

## 2020-09-30 RX ORDER — OMEPRAZOLE 40 MG/1
40 CAPSULE, DELAYED RELEASE ORAL DAILY
Qty: 90 CAP | Refills: 1 | Status: SHIPPED | OUTPATIENT
Start: 2020-09-30 | End: 2021-05-06 | Stop reason: SDUPTHER

## 2020-09-30 RX ORDER — ATORVASTATIN CALCIUM 40 MG/1
40 TABLET, FILM COATED ORAL DAILY
COMMUNITY
End: 2020-09-30 | Stop reason: SDUPTHER

## 2020-09-30 RX ORDER — NYSTATIN 100000 U/G
CREAM TOPICAL
Qty: 30 G | Refills: 0 | Status: SHIPPED | OUTPATIENT
Start: 2020-09-30

## 2020-09-30 RX ORDER — TRAMADOL HYDROCHLORIDE 50 MG/1
50 TABLET ORAL ONCE
COMMUNITY
End: 2021-08-10

## 2020-09-30 RX ORDER — FINASTERIDE 5 MG/1
5 TABLET, FILM COATED ORAL DAILY
Qty: 90 TAB | Refills: 1 | Status: SHIPPED | OUTPATIENT
Start: 2020-09-30 | End: 2021-05-06 | Stop reason: SDUPTHER

## 2020-09-30 RX ORDER — LISINOPRIL 10 MG/1
10 TABLET ORAL DAILY
COMMUNITY
End: 2020-09-30 | Stop reason: SDUPTHER

## 2020-09-30 RX ORDER — WARFARIN 2 MG/1
7 TABLET ORAL DAILY
COMMUNITY
End: 2020-09-30 | Stop reason: DRUGHIGH

## 2020-09-30 RX ORDER — LANOLIN ALCOHOL/MO/W.PET/CERES
325 CREAM (GRAM) TOPICAL 2 TIMES DAILY
Qty: 180 TAB | Refills: 1 | Status: SHIPPED | OUTPATIENT
Start: 2020-09-30 | End: 2021-05-06 | Stop reason: SDUPTHER

## 2020-09-30 RX ORDER — ALBUTEROL SULFATE 90 UG/1
2 AEROSOL, METERED RESPIRATORY (INHALATION)
Qty: 3 INHALER | Refills: 1 | Status: SHIPPED | OUTPATIENT
Start: 2020-09-30

## 2020-09-30 NOTE — PROGRESS NOTES
Marcelo Deleon is a 79 y.o. male, evaluated via audio-only technology on 9/30/2020 for Follow-up (follow up ed and rehab)  . Assessment & Plan:     Diagnoses and all orders for this visit:    1. Cerebrovascular disease  -     atorvastatin (Lipitor) 40 mg tablet; Take 1 Tab by mouth daily. -     aspirin delayed-release 81 mg tablet; Take 1 Tab by mouth daily. 2. COPD, severe (HCC)  -     tiotropium (Spiriva with HandiHaler) 18 mcg inhalation capsule; Take 1 Cap by inhalation daily. -     fluticasone furoate-vilanteroL (BREO ELLIPTA) 100-25 mcg/dose inhaler; Take 1 Puff by inhalation daily. -     albuterol (PROVENTIL HFA, VENTOLIN HFA, PROAIR HFA) 90 mcg/actuation inhaler; Take 2 Puffs by inhalation every six (6) hours as needed for Wheezing.  -     REFERRAL TO PULMONARY DISEASE   - Establish with new provider. 3. Paroxysmal atrial fibrillation (HCC)  -     warfarin (COUMADIN) 5 mg tablet; Take 1 Tab by mouth every evening.   - Cont current dosage of 5 mg daily except Sundays - 7.5 mg.    4. Essential hypertension  -     metoprolol tartrate (LOPRESSOR) 25 mg tablet; Take 0.5 Tabs by mouth two (2) times a day. -     lisinopriL (PRINIVIL, ZESTRIL) 10 mg tablet; Take 1 Tab by mouth daily. 5. Enlarged prostate  -     tamsulosin (FLOMAX) 0.4 mg capsule; Take 2 Caps by mouth daily. -     finasteride (PROSCAR) 5 mg tablet; Take 1 Tab by mouth daily. Other orders  -     senna-docusate (Senexon-S) 8.6-50 mg per tablet; Take 1 Tab by mouth daily. -     omeprazole (PRILOSEC) 40 mg capsule; Take 1 Cap by mouth daily. -     meclizine (ANTIVERT) 25 mg tablet; Take 1 Tab by mouth three (3) times daily as needed for Dizziness. -     ferrous sulfate 325 mg (65 mg iron) tablet; Take 1 Tab by mouth two (2) times a day. -     ascorbic acid, vitamin C, (Vitamin C) 500 mg tablet; Take 1 Tab by mouth daily.   -     nystatin (MYCOSTATIN) topical cream; Apply to bilateral groin, after washing area with water and soap and dry well. Follow-up and Dispositions    · Return for in office - 30 min (schedule as soon as able). 12  Subjective:     Presents for f/u discharge from [facility]. He was in rehab following his 8/11/2020-8/15/2020 [facility] stay for TIA. Hx of cerebrovascular disease. MRI and MRA showed chronic infarcts. Neurology advised of added Asa therapy. Cont Coumadin, added statin. 2. Carotid Stenosis. 50% stenosis mid cervical R ICA. 40% stenosis distal bulbar LICA. Multifocal mild to moderate RVA stenoses. Severe LVA stenosis. Cont coumadin  3. Dementia. MRI noted Global cerebral atrophy with focally prominent atrophy in the left parietal lobe. Supportive care  4. HTN with sinus tachy.  range but most likely needs lopressor earlier in the day, Target 896-218 systolic range, resume home meds will hold norvasc and may increase to lopressor 12.5mg bid only if BP >150mmHg (BP was 126/63 P 78) but currently 177/96 with midodrine and ivf's now d/c'd as earlier BP of 94/50 improved. 5. DM2. resume home meds  6. H/o paroxysmal a.fib on ac. Coumadin,inr 1.96  7. COPD, not in exacerbation. Albuterol/breo    Since discharge, patient's wife reports he is recovering well and gaining strength. They are needing his med refills as [facility]. Long hx of medication and appointment noncompliance. Overdue for specialty follow-ups. Needing new pulmonologist as prior provider office has closed. Prior to Admission medications    Medication Sig Start Date End Date Taking? Authorizing Provider   traMADoL (ULTRAM) 50 mg tablet Take 50 mg by mouth once. Take 1 tab 1 hour before therapy. Hold if sedated/confused or SBP <100. Yes Provider, Historical   tiotropium (Spiriva with HandiHaler) 18 mcg inhalation capsule Take 1 Cap by inhalation daily. 9/30/20  Yes Jazmine Schmidt PA   tamsulosin (FLOMAX) 0.4 mg capsule Take 2 Caps by mouth daily.  9/30/20 Yes Jazmine Hinkle PA   senna-docusate (Senexon-S) 8.6-50 mg per tablet Take 1 Tab by mouth daily. 9/30/20  Yes Jazmine Hinkle PA   omeprazole (PRILOSEC) 40 mg capsule Take 1 Cap by mouth daily. 9/30/20  Yes Jazmine Hinkle PA   metoprolol tartrate (LOPRESSOR) 25 mg tablet Take 0.5 Tabs by mouth two (2) times a day. 9/30/20  Yes KENDRA Bowden   meclizine (ANTIVERT) 25 mg tablet Take 1 Tab by mouth three (3) times daily as needed for Dizziness. 9/30/20  Yes Jazmine Cohn PA   lisinopriL (PRINIVIL, ZESTRIL) 10 mg tablet Take 1 Tab by mouth daily. 9/30/20  Yes Jazmine Cohn PA   fluticasone furoate-vilanteroL (BREO ELLIPTA) 100-25 mcg/dose inhaler Take 1 Puff by inhalation daily. 9/30/20  Yes Jazmine Cohn PA   finasteride (PROSCAR) 5 mg tablet Take 1 Tab by mouth daily. 9/30/20  Yes Jazmine Hinkle PA   ferrous sulfate 325 mg (65 mg iron) tablet Take 1 Tab by mouth two (2) times a day. 9/30/20  Yes Jazmine Cohn PA   atorvastatin (Lipitor) 40 mg tablet Take 1 Tab by mouth daily. 9/30/20  Yes Jazmine Cohn PA   ascorbic acid, vitamin C, (Vitamin C) 500 mg tablet Take 1 Tab by mouth daily. 9/30/20  Yes Jazmine Cohn PA   albuterol (PROVENTIL HFA, VENTOLIN HFA, PROAIR HFA) 90 mcg/actuation inhaler Take 2 Puffs by inhalation every six (6) hours as needed for Wheezing. 9/30/20  Yes Jazmine Hinkle PA   warfarin (COUMADIN) 5 mg tablet Take 1 Tab by mouth every evening. 9/30/20  Yes Jazmine Cohn PA   OXYGEN-AIR DELIVERY SYSTEMS 3 L by Nasal route continuous. Yes Provider, Historical   Lancets misc Check fasting sugars daily before breakfast. DX: E11.9 for freestyle lite meter 11/28/16  Yes Luz Keane MD   Nebulizer & Compressor machine 1 Each.  6/7/16  Yes Provider, Historical   glucose blood VI test strips (FREESTYLE LITE STRIPS) strip Check fasting sugars daily before breakfast. DX: E11.9 for freestyle lite meter 11/10/15  Yes Talisha Brody MD   Blood-Glucose Meter (ONE TOUCH ULTRA 2) monitoring kit Check fasting sugars daily before breakfast. DX: 250.02 8/14/14  Yes Omar Keane MD   atorvastatin (Lipitor) 40 mg tablet Take 40 mg by mouth daily. 9/30/20  Provider, Historical   lisinopriL (PRINIVIL, ZESTRIL) 10 mg tablet Take 10 mg by mouth daily. 9/30/20  Provider, Historical   senna-docusate (Senexon-S) 8.6-50 mg per tablet Take 1 Tab by mouth daily. 9/30/20  Provider, Historical   meclizine (ANTIVERT) 25 mg tablet Take 25 mg by mouth three (3) times daily as needed for Dizziness. 9/30/20  Provider, Historical   tiotropium (Spiriva with HandiHaler) 18 mcg inhalation capsule Take 1 Cap by inhalation daily. 9/30/20  Provider, Historical   warfarin (Coumadin) 2 mg tablet Take 7 mg by mouth daily. 9/30/20  Provider, Historical   finasteride (PROSCAR) 5 mg tablet Take 1 Tab by mouth daily. 5/6/20 9/30/20  KENDRA Hughes   omeprazole (PRILOSEC) 40 mg capsule Take 1 Cap by mouth daily. 5/6/20 9/30/20  KENDRA Hughes   potassium chloride (K-DUR, KLOR-CON) 20 mEq tablet Take 1 Tab by mouth daily. 5/6/20 9/30/20  KENDRA Hughes   ferrous sulfate 325 mg (65 mg iron) tablet Take 1 Tab by mouth two (2) times a day. 5/6/20 9/30/20  KENDRA Hughes   warfarin (COUMADIN) 5 mg tablet Take 1 Tab by mouth every evening. 5/6/20 9/30/20  KENDRA Hughes   tamsulosin (FLOMAX) 0.4 mg capsule Take 1 Cap by mouth daily. Patient taking differently: Take 0.8 mg by mouth daily. 5/6/20 9/30/20  KENDRA Hughes   furosemide (LASIX) 20 mg tablet Take 1 Tab by mouth daily. 5/6/20 9/30/20  KENDRA Hughes   metoprolol tartrate (LOPRESSOR) 25 mg tablet Take 0.5 Tabs by mouth two (2) times a day. 5/6/20 9/30/20  KENDRA Hughes   atorvastatin (LIPITOR) 10 mg tablet Take 1 Tab by mouth daily. 2/12/20 9/30/20  KENDRA Hughes   ferrous sulfate 325 mg (65 mg iron) tablet Take 325 mg by mouth two (2) times a day.   9/30/20  Provider, Historical potassium chloride (KLOR-CON M20) 20 mEq tablet Take 1 Tab by mouth daily. 11/26/18 9/30/20  Omar Keane MD   albuterol (PROVENTIL HFA, VENTOLIN HFA, PROAIR HFA) 90 mcg/actuation inhaler 2 Puffs. 9/15/15 9/30/20  Provider, Historical   fluticasone-vilanterol (BREO ELLIPTA) 100-25 mcg/dose inhaler Take 1 Puff by inhalation daily. 5/21/18 9/30/20  Provider, Historical   albuterol-ipratropium (DUO-NEB) 2.5 mg-0.5 mg/3 ml nebu 3 mL by Nebulization route every four (4) hours. 6/6/18 9/30/20  Margo Sharma MD   tiotropium bromide (SPIRIVA RESPIMAT) 2.5 mcg/actuation inhaler Take 2 Puffs by inhalation daily. 6/7/16 9/30/20  Provider, Historical   ascorbic acid (VITAMIN C) 500 mg tablet Take 500 mg by mouth two (2) times a day.   9/30/20  Provider, Historical     Past Medical History:   Diagnosis Date    Advance directive discussed with patient     Benign localized prostatic hyperplasia with lower urinary tract symptoms (LUTS)     COPD with emphysema ([address]     CVA (cerebrovascular accident) ([address] 7/11/2012    possible    Diabetes mellitus ([address]     Drowsiness     Enlarged prostate     Essential hypertension, benign     Eye exam, routine 07/26/2016    Dr. Hilario Olivares repeat in 1 yr    H/O colonoscopy 11/24/15    Dr. 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 Neurological: Positive for dizziness (improving). Negative for sensory change, speech change and focal weakness. Patient-Reported Vitals 9/30/2020   Patient-Reported Systolic  569   Patient-Reported Diastolic 82        Hayleyjosé miguel Richardson, who was evaluated through a patient-initiated, synchronous (real-time) audio only encounter, and/or her healthcare decision maker, is aware that it is a billable service, with coverage as determined by his insurance carrier. He provided verbal consent to proceed: Yes. He has not had a related appointment within my department in the past 7 days or scheduled within the next 24 hours.       Total Time: minutes: 21-30 minutes    KENDRA Lazar

## 2020-09-30 NOTE — PROGRESS NOTES
For virtual visit patient would like to receive link via TEXT/EMAIL: telemed    Edwina Briggs is a 79 y.o. male (: 1940) evaluated via telephone on 2020 to address:    Chief Complaint   Patient presents with    Follow-up     follow up ed and rehab       There were no vitals filed for this visit. Allergies Reviewed. Learning Assessment:     Learning Assessment 7/3/2018   PRIMARY LEARNER Patient   HIGHEST LEVEL OF EDUCATION - PRIMARY LEARNER  SOME COLLEGE   BARRIERS PRIMARY LEARNER NONE   CO-LEARNER CAREGIVER No   PRIMARY LANGUAGE ENGLISH   LEARNER PREFERENCE PRIMARY READING     DEMONSTRATION   ANSWERED BY patient   RELATIONSHIP SELF     Depression Screening:     3 most recent PHQ Screens 2020   Little interest or pleasure in doing things Not at all   Feeling down, depressed, irritable, or hopeless Not at all   Total Score PHQ 2 0     Fall Risk Assessment:     Fall Risk Assessment, last 12 mths 2020   Able to walk? No   Fall in past 12 months? -   Fall with injury? -   Number of falls in past 12 months -   Fall Risk Score -     Abuse Screening:     Abuse Screening Questionnaire 2019   Do you ever feel afraid of your partner? N   Are you in a relationship with someone who physically or mentally threatens you? N   Is it safe for you to go home? Y       Coordination of Care Questionaire:   1. Have you been to the ER, urgent care clinic since your last visit? Hospitalized since your last visit? YES    2. Have you seen or consulted any other health care providers outside of the [address] [place] [address] since your last visit? Include any pap smears or colon screening. NO    Advanced Directive:   1. Do you have an Advanced Directive? NO    2. Would you like information on Advanced Directives?  NO

## 2020-10-01 ENCOUNTER — TELEPHONE (OUTPATIENT)
Dept: INTERNAL MEDICINE CLINIC | Age: 80
End: 2020-10-01

## 2020-10-01 NOTE — TELEPHONE ENCOUNTER
Please return call to Loyda Yu with [facility] regarding Mr Summers [place] please return call when available

## 2020-10-01 NOTE — TELEPHONE ENCOUNTER
I called back and spoke with Rosi Ricardo and she stated patient wife is confused. Patient was given Lispro in hospital and at [facility] ( he was on sliding scale )    Patient has not taken after he ran out. Should patient be on this medication?       Please advise

## 2020-10-01 NOTE — TELEPHONE ENCOUNTER
Ida Bernard from [facility] and wanted to inform you that patient fell yesterday.

## 2020-10-02 ENCOUNTER — HOSPITAL ENCOUNTER (OUTPATIENT)
Dept: LAB | Age: 80
Discharge: HOME | End: 2020-10-02
Payer: MEDICARE

## 2020-10-02 ENCOUNTER — OFFICE VISIT (OUTPATIENT)
Dept: INTERNAL MEDICINE CLINIC | Age: 80
End: 2020-10-02
Payer: MEDICARE

## 2020-10-02 VITALS
BODY MASS INDEX: 24.21 KG/M2 | HEIGHT: 66 IN | TEMPERATURE: 97.4 F | OXYGEN SATURATION: 90 % | RESPIRATION RATE: 20 BRPM | DIASTOLIC BLOOD PRESSURE: 83 MMHG | SYSTOLIC BLOOD PRESSURE: 159 MMHG | HEART RATE: 78 BPM

## 2020-10-02 DIAGNOSIS — Z79.899 HIGH RISK MEDICATION USE: ICD-10-CM

## 2020-10-02 DIAGNOSIS — R82.90 ABNORMAL URINE ODOR: ICD-10-CM

## 2020-10-02 DIAGNOSIS — R60.9 EDEMA, UNSPECIFIED TYPE: ICD-10-CM

## 2020-10-02 DIAGNOSIS — Z23 NEEDS FLU SHOT: ICD-10-CM

## 2020-10-02 DIAGNOSIS — E11.9 TYPE 2 DIABETES MELLITUS WITHOUT COMPLICATION, WITHOUT LONG-TERM CURRENT USE OF INSULIN (HCC): ICD-10-CM

## 2020-10-02 DIAGNOSIS — N39.0 URINARY TRACT INFECTION WITHOUT HEMATURIA, SITE UNSPECIFIED: Primary | ICD-10-CM

## 2020-10-02 DIAGNOSIS — R53.1 WEAKNESS GENERALIZED: ICD-10-CM

## 2020-10-02 PROCEDURE — 87186 SC STD MICRODIL/AGAR DIL: CPT

## 2020-10-02 PROCEDURE — 87077 CULTURE AEROBIC IDENTIFY: CPT

## 2020-10-02 PROCEDURE — 81003 URINALYSIS AUTO W/O SCOPE: CPT | Performed by: PHYSICIAN ASSISTANT

## 2020-10-02 PROCEDURE — G8432 DEP SCR NOT DOC, RNG: HCPCS | Performed by: PHYSICIAN ASSISTANT

## 2020-10-02 PROCEDURE — 99214 OFFICE O/P EST MOD 30 MIN: CPT | Performed by: PHYSICIAN ASSISTANT

## 2020-10-02 PROCEDURE — G8427 DOCREV CUR MEDS BY ELIG CLIN: HCPCS | Performed by: PHYSICIAN ASSISTANT

## 2020-10-02 PROCEDURE — 1101F PT FALLS ASSESS-DOCD LE1/YR: CPT | Performed by: PHYSICIAN ASSISTANT

## 2020-10-02 PROCEDURE — 87086 URINE CULTURE/COLONY COUNT: CPT

## 2020-10-02 PROCEDURE — G8420 CALC BMI NORM PARAMETERS: HCPCS | Performed by: PHYSICIAN ASSISTANT

## 2020-10-02 PROCEDURE — 90694 VACC AIIV4 NO PRSRV 0.5ML IM: CPT | Performed by: PHYSICIAN ASSISTANT

## 2020-10-02 PROCEDURE — G8536 NO DOC ELDER MAL SCRN: HCPCS | Performed by: PHYSICIAN ASSISTANT

## 2020-10-02 PROCEDURE — G8753 SYS BP > OR = 140: HCPCS | Performed by: PHYSICIAN ASSISTANT

## 2020-10-02 PROCEDURE — G8754 DIAS BP LESS 90: HCPCS | Performed by: PHYSICIAN ASSISTANT

## 2020-10-02 RX ORDER — FUROSEMIDE 20 MG/1
10 TABLET ORAL DAILY
Qty: 15 TAB | Refills: 0 | Status: SHIPPED | OUTPATIENT
Start: 2020-10-02 | End: 2020-10-26

## 2020-10-02 RX ORDER — CEFPROZIL 250 MG/1
250 TABLET, FILM COATED ORAL 2 TIMES DAILY
Qty: 28 TAB | Refills: 0 | Status: SHIPPED | OUTPATIENT
Start: 2020-10-02 | End: 2020-10-16

## 2020-10-02 NOTE — PROGRESS NOTES
fluad quad Immunization/s administered 10/2/2020 by Hyacinth Madrigal with consent. Patient tolerated procedure well. No reactions noted.

## 2020-10-02 NOTE — PROGRESS NOTES
Cindy Fuller is a 79 y.o. male (: 1940) presenting to address:    Chief Complaint   Patient presents with    Urinary Odor       Vitals:    10/02/20 1533   BP: (!) 159/83   Pulse: 78   Resp: 20   Temp: 97.4 °F (36.3 °C)   TempSrc: Oral   SpO2: 90%   Height: 5' 6\" (1.676 m)   PainSc:   0 - No pain       Hearing/Vision:   No exam data present    Learning Assessment:     Learning Assessment 7/3/2018   PRIMARY LEARNER Patient   HIGHEST LEVEL OF EDUCATION - PRIMARY LEARNER  SOME COLLEGE   BARRIERS PRIMARY LEARNER NONE   CO-LEARNER CAREGIVER No   PRIMARY LANGUAGE ENGLISH   LEARNER PREFERENCE PRIMARY READING     DEMONSTRATION   ANSWERED BY patient   RELATIONSHIP SELF     Depression Screening:     3 most recent PHQ Screens 10/2/2020   Little interest or pleasure in doing things Not at all   Feeling down, depressed, irritable, or hopeless Not at all   Total Score PHQ 2 0     Fall Risk Assessment:     Fall Risk Assessment, last 12 mths 10/2/2020   Able to walk? Yes   Fall in past 12 months? Yes   Fall with injury? No   Number of falls in past 12 months 1   Fall Risk Score 1     Abuse Screening:     Abuse Screening Questionnaire 2019   Do you ever feel afraid of your partner? N   Are you in a relationship with someone who physically or mentally threatens you? N   Is it safe for you to go home? Y     Coordination of Care Questionaire:   1. Have you been to the ER, urgent care clinic since your last visit? Hospitalized since your last visit? YES [facility]    2. Have you seen or consulted any other health care providers outside of the [address] [place] [address] since your last visit? Include any pap smears or colon screening. YES    Advanced Directive:   1. Do you have an Advanced Directive? NO    2. Would you like information on Advanced Directives?  NO

## 2020-10-02 NOTE — PROGRESS NOTES
HISTORY OF PRESENT ILLNESS  Maricarmen Lopez is a 79 y.o. male. HPI  Presents for office visit f/u. See virtual visit from this week. 1) Fall 2 days ago - after toileting in the bathroom. He reached down to  his pants but fell. He does not believe he was dizzy. Spouse states he has been very weak since discharge.   - PT/OT has come, but patient hasn't felt well enough to do much. 2) DMII - blood glucose of 106 this am. Spouse is not giving him insulin. 3) HTN - admits to some dizziness with standing but denies dizziness with his fall 2 days ago. 4) Urinary odor - noticed today. Hx of recent UTI - see Care everywhere. 5) Edema - spouse c/o feet swelling in him. Wearing compression stockings. Review of Systems   Constitutional: Positive for malaise/fatigue. Neurological: Positive for dizziness (see hpi). Visit Vitals  BP (!) 159/83 (BP 1 Location: Right arm, BP Patient Position: Sitting)   Pulse 78   Temp 97.4 °F (36.3 °C) (Oral)   Resp 20   Ht 5' 6\" (1.676 m)   SpO2 90%   BMI 24.21 kg/m²       Physical Exam  Constitutional:       General: He is not in acute distress. Appearance: Normal appearance. He is well-developed. HENT:      Head: Normocephalic and atraumatic. Right Ear: Tympanic membrane, ear canal and external ear normal.      Left Ear: Tympanic membrane, ear canal and external ear normal.      Nose: Nose normal.      Mouth/Throat:      Comments: Mask  Eyes:      General: No scleral icterus. Conjunctiva/sclera: Conjunctivae normal.      Pupils: Pupils are equal, round, and reactive to light. Neck:      Musculoskeletal: Neck supple. Cardiovascular:      Rate and Rhythm: Normal rate and regular rhythm. Pulses: Normal pulses. Dorsalis pedis pulses are 2+ on the right side and 2+ on the left side. Posterior tibial pulses are 2+ on the right side and 2+ on the left side. Heart sounds: Normal heart sounds. No murmur. No gallop. Comments: Compression stockings worn to mid-foot creating a tourniquet to distal foot bilaterally. Timmy feet with mild edema. Pulmonary:      Effort: Pulmonary effort is normal. No respiratory distress. Breath sounds: Normal breath sounds. No decreased breath sounds, wheezing, rhonchi or rales. Lymphadenopathy:      Head:      Right side of head: No submandibular or tonsillar adenopathy. Left side of head: No submandibular or tonsillar adenopathy. Cervical: No cervical adenopathy. Upper Body:      Right upper body: No supraclavicular adenopathy. Left upper body: No supraclavicular adenopathy. Skin:     General: Skin is warm and dry. Neurological:      Mental Status: He is alert and oriented to person, place, and time. Psychiatric:         Speech: Speech normal.       Results for orders placed or performed in visit on 10/02/20   AMB POC URINALYSIS DIP STICK AUTO W/O MICRO   Result Value Ref Range    Color (UA POC) Yellow     Clarity (UA POC) Clear     Glucose (UA POC) Negative Negative    Bilirubin (UA POC) Negative Negative    Ketones (UA POC) Negative Negative    Specific gravity (UA POC) 1.020 1.001 - 1.035    Blood (UA POC) Trace Negative    pH (UA POC) 5.5 4.6 - 8.0    Protein (UA POC) Negative Negative    Urobilinogen (UA POC) 0.2 mg/dL 0.2 - 1    Nitrites (UA POC) Positive Negative    Leukocyte esterase (UA POC) 3+ Negative       ASSESSMENT and PLAN  Diagnoses and all orders for this visit:    1. Urinary tract infection without hematuria, site unspecified  -     cefPROZIL (CEFZIL) 250 mg tablet; Take 1 Tab by mouth two (2) times a day for 14 days. 2. Abnormal urine odor  -     CULTURE, URINE; Future  -     AMB POC URINALYSIS DIP STICK AUTO W/O MICRO    3. Edema, unspecified type  -     furosemide (LASIX) 20 mg tablet; Take 0.5 Tabs by mouth daily. - Very low dosage to help with mild edema. 4. Weakness generalized  -     CBC W/O DIFF;  Future  -     METABOLIC PANEL, COMPREHENSIVE; Future    5. Type 2 diabetes mellitus without complication, without long-term current use of insulin (HCC)  -     HEMOGLOBIN A1C W/O EAG; Future  - Hold on any insulin. Spouse agrees. 6. Needs flu shot  -     FLU (FLUAD QUAD INFLUENZA VACCINE,QUAD,ADJUVANTED)    7. High risk medication use  - Currently taking Coumadin 5 mg daily except 7.5 mg on Sundays. Advised to take 5 mg daily while on antibiotic and recheck INR 2 weeks. Will contact home health with order change.

## 2020-10-02 NOTE — TELEPHONE ENCOUNTER
Spoke with LPN from [facility] and she stated yesterday his b/p was 130/72 temp 98.2 hr 67 02 98  R 18  As I was speaking with her PT and OT called me stating they were at his home now and he has a strong urine odor and would like to know if you can order UA or if wife can bring in ua sample    Spoke with patient daughter and she stated they are going to try to get him in the office today.

## 2020-10-02 NOTE — TELEPHONE ENCOUNTER
I do not see this listed as a discharge medication for him. I do not recommend he take this. I imagine they had an order for prn use of this based on his glucose readings, but I do not recommend he do that at home. Prior to his hospitalization, his diabetes was well-controlled. Please obtain more information about his fall. What is his current blood pressure? I also would like to see him in the office if possible. I would like to check his BP, examine him, and check lab.

## 2020-10-05 LAB
BACTERIA SPEC CULT: ABNORMAL
BILIRUB UR QL STRIP: NEGATIVE
CC UR VC: ABNORMAL
GLUCOSE UR-MCNC: NEGATIVE MG/DL
KETONES P FAST UR STRIP-MCNC: NEGATIVE MG/DL
PH UR STRIP: 5.5 [PH] (ref 4.6–8)
PROT UR QL STRIP: NEGATIVE
SERVICE CMNT-IMP: ABNORMAL
SP GR UR STRIP: 1.02 (ref 1–1.03)
UA UROBILINOGEN AMB POC: NORMAL (ref 0.2–1)
URINALYSIS CLARITY POC: CLEAR
URINALYSIS COLOR POC: YELLOW
URINE BLOOD POC: NORMAL
URINE LEUKOCYTES POC: NORMAL
URINE NITRITES POC: POSITIVE

## 2020-10-06 ENCOUNTER — TELEPHONE (OUTPATIENT)
Dept: INTERNAL MEDICINE CLINIC | Age: 80
End: 2020-10-06

## 2020-10-06 NOTE — TELEPHONE ENCOUNTER
Place call to home # and spoke with wife, 2 pt identiers. Gave her appt date Monday 10/12/20 at 1:15pm with Dr. Nayak Jessy office at [facility] for referral place on 9/30/20. She told me she has problems getting Mr. Tammy Kate to his appts because she can't get him in and out of the car. I asked her if she has home health services or called Medicare/Medicaid transportation to get him to his appts. She has home health services but they can only get him in the car and not go with him to appts. She hasn't tried calling Medicare/Medicaid transportation. She relies on her daughter and son to take them to appts however, they both work. She conveyed to me that she is unable to care for him at home by herself and relies heavily on home health services for his ADLs. I emphasized to her that it is very important for him to keep his appts and that she has to arrange for transportation. She understood and had no further concerns at this time.

## 2020-10-14 NOTE — TELEPHONE ENCOUNTER
I have been advised patient complied with pulm f/u appt. They will be sending me a copy of the visit note.

## 2020-10-25 DIAGNOSIS — R60.9 EDEMA, UNSPECIFIED TYPE: ICD-10-CM

## 2020-10-26 RX ORDER — FUROSEMIDE 20 MG/1
TABLET ORAL
Qty: 15 TAB | Refills: 0 | Status: SHIPPED | OUTPATIENT
Start: 2020-10-26 | End: 2021-01-20 | Stop reason: SDUPTHER

## 2020-11-05 PROBLEM — H40.059 BORDERLINE GLAUCOMA WITH OCULAR HYPERTENSION: Status: ACTIVE | Noted: 2020-11-05

## 2020-11-05 PROBLEM — I50.31 ACUTE DIASTOLIC HEART FAILURE (HCC): Status: ACTIVE | Noted: 2020-05-26

## 2020-11-05 PROBLEM — R77.8 ELEVATED TROPONIN: Status: ACTIVE | Noted: 2020-05-26

## 2020-11-05 PROBLEM — R29.6 FREQUENT FALLS: Status: ACTIVE | Noted: 2020-05-26

## 2020-11-05 PROBLEM — I50.9 ACUTE EXACERBATION OF CHF (CONGESTIVE HEART FAILURE) (HCC): Status: ACTIVE | Noted: 2020-05-26

## 2021-01-01 ENCOUNTER — HOME CARE VISIT (OUTPATIENT)
Dept: SCHEDULING | Facility: HOME HEALTH | Age: 81
End: 2021-01-01
Payer: MEDICARE

## 2021-01-01 ENCOUNTER — TELEPHONE (OUTPATIENT)
Dept: INTERNAL MEDICINE CLINIC | Age: 81
End: 2021-01-01

## 2021-01-01 ENCOUNTER — HOME HEALTH ADMISSION (OUTPATIENT)
Dept: HOME HEALTH SERVICES | Facility: HOME HEALTH | Age: 81
End: 2021-01-01
Payer: MEDICARE

## 2021-01-01 ENCOUNTER — HOME CARE VISIT (OUTPATIENT)
Dept: HOME HEALTH SERVICES | Facility: HOME HEALTH | Age: 81
End: 2021-01-01
Payer: MEDICARE

## 2021-01-01 ENCOUNTER — OFFICE VISIT (OUTPATIENT)
Dept: INTERNAL MEDICINE CLINIC | Age: 81
End: 2021-01-01
Payer: MEDICARE

## 2021-01-01 ENCOUNTER — HOSPITAL ENCOUNTER (OUTPATIENT)
Dept: LAB | Age: 81
Discharge: HOME | End: 2021-09-30
Payer: MEDICARE

## 2021-01-01 ENCOUNTER — HOME CARE VISIT (OUTPATIENT)
Dept: HOME HEALTH SERVICES | Facility: HOME HEALTH | Age: 81
End: 2021-01-01

## 2021-01-01 ENCOUNTER — TELEPHONE ANTICOAG (OUTPATIENT)
Dept: INTERNAL MEDICINE CLINIC | Age: 81
End: 2021-01-01

## 2021-01-01 VITALS
TEMPERATURE: 96.4 F | SYSTOLIC BLOOD PRESSURE: 162 MMHG | DIASTOLIC BLOOD PRESSURE: 86 MMHG | HEIGHT: 67 IN | OXYGEN SATURATION: 93 % | HEART RATE: 76 BPM | BODY MASS INDEX: 23.49 KG/M2

## 2021-01-01 VITALS
RESPIRATION RATE: 16 BRPM | DIASTOLIC BLOOD PRESSURE: 76 MMHG | HEART RATE: 73 BPM | SYSTOLIC BLOOD PRESSURE: 132 MMHG | TEMPERATURE: 98.4 F | OXYGEN SATURATION: 100 %

## 2021-01-01 VITALS
HEART RATE: 72 BPM | TEMPERATURE: 98 F | SYSTOLIC BLOOD PRESSURE: 150 MMHG | OXYGEN SATURATION: 94 % | DIASTOLIC BLOOD PRESSURE: 68 MMHG

## 2021-01-01 VITALS
HEART RATE: 79 BPM | HEART RATE: 75 BPM | OXYGEN SATURATION: 96 % | DIASTOLIC BLOOD PRESSURE: 50 MMHG | TEMPERATURE: 98 F | SYSTOLIC BLOOD PRESSURE: 110 MMHG | OXYGEN SATURATION: 99 % | DIASTOLIC BLOOD PRESSURE: 80 MMHG | SYSTOLIC BLOOD PRESSURE: 90 MMHG

## 2021-01-01 VITALS
RESPIRATION RATE: 18 BRPM | DIASTOLIC BLOOD PRESSURE: 83 MMHG | HEART RATE: 79 BPM | TEMPERATURE: 99 F | SYSTOLIC BLOOD PRESSURE: 142 MMHG | OXYGEN SATURATION: 93 %

## 2021-01-01 VITALS
WEIGHT: 150 LBS | TEMPERATURE: 97.7 F | DIASTOLIC BLOOD PRESSURE: 73 MMHG | BODY MASS INDEX: 23.54 KG/M2 | RESPIRATION RATE: 18 BRPM | HEIGHT: 67 IN | OXYGEN SATURATION: 94 % | SYSTOLIC BLOOD PRESSURE: 138 MMHG | HEART RATE: 76 BPM

## 2021-01-01 VITALS
OXYGEN SATURATION: 100 % | SYSTOLIC BLOOD PRESSURE: 147 MMHG | TEMPERATURE: 97.9 F | DIASTOLIC BLOOD PRESSURE: 82 MMHG | HEART RATE: 80 BPM | RESPIRATION RATE: 18 BRPM

## 2021-01-01 VITALS
SYSTOLIC BLOOD PRESSURE: 156 MMHG | TEMPERATURE: 97.3 F | DIASTOLIC BLOOD PRESSURE: 84 MMHG | RESPIRATION RATE: 20 BRPM | BODY MASS INDEX: 23.49 KG/M2 | HEIGHT: 67 IN | OXYGEN SATURATION: 95 % | HEART RATE: 76 BPM

## 2021-01-01 VITALS
HEART RATE: 62 BPM | SYSTOLIC BLOOD PRESSURE: 128 MMHG | TEMPERATURE: 98.8 F | DIASTOLIC BLOOD PRESSURE: 62 MMHG | OXYGEN SATURATION: 100 % | RESPIRATION RATE: 18 BRPM

## 2021-01-01 VITALS
TEMPERATURE: 98.5 F | RESPIRATION RATE: 16 BRPM | HEART RATE: 76 BPM | DIASTOLIC BLOOD PRESSURE: 88 MMHG | SYSTOLIC BLOOD PRESSURE: 152 MMHG | OXYGEN SATURATION: 100 %

## 2021-01-01 VITALS
OXYGEN SATURATION: 94 % | DIASTOLIC BLOOD PRESSURE: 67 MMHG | HEART RATE: 82 BPM | SYSTOLIC BLOOD PRESSURE: 127 MMHG | TEMPERATURE: 98.3 F

## 2021-01-01 VITALS
OXYGEN SATURATION: 100 % | TEMPERATURE: 98.3 F | SYSTOLIC BLOOD PRESSURE: 131 MMHG | DIASTOLIC BLOOD PRESSURE: 76 MMHG | RESPIRATION RATE: 16 BRPM | HEART RATE: 73 BPM

## 2021-01-01 VITALS
RESPIRATION RATE: 14 BRPM | SYSTOLIC BLOOD PRESSURE: 118 MMHG | TEMPERATURE: 98.2 F | DIASTOLIC BLOOD PRESSURE: 65 MMHG | OXYGEN SATURATION: 99 % | HEART RATE: 79 BPM

## 2021-01-01 VITALS — OXYGEN SATURATION: 93 % | HEART RATE: 88 BPM | DIASTOLIC BLOOD PRESSURE: 83 MMHG | SYSTOLIC BLOOD PRESSURE: 169 MMHG

## 2021-01-01 DIAGNOSIS — I10 ESSENTIAL HYPERTENSION: ICD-10-CM

## 2021-01-01 DIAGNOSIS — E11.9 TYPE 2 DIABETES MELLITUS WITHOUT COMPLICATION, WITHOUT LONG-TERM CURRENT USE OF INSULIN (HCC): ICD-10-CM

## 2021-01-01 DIAGNOSIS — Z51.81 ANTICOAGULATION MANAGEMENT ENCOUNTER: Primary | ICD-10-CM

## 2021-01-01 DIAGNOSIS — I10 PRIMARY HYPERTENSION: ICD-10-CM

## 2021-01-01 DIAGNOSIS — D64.9 ANEMIA, UNSPECIFIED TYPE: ICD-10-CM

## 2021-01-01 DIAGNOSIS — Z79.01 ANTICOAGULATION MANAGEMENT ENCOUNTER: Primary | ICD-10-CM

## 2021-01-01 DIAGNOSIS — J44.9 CHRONIC OBSTRUCTIVE PULMONARY DISEASE, UNSPECIFIED COPD TYPE (HCC): Primary | ICD-10-CM

## 2021-01-01 DIAGNOSIS — J44.9 CHRONIC OBSTRUCTIVE PULMONARY DISEASE, UNSPECIFIED COPD TYPE (HCC): ICD-10-CM

## 2021-01-01 DIAGNOSIS — I48.0 PAROXYSMAL ATRIAL FIBRILLATION (HCC): ICD-10-CM

## 2021-01-01 DIAGNOSIS — I48.0 PAROXYSMAL ATRIAL FIBRILLATION (HCC): Primary | ICD-10-CM

## 2021-01-01 DIAGNOSIS — N40.0 ENLARGED PROSTATE: ICD-10-CM

## 2021-01-01 DIAGNOSIS — J43.9 PULMONARY EMPHYSEMA, UNSPECIFIED EMPHYSEMA TYPE (HCC): Primary | ICD-10-CM

## 2021-01-01 DIAGNOSIS — I67.9 CEREBROVASCULAR DISEASE: ICD-10-CM

## 2021-01-01 DIAGNOSIS — R54 FRAILTY: ICD-10-CM

## 2021-01-01 DIAGNOSIS — Z23 ENCOUNTER FOR IMMUNIZATION: ICD-10-CM

## 2021-01-01 LAB
BASOPHILS # BLD: 0.1 K/UL (ref 0–0.1)
BASOPHILS NFR BLD: 1 % (ref 0–2)
DIFFERENTIAL METHOD BLD: ABNORMAL
EOSINOPHIL # BLD: 0.3 K/UL (ref 0–0.4)
EOSINOPHIL NFR BLD: 4 % (ref 0–5)
ERYTHROCYTE [DISTWIDTH] IN BLOOD BY AUTOMATED COUNT: 12.2 % (ref 11.6–14.5)
HCT VFR BLD AUTO: 39.4 % (ref 36–48)
HGB BLD-MCNC: 11.1 G/DL (ref 13–16)
INR BLD: 2.2
INR BLD: 2.5
INR BLD: 2.5
INR BLD: 3.2
IRON SATN MFR SERPL: 28 % (ref 20–50)
IRON SERPL-MCNC: 69 UG/DL (ref 50–175)
LYMPHOCYTES # BLD: 1.2 K/UL (ref 0.9–3.6)
LYMPHOCYTES NFR BLD: 19 % (ref 21–52)
MCH RBC QN AUTO: 31.6 PG (ref 24–34)
MCHC RBC AUTO-ENTMCNC: 28.2 G/DL (ref 31–37)
MCV RBC AUTO: 112.3 FL (ref 78–100)
MONOCYTES # BLD: 0.6 K/UL (ref 0.05–1.2)
MONOCYTES NFR BLD: 9 % (ref 3–10)
NEUTS SEG # BLD: 4.1 K/UL (ref 1.8–8)
NEUTS SEG NFR BLD: 67 % (ref 40–73)
PERIPHERAL SMEAR,PSM: NORMAL
PLATELET # BLD AUTO: 163 K/UL (ref 135–420)
PMV BLD AUTO: 11.6 FL (ref 9.2–11.8)
PT POC: NORMAL
RBC # BLD AUTO: 3.51 M/UL (ref 4.35–5.65)
RBC MORPH BLD: ABNORMAL
TIBC SERPL-MCNC: 244 UG/DL (ref 250–450)
VALID INTERNAL CONTROL?: YES
WBC # BLD AUTO: 6.3 K/UL (ref 4.6–13.2)

## 2021-01-01 PROCEDURE — G0157 HHC PT ASSISTANT EA 15: HCPCS

## 2021-01-01 PROCEDURE — 83540 ASSAY OF IRON: CPT

## 2021-01-01 PROCEDURE — 36415 COLL VENOUS BLD VENIPUNCTURE: CPT

## 2021-01-01 PROCEDURE — G8752 SYS BP LESS 140: HCPCS | Performed by: INTERNAL MEDICINE

## 2021-01-01 PROCEDURE — 3331090002 HH PPS REVENUE DEBIT

## 2021-01-01 PROCEDURE — G0152 HHCP-SERV OF OT,EA 15 MIN: HCPCS

## 2021-01-01 PROCEDURE — G8510 SCR DEP NEG, NO PLAN REQD: HCPCS | Performed by: INTERNAL MEDICINE

## 2021-01-01 PROCEDURE — 3331090001 HH PPS REVENUE CREDIT

## 2021-01-01 PROCEDURE — 99213 OFFICE O/P EST LOW 20 MIN: CPT | Performed by: INTERNAL MEDICINE

## 2021-01-01 PROCEDURE — 99214 OFFICE O/P EST MOD 30 MIN: CPT | Performed by: INTERNAL MEDICINE

## 2021-01-01 PROCEDURE — G0299 HHS/HOSPICE OF RN EA 15 MIN: HCPCS

## 2021-01-01 PROCEDURE — 85610 PROTHROMBIN TIME: CPT | Performed by: INTERNAL MEDICINE

## 2021-01-01 PROCEDURE — 85025 COMPLETE CBC W/AUTO DIFF WBC: CPT

## 2021-01-01 PROCEDURE — G8753 SYS BP > OR = 140: HCPCS | Performed by: INTERNAL MEDICINE

## 2021-01-01 PROCEDURE — 1101F PT FALLS ASSESS-DOCD LE1/YR: CPT | Performed by: INTERNAL MEDICINE

## 2021-01-01 PROCEDURE — G8420 CALC BMI NORM PARAMETERS: HCPCS | Performed by: INTERNAL MEDICINE

## 2021-01-01 PROCEDURE — G8427 DOCREV CUR MEDS BY ELIG CLIN: HCPCS | Performed by: INTERNAL MEDICINE

## 2021-01-01 PROCEDURE — G8754 DIAS BP LESS 90: HCPCS | Performed by: INTERNAL MEDICINE

## 2021-01-01 PROCEDURE — 90694 VACC AIIV4 NO PRSRV 0.5ML IM: CPT | Performed by: INTERNAL MEDICINE

## 2021-01-01 PROCEDURE — G0008 ADMIN INFLUENZA VIRUS VAC: HCPCS | Performed by: INTERNAL MEDICINE

## 2021-01-01 PROCEDURE — G8536 NO DOC ELDER MAL SCRN: HCPCS | Performed by: INTERNAL MEDICINE

## 2021-01-01 PROCEDURE — 400013 HH SOC

## 2021-01-01 PROCEDURE — G0158 HHC OT ASSISTANT EA 15: HCPCS

## 2021-01-01 PROCEDURE — G8428 CUR MEDS NOT DOCUMENT: HCPCS | Performed by: INTERNAL MEDICINE

## 2021-01-01 PROCEDURE — G0151 HHCP-SERV OF PT,EA 15 MIN: HCPCS

## 2021-01-01 PROCEDURE — 400018 HH-NO PAY CLAIM PROCEDURE

## 2021-01-01 RX ORDER — LISINOPRIL 10 MG/1
10 TABLET ORAL DAILY
Qty: 60 TABLET | Refills: 5 | Status: SHIPPED | OUTPATIENT
Start: 2021-01-01 | End: 2022-01-01

## 2021-01-01 RX ORDER — WARFARIN 3 MG/1
3 TABLET ORAL DAILY
Qty: 60 TABLET | Refills: 4 | Status: SHIPPED | OUTPATIENT
Start: 2021-01-01 | End: 2022-01-01

## 2021-01-01 RX ORDER — WARFARIN SODIUM 5 MG/1
5 TABLET ORAL EVERY EVENING
Qty: 90 TABLET | Refills: 1 | Status: SHIPPED
Start: 2021-01-01 | End: 2021-01-01 | Stop reason: DRUGHIGH

## 2021-01-01 RX ORDER — TAMSULOSIN HYDROCHLORIDE 0.4 MG/1
0.8 CAPSULE ORAL DAILY
Qty: 180 CAPSULE | Refills: 1 | Status: SHIPPED | OUTPATIENT
Start: 2021-01-01 | End: 2021-01-01 | Stop reason: SDUPTHER

## 2021-01-01 RX ORDER — BISMUTH SUBSALICYLATE 262 MG
1 TABLET,CHEWABLE ORAL DAILY
COMMUNITY

## 2021-01-01 RX ORDER — FINASTERIDE 5 MG/1
5 TABLET, FILM COATED ORAL DAILY
Qty: 90 TABLET | Refills: 1 | Status: SHIPPED | OUTPATIENT
Start: 2021-01-01 | End: 2021-01-01 | Stop reason: SDUPTHER

## 2021-01-01 RX ORDER — ATORVASTATIN CALCIUM 40 MG/1
40 TABLET, FILM COATED ORAL DAILY
Qty: 90 TABLET | Refills: 1 | Status: SHIPPED | OUTPATIENT
Start: 2021-01-01 | End: 2022-01-01 | Stop reason: SDUPTHER

## 2021-01-01 RX ORDER — METOPROLOL TARTRATE 25 MG/1
12.5 TABLET, FILM COATED ORAL 2 TIMES DAILY
Qty: 90 TABLET | Refills: 1 | Status: SHIPPED | OUTPATIENT
Start: 2021-01-01 | End: 2022-01-01 | Stop reason: SDUPTHER

## 2021-01-20 ENCOUNTER — HOSPITAL ENCOUNTER (OUTPATIENT)
Dept: LAB | Age: 81
Discharge: HOME | End: 2021-01-20
Payer: MEDICARE

## 2021-01-20 ENCOUNTER — OFFICE VISIT (OUTPATIENT)
Dept: INTERNAL MEDICINE CLINIC | Age: 81
End: 2021-01-20
Payer: MEDICARE

## 2021-01-20 VITALS
HEART RATE: 93 BPM | WEIGHT: 157 LBS | DIASTOLIC BLOOD PRESSURE: 88 MMHG | OXYGEN SATURATION: 89 % | SYSTOLIC BLOOD PRESSURE: 174 MMHG | HEIGHT: 66 IN | TEMPERATURE: 97.7 F | BODY MASS INDEX: 25.23 KG/M2 | RESPIRATION RATE: 18 BRPM

## 2021-01-20 DIAGNOSIS — R60.9 EDEMA, UNSPECIFIED TYPE: ICD-10-CM

## 2021-01-20 DIAGNOSIS — I10 ESSENTIAL HYPERTENSION: ICD-10-CM

## 2021-01-20 DIAGNOSIS — E11.9 TYPE 2 DIABETES MELLITUS WITHOUT COMPLICATION, WITHOUT LONG-TERM CURRENT USE OF INSULIN (HCC): ICD-10-CM

## 2021-01-20 DIAGNOSIS — E78.5 HYPERLIPIDEMIA, UNSPECIFIED HYPERLIPIDEMIA TYPE: ICD-10-CM

## 2021-01-20 DIAGNOSIS — R60.9 EDEMA, UNSPECIFIED TYPE: Primary | ICD-10-CM

## 2021-01-20 DIAGNOSIS — R30.0 DYSURIA: ICD-10-CM

## 2021-01-20 LAB
ALBUMIN SERPL-MCNC: 3.4 G/DL (ref 3.4–5)
ALBUMIN/GLOB SERPL: 1.1 {RATIO} (ref 0.8–1.7)
ALP SERPL-CCNC: 64 U/L (ref 45–117)
ALT SERPL-CCNC: 33 U/L (ref 16–61)
ANION GAP SERPL CALC-SCNC: 1 MMOL/L (ref 3–18)
APPEARANCE UR: ABNORMAL
AST SERPL-CCNC: 18 U/L (ref 10–38)
BACTERIA URNS QL MICRO: ABNORMAL /HPF
BILIRUB SERPL-MCNC: 0.7 MG/DL (ref 0.2–1)
BILIRUB UR QL: NEGATIVE
BUN SERPL-MCNC: 21 MG/DL (ref 7–18)
BUN/CREAT SERPL: 23 (ref 12–20)
CALCIUM SERPL-MCNC: 9 MG/DL (ref 8.5–10.1)
CHLORIDE SERPL-SCNC: 101 MMOL/L (ref 100–111)
CO2 SERPL-SCNC: 40 MMOL/L (ref 21–32)
COLOR UR: YELLOW
CREAT SERPL-MCNC: 0.9 MG/DL (ref 0.6–1.3)
EPITH CASTS URNS QL MICRO: NEGATIVE /LPF (ref 0–5)
GLOBULIN SER CALC-MCNC: 3 G/DL (ref 2–4)
GLUCOSE SERPL-MCNC: 164 MG/DL (ref 74–99)
GLUCOSE UR STRIP.AUTO-MCNC: NEGATIVE MG/DL
HGB UR QL STRIP: NEGATIVE
KETONES UR QL STRIP.AUTO: NEGATIVE MG/DL
LEUKOCYTE ESTERASE UR QL STRIP.AUTO: ABNORMAL
NITRITE UR QL STRIP.AUTO: POSITIVE
PH UR STRIP: >8.5 [PH] (ref 5–8)
POTASSIUM SERPL-SCNC: 4.3 MMOL/L (ref 3.5–5.5)
PROT SERPL-MCNC: 6.4 G/DL (ref 6.4–8.2)
PROT UR STRIP-MCNC: ABNORMAL MG/DL
RBC #/AREA URNS HPF: NEGATIVE /HPF (ref 0–5)
SODIUM SERPL-SCNC: 142 MMOL/L (ref 136–145)
SP GR UR REFRACTOMETRY: 1.01 (ref 1–1.03)
TRI-PHOS CRY URNS QL MICRO: ABNORMAL
TSH SERPL DL<=0.05 MIU/L-ACNC: 2.32 UIU/ML (ref 0.36–3.74)
UROBILINOGEN UR QL STRIP.AUTO: 0.2 EU/DL (ref 0.2–1)
WBC URNS QL MICRO: ABNORMAL /HPF (ref 0–4)

## 2021-01-20 PROCEDURE — G8754 DIAS BP LESS 90: HCPCS | Performed by: PHYSICIAN ASSISTANT

## 2021-01-20 PROCEDURE — 84443 ASSAY THYROID STIM HORMONE: CPT

## 2021-01-20 PROCEDURE — 99214 OFFICE O/P EST MOD 30 MIN: CPT | Performed by: PHYSICIAN ASSISTANT

## 2021-01-20 PROCEDURE — G8432 DEP SCR NOT DOC, RNG: HCPCS | Performed by: PHYSICIAN ASSISTANT

## 2021-01-20 PROCEDURE — G8536 NO DOC ELDER MAL SCRN: HCPCS | Performed by: PHYSICIAN ASSISTANT

## 2021-01-20 PROCEDURE — 87086 URINE CULTURE/COLONY COUNT: CPT

## 2021-01-20 PROCEDURE — 80053 COMPREHEN METABOLIC PANEL: CPT

## 2021-01-20 PROCEDURE — 87077 CULTURE AEROBIC IDENTIFY: CPT

## 2021-01-20 PROCEDURE — G8419 CALC BMI OUT NRM PARAM NOF/U: HCPCS | Performed by: PHYSICIAN ASSISTANT

## 2021-01-20 PROCEDURE — 87186 SC STD MICRODIL/AGAR DIL: CPT

## 2021-01-20 PROCEDURE — 81001 URINALYSIS AUTO W/SCOPE: CPT

## 2021-01-20 PROCEDURE — G8427 DOCREV CUR MEDS BY ELIG CLIN: HCPCS | Performed by: PHYSICIAN ASSISTANT

## 2021-01-20 PROCEDURE — G8753 SYS BP > OR = 140: HCPCS | Performed by: PHYSICIAN ASSISTANT

## 2021-01-20 PROCEDURE — 36415 COLL VENOUS BLD VENIPUNCTURE: CPT

## 2021-01-20 PROCEDURE — 1101F PT FALLS ASSESS-DOCD LE1/YR: CPT | Performed by: PHYSICIAN ASSISTANT

## 2021-01-20 RX ORDER — FUROSEMIDE 20 MG/1
TABLET ORAL
Qty: 45 TAB | Refills: 1 | Status: SHIPPED
Start: 2021-01-20 | End: 2021-05-06

## 2021-01-20 RX ORDER — LISINOPRIL 20 MG/1
20 TABLET ORAL DAILY
Qty: 90 TAB | Refills: 1 | Status: SHIPPED | OUTPATIENT
Start: 2021-01-20 | End: 2021-05-06

## 2021-01-20 NOTE — PROGRESS NOTES
Emiliano Samaniego is a 79 y.o. male (: 1940) presenting to address:    Chief Complaint   Patient presents with    Foot Swelling    Leg Swelling       Vitals:    21 1148   BP: (!) 174/88   Pulse: 93   Resp: 18   Temp: 97.7 °F (36.5 °C)   TempSrc: Oral   SpO2: (!) 89%   Weight: 157 lb (71.2 kg)   Height: 5' 6\" (1.676 m)   PainSc:   0 - No pain       Hearing/Vision:   No exam data present    Learning Assessment:     Learning Assessment 7/3/2018   PRIMARY LEARNER Patient   HIGHEST LEVEL OF EDUCATION - PRIMARY LEARNER  SOME COLLEGE   BARRIERS PRIMARY LEARNER NONE   CO-LEARNER CAREGIVER No   PRIMARY LANGUAGE ENGLISH   LEARNER PREFERENCE PRIMARY READING     DEMONSTRATION   ANSWERED BY patient   RELATIONSHIP SELF     Depression Screening:     3 most recent PHQ Screens 2021   Little interest or pleasure in doing things Not at all   Feeling down, depressed, irritable, or hopeless Not at all   Total Score PHQ 2 0     Fall Risk Assessment:     Fall Risk Assessment, last 12 mths 2021   Able to walk? Yes   Fall in past 12 months? 1   Number of falls in past 12 months 2   Fall with injury? 0     Abuse Screening:     Abuse Screening Questionnaire 2019   Do you ever feel afraid of your partner? N   Are you in a relationship with someone who physically or mentally threatens you? N   Is it safe for you to go home? Y     Coordination of Care Questionaire:   1. Have you been to the ER, urgent care clinic since your last visit? Hospitalized since your last visit? NO    2. Have you seen or consulted any other health care providers outside of the [address] [place] [address] since your last visit? Include any pap smears or colon screening. YES    Advanced Directive:   1. Do you have an Advanced Directive? NO    2. Would you like information on Advanced Directives?  NO

## 2021-01-20 NOTE — PROGRESS NOTES
HISTORY OF PRESENT ILLNESS  Joanna Arrington is a 79 y.o. male. HPI  Presents for seth LE edema. Patient's spouse states he has been taking Lasix 10 mg daily, but #15 no refills (30-day Rx) was last given 10/26/2020. This medication reportedly helped. Denies orthopnea. Admits to increased SOB, but patient has hx of severe COPD on O2. He does not elevate his legs and is very sedentary. Does not wear compression stockings. 12/25/2020 Cr 0.7. Hospitalized 12/25/20-12/27/20 at [facility].   - BP was 154/78. Paroxysmal a fib - on Coumadin. Vasovagal event while on the commode. [facility] d/c med list shows lisinopril 2.5 mg daily and metoprolol 25 mg 0.25 tab bid, however, patient's spouse seems to be filling [facility] Rx's at [facility]. This is Lisinopril 10 mg and Metoprolol 25 mg 0.5 tab bid. - 12/2020 Echo: Preserved EF 55%. No thrombi. 2) HTN - uncontrolled. Lisinopril 10 mg and metoprolol tartrate 25 mg 1/2 tab BID. Unsure of compliance. BP Readings from Last 3 Encounters:   01/20/21 (!) 174/88   10/02/20 (!) 159/83   02/12/20 124/86       3) A fib - no recent cardiology f/u.     4) C/o intermittent dysuria and incontinence. 5) HLD - last lipid 12/26/2020 - Care Everywhere. LDL 59.     6) DMII - 12/26/2020 A1C 5.5% - Care everywhere. Not taking insulin. Review of Systems   Respiratory: Positive for shortness of breath (increased). Negative for cough. Cardiovascular: Positive for leg swelling. Negative for orthopnea.      Visit Vitals  BP (!) 174/88 (BP 1 Location: Right arm, BP Patient Position: Sitting)   Pulse 93   Temp 97.7 °F (36.5 °C) (Oral)   Resp 18   Ht 5' 6\" (1.676 m)   Wt 157 lb (71.2 kg)   SpO2 (!) 89%   BMI 25.34 kg/m²     BP Readings from Last 3 Encounters:   01/20/21 (!) 174/88   10/02/20 (!) 159/83   02/12/20 124/86     Pulse Readings from Last 3 Encounters:   01/20/21 93   10/02/20 78   02/12/20 81         Physical Exam  Constitutional:       General: He is not in acute distress. Appearance: Normal appearance. He is well-developed. HENT:      Head: Normocephalic and atraumatic. Right Ear: Tympanic membrane, ear canal and external ear normal.      Left Ear: Tympanic membrane, ear canal and external ear normal.      Nose: Nose normal.      Mouth/Throat:      Comments: Mask  Eyes:      General: No scleral icterus. Conjunctiva/sclera: Conjunctivae normal.      Pupils: Pupils are equal, round, and reactive to light. Neck:      Musculoskeletal: Neck supple. Cardiovascular:      Rate and Rhythm: Normal rate and regular rhythm. Pulses: Normal pulses. Dorsalis pedis pulses are 2+ on the right side and 2+ on the left side. Posterior tibial pulses are 2+ on the right side and 2+ on the left side. Heart sounds: Normal heart sounds. No murmur. No gallop. Comments: Timmy calves soft and nontender. No asymmetric leg enlargement. Pulmonary:      Effort: Pulmonary effort is normal. No respiratory distress. Breath sounds: Normal breath sounds. No decreased breath sounds, wheezing, rhonchi or rales. Musculoskeletal:      Right lower le+ Edema present. Left lower le+ Edema present. Lymphadenopathy:      Head:      Right side of head: No submandibular or tonsillar adenopathy. Left side of head: No submandibular or tonsillar adenopathy. Cervical: No cervical adenopathy. Upper Body:      Right upper body: No supraclavicular adenopathy. Left upper body: No supraclavicular adenopathy. Skin:     General: Skin is warm and dry. Neurological:      Mental Status: He is alert and oriented to person, place, and time. Psychiatric:         Speech: Speech normal.         ASSESSMENT and PLAN  Diagnoses and all orders for this visit:    1. Edema, unspecified type  -     METABOLIC PANEL, COMPREHENSIVE; Future  -     TSH 3RD GENERATION;  Future  -     furosemide (LASIX) 20 mg tablet; TAKE 1/2 TABLET BY MOUTH EVERY DAY   - Will resume very low dosage to help with swelling.   - Advised of compression stocking use. - Advised to elevate legs and feet when in a sitting position. 2. Dysuria  -     URINALYSIS W/ RFLX MICROSCOPIC; Future  -     CULTURE, URINE; Future    3. Type 2 diabetes mellitus without complication, without long-term current use of insulin (HCC)  -     TSH 3RD GENERATION; Future  - Cont hold of insulin. They agree. 4. Essential hypertension  -     lisinopriL (PRINIVIL, ZESTRIL) 20 mg tablet; Take 1 Tab by mouth daily.   - Dosage increase to 20 mg daily. 5. Hyperlipidemia, unspecified hyperlipidemia type  - Cont Lipitor 40 mg daily.

## 2021-01-25 LAB
BACTERIA SPEC CULT: ABNORMAL
BACTERIA SPEC CULT: ABNORMAL
CC UR VC: ABNORMAL
SERVICE CMNT-IMP: ABNORMAL

## 2021-01-28 RX ORDER — AMOXICILLIN 500 MG/1
500 CAPSULE ORAL 2 TIMES DAILY
Qty: 28 CAP | Refills: 0 | Status: SHIPPED | OUTPATIENT
Start: 2021-01-28 | End: 2021-02-11

## 2021-01-29 NOTE — PROGRESS NOTES
Please advise patient his culture came back positive. I am sending an antibiotic to his pharmacy. Take Amoxicillin twice daily x 14 days. He needs to follow up with urology as well. I sent to [facility] on [address].

## 2021-01-29 NOTE — PROGRESS NOTES
Spoke with patient wife and gave resulted note  She verbalized understanding and stated she will schedule appointment with urology.

## 2021-04-04 LAB — HBA1C MFR BLD HPLC: 6.6 %

## 2021-05-06 ENCOUNTER — TELEPHONE (OUTPATIENT)
Dept: INTERNAL MEDICINE CLINIC | Age: 81
End: 2021-05-06

## 2021-05-06 ENCOUNTER — OFFICE VISIT (OUTPATIENT)
Dept: INTERNAL MEDICINE CLINIC | Age: 81
End: 2021-05-06
Payer: MEDICARE

## 2021-05-06 VITALS
HEIGHT: 67 IN | DIASTOLIC BLOOD PRESSURE: 79 MMHG | TEMPERATURE: 98.9 F | SYSTOLIC BLOOD PRESSURE: 159 MMHG | RESPIRATION RATE: 20 BRPM | WEIGHT: 158 LBS | HEART RATE: 85 BPM | BODY MASS INDEX: 24.8 KG/M2 | OXYGEN SATURATION: 96 %

## 2021-05-06 DIAGNOSIS — I63.319 CEREBROVASCULAR ACCIDENT (CVA) DUE TO THROMBOSIS OF MIDDLE CEREBRAL ARTERY, UNSPECIFIED BLOOD VESSEL LATERALITY (HCC): Primary | ICD-10-CM

## 2021-05-06 DIAGNOSIS — K59.00 CONSTIPATION, UNSPECIFIED CONSTIPATION TYPE: ICD-10-CM

## 2021-05-06 DIAGNOSIS — D64.9 ANEMIA, UNSPECIFIED TYPE: ICD-10-CM

## 2021-05-06 DIAGNOSIS — R10.13 DYSPEPSIA: ICD-10-CM

## 2021-05-06 DIAGNOSIS — I48.0 PAROXYSMAL ATRIAL FIBRILLATION (HCC): ICD-10-CM

## 2021-05-06 DIAGNOSIS — I10 ESSENTIAL HYPERTENSION: ICD-10-CM

## 2021-05-06 DIAGNOSIS — N40.0 ENLARGED PROSTATE: ICD-10-CM

## 2021-05-06 DIAGNOSIS — L85.3 XEROSIS OF SKIN: ICD-10-CM

## 2021-05-06 LAB
INR BLD: 2.1
PT POC: NORMAL
VALID INTERNAL CONTROL?: YES

## 2021-05-06 PROCEDURE — 99215 OFFICE O/P EST HI 40 MIN: CPT | Performed by: INTERNAL MEDICINE

## 2021-05-06 PROCEDURE — 85610 PROTHROMBIN TIME: CPT | Performed by: INTERNAL MEDICINE

## 2021-05-06 PROCEDURE — 1101F PT FALLS ASSESS-DOCD LE1/YR: CPT | Performed by: INTERNAL MEDICINE

## 2021-05-06 PROCEDURE — G8753 SYS BP > OR = 140: HCPCS | Performed by: INTERNAL MEDICINE

## 2021-05-06 PROCEDURE — G8510 SCR DEP NEG, NO PLAN REQD: HCPCS | Performed by: INTERNAL MEDICINE

## 2021-05-06 PROCEDURE — G8754 DIAS BP LESS 90: HCPCS | Performed by: INTERNAL MEDICINE

## 2021-05-06 PROCEDURE — G8420 CALC BMI NORM PARAMETERS: HCPCS | Performed by: INTERNAL MEDICINE

## 2021-05-06 PROCEDURE — G8427 DOCREV CUR MEDS BY ELIG CLIN: HCPCS | Performed by: INTERNAL MEDICINE

## 2021-05-06 PROCEDURE — G8536 NO DOC ELDER MAL SCRN: HCPCS | Performed by: INTERNAL MEDICINE

## 2021-05-06 RX ORDER — LANOLIN ALCOHOL/MO/W.PET/CERES
325 CREAM (GRAM) TOPICAL 2 TIMES DAILY
Qty: 180 TAB | Refills: 1 | Status: SHIPPED | OUTPATIENT
Start: 2021-05-06 | End: 2021-05-06 | Stop reason: SDUPTHER

## 2021-05-06 RX ORDER — OMEPRAZOLE 40 MG/1
40 CAPSULE, DELAYED RELEASE ORAL DAILY
Qty: 90 CAP | Refills: 1 | Status: SHIPPED | OUTPATIENT
Start: 2021-05-06 | End: 2021-05-06 | Stop reason: SDUPTHER

## 2021-05-06 RX ORDER — AMOXICILLIN 250 MG
1 CAPSULE ORAL DAILY
Qty: 90 TAB | Refills: 1 | Status: SHIPPED | OUTPATIENT
Start: 2021-05-06 | End: 2021-05-06 | Stop reason: SDUPTHER

## 2021-05-06 RX ORDER — FACIAL-BODY WIPES
10 EACH TOPICAL
COMMUNITY
End: 2022-01-01

## 2021-05-06 RX ORDER — OMEPRAZOLE 40 MG/1
40 CAPSULE, DELAYED RELEASE ORAL DAILY
Qty: 90 CAP | Refills: 1 | Status: SHIPPED | OUTPATIENT
Start: 2021-05-06 | End: 2022-01-01

## 2021-05-06 RX ORDER — AMOXICILLIN 250 MG
1 CAPSULE ORAL DAILY
Qty: 90 TAB | Refills: 1 | Status: SHIPPED | OUTPATIENT
Start: 2021-05-06 | End: 2021-05-07

## 2021-05-06 RX ORDER — LISINOPRIL 10 MG/1
10 TABLET ORAL DAILY
Qty: 90 TAB | Refills: 5 | Status: SHIPPED | OUTPATIENT
Start: 2021-05-06 | End: 2021-05-07

## 2021-05-06 RX ORDER — TAMSULOSIN HYDROCHLORIDE 0.4 MG/1
0.8 CAPSULE ORAL DAILY
Qty: 180 CAP | Refills: 1 | Status: SHIPPED | OUTPATIENT
Start: 2021-05-06 | End: 2021-01-01 | Stop reason: SDUPTHER

## 2021-05-06 RX ORDER — INSULIN LISPRO 100 [IU]/ML
INJECTION, SOLUTION INTRAVENOUS; SUBCUTANEOUS
COMMUNITY
End: 2021-05-06

## 2021-05-06 RX ORDER — CLOPIDOGREL BISULFATE 75 MG/1
75 TABLET ORAL DAILY
Qty: 90 TAB | Refills: 5 | Status: SHIPPED | OUTPATIENT
Start: 2021-05-06 | End: 2021-05-06 | Stop reason: SDUPTHER

## 2021-05-06 RX ORDER — TAMSULOSIN HYDROCHLORIDE 0.4 MG/1
0.8 CAPSULE ORAL DAILY
Qty: 180 CAP | Refills: 1 | Status: SHIPPED | OUTPATIENT
Start: 2021-05-06 | End: 2021-05-06 | Stop reason: SDUPTHER

## 2021-05-06 RX ORDER — FINASTERIDE 5 MG/1
5 TABLET, FILM COATED ORAL DAILY
Qty: 90 TAB | Refills: 1 | Status: SHIPPED | OUTPATIENT
Start: 2021-05-06 | End: 2021-05-06 | Stop reason: SDUPTHER

## 2021-05-06 RX ORDER — WARFARIN SODIUM 5 MG/1
5 TABLET ORAL EVERY EVENING
Qty: 90 TAB | Refills: 1 | Status: SHIPPED | OUTPATIENT
Start: 2021-05-06 | End: 2021-05-06 | Stop reason: SDUPTHER

## 2021-05-06 RX ORDER — ACETAMINOPHEN 325 MG/1
325 TABLET ORAL
COMMUNITY
End: 2022-01-01

## 2021-05-06 RX ORDER — LISINOPRIL 10 MG/1
10 TABLET ORAL DAILY
Qty: 90 TAB | Refills: 5 | Status: SHIPPED | OUTPATIENT
Start: 2021-05-06 | End: 2021-05-06 | Stop reason: SDUPTHER

## 2021-05-06 RX ORDER — WARFARIN SODIUM 5 MG/1
5 TABLET ORAL EVERY EVENING
Qty: 90 TAB | Refills: 1 | Status: SHIPPED | OUTPATIENT
Start: 2021-05-06 | End: 2021-08-12 | Stop reason: SDUPTHER

## 2021-05-06 RX ORDER — ASCORBIC ACID 500 MG
500 TABLET ORAL DAILY
Qty: 90 TAB | Refills: 1 | Status: CANCELLED | OUTPATIENT
Start: 2021-05-06

## 2021-05-06 RX ORDER — LISINOPRIL 20 MG/1
20 TABLET ORAL DAILY
Qty: 90 TAB | Refills: 1 | Status: SHIPPED | OUTPATIENT
Start: 2021-05-06 | End: 2021-05-07

## 2021-05-06 RX ORDER — FINASTERIDE 5 MG/1
5 TABLET, FILM COATED ORAL DAILY
Qty: 90 TAB | Refills: 1 | Status: SHIPPED | OUTPATIENT
Start: 2021-05-06 | End: 2021-01-01 | Stop reason: SDUPTHER

## 2021-05-06 RX ORDER — CLOPIDOGREL BISULFATE 75 MG/1
75 TABLET ORAL
COMMUNITY
End: 2021-05-06 | Stop reason: SDUPTHER

## 2021-05-06 RX ORDER — METFORMIN HYDROCHLORIDE 500 MG/1
500 TABLET ORAL 2 TIMES DAILY WITH MEALS
Qty: 90 TAB | Refills: 5 | Status: SHIPPED | OUTPATIENT
Start: 2021-05-06 | End: 2021-05-06

## 2021-05-06 RX ORDER — METFORMIN HYDROCHLORIDE 500 MG/1
500 TABLET ORAL 2 TIMES DAILY WITH MEALS
Qty: 90 TAB | Refills: 5 | Status: SHIPPED
Start: 2021-05-06 | End: 2021-08-10

## 2021-05-06 RX ORDER — DEXTROSE MONOHYDRATE 50 MG/ML
INJECTION, SOLUTION INTRAVENOUS CONTINUOUS
COMMUNITY
End: 2022-01-01

## 2021-05-06 RX ORDER — METOPROLOL TARTRATE 25 MG/1
12.5 TABLET, FILM COATED ORAL 2 TIMES DAILY
Qty: 90 TAB | Refills: 1 | Status: SHIPPED | OUTPATIENT
Start: 2021-05-06 | End: 2021-05-06 | Stop reason: SDUPTHER

## 2021-05-06 RX ORDER — LISINOPRIL 20 MG/1
20 TABLET ORAL DAILY
Qty: 90 TAB | Refills: 1 | Status: SHIPPED | OUTPATIENT
Start: 2021-05-06 | End: 2021-05-06 | Stop reason: SDUPTHER

## 2021-05-06 RX ORDER — METOPROLOL TARTRATE 25 MG/1
12.5 TABLET, FILM COATED ORAL 2 TIMES DAILY
Qty: 90 TAB | Refills: 1 | Status: SHIPPED | OUTPATIENT
Start: 2021-05-06 | End: 2021-01-01 | Stop reason: SDUPTHER

## 2021-05-06 RX ORDER — LANOLIN ALCOHOL/MO/W.PET/CERES
325 CREAM (GRAM) TOPICAL 2 TIMES DAILY
Qty: 180 TAB | Refills: 1 | Status: SHIPPED
Start: 2021-05-06 | End: 2021-08-10

## 2021-05-06 RX ORDER — ASCORBIC ACID 500 MG
500 TABLET ORAL DAILY
Qty: 90 TAB | Refills: 1 | Status: SHIPPED | OUTPATIENT
Start: 2021-05-06

## 2021-05-06 RX ORDER — ASCORBIC ACID 500 MG
500 TABLET ORAL DAILY
Qty: 90 TAB | Refills: 1 | Status: SHIPPED | OUTPATIENT
Start: 2021-05-06 | End: 2021-05-06 | Stop reason: SDUPTHER

## 2021-05-06 RX ORDER — CLOPIDOGREL BISULFATE 75 MG/1
75 TABLET ORAL DAILY
Qty: 90 TAB | Refills: 5 | Status: SHIPPED | OUTPATIENT
Start: 2021-05-06 | End: 2021-08-12 | Stop reason: SDUPTHER

## 2021-05-06 NOTE — TELEPHONE ENCOUNTER
Megan Kate with [facility] is calling in stating they need to know how many times a week the patient needs to have his INR done.   Asking if you can please fax the order to them at [phone]

## 2021-05-06 NOTE — PROGRESS NOTES
Progress Note    Patient: Chelsie Martinez               Sex: male                  YOB: 1940      Age:  79 y.o.                    HPI:     Chelsie Martinez is a 79 y.o. male who has been seen for transitional care after a stroke and admission to [facility] and rehab at [facility] . He is now on coumadin and plavix as recommended by neurology . He is followed for hypertension and DM . His INR is 2.1 today   [facility] is working with him at home .    Past Medical History:   Diagnosis Date    Advance directive discussed with patient     Benign localized prostatic hyperplasia with lower urinary tract symptoms (LUTS)     COPD with emphysema ([address]     CVA (cerebrovascular accident) ([address] 7/11/2012    possible    Diabetes mellitus ([address]     Drowsiness     Enlarged prostate     Essential hypertension, benign     Eye exam, routine 07/26/2016    Dr. Jenelle Franco repeat in 1 yr    H/O colonoscopy 11/24/15    Dr. 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 Brittney Pereira  late 3751'C       Family History   Problem Relation Age of Onset    Cancer Father         he does not know       Social History     Socioeconomic History    Marital status:      Spouse name: Not on file    Number of children: Not on file    Years of education: Not on file    Highest education level: Not on file   Tobacco Use    Smoking status: Former Smoker     Packs/day: 1.00     Years: 45.00     Pack years: 45.00     Types: Cigarettes     Quit date: 2015     Years since quittin.7    Smokeless tobacco: Never Used    Tobacco comment: Pt counseled to continue to not smoke. Substance and Sexual Activity    Alcohol use: No     Alcohol/week: 0.0 standard drinks    Drug use: No    Sexual activity: Not Currently         Current Outpatient Medications:     lisinopriL (PRINIVIL, ZESTRIL) 20 mg tablet, Take 1 Tab by mouth daily. , Disp: 90 Tab, Rfl: 1    furosemide (LASIX) 20 mg tablet, TAKE 1/2 TABLET BY MOUTH EVERY DAY, Disp: 45 Tab, Rfl: 1    mirabegron ER (Myrbetriq) 25 mg ER tablet, Take 1 Tab by mouth daily. , Disp: 90 Tab, Rfl: 1    metFORMIN (GLUCOPHAGE) 500 mg tablet, , Disp: , Rfl:     potassium chloride (K-DUR, KLOR-CON) 20 mEq tablet, Take 20 mEq by mouth., Disp: , Rfl:     traMADoL (ULTRAM) 50 mg tablet, Take 50 mg by mouth once. Take 1 tab 1 hour before therapy. Hold if sedated/confused or SBP <100., Disp: , Rfl:     tiotropium (Spiriva with HandiHaler) 18 mcg inhalation capsule, Take 1 Cap by inhalation daily. , Disp: 90 Cap, Rfl: 1    tamsulosin (FLOMAX) 0.4 mg capsule, Take 2 Caps by mouth daily. , Disp: 180 Cap, Rfl: 1    senna-docusate (Senexon-S) 8.6-50 mg per tablet, Take 1 Tab by mouth daily. , Disp: 90 Tab, Rfl: 1    omeprazole (PRILOSEC) 40 mg capsule, Take 1 Cap by mouth daily. , Disp: 90 Cap, Rfl: 1    metoprolol tartrate (LOPRESSOR) 25 mg tablet, Take 0.5 Tabs by mouth two (2) times a day., Disp: 90 Tab, Rfl: 1    meclizine (ANTIVERT) 25 mg tablet, Take 1 Tab by mouth three (3) times daily as needed for Dizziness. , Disp: 60 Tab, Rfl: 0    fluticasone furoate-vilanteroL (BREO ELLIPTA) 100-25 mcg/dose inhaler, Take 1 Puff by inhalation daily. , Disp: 3 Inhaler, Rfl: 1    finasteride (PROSCAR) 5 mg tablet, Take 1 Tab by mouth daily. , Disp: 90 Tab, Rfl: 1    ferrous sulfate 325 mg (65 mg iron) tablet, Take 1 Tab by mouth two (2) times a day., Disp: 180 Tab, Rfl: 1    atorvastatin (Lipitor) 40 mg tablet, Take 1 Tab by mouth daily. , Disp: 90 Tab, Rfl: 1    ascorbic acid, vitamin C, (Vitamin C) 500 mg tablet, Take 1 Tab by mouth daily. , Disp: 90 Tab, Rfl: 1    albuterol (PROVENTIL HFA, VENTOLIN HFA, PROAIR HFA) 90 mcg/actuation inhaler, Take 2 Puffs by inhalation every six (6) hours as needed for Wheezing., Disp: 3 Inhaler, Rfl: 1    warfarin (COUMADIN) 5 mg tablet, Take 1 Tab by mouth every evening., Disp: 90 Tab, Rfl: 1    aspirin delayed-release 81 mg tablet, Take 1 Tab by mouth daily. , Disp: 90 Tab, Rfl: 1    nystatin (MYCOSTATIN) topical cream, Apply to bilateral groin, after washing area with water and soap and dry well., Disp: 30 g, Rfl: 0    OXYGEN-AIR DELIVERY SYSTEMS, 3 L by Nasal route continuous. , Disp: , Rfl:     Lancets misc, Check fasting sugars daily before breakfast. DX: E11.9 for freestyle lite meter, Disp: 100 Each, Rfl: 11    Nebulizer & Compressor machine, 1 Each., Disp: , Rfl:     glucose blood VI test strips (FREESTYLE LITE STRIPS) strip, Check fasting sugars daily before breakfast. DX: E11.9 for freestyle lite meter, Disp: 100 Strip, Rfl: 11    Blood-Glucose Meter (ONE TOUCH ULTRA 2) monitoring kit, Check fasting sugars daily before breakfast. DX: 250.02, Disp: 1 Kit, Rfl: 0     No Known Allergies    Review of Systems   Constitutional: Negative for chills and fever. Respiratory: Negative for cough. Cardiovascular: Negative for chest pain. Gastrointestinal: Negative. Neurological: Positive for dizziness.         Physical Exam:      Visit Vitals  BP (!) 159/79 (BP 1 Location: Right upper arm, BP Patient Position: Sitting, BP Cuff Size: Adult)   Pulse 85   Temp 98.9 °F (37.2 °C) (Oral)   Resp 20   Ht 5' 7\" (1.702 m)   Wt 158 lb (71.7 kg)   SpO2 96%   BMI 24.75 kg/m²       Physical Exam  Constitutional:       Appearance: Normal appearance. HENT:      Head: Normocephalic. Cardiovascular:      Rate and Rhythm: Normal rate. Rhythm irregular. Neurological:      Mental Status: He is alert. Psychiatric:         Mood and Affect: Mood normal.         Behavior: Behavior normal.          Labs Reviewed:  INR  2.1    Assessment/Plan       ICD-10-CM ICD-9-CM    1. Cerebrovascular accident (CVA) due to thrombosis of middle cerebral artery, unspecified blood vessel laterality (HCC)  I63.319 434.01 clopidogreL (PLAVIX) 75 mg tab      metFORMIN (GLUCOPHAGE) 500 mg tablet   2. Paroxysmal atrial fibrillation (HCC)  I48.0 427.31 AMB POC PT/INR      warfarin (COUMADIN) 5 mg tablet   3. Enlarged prostate  N40.0 600.00 tamsulosin (FLOMAX) 0.4 mg capsule      finasteride (PROSCAR) 5 mg tablet   4. Essential hypertension  I10 401.9 metoprolol tartrate (LOPRESSOR) 25 mg tablet      lisinopriL (PRINIVIL, ZESTRIL) 20 mg tablet   5. Xerosis of skin  L85.3 706.8 ascorbic acid, vitamin C, (Vitamin C) 500 mg tablet      diphenhydrAMINE (BENADRYL) 2 % topical cream   6. Anemia, unspecified type  D64.9 285.9 ferrous sulfate 325 mg (65 mg iron) tablet   7. Dyspepsia  R10.13 536.8 omeprazole (PRILOSEC) 40 mg capsule   8. Constipation, unspecified constipation type  K59.00 564.00 senna-docusate (Senexon-S) 8.6-50 mg per tablet      Continue coumadin at same dosage  Will stop sliding scale . Need to simplify regimen . Did not refill meclizine . Advised needs to have PT INR checked once a week    reviewed [facility]  records  Spent 45 mins on this visit   Is anemic and on iron . But MCV is high . Should have iron studies and B12 on return .  Has been on metformin and  prilosec  long term   Chet Meza MD

## 2021-05-07 ENCOUNTER — TELEPHONE (OUTPATIENT)
Dept: INTERNAL MEDICINE CLINIC | Age: 81
End: 2021-05-07

## 2021-05-07 DIAGNOSIS — I67.9 CEREBROVASCULAR DISEASE: ICD-10-CM

## 2021-05-07 RX ORDER — ATORVASTATIN CALCIUM 40 MG/1
40 TABLET, FILM COATED ORAL DAILY
Qty: 90 TAB | Refills: 1 | Status: SHIPPED | OUTPATIENT
Start: 2021-05-07 | End: 2021-01-01 | Stop reason: SDUPTHER

## 2021-05-07 RX ORDER — LISINOPRIL 30 MG/1
30 TABLET ORAL DAILY
Qty: 90 TAB | Refills: 3 | Status: SHIPPED | OUTPATIENT
Start: 2021-05-07 | End: 2022-01-01 | Stop reason: SDUPTHER

## 2021-05-07 RX ORDER — SENNOSIDES 8.6 MG/1
1 TABLET ORAL DAILY
Qty: 90 TAB | Refills: 1 | Status: SHIPPED | OUTPATIENT
Start: 2021-05-07 | End: 2022-01-01

## 2021-05-07 RX ORDER — MECLIZINE HYDROCHLORIDE 25 MG/1
25 TABLET ORAL
Qty: 60 TAB | Refills: 0 | OUTPATIENT
Start: 2021-05-07

## 2021-05-07 RX ORDER — SENNOSIDES 8.6 MG/1
1 TABLET ORAL DAILY
Qty: 90 TAB | Refills: 3 | Status: SHIPPED | OUTPATIENT
Start: 2021-05-07 | End: 2022-01-01

## 2021-05-07 NOTE — TELEPHONE ENCOUNTER
Hospital discharge summary reviewed. Neurology discharged patient on Plavix and Coumadin. Aspirin was discontinued. Please advise pharmacist.     Will send docusate and senna separately.

## 2021-05-07 NOTE — TELEPHONE ENCOUNTER
Received a call from the Pharmacist at   [place] [address] [address] [place] [address]  Phone: [phone] Fax: [phone]    For order clarification for Plavix and warfarin. Also to inform the pharmacy does not carry senna-docusate. Requesting medications to be prescribed separately. Chart reviewed. \" He is now on coumadin and plavix as recommended by neurology\"  per Dr. Alma Mckeon. Additionally, pt has Home health and will be followed for Anticoagulation Monitoring. Pharmacist verbalizes understanding. Will notify of medication unavailability.

## 2021-05-07 NOTE — TELEPHONE ENCOUNTER
Patient's daughter is calling in for refills. Requested Prescriptions     Pending Prescriptions Disp Refills    atorvastatin (Lipitor) 40 mg tablet 90 Tab 1     Sig: Take 1 Tab by mouth daily.     meclizine (ANTIVERT) 25 mg tablet 60 Tab 0     Sig: Take 1 Tab by mouth three (3) times daily as needed for Dizziness.   '

## 2021-05-07 NOTE — TELEPHONE ENCOUNTER
Patient's daughter is calling in asking if the 20mg &  10mg  Lisinopril and be called in as 1 30mg prescription.   [facility] 30[facility] pills

## 2021-05-10 NOTE — TELEPHONE ENCOUNTER
Spoke with pharmacist Jeannie and gave message. She stated patient picked up plavix, coumadin and Aspirin today. She stated she was going to call the daughter to inform her Neurology discharged medication. I will also call myself today.

## 2021-05-10 NOTE — TELEPHONE ENCOUNTER
I called and spoke with patient daughter and she stated pharmacist did just call her and gave her message.

## 2021-05-24 ENCOUNTER — TELEPHONE (OUTPATIENT)
Dept: INTERNAL MEDICINE CLINIC | Age: 81
End: 2021-05-24

## 2021-05-24 NOTE — TELEPHONE ENCOUNTER
Received a fax test date and time was 05/20/2021 @ 1100  Patient INR was 1.4  brando wanted me to call and get info from patient what is dosage ? Missed medication ? Diet change ? I got a voicemail and LM. I then received a call from [facility] @ [facility] that patient INR is 2.0  She stated she is not sure of current dosage but thing it is 5 mg daily. [facility] would like me to give her a call back with dosage patient needs to be taking ?       [facility] number is [phone]

## 2021-05-25 NOTE — TELEPHONE ENCOUNTER
Spoke with [facility] and gave message and she wants to know how often patient needs to do the home md/inr so that she can tell patient.

## 2021-06-22 DIAGNOSIS — J43.9 PULMONARY EMPHYSEMA, UNSPECIFIED EMPHYSEMA TYPE (HCC): Primary | ICD-10-CM

## 2021-06-22 DIAGNOSIS — Z86.73 HISTORY OF CVA (CEREBROVASCULAR ACCIDENT): ICD-10-CM

## 2021-06-24 DIAGNOSIS — J43.9 PULMONARY EMPHYSEMA, UNSPECIFIED EMPHYSEMA TYPE (HCC): ICD-10-CM

## 2021-06-24 DIAGNOSIS — I67.9 CEREBROVASCULAR DISEASE: Primary | ICD-10-CM

## 2021-06-25 ENCOUNTER — TELEPHONE ANTICOAG (OUTPATIENT)
Dept: INTERNAL MEDICINE CLINIC | Age: 81
End: 2021-06-25

## 2021-06-25 LAB — INR, EXTERNAL: 2.8

## 2021-06-25 NOTE — PROGRESS NOTES
Received a call from Otoniel Price with [facility] to report anticoagulation monitoring for the diagnosis of Atrial Fibrillation. His current Coumadin dose is 5mg daily. Today's findings include an INR of 2.8. Notified covering MD.    Considering Mr. Boudreaux Cover past history, todays findings, and per the coumadin policy/protocol, [address] [place] [address] Otoniel Price was instructed to take Coumadin as follows,  Continue to Coumadin 5mg daily and recheck in one week, 07/02/21 per Dr. Jonathon Engel. [address]         Anticoagulation Summary  As of 6/25/2021    INR goal:     TTR:  --   INR used for dosing:     Warfarin maintenance plan:  5 mg (5 mg x 1) every day   Weekly warfarin total:  35 mg   Plan last modified:  Ananya Clement (6/25/2021)   Next INR check:  7/2/2021   Target end date:           Anticoagulation Episode Summary     INR check location:      Preferred lab:      Send INR reminders to:      Comments:

## 2021-07-02 ENCOUNTER — TELEPHONE (OUTPATIENT)
Dept: INTERNAL MEDICINE CLINIC | Age: 81
End: 2021-07-02

## 2021-07-02 NOTE — TELEPHONE ENCOUNTER
Esvin Dalal from [facility] called and stated patient INR is 2.9    She would like a call back with directions.       Esvin Dalal number [phone]

## 2021-08-03 PROBLEM — E78.5 HYPERLIPIDEMIA: Status: RESOLVED | Noted: 2021-08-03 | Resolved: 2021-08-03

## 2021-08-10 ENCOUNTER — HOSPITAL ENCOUNTER (OUTPATIENT)
Dept: LAB | Age: 81
Discharge: HOME | End: 2021-08-10
Payer: MEDICARE

## 2021-08-10 ENCOUNTER — OFFICE VISIT (OUTPATIENT)
Dept: INTERNAL MEDICINE CLINIC | Age: 81
End: 2021-08-10
Payer: MEDICARE

## 2021-08-10 VITALS
HEIGHT: 67 IN | HEART RATE: 70 BPM | BODY MASS INDEX: 24.75 KG/M2 | TEMPERATURE: 97 F | DIASTOLIC BLOOD PRESSURE: 68 MMHG | OXYGEN SATURATION: 91 % | SYSTOLIC BLOOD PRESSURE: 112 MMHG

## 2021-08-10 DIAGNOSIS — J44.9 CHRONIC OBSTRUCTIVE PULMONARY DISEASE, UNSPECIFIED COPD TYPE (HCC): ICD-10-CM

## 2021-08-10 DIAGNOSIS — D64.9 ANEMIA, UNSPECIFIED TYPE: ICD-10-CM

## 2021-08-10 DIAGNOSIS — E11.9 TYPE 2 DIABETES MELLITUS WITHOUT COMPLICATION, WITHOUT LONG-TERM CURRENT USE OF INSULIN (HCC): ICD-10-CM

## 2021-08-10 DIAGNOSIS — Z51.81 ANTICOAGULATION MANAGEMENT ENCOUNTER: ICD-10-CM

## 2021-08-10 DIAGNOSIS — E78.5 HYPERLIPIDEMIA, UNSPECIFIED HYPERLIPIDEMIA TYPE: ICD-10-CM

## 2021-08-10 DIAGNOSIS — I10 ESSENTIAL HYPERTENSION: Primary | ICD-10-CM

## 2021-08-10 DIAGNOSIS — Z79.01 ANTICOAGULATION MANAGEMENT ENCOUNTER: ICD-10-CM

## 2021-08-10 DIAGNOSIS — D63.8 ANEMIA OF CHRONIC DISEASE: ICD-10-CM

## 2021-08-10 LAB
FOLATE SERPL-MCNC: >20 NG/ML (ref 3.1–17.5)
HBA1C MFR BLD HPLC: 5.5 %
INR BLD: 2.4
PT POC: NORMAL
VALID INTERNAL CONTROL?: YES
VIT B12 SERPL-MCNC: 1729 PG/ML (ref 211–911)

## 2021-08-10 PROCEDURE — 1101F PT FALLS ASSESS-DOCD LE1/YR: CPT | Performed by: INTERNAL MEDICINE

## 2021-08-10 PROCEDURE — G8752 SYS BP LESS 140: HCPCS | Performed by: INTERNAL MEDICINE

## 2021-08-10 PROCEDURE — 82607 VITAMIN B-12: CPT

## 2021-08-10 PROCEDURE — 85610 PROTHROMBIN TIME: CPT | Performed by: INTERNAL MEDICINE

## 2021-08-10 PROCEDURE — G8536 NO DOC ELDER MAL SCRN: HCPCS | Performed by: INTERNAL MEDICINE

## 2021-08-10 PROCEDURE — 36415 COLL VENOUS BLD VENIPUNCTURE: CPT

## 2021-08-10 PROCEDURE — G8510 SCR DEP NEG, NO PLAN REQD: HCPCS | Performed by: INTERNAL MEDICINE

## 2021-08-10 PROCEDURE — 99215 OFFICE O/P EST HI 40 MIN: CPT | Performed by: INTERNAL MEDICINE

## 2021-08-10 PROCEDURE — 83036 HEMOGLOBIN GLYCOSYLATED A1C: CPT | Performed by: INTERNAL MEDICINE

## 2021-08-10 PROCEDURE — G8754 DIAS BP LESS 90: HCPCS | Performed by: INTERNAL MEDICINE

## 2021-08-10 PROCEDURE — G8420 CALC BMI NORM PARAMETERS: HCPCS | Performed by: INTERNAL MEDICINE

## 2021-08-10 PROCEDURE — G8427 DOCREV CUR MEDS BY ELIG CLIN: HCPCS | Performed by: INTERNAL MEDICINE

## 2021-08-10 NOTE — PROGRESS NOTES
Melissa Burnette is a 79 y.o. male (: 1940) presenting to address:    Chief Complaint   Patient presents with    Cerebrovascular Accident    Medication Evaluation    Decreased Appetite       Vitals:    08/10/21 1313   BP: 112/68   Pulse: 70   Temp: 97 °F (36.1 °C)   TempSrc: Temporal   SpO2: 91%   Height: 5' 7\" (1.702 m)   PainSc:   0 - No pain       Hearing/Vision:   No exam data present    Learning Assessment:     Learning Assessment 7/3/2018   PRIMARY LEARNER Patient   HIGHEST LEVEL OF EDUCATION - PRIMARY LEARNER  SOME COLLEGE   BARRIERS PRIMARY LEARNER NONE   CO-LEARNER CAREGIVER No   PRIMARY LANGUAGE ENGLISH   LEARNER PREFERENCE PRIMARY READING     DEMONSTRATION   ANSWERED BY patient   RELATIONSHIP SELF     Depression Screening:     3 most recent PHQ Screens 8/10/2021   Little interest or pleasure in doing things Not at all   Feeling down, depressed, irritable, or hopeless Not at all   Total Score PHQ 2 0     Fall Risk Assessment:     Fall Risk Assessment, last 12 mths 8/10/2021   Able to walk? No   Fall in past 12 months? -   Number of falls in past 12 months -   Fall with injury? -     Abuse Screening:     Abuse Screening Questionnaire 2019   Do you ever feel afraid of your partner? N   Are you in a relationship with someone who physically or mentally threatens you? N   Is it safe for you to go home? Y     Coordination of Care Questionaire:   1. Have you been to the ER, urgent care clinic since your last visit? Hospitalized since your last visit? NO    2. Have you seen or consulted any other health care providers outside of the [address] [place] [address] since your last visit? Include any pap smears or colon screening. NO    Advanced Directive:   1. Do you have an Advanced Directive? NO    2. Would you like information on Advanced Directives?  NO

## 2021-08-11 DIAGNOSIS — D63.8 ANEMIA OF CHRONIC DISEASE: Primary | ICD-10-CM

## 2021-08-12 DIAGNOSIS — I48.0 PAROXYSMAL ATRIAL FIBRILLATION (HCC): ICD-10-CM

## 2021-08-12 DIAGNOSIS — I63.319 CEREBROVASCULAR ACCIDENT (CVA) DUE TO THROMBOSIS OF MIDDLE CEREBRAL ARTERY, UNSPECIFIED BLOOD VESSEL LATERALITY (HCC): ICD-10-CM

## 2021-08-12 RX ORDER — CLOPIDOGREL BISULFATE 75 MG/1
75 TABLET ORAL DAILY
Qty: 90 TABLET | Refills: 5 | Status: SHIPPED | OUTPATIENT
Start: 2021-08-12 | End: 2022-01-01 | Stop reason: SDUPTHER

## 2021-08-12 RX ORDER — WARFARIN SODIUM 5 MG/1
5 TABLET ORAL EVERY EVENING
Qty: 90 TABLET | Refills: 1 | Status: SHIPPED | OUTPATIENT
Start: 2021-08-12 | End: 2021-01-01

## 2021-08-12 NOTE — TELEPHONE ENCOUNTER
Pt is requesting med refills & to have them state he does require both medications otherwise they will not fill them    Requested Prescriptions     Pending Prescriptions Disp Refills    warfarin (COUMADIN) 5 mg tablet 90 Tablet 1     Sig: Take 1 Tablet by mouth every evening.  clopidogreL (PLAVIX) 75 mg tab 90 Tablet 5     Sig: Take 1 Tablet by mouth daily.

## 2021-09-09 NOTE — PROGRESS NOTES
Progress Note    Patient: Luis F Mensah               Sex: male                  YOB: 1940      Age:  79 y.o.                    HPI:     Luis F Mensah is a 79 y.o. male who has been seen for followup of anticoagulation , COPD, DM    Past Medical History:   Diagnosis Date    Advance directive discussed with patient     Benign localized prostatic hyperplasia with lower urinary tract symptoms (LUTS)     COPD with emphysema ([address]     CVA (cerebrovascular accident) ([address] 7/11/2012    possible    Diabetes mellitus ([address]     Drowsiness     Enlarged prostate     Essential hypertension, benign     Eye exam, routine 07/26/2016    Dr. Felipe Liao repeat in 1 yr    H/O colonoscopy 11/24/15    Dr. Laura Rivera ([facility])Tubular adenoma/polyp bx, showed diverticulosis in the sigmoid/descending colon and internal hemorrhoids, 7mm polyp    History of urinary retention     Hoarseness     Hypercholesterolemia     Microhematuria     Other and unspecified hyperlipidemia     Paroxysmal atrial fibrillation ([address] 10/2/2015    Pneumonia     Pulmonary embolus (HCC)     Renal cyst, right     Saddle embolus of pulmonary artery without acute cor pulmonale ([address] 10/2015    Scrotal abscess     Shortness of breath     Tobacco use disorder 8/10/2010    Type II or unspecified type diabetes mellitus without mention of complication, not stated as uncontrolled     Urinary retention        Past Surgical History:   Procedure Laterality Date    HX CATARACT REMOVAL Left 4/2015    by Dr. Norberto Pollock  late 9874'[id number]       Family History   Problem Relation Age of Onset    Cancer Father         he does not know       Social History     Socioeconomic History    Marital status:      Spouse name: Not on file    Number of children: Not on file    Years of education: Not on file    Highest education level: Not on file   Tobacco Use    Smoking status: Former Smoker Packs/day: 1.00     Years: 45.00     Pack years: 45.00     Types: Cigarettes     Quit date: 2015     Years since quittin.0    Smokeless tobacco: Never Used    Tobacco comment: Pt counseled to continue to not smoke. Vaping Use    Vaping Use: Never used   Substance and Sexual Activity    Alcohol use: No     Alcohol/week: 0.0 standard drinks    Drug use: No    Sexual activity: Not Currently     Social Determinants of Health     Financial Resource Strain:     Difficulty of Paying Living Expenses:    Food Insecurity:     Worried About Running Out of Food in the Last Year:     Ran Out of Food in the Last Year:    Transportation Needs:     Lack of Transportation (Medical):  Lack of Transportation (Non-Medical):    Physical Activity:     Days of Exercise per Week:     Minutes of Exercise per Session:    Stress:     Feeling of Stress :    Social Connections:     Frequency of Communication with Friends and Family:     Frequency of Social Gatherings with Friends and Family:     Attends Baptism Services:     Active Member of Clubs or Organizations:     Attends Club or Organization Meetings:     Marital Status:          Current Outpatient Medications:     warfarin (COUMADIN) 5 mg tablet, Take 1 Tablet by mouth every evening., Disp: 90 Tablet, Rfl: 1    clopidogreL (PLAVIX) 75 mg tab, Take 1 Tablet by mouth daily. , Disp: 90 Tablet, Rfl: 5    atorvastatin (Lipitor) 40 mg tablet, Take 1 Tab by mouth daily. , Disp: 90 Tab, Rfl: 1    lisinopriL (PRINIVIL, ZESTRIL) 30 mg tablet, Take 1 Tab by mouth daily. , Disp: 90 Tab, Rfl: 3    finasteride (PROSCAR) 5 mg tablet, Take 1 Tab by mouth daily. , Disp: 90 Tab, Rfl: 1    ascorbic acid, vitamin C, (Vitamin C) 500 mg tablet, Take 1 Tab by mouth daily. , Disp: 90 Tab, Rfl: 1    tamsulosin (FLOMAX) 0.4 mg capsule, Take 2 Caps by mouth daily. , Disp: 180 Cap, Rfl: 1    metoprolol tartrate (LOPRESSOR) 25 mg tablet, Take 0.5 Tabs by mouth two (2) times a day., Disp: 90 Tab, Rfl: 1    albuterol (PROVENTIL HFA, VENTOLIN HFA, PROAIR HFA) 90 mcg/actuation inhaler, Take 2 Puffs by inhalation every six (6) hours as needed for Wheezing., Disp: 3 Inhaler, Rfl: 1    OXYGEN-AIR DELIVERY SYSTEMS, 3 L by Nasal route continuous. , Disp: , Rfl:     Nebulizer & Compressor machine, 1 Each., Disp: , Rfl:     Blood-Glucose Meter (ONE TOUCH ULTRA 2) monitoring kit, Check fasting sugars daily before breakfast. DX: 250.02, Disp: 1 Kit, Rfl: 0    senna (Senna) 8.6 mg tablet, Take 1 Tab by mouth daily. (Patient not taking: Reported on 9/9/2021), Disp: 90 Tab, Rfl: 1    docusate sodium (COLACE) 50 mg capsule, Take 1 Cap by mouth two (2) times a day for 180 days. (Patient not taking: Reported on 9/9/2021), Disp: 180 Cap, Rfl: 1    senna (Senna) 8.6 mg tablet, Take 1 Tab by mouth daily. (Patient not taking: Reported on 9/9/2021), Disp: 90 Tab, Rfl: 3    bisacodyL (DULCOLAX) 10 mg supp, Insert 10 mg into rectum. (Patient not taking: Reported on 9/9/2021), Disp: , Rfl:     acetaminophen (TYLENOL) 325 mg tablet, Take 325 mg by mouth every four (4) hours as needed for Pain. (Patient not taking: Reported on 9/9/2021), Disp: , Rfl:     dextrose 5% solution, by IntraVENous route continuous. (Patient not taking: Reported on 9/9/2021), Disp: , Rfl:     glucagon (GlucaGen HypoKit) 1 mg injection, 1 mg by IntraVENous route once., Disp: , Rfl:     omeprazole (PRILOSEC) 40 mg capsule, Take 1 Cap by mouth daily. (Patient not taking: Reported on 9/9/2021), Disp: 90 Cap, Rfl: 1    tiotropium (Spiriva with HandiHaler) 18 mcg inhalation capsule, Take 1 Cap by inhalation daily. , Disp: 90 Cap, Rfl: 1    fluticasone furoate-vilanteroL (BREO ELLIPTA) 100-25 mcg/dose inhaler, Take 1 Puff by inhalation daily. , Disp: 3 Inhaler, Rfl: 1    nystatin (MYCOSTATIN) topical cream, Apply to bilateral groin, after washing area with water and soap and dry well.  (Patient not taking: Reported on 9/9/2021), Disp: 30 g, Rfl: 0    Lancets misc, Check fasting sugars daily before breakfast. DX: E11.9 for freestyle lite meter, Disp: 100 Each, Rfl: 11    glucose blood VI test strips (FREESTYLE LITE STRIPS) strip, Check fasting sugars daily before breakfast. DX: E11.9 for freestyle lite meter, Disp: 100 Strip, Rfl: 11     No Known Allergies    Review of Systems   Constitutional: Negative for chills and fever. Respiratory: Negative for cough and shortness of breath. Cardiovascular: Negative for chest pain. Gastrointestinal: Negative. Genitourinary: Negative. Neurological: Negative for dizziness and loss of consciousness. Physical Exam:      Visit Vitals  /73 (BP 1 Location: Right arm, BP Patient Position: Sitting)   Pulse 76   Temp 97.7 °F (36.5 °C) (Temporal)   Resp 18   Ht 5' 7\" (1.702 m)   Wt 150 lb (68 kg)   SpO2 94%   BMI 23.49 kg/m²       Physical Exam  Constitutional:       Appearance: Normal appearance. Cardiovascular:      Rate and Rhythm: Normal rate and regular rhythm. Pulmonary:      Effort: Pulmonary effort is normal. No respiratory distress. Breath sounds: No stridor. No wheezing or rhonchi. Skin:     General: Skin is warm and dry. Neurological:      General: No focal deficit present. Mental Status: He is alert. Mental status is at baseline. Labs Reviewed:  INR is 2.5    Assessment/Plan       ICD-10-CM ICD-9-CM    1. Anticoagulation management encounter  Z51.81 V58.83 AMB POC PT/INR    Z79.01 V58.61    2. Essential hypertension  I10 401.9    3. Type 2 diabetes mellitus without complication, without long-term current use of insulin (HCC)  E11.9 250.00    continue same dose coumadin .  Last HGBA!c was 5.5 in August - no change needed re DM           Emily Bush MD

## 2021-09-09 NOTE — PROGRESS NOTES
ROOM # 14    Luis F Mensah presents today for No chief complaint on file. Luis F Mensah preferred language for health care discussion is english/other. Is someone accompanying this pt? YES-WIFE-TEDDY    Is the patient using any DME equipment during OV? YES-WHEELCHAIR AND OXYGEN    Depression Screening:  3 most recent [facility] Screens 9/9/2021 8/10/2021 5/6/2021 1/20/2021 10/2/2020 9/30/2020 2/12/2020   Little interest or pleasure in doing things Not at all Not at all Not at all Not at all Not at all Not at all Not at all   Feeling down, depressed, irritable, or hopeless Not at all Not at all Not at all Not at all Not at all Not at all Not at all   Total Score PHQ 2 0 0 0 0 0 0 0       Learning Assessment:  Learning Assessment 7/3/2018 8/14/2014   PRIMARY LEARNER Patient Patient   HIGHEST LEVEL OF EDUCATION - PRIMARY LEARNER  [facility] CAREGIVER No -   PRIMARY LANGUAGE ENGLISH ENGLISH   LEARNER PREFERENCE PRIMARY READING LISTENING     DEMONSTRATION -   ANSWERED BY patient self   RELATIONSHIP SELF SELF       Abuse Screening:  Abuse Screening Questionnaire 2/19/2019 7/3/2018 8/10/2017 5/12/2015   Do you ever feel afraid of your partner? N N N N   Are you in a relationship with someone who physically or mentally threatens you? N N N N   Is it safe for you to go home? Taj Hoskins       Fall Risk  Fall Risk Assessment, last 12 mths 9/9/2021 8/10/2021 5/6/2021 1/20/2021 10/2/2020 2/12/2020 9/17/2019   Able to walk? Yes No Yes Yes Yes No Yes   Fall in past 12 months? 1 - 0 1 Yes - No   Do you feel unsteady? 1 - - - - - -   Are you worried about falling 1 - - - - - -   Number of falls in past 12 months 1 - 2 2 1 - -   Fall with injury? 0 - 0 0 0 - -       Health Maintenance reviewed and discussed per provider.  Yes    Luis F Mensah is due for   Health Maintenance Due   Topic Date Due    COVID-19 Vaccine (1) Never done    Foot Exam Q1  08/10/2018    Shingrix Vaccine Age 50> (2 of 2) 12/17/2018    Eye Exam Retinal or Dilated  07/25/2019    MICROALBUMIN Q1  10/04/2019    Medicare Yearly Exam  10/05/2019    Flu Vaccine (1) 09/01/2021         Please order/place referral if appropriate. Advance Directive:  1. Do you have an advance directive in place? Patient Reply: NO    2. If not, would you like material regarding how to put one in place? Patient Reply: NO    Coordination of Care:  1. Have you been to the ER, urgent care clinic since your last visit? Hospitalized since your last visit? NO    2. Have you seen or consulted any other health care providers outside of the [address] [place] [address] since your last visit? Include any pap smears or colon screening.  NO

## 2021-09-30 NOTE — PROGRESS NOTES
Fluad Immunization/s administered 9/30/2021 by Sally Ramos with consent. Patient tolerated procedure well. No reactions noted.

## 2021-09-30 NOTE — PROGRESS NOTES
Progress Note    Patient: Adelaida Spencer               Sex: male                  YOB: 1940      Age:  79 y.o.                    HPI:     Adelaida Spencer is a 79 y.o. male who has been seen for followup of anticoag Rx , anemia and hypertension   DM is well controlled  Past Medical History:   Diagnosis Date    Advance directive discussed with patient     Benign localized prostatic hyperplasia with lower urinary tract symptoms (LUTS)     COPD with emphysema ([address]     CVA (cerebrovascular accident) ([address] 7/11/2012    possible    Diabetes mellitus ([address]     Drowsiness     Enlarged prostate     Essential hypertension, benign     Eye exam, routine 07/26/2016    Dr. Natividad Bartholomew repeat in 1 yr    H/O colonoscopy 11/24/15    Dr. Diane Aldana ([facility])Tubular adenoma/polyp bx, showed diverticulosis in the sigmoid/descending colon and internal hemorrhoids, 7mm polyp    History of urinary retention     Hoarseness     Hypercholesterolemia     Microhematuria     Other and unspecified hyperlipidemia     Paroxysmal atrial fibrillation ([address] 10/2/2015    Pneumonia     Pulmonary embolus ([address]     Renal cyst, right     Saddle embolus of pulmonary artery without acute cor pulmonale ([address] 10/2015    Scrotal abscess     Shortness of breath     Tobacco use disorder 8/10/2010    Type II or unspecified type diabetes mellitus without mention of complication, not stated as uncontrolled     Urinary retention        Past Surgical History:   Procedure Laterality Date    HX CATARACT REMOVAL Left 4/2015    by Dr. Sadie Crigler  late 0060'[id number]       Family History   Problem Relation Age of Onset    Cancer Father         he does not know       Social History     Socioeconomic History    Marital status:      Spouse name: Not on file    Number of children: Not on file    Years of education: Not on file    Highest education level: Not on file   Tobacco Use    Smoking status: Former Smoker     Packs/day: 1.00     Years: 45.00     Pack years: 45.00     Types: Cigarettes     Quit date: 2015     Years since quittin.1    Smokeless tobacco: Never Used    Tobacco comment: Pt counseled to continue to not smoke. Vaping Use    Vaping Use: Never used   Substance and Sexual Activity    Alcohol use: No     Alcohol/week: 0.0 standard drinks    Drug use: No    Sexual activity: Not Currently     Social Determinants of Health     Financial Resource Strain:     Difficulty of Paying Living Expenses:    Food Insecurity:     Worried About Running Out of Food in the Last Year:     Ran Out of Food in the Last Year:    Transportation Needs:     Lack of Transportation (Medical):  Lack of Transportation (Non-Medical):    Physical Activity:     Days of Exercise per Week:     Minutes of Exercise per Session:    Stress:     Feeling of Stress :    Social Connections:     Frequency of Communication with Friends and Family:     Frequency of Social Gatherings with Friends and Family:     Attends Sabianism Services:     Active Member of Clubs or Organizations:     Attends Club or Organization Meetings:     Marital Status:          Current Outpatient Medications:     multivitamin (ONE A DAY) tablet, Take 1 Tablet by mouth daily. , Disp: , Rfl:     warfarin (COUMADIN) 5 mg tablet, Take 1 Tablet by mouth every evening., Disp: 90 Tablet, Rfl: 1    clopidogreL (PLAVIX) 75 mg tab, Take 1 Tablet by mouth daily. , Disp: 90 Tablet, Rfl: 5    atorvastatin (Lipitor) 40 mg tablet, Take 1 Tab by mouth daily. , Disp: 90 Tab, Rfl: 1    lisinopriL (PRINIVIL, ZESTRIL) 30 mg tablet, Take 1 Tab by mouth daily. , Disp: 90 Tab, Rfl: 3    finasteride (PROSCAR) 5 mg tablet, Take 1 Tab by mouth daily. , Disp: 90 Tab, Rfl: 1    ascorbic acid, vitamin C, (Vitamin C) 500 mg tablet, Take 1 Tab by mouth daily. , Disp: 90 Tab, Rfl: 1    tamsulosin (FLOMAX) 0.4 mg capsule, Take 2 Caps by mouth daily., Disp: 180 Cap, Rfl: 1    metoprolol tartrate (LOPRESSOR) 25 mg tablet, Take 0.5 Tabs by mouth two (2) times a day., Disp: 90 Tab, Rfl: 1    tiotropium (Spiriva with HandiHaler) 18 mcg inhalation capsule, Take 1 Cap by inhalation daily. , Disp: 90 Cap, Rfl: 1    fluticasone furoate-vilanteroL (BREO ELLIPTA) 100-25 mcg/dose inhaler, Take 1 Puff by inhalation daily. , Disp: 3 Inhaler, Rfl: 1    albuterol (PROVENTIL HFA, VENTOLIN HFA, PROAIR HFA) 90 mcg/actuation inhaler, Take 2 Puffs by inhalation every six (6) hours as needed for Wheezing., Disp: 3 Inhaler, Rfl: 1    nystatin (MYCOSTATIN) topical cream, Apply to bilateral groin, after washing area with water and soap and dry well., Disp: 30 g, Rfl: 0    OXYGEN-AIR DELIVERY SYSTEMS, 3 L by Nasal route continuous. , Disp: , Rfl:     Lancets misc, Check fasting sugars daily before breakfast. DX: E11.9 for freestyle lite meter, Disp: 100 Each, Rfl: 11    Nebulizer & Compressor machine, 1 Each., Disp: , Rfl:     glucose blood VI test strips (FREESTYLE LITE STRIPS) strip, Check fasting sugars daily before breakfast. DX: E11.9 for freestyle lite meter, Disp: 100 Strip, Rfl: 11    Blood-Glucose Meter (ONE TOUCH ULTRA 2) monitoring kit, Check fasting sugars daily before breakfast. DX: 250.02, Disp: 1 Kit, Rfl: 0    senna (Senna) 8.6 mg tablet, Take 1 Tab by mouth daily. (Patient not taking: Reported on 9/9/2021), Disp: 90 Tab, Rfl: 1    docusate sodium (COLACE) 50 mg capsule, Take 1 Cap by mouth two (2) times a day for 180 days. (Patient not taking: Reported on 9/9/2021), Disp: 180 Cap, Rfl: 1    senna (Senna) 8.6 mg tablet, Take 1 Tab by mouth daily. (Patient not taking: Reported on 9/9/2021), Disp: 90 Tab, Rfl: 3    bisacodyL (DULCOLAX) 10 mg supp, Insert 10 mg into rectum. (Patient not taking: Reported on 9/9/2021), Disp: , Rfl:     acetaminophen (TYLENOL) 325 mg tablet, Take 325 mg by mouth every four (4) hours as needed for Pain.  (Patient not taking: Reported on 9/9/2021), Disp: , Rfl:     dextrose 5% solution, by IntraVENous route continuous. (Patient not taking: Reported on 9/9/2021), Disp: , Rfl:     glucagon (GlucaGen HypoKit) 1 mg injection, 1 mg by IntraVENous route once. (Patient not taking: Reported on 9/30/2021), Disp: , Rfl:     omeprazole (PRILOSEC) 40 mg capsule, Take 1 Cap by mouth daily. (Patient not taking: Reported on 9/9/2021), Disp: 90 Cap, Rfl: 1     No Known Allergies    Review of Systems   Constitutional: Positive for malaise/fatigue. Negative for chills, fever and weight loss. Respiratory: Positive for shortness of breath. Negative for cough. Cardiovascular: Negative for chest pain. Gastrointestinal: Negative. Genitourinary: Negative. Neurological: Positive for dizziness. Negative for loss of consciousness. Physical Exam:      Visit Vitals  BP (!) 156/84 (BP 1 Location: Right upper arm, BP Patient Position: Sitting, BP Cuff Size: Adult)   Pulse 76   Temp 97.3 °F (36.3 °C) (Temporal)   Resp 20   Ht 5' 7\" (1.702 m)   SpO2 95%   BMI 23.49 kg/m²       Physical Exam  Constitutional:       Appearance: Normal appearance. HENT:      Head: Normocephalic and atraumatic. Cardiovascular:      Rate and Rhythm: Normal rate and regular rhythm. Heart sounds: No murmur heard. Gallop present. Pulmonary:      Effort: Pulmonary effort is normal. No respiratory distress. Breath sounds: No stridor. Rhonchi present. Neurological:      Mental Status: He is alert. Psychiatric:         Mood and Affect: Mood normal.         Behavior: Behavior normal.          Labs Reviewed:   INR 2.2 today     Assessment/Plan       ICD-10-CM ICD-9-CM    1. Paroxysmal atrial fibrillation (HCC)  I48.0 427.31 AMB POC PT/INR   2. Anemia, unspecified type  D64.9 285.9 multivitamin (ONE A DAY) tablet      IRON PROFILE   3. Cerebrovascular disease  I67.9 437.9 atorvastatin (Lipitor) 40 mg tablet   4.  Enlarged prostate  N40.0 600.00 tamsulosin (FLOMAX) 0.4 mg capsule      finasteride (PROSCAR) 5 mg tablet   5.  Essential hypertension  I10 401.9 metoprolol tartrate (LOPRESSOR) 25 mg tablet             Antonina Mi MD

## 2021-09-30 NOTE — PROGRESS NOTES
Cesar Echavarria is a 79 y.o. male (: 1940) presenting to address:    Chief Complaint   Patient presents with    Anticoagulation       Vitals:    21 1256   BP: (!) 156/84   Pulse: 76   Resp: 20   Temp: 97.3 °F (36.3 °C)   TempSrc: Temporal   SpO2: 95%   Height: 5' 7\" (1.702 m)   PainSc:   0 - No pain       Hearing/Vision:   No exam data present    Learning Assessment:     Learning Assessment 7/3/2018   PRIMARY LEARNER Patient   HIGHEST LEVEL OF EDUCATION - PRIMARY LEARNER  SOME COLLEGE   BARRIERS PRIMARY LEARNER NONE   CO-LEARNER CAREGIVER No   PRIMARY LANGUAGE ENGLISH   LEARNER PREFERENCE PRIMARY READING     DEMONSTRATION   ANSWERED BY patient   RELATIONSHIP SELF     Depression Screening:     3 most recent PHQ Screens 2021   Little interest or pleasure in doing things Not at all   Feeling down, depressed, irritable, or hopeless Not at all   Total Score PHQ 2 0     Fall Risk Assessment:     Fall Risk Assessment, last 12 mths 2021   Able to walk? No   Fall in past 12 months? -   Do you feel unsteady? -   Are you worried about falling -   Number of falls in past 12 months -   Fall with injury? -     Abuse Screening:     Abuse Screening Questionnaire 2019   Do you ever feel afraid of your partner? N   Are you in a relationship with someone who physically or mentally threatens you? N   Is it safe for you to go home? Y     Coordination of Care Questionaire:   1. Have you been to the ER, urgent care clinic since your last visit? Hospitalized since your last visit? NO    2. Have you seen or consulted any other health care providers outside of the [address] [place] [address] since your last visit? Include any pap smears or colon screening. Yes     Advanced Directive:   1. Do you have an Advanced Directive? NO    2. Would you like information on Advanced Directives?  NO
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
## 2021-10-26 NOTE — PROGRESS NOTES
Zachery Loja is a 79 y.o. male (: 1940) presenting to address:    Chief Complaint   Patient presents with    Coagulation disorder       Vitals:    10/26/21 1314   BP: (!) 162/86   Pulse: 76   Temp: (!) 96.4 °F (35.8 °C)   TempSrc: Temporal   SpO2: 93%   Height: 5' 7\" (1.702 m)   PainSc:   0 - No pain       Hearing/Vision:   No exam data present    Learning Assessment:     Learning Assessment 7/3/2018   PRIMARY LEARNER Patient   HIGHEST LEVEL OF EDUCATION - PRIMARY LEARNER  SOME COLLEGE   BARRIERS PRIMARY LEARNER NONE   CO-LEARNER CAREGIVER No   PRIMARY LANGUAGE ENGLISH   LEARNER PREFERENCE PRIMARY READING     DEMONSTRATION   ANSWERED BY patient   RELATIONSHIP SELF     Depression Screening:     3 most recent PHQ Screens 10/26/2021   Little interest or pleasure in doing things Not at all   Feeling down, depressed, irritable, or hopeless Not at all   Total Score PHQ 2 0     Fall Risk Assessment:     Fall Risk Assessment, last 12 mths 10/26/2021   Able to walk? Yes   Fall in past 12 months? 0   Do you feel unsteady? -   Are you worried about falling -   Number of falls in past 12 months -   Fall with injury? -     Abuse Screening:     Abuse Screening Questionnaire 2019   Do you ever feel afraid of your partner? N   Are you in a relationship with someone who physically or mentally threatens you? N   Is it safe for you to go home? Y     Coordination of Care Questionaire:   1. Have you been to the ER, urgent care clinic since your last visit? Hospitalized since your last visit? NO    2. Have you seen or consulted any other health care providers outside of the [address] [place] [address] since your last visit? Include any pap smears or colon screening. NO    Advanced Directive:   1. Do you have an Advanced Directive? NO    2. Would you like information on Advanced Directives?  NO

## 2021-10-26 NOTE — PROGRESS NOTES
Progress Note    Patient: Krish Jerez               Sex: male                  YOB: 1940      Age:  79 y.o.                    HPI:     Krish Jerez is a 79 y.o. male who has been seen for  followup of anticoagulation. Hypertension , DM.  He has some hoarseness which is  possibly related to his O2   His BP is up somewhat , his wife is unsure if he is taking celebrex    Past Medical History:   Diagnosis Date    Advance directive discussed with patient     Benign localized prostatic hyperplasia with lower urinary tract symptoms (LUTS)     COPD with emphysema ([address]     CVA (cerebrovascular accident) ([address] 7/11/2012    possible    Diabetes mellitus ([address]     Drowsiness     Enlarged prostate     Essential hypertension, benign     Eye exam, routine 07/26/2016    Dr. Sulma Juarez repeat in 1 yr    H/O colonoscopy 11/24/15    Dr. Leona Scott ([facility])Tubular adenoma/polyp bx, showed diverticulosis in the sigmoid/descending colon and internal hemorrhoids, 7mm polyp    History of urinary retention     Hoarseness     Hypercholesterolemia     Microhematuria     Other and unspecified hyperlipidemia     Paroxysmal atrial fibrillation ([address] 10/2/2015    Pneumonia     Pulmonary embolus (HCC)     Renal cyst, right     Saddle embolus of pulmonary artery without acute cor pulmonale ([address] 10/2015    Scrotal abscess     Shortness of breath     Tobacco use disorder 8/10/2010    Type II or unspecified type diabetes mellitus without mention of complication, not stated as uncontrolled     Urinary retention        Past Surgical History:   Procedure Laterality Date    HX CATARACT REMOVAL Left 4/2015    by Dr. Kayden Huerta  late 5464'X       Family History   Problem Relation Age of Onset    Cancer Father         he does not know       Social History     Socioeconomic History    Marital status:      Spouse name: Not on file    Number of children: Not on file    Years of education: Not on file    Highest education level: Not on file   Tobacco Use    Smoking status: Former Smoker     Packs/day: 1.00     Years: 45.00     Pack years: 45.00     Types: Cigarettes     Quit date: 2015     Years since quittin.1    Smokeless tobacco: Never Used    Tobacco comment: Pt counseled to continue to not smoke. Vaping Use    Vaping Use: Never used   Substance and Sexual Activity    Alcohol use: No     Alcohol/week: 0.0 standard drinks    Drug use: No    Sexual activity: Not Currently     Social Determinants of Health     Financial Resource Strain:     Difficulty of Paying Living Expenses:    Food Insecurity:     Worried About Running Out of Food in the Last Year:     Ran Out of Food in the Last Year:    Transportation Needs:     Lack of Transportation (Medical):      Lack of Transportation (Non-Medical):    Physical Activity:     Days of Exercise per Week:     Minutes of Exercise per Session:    Stress:     Feeling of Stress :    Social Connections:     Frequency of Communication with Friends and Family:     Frequency of Social Gatherings with Friends and Family:     Attends Mandaen Services:     Active Member of Clubs or Organizations:     Attends Club or Organization Meetings:     Marital Status:          Current Outpatient Medications:     celecoxib (CeleBREX) 200 mg capsule, Take 1 Capsule by mouth., Disp: , Rfl:     furosemide (LASIX) 20 mg tablet, Take 20 mg by mouth., Disp: , Rfl:     metFORMIN (GLUCOPHAGE) 500 mg tablet, Take 500 mg by mouth., Disp: , Rfl:     nebivoloL (Bystolic) 20 mg tablet, Take  by mouth., Disp: , Rfl:     pioglitazone (Actos) 30 mg tablet, Take  by mouth., Disp: , Rfl:     potassium chloride SR (K-TAB) 20 mEq tablet, Take  by mouth., Disp: , Rfl:     valsartan-hydroCHLOROthiazide (Diovan HCT) 320-12.5 mg per tablet, Take  by mouth., Disp: , Rfl:     tamsulosin (FLOMAX) 0.4 mg capsule, Take 2 Capsules by mouth daily. , Disp: 180 Capsule, Rfl: 3    finasteride (PROSCAR) 5 mg tablet, Take 1 Tablet by mouth daily. , Disp: 90 Tablet, Rfl: 1    multivitamin (ONE A DAY) tablet, Take 1 Tablet by mouth daily. , Disp: , Rfl:     atorvastatin (Lipitor) 40 mg tablet, Take 1 Tablet by mouth daily. , Disp: 90 Tablet, Rfl: 1    metoprolol tartrate (LOPRESSOR) 25 mg tablet, Take 0.5 Tablets by mouth two (2) times a day., Disp: 90 Tablet, Rfl: 1    warfarin (COUMADIN) 5 mg tablet, Take 1 Tablet by mouth every evening., Disp: 90 Tablet, Rfl: 1    clopidogreL (PLAVIX) 75 mg tab, Take 1 Tablet by mouth daily. , Disp: 90 Tablet, Rfl: 5    lisinopriL (PRINIVIL, ZESTRIL) 30 mg tablet, Take 1 Tab by mouth daily. , Disp: 90 Tab, Rfl: 3    senna (Senna) 8.6 mg tablet, Take 1 Tab by mouth daily. , Disp: 90 Tab, Rfl: 1    docusate sodium (COLACE) 50 mg capsule, Take 1 Cap by mouth two (2) times a day for 180 days. , Disp: 180 Cap, Rfl: 1    senna (Senna) 8.6 mg tablet, Take 1 Tab by mouth daily. , Disp: 90 Tab, Rfl: 3    bisacodyL (DULCOLAX) 10 mg supp, Insert 10 mg into rectum. , Disp: , Rfl:     acetaminophen (TYLENOL) 325 mg tablet, Take 325 mg by mouth every four (4) hours as needed for Pain., Disp: , Rfl:     dextrose 5% solution, by IntraVENous route continuous. , Disp: , Rfl:     glucagon (GlucaGen HypoKit) 1 mg injection, 1 mg by IntraVENous route once., Disp: , Rfl:     ascorbic acid, vitamin C, (Vitamin C) 500 mg tablet, Take 1 Tab by mouth daily. , Disp: 90 Tab, Rfl: 1    omeprazole (PRILOSEC) 40 mg capsule, Take 1 Cap by mouth daily. , Disp: 90 Cap, Rfl: 1    tiotropium (Spiriva with HandiHaler) 18 mcg inhalation capsule, Take 1 Cap by inhalation daily. , Disp: 90 Cap, Rfl: 1    fluticasone furoate-vilanteroL (BREO ELLIPTA) 100-25 mcg/dose inhaler, Take 1 Puff by inhalation daily. , Disp: 3 Inhaler, Rfl: 1    albuterol (PROVENTIL HFA, VENTOLIN HFA, PROAIR HFA) 90 mcg/actuation inhaler, Take 2 Puffs by inhalation every six (6) hours as needed for Wheezing., Disp: 3 Inhaler, Rfl: 1    nystatin (MYCOSTATIN) topical cream, Apply to bilateral groin, after washing area with water and soap and dry well., Disp: 30 g, Rfl: 0    OXYGEN-AIR DELIVERY SYSTEMS, 3 L by Nasal route continuous. , Disp: , Rfl:     Lancets misc, Check fasting sugars daily before breakfast. DX: E11.9 for freestyle lite meter, Disp: 100 Each, Rfl: 11    Nebulizer & Compressor machine, 1 Each., Disp: , Rfl:     glucose blood VI test strips (FREESTYLE LITE STRIPS) strip, Check fasting sugars daily before breakfast. DX: E11.9 for freestyle lite meter, Disp: 100 Strip, Rfl: 11    Blood-Glucose Meter (ONE TOUCH ULTRA 2) monitoring kit, Check fasting sugars daily before breakfast. DX: 250.02, Disp: 1 Kit, Rfl: 0    insulin lispro (HUMALOG) 100 unit/mL injection, 1-6 Units by SubCUTAneous route. (Patient not taking: Reported on 10/26/2021), Disp: , Rfl:      No Known Allergies    Review of Systems   Constitutional: Negative for chills and fever. Eyes: Negative. Seeing \"retina doctor\" Dr Alesia Ponce   Respiratory: Negative for cough. Cardiovascular: Negative for chest pain. Gastrointestinal: Negative. Genitourinary: Negative. Neurological: Negative for dizziness and loss of consciousness. Physical Exam:      Visit Vitals  BP (!) 162/86 (BP 1 Location: Right upper arm, BP Patient Position: Sitting, BP Cuff Size: Adult)   Pulse 76   Temp (!) 96.4 °F (35.8 °C) (Temporal)   Ht 5' 7\" (1.702 m)   SpO2 93%   BMI 23.49 kg/m²       Physical Exam  Constitutional:       Appearance: Normal appearance. HENT:      Head: Normocephalic and atraumatic. Cardiovascular:      Rate and Rhythm: Normal rate and regular rhythm. Pulses: Normal pulses. Heart sounds: Normal heart sounds. Pulmonary:      Effort: Pulmonary effort is normal. No respiratory distress. Breath sounds: Normal breath sounds. No stridor. Neurological:      Mental Status: He is alert. Labs Reviewed:  protime 2.5 today     Assessment/Plan       ICD-10-CM ICD-9-CM    1. Paroxysmal atrial fibrillation (HCC)  I48.0 427.31 AMB POC PT/INR   2. Essential hypertension  I10 401.9    3.  Chronic obstructive pulmonary disease, unspecified COPD type ([facility]  J44.9 496    asked his wife to bring in all his med containers   Continue same dose coumadin        María Elena Alvarez MD

## 2021-12-09 NOTE — PROGRESS NOTES
ROOM # [id number]    Identified pt with two pt identifiers(name and ). Reviewed record in preparation for visit and have obtained necessary documentation. Chief Complaint   Patient presents with    Hypertension    Anticoagulation      Renu Scott preferred language for health care discussion is English/other. Is the patient using any DME equipment during OV? Doreen Reid is due for:  Health Maintenance Due   Topic    Foot Exam Q1     Shingrix Vaccine Age 50> (2 of 2)    Eye Exam Retinal or Dilated     MICROALBUMIN Q1     Medicare Yearly Exam      Health Maintenance reviewed and discussed per provider  Please order/place referral if appropriate. 1. For patients aged 39-70: Has the patient had a colonoscopy? UNKNOWN  If the patient is female:    2. For patients aged 41-77: Has the patient had a mammogram within the past 2 years? N/A    3. For patients aged 21-65: Has the patient had a pap smear? N/A    Advance Directive:  1. Do you have an advance directive in place? Patient Reply: NOT ASKED    2. If not, would you like material regarding how to put one in place? NOT ASKED    Coordination of Care:  1. Have you been to the ER, urgent care clinic since your last visit? Hospitalized since your last visit? NO    2. Have you seen or consulted any other health care providers outside of the [address] [place] [address] since your last visit? Include any pap smears or colon screening. NO    Patient is accompanied by WIFE I have received verbal consent from Renu Scott to discuss any/all medical information while they are present in the room.     Learning Assessment:  Learning Assessment 7/3/2018 2014   PRIMARY LEARNER Patient Patient   HIGHEST LEVEL OF EDUCATION - PRIMARY LEARNER  SOME COLLEGE SOME COLLEGE   BARRIERS PRIMARY LEARNER NONE NONE   CO-LEARNER CAREGIVER No -   PRIMARY LANGUAGE ENGLISH ENGLISH   LEARNER PREFERENCE PRIMARY READING LISTENING     DEMONSTRATION -   ANSWERED BY patient self RELATIONSHIP SELF SELF     Depression Screening:  3 most recent [facility] Screens 12/9/2021 10/26/2021 9/30/2021 9/9/2021 8/10/2021 5/6/2021 1/20/2021   Little interest or pleasure in doing things Not at all Not at all Not at all Not at all Not at all Not at all Not at all   Feeling down, depressed, irritable, or hopeless Not at all Not at all Not at all Not at all Not at all Not at all Not at all   Total Score PHQ 2 0 0 0 0 0 0 0     Abuse Screening:  Abuse Screening Questionnaire 2/19/2019 7/3/2018 8/10/2017 5/12/2015   Do you ever feel afraid of your partner? N N N N   Are you in a relationship with someone who physically or mentally threatens you? N N N N   Is it safe for you to go home? Alex Shepherd     Fall Risk  Fall Risk Assessment, last 12 mths 12/9/2021 10/26/2021 9/30/2021 9/9/2021 8/10/2021 5/6/2021 1/20/2021   Able to walk? No Yes No Yes No Yes Yes   Fall in past 12 months? - 0 - 1 - 0 1   Do you feel unsteady? - - - 1 - - -   Are you worried about falling - - - 1 - - -   Number of falls in past 12 months - - - 1 - 2 2   Fall with injury? - - - 0 - 0 0     Recent Travel Screening and Travel History documentation     Travel Screening     Question   Response    In the last month, have you been in contact with someone who was confirmed or suspected to have Coronavirus / COVID-19? No / Unsure    Have you had a COVID-19 viral test in the last 14 days? No    Do you have any of the following new or worsening symptoms? None of these    Have you traveled internationally or domestically in the last month?   No      Travel History   Travel since 11/09/21    No documented travel since 11/09/21

## 2021-12-09 NOTE — PROGRESS NOTES
Progress Note    Patient: Romeo Wise               Sex: male                  YOB: 1940      Age:  79 y.o.                    HPI:     Romeo Wise is a 79 y.o. male who has been seen for followup of hypertension  and coumadin anticoagulation .  He denies any new problems     Past Medical History:   Diagnosis Date    Advance directive discussed with patient     Benign localized prostatic hyperplasia with lower urinary tract symptoms (LUTS)     COPD with emphysema ([address]     CVA (cerebrovascular accident) ([address] 7/11/2012    possible    Diabetes mellitus ([address]     Drowsiness     Enlarged prostate     Essential hypertension, benign     Eye exam, routine 07/26/2016    Dr. Griselda Ripa repeat in 1 yr    H/O colonoscopy 11/24/15    Dr. Alli Flood ([facility])Tubular adenoma/polyp bx, showed diverticulosis in the sigmoid/descending colon and internal hemorrhoids, 7mm polyp    History of urinary retention     Hoarseness     Hypercholesterolemia     Microhematuria     Other and unspecified hyperlipidemia     Paroxysmal atrial fibrillation ([address] 10/2/2015    Pneumonia     Pulmonary embolus (HCC)     Renal cyst, right     Saddle embolus of pulmonary artery without acute cor pulmonale ([address] 10/2015    Scrotal abscess     Shortness of breath     Tobacco use disorder 8/10/2010    Type II or unspecified type diabetes mellitus without mention of complication, not stated as uncontrolled     Urinary retention        Past Surgical History:   Procedure Laterality Date    HX CATARACT REMOVAL Left 4/2015    by Dr. Singh Booker  late 8081'V       Family History   Problem Relation Age of Onset    Cancer Father         he does not know       Social History     Socioeconomic History    Marital status:    Tobacco Use    Smoking status: Former Smoker     Packs/day: 1.00     Years: 45.00     Pack years: 45.00     Types: Cigarettes     Quit date: 2015     Years since quittin.3    Smokeless tobacco: Never Used    Tobacco comment: Pt counseled to continue to not smoke. Vaping Use    Vaping Use: Never used   Substance and Sexual Activity    Alcohol use: No     Alcohol/week: 0.0 standard drinks    Drug use: No    Sexual activity: Not Currently         Current Outpatient Medications:     tamsulosin (FLOMAX) 0.4 mg capsule, Take 2 Capsules by mouth daily. , Disp: 180 Capsule, Rfl: 3    finasteride (PROSCAR) 5 mg tablet, Take 1 Tablet by mouth daily. , Disp: 90 Tablet, Rfl: 1    multivitamin (ONE A DAY) tablet, Take 1 Tablet by mouth daily. , Disp: , Rfl:     atorvastatin (Lipitor) 40 mg tablet, Take 1 Tablet by mouth daily. , Disp: 90 Tablet, Rfl: 1    metoprolol tartrate (LOPRESSOR) 25 mg tablet, Take 0.5 Tablets by mouth two (2) times a day., Disp: 90 Tablet, Rfl: 1    warfarin (COUMADIN) 5 mg tablet, Take 1 Tablet by mouth every evening., Disp: 90 Tablet, Rfl: 1    clopidogreL (PLAVIX) 75 mg tab, Take 1 Tablet by mouth daily. , Disp: 90 Tablet, Rfl: 5    lisinopriL (PRINIVIL, ZESTRIL) 30 mg tablet, Take 1 Tab by mouth daily. , Disp: 90 Tab, Rfl: 3    acetaminophen (TYLENOL) 325 mg tablet, Take 325 mg by mouth every four (4) hours as needed for Pain., Disp: , Rfl:     ascorbic acid, vitamin C, (Vitamin C) 500 mg tablet, Take 1 Tab by mouth daily. , Disp: 90 Tab, Rfl: 1    tiotropium (Spiriva with HandiHaler) 18 mcg inhalation capsule, Take 1 Cap by inhalation daily. , Disp: 90 Cap, Rfl: 1    fluticasone furoate-vilanteroL (BREO ELLIPTA) 100-25 mcg/dose inhaler, Take 1 Puff by inhalation daily. , Disp: 3 Inhaler, Rfl: 1    albuterol (PROVENTIL HFA, VENTOLIN HFA, PROAIR HFA) 90 mcg/actuation inhaler, Take 2 Puffs by inhalation every six (6) hours as needed for Wheezing., Disp: 3 Inhaler, Rfl: 1    nystatin (MYCOSTATIN) topical cream, Apply to bilateral groin, after washing area with water and soap and dry well., Disp: 30 g, Rfl: 0   OXYGEN-AIR DELIVERY SYSTEMS, 3 L by Nasal route continuous. , Disp: , Rfl:     Lancets misc, Check fasting sugars daily before breakfast. DX: E11.9 for freestyle lite meter, Disp: 100 Each, Rfl: 11    Nebulizer & Compressor machine, 1 Each., Disp: , Rfl:     lisinopriL (PRINIVIL, ZESTRIL) 10 mg tablet, Take 1 Tablet by mouth daily. (Patient not taking: Reported on 12/9/2021), Disp: 60 Tablet, Rfl: 5    furosemide (LASIX) 20 mg tablet, Take 20 mg by mouth. (Patient not taking: Reported on 12/9/2021), Disp: , Rfl:     pioglitazone (Actos) 30 mg tablet, Take  by mouth., Disp: , Rfl:     potassium chloride SR (K-TAB) 20 mEq tablet, Take  by mouth., Disp: , Rfl:     senna (Senna) 8.6 mg tablet, Take 1 Tab by mouth daily. , Disp: 90 Tab, Rfl: 1    senna (Senna) 8.6 mg tablet, Take 1 Tab by mouth daily. , Disp: 90 Tab, Rfl: 3    bisacodyL (DULCOLAX) 10 mg supp, Insert 10 mg into rectum. (Patient not taking: Reported on 12/9/2021), Disp: , Rfl:     dextrose 5% solution, by IntraVENous route continuous. (Patient not taking: Reported on 12/9/2021), Disp: , Rfl:     glucagon (GlucaGen HypoKit) 1 mg injection, 1 mg by IntraVENous route once. (Patient not taking: Reported on 12/9/2021), Disp: , Rfl:     omeprazole (PRILOSEC) 40 mg capsule, Take 1 Cap by mouth daily.  (Patient not taking: Reported on 12/9/2021), Disp: 90 Cap, Rfl: 1    glucose blood VI test strips (FREESTYLE LITE STRIPS) strip, Check fasting sugars daily before breakfast. DX: E11.9 for freestyle lite meter (Patient not taking: Reported on 12/9/2021), Disp: 100 Strip, Rfl: 11    Blood-Glucose Meter (ONE TOUCH ULTRA 2) monitoring kit, Check fasting sugars daily before breakfast. DX: 250.02 (Patient not taking: Reported on 12/9/2021), Disp: 1 Kit, Rfl: 0     No Known Allergies    ROS     Physical Exam:      Visit Vitals  BP (!) 147/82 (BP 1 Location: Right arm, BP Patient Position: Sitting)   Pulse 80   Temp 97.9 °F (36.6 °C) (Temporal)   Resp 18   SpO2 100%       Physical Exam  Constitutional:       Appearance: Normal appearance. Cardiovascular:      Rate and Rhythm: Normal rate and regular rhythm. Pulmonary:      Effort: Pulmonary effort is normal. No respiratory distress. Breath sounds: Normal breath sounds. No stridor. No wheezing, rhonchi or rales. Skin:     General: Skin is warm and dry. Neurological:      General: No focal deficit present. Mental Status: He is alert and oriented to person, place, and time. Psychiatric:         Mood and Affect: Mood normal.         Behavior: Behavior normal.          Labs Reviewed:  protime is 3.2    Assessment/Plan       ICD-10-CM ICD-9-CM    1. Anticoagulation management encounter  Z51.81 V58.83 PROTHROMBIN TIME + INR    Z79.01 V58.61    2. Frailty  R54 [id number]   3. Primary hypertension  I10 401.9 REFERRAL TO [facility]   4.  Paroxysmal atrial fibrillation (HCC)  I48.0 427.31 warfarin (COUMADIN) 5 mg tablet    change coumadin to  5mg every day except Thursday   Daughter requested home  care and PT at home         Jagdish Berger MD

## 2021-12-14 NOTE — TELEPHONE ENCOUNTER
Asiya Tom [address] is calling to find out if the patient is going to be coming in to the office for his anticoagulation checks or doing them at home? Can be reached at [phone].

## 2021-12-14 NOTE — TELEPHONE ENCOUNTER
Vandana Fishman with [facility] today needing new order for PT/INr to be drawn in Am please submit

## 2021-12-15 NOTE — TELEPHONE ENCOUNTER
Received a call from [facility] to f/u with orders for Protime monitoring and informed that the pt does not have an Anticoagulation Monitoring System at Home per the pt's spouse. This was reported during [address] [facility] on 12/14/21. Chart reviewed and confirmed. Verbal order with readback given to Check the pt's PT/INR and report results on Thursday December 12/16/21 this office per Dr. Belkys Vega. Staff verbally acknowledges understanding. No further concerns were voiced at this time.

## 2021-12-16 NOTE — Clinical Note
Therapy Functional Score Assessment  Question   Score   Grooming  2      Upper Dressing 2   Lower Dressing 3   Bathing  6   Toilet Transfer  4   Transfer  3         Ambulation  5   Dyspnea                     2   Pain Interfering with activity 2  Est number therapy visits      7

## 2021-12-16 NOTE — TELEPHONE ENCOUNTER
Asiya from [facility] called and stated patient INR is 4.2  No change in diet or medication. Patient dose is 4 mg every day but thurday  He skip dose on Thursday.   Spoke with Dr. Suman Koroma and he stated patient needs to skip Thursday and Monday and recheck on 12/23/2021

## 2021-12-16 NOTE — HOME HEALTH
PMHx: H+P per MD appointment Sabi Jasmine is a 79 y.o. male who has been seen for followup of hypertension and coumadin anticoagulation . He denies any new problems      Past Medical History:    o Advance directive discussed with patient    o Benign localized prostatic hyperplasia with lower urinary tract symptoms (LUTS)    o COPD with emphysema ([address]    o CVA (cerebrovascular accident) ([address] 7/11/2012    possible  o Diabetes mellitus ([address]    o Drowsiness    o Enlarged prostate    o Essential hypertension, benign    o Eye exam, routine 07/26/2016    Dr. Jany Triplett repeat in 1 yr  o H/O colonoscopy 11/24/15    Dr. May Moore ([facility])Tubular adenoma/polyp bx, showed diverticulosis in the sigmoid/descending colon and internal hemorrhoids, 7mm polyp  o History of urinary retention    o Hoarseness    o Hypercholesterolemia    o Microhematuria    o Other and unspecified hyperlipidemia    o Paroxysmal atrial fibrillation ([address] 10/2/2015  o Pneumonia    o Pulmonary embolus (HCC)    o Renal cyst, right    o Saddle embolus of pulmonary artery without acute cor pulmonale ([address] 10/2015  o Scrotal abscess    o Shortness of breath    o Tobacco use disorder 8/10/2010  o Type II or unspecified type diabetes mellitus without mention of complication, not stated as uncontrolled    o Urinary retention               Past Surgical History:  o HX CATARACT REMOVAL Left 4/2015    by Dr. Annabella Dixon   late [id number]'P       SUBJECTIVE:Pt and caregivers ( wife and daughter in law) report that pt has been non ambulatory since June. Pt was recieving therapy then and was walking with the therapist and when theapy stopped pt did not cont to amb with his caregivers. Pt stated that he is fearful of falling   LIVING SITUATION: Pt lives with wife in a single level home with 1 step at entrence without railings.  Pt a daugher in law is his paid caregiver and is there 8 hrs a day   REQUIRES CAREGIVER ASSISTANCE FOR: transportation, medicationas, ADLS,IADLS, Pt is dependent with tolieting   PLOF: Pt was dependent with transfers, tolieting, ADLS. IADLS ,  MEDICATIONS REVIEWED AND UPDATED: no changes   NEXT MD APPT:  1/6/22  ROM: B LE WFL   STRENGTH: R hip flexors -4/5 R quads and hamstrings 4/5  L hip flexors, quads and hamstrings 4/5   WOUNDS:None   BED MOBILITY:sit to supine with SBA increased time needed to complete task. supine to sit with MN A for upper body management with MOD vc needed for technqiue and sequencing   TRANSFERS: sit to stand from bed x 2 with B UE support with MOD vc needed for technique and sequencing  stand piviot transfer for wc to bed with MIN A . Pt tends to place walker straight infront of bed and does a full [place] to come to sitting. Recommeded to pt and caregiver to place wc at an angle on the side of bed that way pt does not need to turn as far   GAIT: pt amb 5-8 steps with RW with MOD A with mod vc needed for technique and sequencing. On 3 Lo2 by nasal canula pt then became very nervous and fearfull and tired to sit premautrely. Pt when amb has a fwd flexed posture with wide BRAULIO shuffling gait pattern B LE did not clear the floor with amb. O2 sat affter amb was 99% with HR of 89  STAIRS:NA  BALANCE: Pt was able to maintain a static standing balance with MIN/ CGA A wih RW with MOD vc needed for erect posture for 45 sec. pt then became nervous and needed to sit back down O2 sat on 3 LO2 by nasal canula was 99% 87 Hr . PATIENT EDUCATION PROVIDED THIS VISIT: safety, HEP, walking, deep breathing, Educated pt that he needs to change his postion every HR for pressure relief. Educated pt and caregiver that he needs to sit up for 30-45 min after eathing to prevent pneumonia. Had a long discussion with pt and caregivers wife/ duaghter in law that pt needs to particapte in activites when therapy is not pressent as it is the only way he will gain confidence and strength.     PATIENT RESPONE TO TX:  Pt had no increase in pain with activity  PATIENT LEVEL OF UNDERSTANDING OF EDUCATION PROVIDED; Pt was able to agree to participate in standing with caregiver with walker daily with caregivers   HEP consisting of:  1. Standing  x daily wtih RW with A   2. B LE seated hip flexion, LAQS,AP  3 daily x 10   Written HEP issued, patient/caregiver verbalized understanding. CONTINUED NEED FOR THE FOLLOWING SKILLS: HH PT is medically necessary to  address pain, , decreased strength, i impaired bed mobility, decreased independence with functional transfers, impaired gait,, and impaired balance in order to improve functional independence, quality of life, return to PLOF, and reduce the risk for falls. PLAN: Pt will be seen to establish and upgrade HEP. Gait training with RW on flat and  surfaces. Bed mobility training, transfer training. Stair training. Dynamic standing balance re -education. Reinforece general safety precautions. DISCHARGE PLANNING DISCUSSED: Discharge to self and family under MD supervision once all goals have been met or patient has reached max potential. Patient/caregiver verbalized understanding.

## 2021-12-16 NOTE — Clinical Note
Went over [employer] with [employer]. Discussed that pt is fearfull of falling and that is part of the reason that he is has not amb since June, along with the fact that he did not practice with caregivers when therapy was not present when he was receiving  HHPT in June by another agency. Noftifed LORIE that I had a discusson with pt and caregivers and that he needs to preform what he work with him  in sessions. when we are not there and he was in agreement with this.

## 2021-12-19 NOTE — HOME HEALTH
Patient education provided this visit to include: MEDICATION ADHERENCE IS AN IMPORTANT COMPONENT OF HTN MANAGEMENT. PATIENT STATES THE IMPORTANCE TO TAKE HTN MEDS SAME TIME EACH DAY. patient to follow coumadin regimen as directed by MD and to monitor for s/s of excessive bleeding, aware who to report to/when.nstruct patient/caregiver to maintain consistency with diet - changes in diet should be avoided because the amount of warfarin needed is based on the lab levels and the amount of vitamin K is determined by their diet. Foods high in vitamin K can decrease the effectiveness of the warfarin resulting in clots. Green leafy vegetables contain higher levels of vitamin K which include brussel sprouts, kale, peas, seun greens, spinach, and broccoli. The greener the leaves, the higher the vitamin K content. Instruct patient/caregiver in methods to conserve energy. TAKE FREQUENT BREAKS, USE ASSISTIVE DEVICE. pt instructed to follow with diabetic diet- monitoring sugar intake, limiting foods with high sugar content. reviewed cardiac diet- monitoring sodium intake, cholesterol and fat intake. patient aware to limit sodium, no added sodium to diet. reviewed foods to avoid, how to order foods when eating out, how to read nutrition labels and measure sodium, cholesterol and fat intake. patient instructed to be very careful when ambulating around oxygen tubing to prevent any falls from occurring. INSTRUCTED PATIENT AND CG THAT SHOULD ANY NEEDS OR CONCERNS ARISE TO FIRST CALL OUR OFFICE, OR THE DR'S OFFICE  OR GO TO AN URGENT CARE CENTER AND NOT TO THE ED FOR NON-LIFE THREATENING EVENTS. IF IT IS LIFE THREATENING THEN CALL 911 OR GO TO THE CLOSEST ER. Reviewed fall precautions and safety:dangle, uses assistive device at all times, non skid foot wear,and night light. Verbalized understanding. Reviewed to reported any redness, swelling, warmth, fever, chills, increased heart/respiratory rate, uncontrolled pain/tenderness, drainage:green/yellow/brown, or odor to MD immediately. Patient/caregiver degree of understanding:fair    Home exercise program/Homework provided:activity as tolerated, trying to get physical activity 4-5 x weekly. stopping activity if causing shortness of breath or chest pain, dizziness or weakness. Pt/Caregiver instructed on plan of care and are agreeable to plan of care at this time. Physician Dr Judith Navarrete of patient admission to home health and plan of care including anticipated frequency of  2w1,1w3,2prn and treatments/interventions/modalities of HTN/AFIB    Discharge planning discussed with patient and caregiver. Discharge planning as follows: Patient will be discharged once education is complete, pt is medically stable. Pt is aware that they will be discharged         Pt/Caregiver did verbalize understanding of discharge planning. Next MD appointment January 06 with Dr Leticia Machado MD/NP/PA. Patient/caregiver encouraged/instructed to keep appointment as lack of follow through with physician appointment could result in discontinuation of home care services for non-compliance. Skilled services/Home bound verification: The encounter with the patient was in whole, or in part, for the following   medical condition, which is the primary reason for home health care. PDGM Dx: Paroxysmal atrial fibrillation, HTN          Skilled Reason for admission/summary of clinical condition:  medication and disease management education  This patient is homebound for the following reasons Requires considerable and taxing effort to leave the home , Requires the assistance of 1 or more persons to leave the home  and Decreased mental status requiring 24 hour supervision . patients cg is daughter and is available as needed or assistance with IADL's, ADL's, meal prep, medication management, and taking patient to all doctors appointments.          Medications reconciled and all medications are available in the home this visit. The following education was provided regarding medications, medication interactions, and look alike medications (specify): plavix. Medications  are effective at this time. High risk medication teaching regarding anticoagulants, hyperglycemic agents or opiod narcotics performed (specify) TEACHING PERFOMED ON HOW PLAVIX AND ASA CAN PREVENT STROKES AND HEART ATTACKS. PLAVIX IS A PLATLET INHIBITOR AND WORKS BY BLOCKING PLATLETS FROM STICKING TOGETHER AND PREVENTS THEM FROM FORMING HARMFUL CLOTS.  IT KEEPS BLOOD VESSELS OPEN AND KEEPS BLOOD FLOWING SMOOTHLY IN YOUR BODY.     ,    Dr Real Sim notified of any discrepancies/medication interactions  NONE    Home health supplies by type and quantity ordered/delivered this visit include: NA

## 2021-12-19 NOTE — HOME HEALTH
Skilled Care:  meds, VS, O2  Patient education provided this visit to include: MEDICATION ADHERENCE IS AN IMPORTANT COMPONENT OF HTN MANAGEMENT. PATIENT STATES THE IMPORTANCE TO TAKE HTN MEDS SAME TIME EACH DAY. patient to follow coumadin regimen as directed by MD and to monitor for s/s of excessive bleeding, aware who to report to/when.nstruct patient/caregiver to maintain consistency with diet - changes in diet should be avoided because the amount of warfarin needed is based on the lab levels and the amount of vitamin K is determined by their diet. Foods high in vitamin K can decrease the effectiveness of the warfarin resulting in clots. Green leafy vegetables contain higher levels of vitamin K which include brussel sprouts, kale, peas, seun greens, spinach, and broccoli. The greener the leaves, the higher the vitamin K content. Instruct patient/caregiver in methods to conserve energy. TAKE FREQUENT BREAKS, USE ASSISTIVE DEVICE. pt instructed to follow with diabetic diet- monitoring sugar intake, limiting foods with high sugar content. reviewed cardiac diet- monitoring sodium intake, cholesterol and fat intake. patient aware to limit sodium, no added sodium to diet. reviewed foods to avoid, how to order foods when eating out, how to read nutrition labels and measure sodium, cholesterol and fat intake. patient instructed to be very careful when ambulating around oxygen tubing to prevent any falls from occurring. INSTRUCTED PATIENT AND CG THAT SHOULD ANY NEEDS OR CONCERNS ARISE TO FIRST CALL OUR OFFICE, OR THE DR'S OFFICE OR GO TO AN URGENT CARE CENTER AND NOT TO THE ED FOR NON-LIFE THREATENING EVENTS. IF IT IS LIFE THREATENING THEN CALL 911 OR GO TO THE CLOSEST ER. Reviewed fall precautions and safety:dangle, uses assistive device at all times, non skid foot wear,and night light. Verbalized understanding. Reviewed to reported any redness, swelling, warmth, fever, chills, increased heart/respiratory rate, uncontrolled pain/tenderness, drainage:green/yellow/brown, or odor to MD immediately. patients cg is daughter and is available as needed or assistance with IADL's, ADL's, meal prep, medication management, and taking patient to all doctors appointments. Medications reconciled and all medications are available in the home this visit. The following education was provided regarding medications, medication interactions, and look alike medications (specify): plavix. Medications are effective at this time. High risk medication teaching regarding anticoagulants, hyperglycemic agents or opiod narcotics performed (specify) TEACHING PERFOMED ON HOW PLAVIX AND ASA CAN PREVENT STROKES AND HEART ATTACKS. PLAVIX IS A PLATLET INHIBITOR AND WORKS BY BLOCKING PLATLETS FROM STICKING TOGETHER AND PREVENTS THEM FROM FORMING HARMFUL CLOTS. IT KEEPS BLOOD VESSELS OPEN AND KEEPS BLOOD FLOWING SMOOTHLY IN YOUR BODY    PATIENT IS STEADILY PROGRESSING TOWARDS GOALS, STILL NEEDS REINFORCEMENT/ENCOURAGEMENT. WILL CONTINUE TO MONITOR. Home health supplies by type and quantity ordered/delivered this visit include: NA  .activity as tolerated, trying to get physical activity 4-5 x weekly. stopping activity if causing shortness of breath or chest pain, dizziness or weakness. Patient will be discharged once education is complete, and pt is medically stable.

## 2021-12-20 NOTE — Clinical Note
dx: Paroxysmal atrial fibrillation, zip: [id number], frequency: 1w2, 2w2, focus: increased participation ADL tasks sitting EOB, B gross grasp and RUE functional strength, safety and technique with transfers.

## 2021-12-21 NOTE — HOME HEALTH
Clinical Condition per EPIC:  \"Ordered by Physician or his agent (name): Michael Worley MD     FACE to [facility] Encounter ( #[id number])     I attest that I or another qualified licensed provider saw Colby Nelson 90 days prior to or 30 days post admission and this face to face encounter meets the necessary Home Health requirements. The face to face encounter occurred by Michael Worley MD  ( I am certifying that this face to face summary is a permanent part of the patient's medical record in your office as required.)     The encounter with the patient was in whole, or in part, for the following medical condition, which is the primary reason for home health care. PDGM Dx: Paroxysmal atrial fibrillation, HTN . Found in: CC ,Document: Progress Notes,   Date 12/9/21     Disciplines requested: SN PT OT     Servi meghan to provide:  Evaluation and Treat; Medication and Disease Management   Please evaluate patient for PT  And OT .  Needs skilled nursing care  For protimes and hypertension     [phone]     Physician to follow patient's care (the person listed here will be responsible for signing ongoing orders): Michael Worley MD     List of Comorbitites:   Past Medical History:   Diagnosis Date   o Advance directive discussed with patient     o Benign localized prostatic hyperpl justina with lower urinary tract symptoms (LUTS)     o COPD with emphysema ([address]     o CVA (cerebrovascular accident) ([address] 7/11/2012     possible   o Diabetes mellitus ([address]     o Drowsiness     o Enlarged prostate     o Essential hypertension, benign     o Eye exam, routine 07/26/2016     Dr. Adiel Asencio repeat in 1 yr   o H/O colonoscopy 11/24/15     Dr. Arti Murray (Digestive & Liver disease)Tubular adenoma/polyp bx, showed divertic ulosis in the sigmoid/descending colon and internal hemorrhoids, 7mm polyp   o History of urinary retention     o Hoarseness     o Hypercholesterolemia     o Microhematuria     o Other and unspecified hyperlipidemia o Paroxysmal atrial fibrillation ([facility] 10/2/2015   o Pneumonia     o Pulmonary embolus (HCC)     o Renal cyst, right     o Saddle embolus of pulmonary artery without acute cor pulmonale ([facility] 10/2015   o Scrotal abscess     o Shortness of breath     o Tobacco use disorder 8/10/2010   o Type II or unspecified type diabetes mellitus without mention of complication, not stated as uncontrolled     o Urinary retention\"  . SUBJECTIVE:  Pt stated \"I don't want to use the bedside commode. I'm worried about falling. \" pt lying supine hospital bed upon OT arrival. pt agreed tx. pt and caregiver report pt has had multiple falls from EOB attempting to get to [facility] in the past as well as was not able to hold urine/feces while attempting to get to [facility] and went on the floor therefore do not want to address using [facility] for toileting in OT POC. CAREGIVER INVOLVEMENT: pt dtr inlaw is pt PCG. caregiver performs all ADL tasks and transfers for pt, performs all IADL tasks, shopping, transportation, and A with medications. MEDICATION RECONCILIATION: OT reconcilled medications with pt with no changes   DME ORDERED/RECOMMENEDED: NA, pt has transport chair, WC, BSC, and hospital bed   . OBJECTIVE:  BATHING: D, pt caregiver performs bathing with pt lying supine hospital bed  TOILETING: D via brief, pt declined addressing use of BSC in OT POC. see above stated for details. UB DRESSING: D  LB DRESSING: D  GROOMING: D  FEEDING: setup A  .  pt reports Modified Frances RPE 10/10 after performing transfers and ADL assessment.   .  OT instructed/demonstrated pt the following with good understanding:    - energy conservation while performing bathing, dressing, and setup such as set clothing out night before, gather items required to perform task in one trip, sit while performing tasks, take rest breaks as needed, perform shower/bathing on days with no other appointments or activities scheduled, etc.     - pt is to perform bathing/dressing tasks sitting upright, when possible, for increased participation, functional strength/level, and decreased caregiver burden. - while sitting perform weight shift technique bringing LE contra laterally onto opposite LE while performing LB bathing/dressing for safety and fall prevention. pt unable to perform technique. -ADL training performed for improved body mechanics, safety, and technique for reduced risk of falls and improved functional level and participation of tasks. Cj Delarosa IADL: NA pt was not performing IADL tasks prior to episode  . AMBULATION: NA. pt declined attempting ambulation this day due to fatigue from previous PT tx. .  EOB/BED TRANSFER: roll R and L use bedrail SBA, supine to sit min A mod v/c technique/hand placement, sit to supine SBA with increased time/effort. WC: NT per pt decline due to fatigue  TOILET/BSC: NA, pt toilets in brief, pt is not able to safely get into the bathroom. TUB SHOWER/SHOWER CHAIR/TUB BENCH: NA, pt performs bathing via sponge bath bedside only. pt is not able to safely get into the bathroom. .  -transfer training performed for improved body mechanics and technique for fall prevention and safety while performing ADL tasks. Cj Delarosa PATIENT RESPONSE TO TREATMENT:  pt had no increased pain or discomfort during tx/with activity    PATIENT LEVEL OF UNDERSTANDING OF EDUCATION PROVIDED: pt able to teach back importance sitting EOB to perform ADL tasks for increased functional strength, functional level, and decrease caregiver burden as well as pt agreeable to perform tasks sitting EOB. PATIENT EDUCATION PROVIDED THIS VISIT: UB HEP, OT role, energy conservation, fall prevention/safety training ADL tasks, transfer training   REHAB POTENTIAL: good for stated goals   HOME EXERCISE PROGRAM: Written HEP of the following issued. MANZANARES will instruct/re-instruct pt next treatment.  UB HEP includes the following: BUE shoulder press, shoulder flexion, shoulder abduction, shoulder extension, chest press, elbow flexion/extension x 10, x 2 per day. pt aware MANZANARES will update HEP throughout POC. UB HEP performed for pt increased endurance and strength to perform ADL/IADL tasks and ambulation/transfers to perform tasks with improved safety, increased functional level, and for fall prevention. CONTINUED NEED FOR THE FOLLOWING SKILLS: HH OT is medically necessary to address pain, decreased functional strength, increased swelling, impaired bed mobility to perform ADL tasks, decreased independence and safety with functional transfers, decreased independence and safety performing ADL tasks, decreased activity and standing tolerance, decreased functional endurance, and impaired balance in order to improve functional independence, obtain set goals, reduce risk of falls, reduce pain, improve quality of life, and return to PLOF. ASSESSMENT: pt presents with decreased endurance, decreased functional strength, decreased safety transfers, increased A with transfers, decreased safety/participation ADL tasks, decreased balance, increased burden of care, and no UB HEP. PLAN: pt will be seen to address ther ex: establish and upgrade HEP, ther act: activity and standing tolerance training, endurance training, balance training, safety training, energy conservation training, transfer training, ADL training, and pt/caregiver education. DISCHARGE PLANNING DISCUSSED WITH PT/CAREGIVER: Anticipate D/C skilled OT week of 1.10.22 when goals met or when pt obtained maximum benefit. pt frequency 1w2, 2w2. discharge to self and family care under MD supervision once all goals have been met or patient has reached max potential. pt and caregiver verbalized understanding and agree POC.

## 2021-12-22 NOTE — HOME HEALTH
Subjective: Caregiver reports that the  pt does not like to be rushed when getting up or down. She also repots that his feet give him problems when performing transfers. Future MD appointments: TBD  Caregiver involvement/assistance needed for: Pt lives with family and his daughter-in-law is his paid caregiver. Objective: See interventions. Assessment of Progress toward goals: Pt is able to maintain good vitals while performing supine exercises to help with bed mobility. Continued need for the following skills: There is a need for skilled PT to address transfer training and standing for time to reduce burden on caregivers. Communications with all co-treating staff regarding pt status/progress during staff meeting/phone call/text/email. Equipment Needs:  Plan for Next visit: Address strengthening and trasnfer training. Frequency: 1 w 1, 2 w 3. Pt has two visits next week. The Following Discharge Planning was Discussed with the Pt/Caregiver: Pt to receive Home Health PT until goals are met or max assist has been achieved.

## 2021-12-22 NOTE — HOME HEALTH
Subjective: When asked about his O2, pt reports that he is on 3 L of O2. Pt is nervous going from the bed to the [facility] and back and forth. Caregiver, Misha Arzola, reports that the pt has history of falls and that explains his fear of falls. Future MD appointments: TBD  Caregiver involvement/assistance needed for: Pt lives in a single story home with spouse and his daughter-in-law is his paid caregiver. Objective: See interventions. Assessment of Progress toward goals: Pt is able to follow verbal and written instruction regarding sitting exercises. Pt becomes very nervous when attempting sit to stand transfers. Pt to too nervous to perform sit to stand transfers. Continued need for the following skills: Pt requires continued skilled PT to improve overall strength, endurance and transfer training to reduce burden on caregivers. Communications with Watson Antonio, Lead PT regarding pt status and POC via phone call and email. Equipment Needs:  Plan for Next visit: Instruct pt in supine exercises to improve bed mobility. Frequency: 1 w 1, 2 w 3. Pt has one more visit this week. The Following Discharge Planning was Discussed with the Pt/Caregiver: Pt to receive Home Health PT until goals are met or pt has achieved max benefit.

## 2021-12-26 NOTE — HOME HEALTH
PT 44.3 INR 3.7 reported to [address]: meds, VS, O2 , PT/INR  Patient education provided this visit to include: MEDICATION ADHERENCE IS AN IMPORTANT COMPONENT OF HTN MANAGEMENT. PATIENT STATES THE IMPORTANCE TO TAKE HTN MEDS SAME TIME EACH DAY. patient to follow coumadin regimen as directed by MD and to monitor for s/s of excessive bleeding, aware who to report to/when.nstruct patient/caregiver to maintain consistency with diet - changes in diet should be avoided because the amount of warfarin needed is based on the lab levels and the amount of vitamin K is determined by their diet. Foods high in vitamin K can decrease the effectiveness of the warfarin resulting in clots. Green leafy vegetables contain higher levels of vitamin K which include brussel sprouts, kale, peas, seun greens, spinach, and broccoli. The greener the leaves, the higher the vitamin K content. Instruct patient/caregiver in methods to conserve energy. TAKE FREQUENT BREAKS, USE ASSISTIVE DEVICE. pt instructed to follow with diabetic diet- monitoring sugar intake, limiting foods with high sugar content. reviewed cardiac diet- monitoring sodium intake, cholesterol and fat intake. patient aware to limit sodium, no added sodium to diet. reviewed foods to avoid, how to order foods when eating out, how to read nutrition labels and measure sodium, cholesterol and fat intake. patient instructed to be very careful when ambulating around oxygen tubing to prevent any falls from occurring. INSTRUCTED PATIENT AND CG THAT SHOULD ANY NEEDS OR CONCERNS ARISE TO FIRST CALL OUR OFFICE, OR THE DR'S OFFICE OR GO TO AN URGENT CARE CENTER AND NOT TO THE ED FOR NON-LIFE THREATENING EVENTS. IF IT IS LIFE THREATENING THEN CALL 911 OR GO TO THE CLOSEST ER. Reviewed fall precautions and safety:dangle, uses assistive device at all times, non skid foot wear,and night light. Verbalized understanding. Reviewed to reported any redness, swelling, warmth, fever, chills, increased heart/respiratory rate, uncontrolled pain/tenderness, drainage:green/yellow/brown, or odor to MD immediately. patients cg is daughter and is available as needed or assistance with IADL's, ADL's, meal prep, medication management, and taking patient to all doctors appointments. Medications reconciled and all medications are available in the home this visit. The following education was provided regarding medications, medication interactions, and look alike medications (specify): plavix. Medications are effective at this time. High risk medication teaching regarding anticoagulants, hyperglycemic agents or opiod narcotics performed (specify) TEACHING PERFOMED ON HOW PLAVIX AND ASA CAN PREVENT STROKES AND HEART ATTACKS. PLAVIX IS A PLATLET INHIBITOR AND WORKS BY BLOCKING PLATLETS FROM STICKING TOGETHER AND PREVENTS THEM FROM FORMING HARMFUL CLOTS. IT KEEPS BLOOD VESSELS OPEN AND KEEPS BLOOD FLOWING SMOOTHLY IN YOUR BODY   PATIENT IS STEADILY PROGRESSING TOWARDS GOALS, STILL NEEDS REINFORCEMENT/ENCOURAGEMENT. WILL CONTINUE TO MONITOR. Home health supplies by type and quantity ordered/delivered this visit include: NA   Home exercise program: activity as tolerated, trying to get physical activity 4-5 x weekly. stopping activity if causing shortness of breath or chest pain, dizziness or weakness. Patient will be discharged once education is complete, and pt is medically stable.

## 2021-12-28 NOTE — Clinical Note
added. thanks   ----- Message -----  From: Reyes Aguilar PTA  Sent: 12/28/2021  10:37 PM EST  To: Maribeth Cordero PT      The following medicine needs to be added.  TIA  WARFARIN 3 mg     QTY 60     Filled 12/27/21   3 refills prior to 12/23/2022  Take 1 tablet [place] daily except Monday and Thursday  Tamia Durham MD

## 2021-12-28 NOTE — Clinical Note
The following medicine needs to be added.  TIA  WARFARIN 3 mg     QTY 60     Filled 12/27/21   3 refills prior to 12/23/2022  Take 1 tablet by[place] daily except Monday and Thursday  Alexa Jamison MD

## 2021-12-28 NOTE — HOME HEALTH
SUBJECTIVE: Pt in transport chair upon arrival, pt reported he was tired from PT visit earlier and wanted to sleep, pts CG (also daughter-in-law) present for education on OT goals for increased pt participation and UE exercise review for carry over. CAREGIVER ASSISTANCE NEEDED FOR: pts wife home during visit for education and training. MEDICATIONS REVIEWED AND UPDATED: no medication changes reported this visit. .  OBJECTIVE: see interventions  PATIENT RESPONSE TO TREATMENT:  Pt demonstrated positive response to therapy with no reported increase of pain and ability to participate with all aspects. Neida Gardner PATIENT/CAREGIVER EDUCATION PROVIDED THIS VISIT: Therapist educated pt on HEP, energy conservation techniques and additional core exercises to increase pt strength and ability to participate daily. PATIENT LEVEL OF UNDERSTANDING OF EDUCATION PROVIDED: Pt demonstrated understanding of HEP through teachback participation of each exercise and vebalized teachback of energy conservation strategies as discussed. ASSESSMENT AND PROGRESS TOWARD GOALS:  Pt demonstrated good progress towards goals through demo of understanding of HEP and carry over of additional education with energy conservation, chair push-ups and core exercises for balance. Patient will benefit from continued intervention with progression of ADL, transfer, balance and RUE strength training. CONTINUED NEED FOR THE FOLLOWING SKILLS: HH OT is medically necessary to address pain, decreased ROM, decreased strength, impaired bed mobility, decreased independence with functional transfers, decreased I with ADLs, and impaired balance in order to improve functional independence, quality of life, return to PLOF, reduce the risk for falls, and reduce pain.   PLAN: next visit will be for ADL training to increase pt participation and overall I.    DISCHARGE PLANNING DISCUSSED: Discharge to self and family under MD supervision once all goals have been met or patient has reached maximum potential.  Frequency remaininw2 remaining.

## 2021-12-29 NOTE — HOME HEALTH
Subjective: Pt denies any pain. Caregiver, Vesna Resendiz, reports that she helps the pt with his exercises. Future MD appointments: TBD  Caregiver involvement/assistance needed for: Pt lives in a one story home with his wife and he has a caregiver who is his daughter-in-law. Objective: See interventions. Assessment of Progress toward goals: Pt recall of exercises is poor. Pt's compliance is in question. Continued need for the following skills: Assess pt's compliance with exercises and address supine exercises and issue handout. Communications with all co-treating staff regarding pt status/progress during staff meeting/phone call/text/email. Equipment Needs:  Plan for Next visit: Address supine exercises and bed mobility next visit. Frequency: 1 w 1, 2 w 3. Pt has one remaining visit. The Following Discharge Planning was Discussed with the Pt/Caregiver: Pt to receive HHPT until goals are met or max benefit has been achieved.

## 2021-12-30 NOTE — HOME HEALTH
Set up telehealth equipment including monitor, BP cuff, scale and pulse ox as needed. Instructed patient in how to use the monitoring devices and when monitoring should take place. Instructed in reason for telehealth monitoring related to disease process of afib and HTN. Reviewed potential problems with telehealth, troubleshooting techniques and who to contact for questions and problems. Caregiver verbalized understanding of how to use telehealth equipment with a return demonstration. normal

## 2021-12-30 NOTE — HOME HEALTH
Subjective: Pt goes to PCP tomorrow, thus the reason for today's visit instead of tomorrow. .  Future MD appointments: 12/30/21 PCP  Caregiver involvement/assistance needed for: Pt lives with spouse and his daughter-in-law as his caregiver. Objective: See interventions. Assessment of Progress toward goals: Pt is able to perform supine exercises with guidance, tactile/verbal cues and MIN A. Continued need for the following skills: There is a continued need for skilled PT to address strength to perform bed mobility and transfer training to reduce the burden on family.  PT, Danny Gabriel, PT regarding impending DC via phone call. Equipment Needs:  Plan for Next visit: Practice sit to stand transfers with standing for time. Frequency: 1 w 1, 2 w 3. Pt has two visits next week. The Following Discharge Planning was Discussed with the Pt/Caregiver: Pt to receive Home Health PT until goals are met or max benefit has been achieved.

## 2021-12-30 NOTE — PROGRESS NOTES
Received a call from [address] [place] [address] Nurse to inform the pt is not home and will not arrive home until later this evening. Notified PCP and okay to recheck on Monday 01/03/22 per Dr. Sridhar Lynn. Verbal readback given, no further concerns were voiced at this time.

## 2022-01-01 ENCOUNTER — HOME CARE VISIT (OUTPATIENT)
Dept: SCHEDULING | Facility: HOME HEALTH | Age: 82
End: 2022-01-01
Payer: MEDICARE

## 2022-01-01 ENCOUNTER — HOME CARE VISIT (OUTPATIENT)
Dept: HOME HEALTH SERVICES | Facility: HOME HEALTH | Age: 82
End: 2022-01-01
Payer: MEDICARE

## 2022-01-01 ENCOUNTER — OFFICE VISIT (OUTPATIENT)
Dept: INTERNAL MEDICINE CLINIC | Age: 82
End: 2022-01-01
Payer: MEDICARE

## 2022-01-01 ENCOUNTER — TELEPHONE (OUTPATIENT)
Dept: INTERNAL MEDICINE CLINIC | Age: 82
End: 2022-01-01

## 2022-01-01 ENCOUNTER — CLINICAL SUPPORT (OUTPATIENT)
Dept: INTERNAL MEDICINE CLINIC | Age: 82
End: 2022-01-01
Payer: MEDICARE

## 2022-01-01 VITALS
RESPIRATION RATE: 18 BRPM | DIASTOLIC BLOOD PRESSURE: 86 MMHG | TEMPERATURE: 97.3 F | SYSTOLIC BLOOD PRESSURE: 145 MMHG | HEART RATE: 85 BPM | OXYGEN SATURATION: 97 %

## 2022-01-01 VITALS
TEMPERATURE: 98 F | RESPIRATION RATE: 18 BRPM | OXYGEN SATURATION: 97 % | DIASTOLIC BLOOD PRESSURE: 84 MMHG | HEART RATE: 81 BPM | SYSTOLIC BLOOD PRESSURE: 150 MMHG

## 2022-01-01 VITALS
SYSTOLIC BLOOD PRESSURE: 130 MMHG | HEART RATE: 88 BPM | OXYGEN SATURATION: 93 % | RESPIRATION RATE: 19 BRPM | DIASTOLIC BLOOD PRESSURE: 78 MMHG | TEMPERATURE: 97.8 F

## 2022-01-01 VITALS
HEART RATE: 80 BPM | OXYGEN SATURATION: 92 % | RESPIRATION RATE: 18 BRPM | SYSTOLIC BLOOD PRESSURE: 149 MMHG | TEMPERATURE: 96.1 F | DIASTOLIC BLOOD PRESSURE: 88 MMHG

## 2022-01-01 VITALS
HEART RATE: 85 BPM | SYSTOLIC BLOOD PRESSURE: 126 MMHG | TEMPERATURE: 97.5 F | DIASTOLIC BLOOD PRESSURE: 76 MMHG | OXYGEN SATURATION: 92 %

## 2022-01-01 VITALS
BODY MASS INDEX: 23.49 KG/M2 | RESPIRATION RATE: 18 BRPM | HEART RATE: 75 BPM | TEMPERATURE: 96.9 F | SYSTOLIC BLOOD PRESSURE: 130 MMHG | HEIGHT: 67 IN | DIASTOLIC BLOOD PRESSURE: 81 MMHG | OXYGEN SATURATION: 90 %

## 2022-01-01 VITALS
RESPIRATION RATE: 17 BRPM | DIASTOLIC BLOOD PRESSURE: 87 MMHG | OXYGEN SATURATION: 93 % | HEART RATE: 79 BPM | SYSTOLIC BLOOD PRESSURE: 147 MMHG | TEMPERATURE: 99.1 F

## 2022-01-01 VITALS
HEART RATE: 90 BPM | SYSTOLIC BLOOD PRESSURE: 144 MMHG | DIASTOLIC BLOOD PRESSURE: 71 MMHG | RESPIRATION RATE: 20 BRPM | OXYGEN SATURATION: 91 % | TEMPERATURE: 97.6 F

## 2022-01-01 VITALS
HEART RATE: 74 BPM | OXYGEN SATURATION: 94 % | RESPIRATION RATE: 18 BRPM | SYSTOLIC BLOOD PRESSURE: 120 MMHG | DIASTOLIC BLOOD PRESSURE: 68 MMHG | TEMPERATURE: 99 F

## 2022-01-01 VITALS
TEMPERATURE: 98.5 F | OXYGEN SATURATION: 97 % | SYSTOLIC BLOOD PRESSURE: 150 MMHG | DIASTOLIC BLOOD PRESSURE: 70 MMHG | HEART RATE: 72 BPM

## 2022-01-01 DIAGNOSIS — I48.0 PAROXYSMAL ATRIAL FIBRILLATION (HCC): Primary | ICD-10-CM

## 2022-01-01 DIAGNOSIS — E11.9 TYPE 2 DIABETES MELLITUS WITHOUT COMPLICATION, WITHOUT LONG-TERM CURRENT USE OF INSULIN (HCC): ICD-10-CM

## 2022-01-01 DIAGNOSIS — I67.9 CEREBROVASCULAR DISEASE: ICD-10-CM

## 2022-01-01 DIAGNOSIS — I48.0 PAROXYSMAL ATRIAL FIBRILLATION (HCC): ICD-10-CM

## 2022-01-01 DIAGNOSIS — I10 ESSENTIAL HYPERTENSION: ICD-10-CM

## 2022-01-01 DIAGNOSIS — Z79.01 ANTICOAGULATION MANAGEMENT ENCOUNTER: ICD-10-CM

## 2022-01-01 DIAGNOSIS — Z79.01 ANTICOAGULATED: Primary | ICD-10-CM

## 2022-01-01 DIAGNOSIS — Z00.00 MEDICARE ANNUAL WELLNESS VISIT, SUBSEQUENT: ICD-10-CM

## 2022-01-01 DIAGNOSIS — Z86.711 HISTORY OF PULMONARY EMBOLISM: Primary | ICD-10-CM

## 2022-01-01 DIAGNOSIS — J44.9 CHRONIC OBSTRUCTIVE PULMONARY DISEASE, UNSPECIFIED COPD TYPE (HCC): ICD-10-CM

## 2022-01-01 DIAGNOSIS — N40.0 ENLARGED PROSTATE: ICD-10-CM

## 2022-01-01 DIAGNOSIS — Z79.01 CURRENT USE OF LONG TERM ANTICOAGULATION: ICD-10-CM

## 2022-01-01 DIAGNOSIS — I63.319 CEREBROVASCULAR ACCIDENT (CVA) DUE TO THROMBOSIS OF MIDDLE CEREBRAL ARTERY, UNSPECIFIED BLOOD VESSEL LATERALITY (HCC): ICD-10-CM

## 2022-01-01 DIAGNOSIS — R54 FRAILTY: ICD-10-CM

## 2022-01-01 DIAGNOSIS — Z79.01 ANTICOAGULATION MANAGEMENT ENCOUNTER: Primary | ICD-10-CM

## 2022-01-01 DIAGNOSIS — Z51.81 ANTICOAGULATION MANAGEMENT ENCOUNTER: Primary | ICD-10-CM

## 2022-01-01 DIAGNOSIS — Z51.81 ANTICOAGULATION MANAGEMENT ENCOUNTER: ICD-10-CM

## 2022-01-01 LAB
GLUCOSE POC: 266 MG/DL
HBA1C MFR BLD HPLC: 5.8 %
INR BLD: 1.2
INR BLD: 1.3
INR BLD: 1.9
INR BLD: 2.6
INR BLD: 3.5
INR BLD: NORMAL
INR BLD: NORMAL
PT POC: 1.1 SECONDS
PT POC: 1.4 SECONDS
PT POC: NORMAL
VALID INTERNAL CONTROL?: YES

## 2022-01-01 PROCEDURE — 3331090001 HH PPS REVENUE CREDIT

## 2022-01-01 PROCEDURE — G0158 HHC OT ASSISTANT EA 15: HCPCS

## 2022-01-01 PROCEDURE — 3331090002 HH PPS REVENUE DEBIT

## 2022-01-01 PROCEDURE — 83036 HEMOGLOBIN GLYCOSYLATED A1C: CPT | Performed by: INTERNAL MEDICINE

## 2022-01-01 PROCEDURE — G8752 SYS BP LESS 140: HCPCS | Performed by: INTERNAL MEDICINE

## 2022-01-01 PROCEDURE — G8427 DOCREV CUR MEDS BY ELIG CLIN: HCPCS | Performed by: INTERNAL MEDICINE

## 2022-01-01 PROCEDURE — 1101F PT FALLS ASSESS-DOCD LE1/YR: CPT | Performed by: INTERNAL MEDICINE

## 2022-01-01 PROCEDURE — G0300 HHS/HOSPICE OF LPN EA 15 MIN: HCPCS

## 2022-01-01 PROCEDURE — 3331090003 HH PPS REVENUE ADJ

## 2022-01-01 PROCEDURE — G8754 DIAS BP LESS 90: HCPCS | Performed by: INTERNAL MEDICINE

## 2022-01-01 PROCEDURE — G8753 SYS BP > OR = 140: HCPCS | Performed by: INTERNAL MEDICINE

## 2022-01-01 PROCEDURE — 85610 PROTHROMBIN TIME: CPT | Performed by: INTERNAL MEDICINE

## 2022-01-01 PROCEDURE — G8510 SCR DEP NEG, NO PLAN REQD: HCPCS | Performed by: INTERNAL MEDICINE

## 2022-01-01 PROCEDURE — G0157 HHC PT ASSISTANT EA 15: HCPCS

## 2022-01-01 PROCEDURE — G8432 DEP SCR NOT DOC, RNG: HCPCS | Performed by: INTERNAL MEDICINE

## 2022-01-01 PROCEDURE — G8420 CALC BMI NORM PARAMETERS: HCPCS | Performed by: INTERNAL MEDICINE

## 2022-01-01 PROCEDURE — 3044F HG A1C LEVEL LT 7.0%: CPT | Performed by: INTERNAL MEDICINE

## 2022-01-01 PROCEDURE — 400018 HH-NO PAY CLAIM PROCEDURE

## 2022-01-01 PROCEDURE — 99214 OFFICE O/P EST MOD 30 MIN: CPT | Performed by: INTERNAL MEDICINE

## 2022-01-01 PROCEDURE — G8536 NO DOC ELDER MAL SCRN: HCPCS | Performed by: INTERNAL MEDICINE

## 2022-01-01 PROCEDURE — G0439 PPPS, SUBSEQ VISIT: HCPCS | Performed by: INTERNAL MEDICINE

## 2022-01-01 PROCEDURE — 400013 HH SOC

## 2022-01-01 PROCEDURE — 82962 GLUCOSE BLOOD TEST: CPT | Performed by: INTERNAL MEDICINE

## 2022-01-01 PROCEDURE — G0152 HHCP-SERV OF OT,EA 15 MIN: HCPCS

## 2022-01-01 PROCEDURE — G0151 HHCP-SERV OF PT,EA 15 MIN: HCPCS

## 2022-01-01 RX ORDER — WARFARIN 4 MG/1
4 TABLET ORAL DAILY
Qty: 30 TABLET | Refills: 3 | Status: SHIPPED | OUTPATIENT
Start: 2022-01-01 | End: 2022-01-01 | Stop reason: SDUPTHER

## 2022-01-01 RX ORDER — CLOPIDOGREL BISULFATE 75 MG/1
75 TABLET ORAL DAILY
Qty: 90 TABLET | Refills: 5 | Status: SHIPPED | OUTPATIENT
Start: 2022-01-01

## 2022-01-01 RX ORDER — ATORVASTATIN CALCIUM 40 MG/1
40 TABLET, FILM COATED ORAL DAILY
Qty: 90 TABLET | Refills: 1 | Status: SHIPPED | OUTPATIENT
Start: 2022-01-01

## 2022-01-01 RX ORDER — BLOOD SUGAR DIAGNOSTIC
STRIP MISCELLANEOUS
Qty: 100 STRIP | Refills: 5 | Status: SHIPPED | OUTPATIENT
Start: 2022-01-01 | End: 2022-01-01

## 2022-01-01 RX ORDER — GLIPIZIDE 5 MG/1
5 TABLET ORAL 2 TIMES DAILY
Qty: 120 TABLET | Refills: 2 | Status: SHIPPED | OUTPATIENT
Start: 2022-01-01

## 2022-01-01 RX ORDER — WARFARIN SODIUM 5 MG/1
5 TABLET ORAL EVERY EVENING
Qty: 90 TABLET | Refills: 1 | OUTPATIENT
Start: 2022-01-01

## 2022-01-01 RX ORDER — LISINOPRIL 30 MG/1
30 TABLET ORAL DAILY
Qty: 90 TABLET | Refills: 3 | Status: SHIPPED | OUTPATIENT
Start: 2022-01-01

## 2022-01-01 RX ORDER — ARFORMOTEROL TARTRATE 15 UG/2ML
SOLUTION RESPIRATORY (INHALATION)
COMMUNITY
Start: 2022-01-01

## 2022-01-01 RX ORDER — FINASTERIDE 5 MG/1
5 TABLET, FILM COATED ORAL DAILY
Qty: 90 TABLET | Refills: 1 | Status: SHIPPED | OUTPATIENT
Start: 2022-01-01

## 2022-01-01 RX ORDER — WARFARIN SODIUM 5 MG/1
5 TABLET ORAL DAILY
Qty: 60 TABLET | Refills: 2 | Status: SHIPPED | OUTPATIENT
Start: 2022-01-01

## 2022-01-01 RX ORDER — BUDESONIDE 0.5 MG/2ML
SUSPENSION RESPIRATORY (INHALATION)
COMMUNITY
Start: 2022-01-01

## 2022-01-01 RX ORDER — ALBUTEROL SULFATE 0.83 MG/ML
SOLUTION RESPIRATORY (INHALATION)
COMMUNITY
Start: 2022-01-01

## 2022-01-01 RX ORDER — TAMSULOSIN HYDROCHLORIDE 0.4 MG/1
0.8 CAPSULE ORAL DAILY
Qty: 180 CAPSULE | Refills: 3 | Status: SHIPPED | OUTPATIENT
Start: 2022-01-01

## 2022-01-01 RX ORDER — METOPROLOL TARTRATE 25 MG/1
12.5 TABLET, FILM COATED ORAL 2 TIMES DAILY
Qty: 90 TABLET | Refills: 1 | Status: SHIPPED | OUTPATIENT
Start: 2022-01-01

## 2022-01-03 NOTE — HOME HEALTH
SUBJECTIVE: Pt supine in bed upon arrival, pts daughter-in-law available for training with at bed level for balance and improved ADL participation. CAREGIVER ASSISTANCE NEEDED FOR: pts wife and daughter-in-law present during visit for education with ADLs at EOB level. MEDICATIONS REVIEWED AND UPDATED: no medication changes reported this visit. .  OBJECTIVE: see interventions  PATIENT RESPONSE TO TREATMENT:  Pt demonstrated fairly positive response to treatment with some fatigue and min groaning when asked to engage core for strengthening and stability. Lazara García PATIENT/CAREGIVER EDUCATION PROVIDED THIS VISIT: Therapist educated pt and CG on raising height of bed to improve his transfers and save her back with improved body mechanics, therapist also educated on seated reach exercise to improve core engagement. PATIENT LEVEL OF UNDERSTANDING OF EDUCATION PROVIDED: Pt demonstrated understanding through improved transfer with bed raised and CG present to practice for hands on training. ASSESSMENT AND PROGRESS TOWARD GOALS:  Pt demonstrated good progress towards goals for self-care and transfers with good understanding of education provided to increase safety. Patient will benefit from continued intervention with progression of ADL, balance and transfer training. CONTINUED NEED FOR THE FOLLOWING SKILLS: HH OT is medically necessary to address pain, decreased strength, impaired bed mobility, decreased independence with functional transfers, decreased I with ADLs, and impaired balance in order to improve functional independence, quality of life, return to PLOF, reduce the risk for falls, and reduce pain. PLAN: next visit will be for HEP review, upgrades and continued ADL training to work on sitting balance at EOB. DISCHARGE PLANNING DISCUSSED: Discharge to self and family under MD supervision once all goals have been met or patient has reached maximum potential.  Frequency remaininw1, 2w1 remaining.

## 2022-01-03 NOTE — Clinical Note
Thanks for the update.  ----- Message -----  From: Radha Romano  Sent: 1/3/2022   4:04 PM EST  To: Kwadwo Huynh PTA      Pt discharged from nursing services. INR called into MD awaiting a call back for what pt is to take Pt has MD appointment on Thursday.      Thank you in advance  Chele Soler LPN

## 2022-01-03 NOTE — Clinical Note
thanks   ----- Message -----  From: Jennifer Pickett  Sent: 1/3/2022   4:04 PM EST  To: Beacher Crigler, PT      Pt discharged from nursing services. INR called into MD awaiting a call back for what pt is to take Pt has MD appointment on Thursday.      Thank you in advance  Elba Schumacher LPN

## 2022-01-03 NOTE — Clinical Note
thank you   ----- Message -----  From: Jennifer Pickett  Sent: 1/3/2022   4:04 PM EST  To: Ashley Shepherd, OTR/JAMESON      Pt discharged from nursing services. INR called into MD awaiting a call back for what pt is to take Pt has MD appointment on Thursday.      Thank you in advance  Elba Schumacher LPN

## 2022-01-03 NOTE — HOME HEALTH
Skilled reason for visit: PT/INR     Caregiver involvement: Patient's cg is family. They lives with patient and is available 24/7 for assistance with iadls, adls, meal prep, medication management, taking to md appointments. family and caregive available of needs help. Medications reviewed and all medications are available in the home this visit. Medications  are effective at this time. Home health supplies by type and quantity ordered/delivered this visit include: NA    Patient education provided this visit: PT/INR completed today it was 17.1 and 1.7 called Dr.Murray stephen valle with [facility] at 11:14am. Pt glucose level at time of visit was 156. Pt is a telehealth pt. Pt is due to see MD on thursday,    Patient level of understanding of education provided: Caregiver and wife aware    Skilled Care Performed this visit: PT/INR and education    Patient response to procedure performed: No pain voiced at this time    Home exercise program: pt conitnues to take  medication as ordered and follows up on all  md appintments    Continued need for the following skills: Physical Therapy     Plan for next visit:     Patient and/or caregiver notified and agrees to changes in the Plan of Care     The following discharge planning was discussed with the pt/caregiver: when patient reaches goals and medication is managed, and disease processes are understood patient agrees and understand that discharge will take place.

## 2022-01-03 NOTE — Clinical Note
Pt discharged from nursing services. INR called into MD awaiting a call back for what pt is to take Pt has MD appointment on Thursday.      Thank you in advance  Media Brain LPN

## 2022-01-04 NOTE — TELEPHONE ENCOUNTER
S/w Asiya [address] [place] [address] to informed the pt was d/c from home care yesterday and additionally, the pt's PT INR was PT:17.1 INR:1.7, pt is taking 3mg daily. Notified PCP and Called and left a message for staff to  inform the pt to take warfarin 3 mg daily and to keep 1/6/22 appt.

## 2022-01-04 NOTE — HOME HEALTH
Subjective: Pt denies any pain. Pt reports that he has already done exercises, yet can not recall them. Pt also reports that he has walked recently, yet can not recall when or how far. When discussing skin breakdown, caregiver points to all of the different creams and barriers. Future MD appointments: 1/6/21 PCP  Caregiver involvement/assistance needed for: Pt lives with spouse in a single story home. His daughter-in-law is his caregiver. Objective: See interventions. Pt received HHPT today to address supine exercises to improve bed mobility to improve strength and reduce the risk of skin breakdown. Educate pt and caregiver that the pt is to change postions ever 1-2 hours to reduce the risk of skin breakdown. They are informed that barrier creams are not the only answer to the prevention of skin breakdown. They are also instructed in how to use pillows to decrease pressure on dave areas. Assessment of Progress toward goals: Pt requires multiple tactile and VCs to perform new supine exercises. Pt appears to be falling asleep during exercises. Caregiver instructed  in form and how to guide the pt through his exercises. Continued need for the following skills: There is a continued need for skilled PT to assess for progress toward goals of improved independence with bed mobility through improved overall strength. Equipment Needs:  Plan for Next visit: Pt to receive therapy based assessment next visit to determine progress toward goals. Frequency: 1 w 1, 2 w 3. Pt has one remaining visit. The Following Discharge Planning was Discussed with the Pt/Caregiver: Pt to receive HHPT until goals are met or max benefit has been achieved.

## 2022-01-04 NOTE — TELEPHONE ENCOUNTER
Asiya with [facility] is calling in stating [facility] called yesterday and spoke with Hermelinda but no one has heard back with INR directions. Asking that she please be called back ASAP.     Thank Jason Arana - [phone]

## 2022-01-05 NOTE — HOME HEALTH
SUBJECTIVE: Pt in supine upon arrival, pts CG forgot therapist was coming today but both were agreeable for training with transfers and self-care. CAREGIVER ASSISTANCE NEEDED FOR: pts wife and daughter in law home and available for training. MEDICATIONS REVIEWED AND UPDATED: no medication changes reported this visit. .  OBJECTIVE: see interventions  PATIENT RESPONSE TO TREATMENT:  Pt demonstrated positive response to treatment with no increased report of pain. Vandanareina  PATIENT/CAREGIVER EDUCATION PROVIDED THIS VISIT: Therapist educated pt and CG on pt needing to perform more for himself with bathing as he is completely capable of reaching more of his body to wash than he does and on importance of consistent position changes during the day to help body adjust and reduce potential lightheadedness with transition. PATIENT LEVEL OF UNDERSTANDING OF EDUCATION PROVIDED: Pt verbalized understanding of position change education and demonstrated understanding of increasin bathing participation by demonstrating improved reach for carry over. ASSESSMENT AND PROGRESS TOWARD GOALS:  Pt demonstrated good progress towards goals but is very self-limiting and has CGs who allow him to be reliant on them rather than push him to do more as instructed with therapy. Patient will benefit from continued intervention with progression of final ADL and transfer assessment with CG and HEP carry over assessment using upgrades as pt has not been consistent with performing per his report. CONTINUED NEED FOR THE FOLLOWING SKILLS: HH OT is medically necessary to address decreased strength, impaired bed mobility, decreased independence with functional transfers, decreased I with ADLs, and impaired balance in order to improve functional independence, quality of life, return to PLOF, reduce the risk for falls, and reduce pain. PLAN: next visit will be for final ADL and transfer assessment with CG present for carry over demo with pt.     DISCHARGE PLANNING DISCUSSED: Discharge to self and family under MD supervision once all goals have been met or patient has reached maximum potential.  Frequency remaininw1 remaining with pt and CGs aware of OTDC next week.

## 2022-01-06 NOTE — PROGRESS NOTES
MD Moni Blackwell Dorna Drew L  Would like him to take 6 mg a day x 2 then 3mg each day . Recheck PT in 2 weeks             AMB POC PT/INR  Order: [id number]   Status: Final result     Visible to patient: Yes (not seen)     Dx:  Anticoagulation management encounter     0 Result Notes    Component Ref Range & Units 11:35   (1/6/22) 4 wk ago   (12/9/21) 2 mo ago   (10/26/21) 3 mo ago   (9/30/21) 3 mo ago   (9/9/21) 4 mo ago   (8/10/21) 8 mo ago   (5/6/21)   VALID INTERNAL CONTROL POC  Yes  Yes  Yes  Yes  Yes  Yes  Yes    Prothrombin time (POC)                 INR POC  1.2  3.2  2.5  2.2  2.5  2.4  2.1

## 2022-01-07 NOTE — HOME HEALTH
SUBJECTIVE: Pt reports that he is not standing with his caregivers when theapy is not present   REQUIRES CAREGIVER ASSISTANCE FOR: tranportation, medication, ADLS, IADLS   MEDICATIONS REVIEWED AND UPDATED: no changes   NEXT MD APPT: Dr. Esvin Anne 3/3/22  ROM: B LE WFL  STRENGTH: B hip flexors 4/5 B quad and hamstrings 5/5   WOUNDS: None   BED MOBILITY:supine to sit and sit to supine MOD I. Pt was able to scoot to the EOB I with increased time needed to complete task  TRANSFERS: sit to stand from bed x 2 with CGA with 1 vc needed for handplacment. Pt refused to prefom standpiviot transfer from bed to wc and back this morning. GAIT: Pt refused to amb with therapist today and has refused to amb on previous LTPA visits  BALANCE: Pt was able to maintain a static sitting posture on the EOB with B UE support for 5 min. Static standing x 2 for 15 sec each time with SBA/ CGA with RW  then pt reports that he is faituged and needs to sit back down. Min vc needed for encouragement to partipcate in task. Tinetti balance Assessment not completed as pt refused to amb . O2 sat was 98 on 3 Lo2 by nasal canula  Hr was 89  PATIENT RESPONE TO TX: Pt had no increase in pain throughout tx session and O2 and vital signs remained stable on 3Lo2 by nasal canula  PATIENT LEVEL OF UNDERSTANDING OF EDUCATION PROVIDED  Pt and caregiver verbalized understanding of changing pt positon kavon HR for pressure relief  PATIENT EDUCATION PROVIDED THIS VISIT: safety, HEP, walking, deep breathing, Reinforced with pt and caregiver the only way that pt transfers and standing balance are going to improve is if they cont to work on them on there one. Adarsh Angel has been educated and demonstrates proper guarding technique on transfers      HEP consisting of:  Issue new handout with additional supine exercises. Supine HEP x 15. Have pt and caregiver demonstrate exercises together. They both require assistance to perform HEP.   APs, Heel slide with MOD A to keep LE from EXT rotating. Hip ABD-tactile and VCs needed, Knee to chest, pelvis ROT with knees bent, crunches   Written HEP issued, patient/caregiver verbalized understanding. Patient is s/p Afib, HTN  and has been treated for ROM, strengthening, gait training, stair training, HEP training, safety training, and balance training. On SOC strength  was R hip flexors -4/5 R quads and hamstrings 4/5  L hip flexors, quads and hamstrings 4/5 . Today at [place] strength  isB hip flexors 4/5 B quad and hamstrings 5/5 . Pt did make progress but did not met goal of +4/5 B hip flexors. On [facility] bed mobility was sit to supine with SBA increased time needed to complete task. supine to sit with MN A for upper body management with MOD vc needed for technqiue and sequencing . Today at [place] bed mobility is supine to sit and sit to supine MOD I. Pt was able to scoot to the EOB I with increased time needed to complete task. On [facility] transfers were sit to stand from bed x 2 with B UE support with MOD vc needed for technique and sequencing  stand piviot transfer for wc to bed with MIN A . Pt tends to place walker straight infront of bed and does a full [place] to come to sitting. Recommeded to pt and caregiver to place wc at an angle on the side of bed that way pt does not need to turn as far . Today at [place] transfers are  sit to stand from bed x 2 with CGA with 1 vc needed for handplacment. Pt refused to prefom standpiviot transfer from bed to wc and back this morning. Pt did make some progress but did not met goal of SBA for transfers  On SOC gait was pt amb 5-8 steps with RW with MOD A with mod vc needed for technique and sequencing. On 3 Lo2 by nasal canula pt then became very nervous and fearfull and tired to sit premautrely. Pt when amb has a fwd flexed posture with wide BRAULIO shuffling gait pattern B LE did not clear the floor with amb. O2 sat affter amb was 99% with HR of 89.  Today at [place] gait is Pt refused to amb with therapist today and has refused to amb on previous LTPA visits. On SOC Pt was able to maintain a static standing balance with MIN/ CGA A wih RW with MOD vc needed for erect posture for 45 sec. pt then became nervous and needed to sit back down O2 sat on 3 LO2 by nasal canula was 99% 87 Hr . Pt was able to maintain a static sitting posture on the EOB with B UE support for 5 min. Static standing x 2 for 15 sec each time with SBA/ CGA  with RW then pt reports that he is faituged and needs to sit back down. Min vc needed for encouragement to partipcate in task. Tinetti balance Assessment not completed as pt refused to amb. Pt made some progress but did not met goal of static standing for 1-3 min with RW  Pt has made limited progress in therapy, as pt tends to be self limiting in his partipation level and is not working on standing with caregivers when therapist are not around . Discharge plan: Discharge from [address] as pt has reached MAX potenital for functional gains at this time as pt tends to self limiting in particpation .   Dr. Salvatore Cornejo office notifed of DC from [address] with Max benefit achieved

## 2022-01-07 NOTE — Clinical Note
Patient is s/p Afib, HTN  and has been treated for ROM, strengthening, gait training, stair training, HEP training, safety training, and balance training. On SOC strength  was R hip flexors -4/5 R quads and hamstrings 4/5  L hip flexors, quads and hamstrings 4/5 . Today at [place] strength  isB hip flexors 4/5 B quad and hamstrings 5/5 . Pt did make progress but did not met goal of +4/5 B hip flexors. On [facility] bed mobility was sit to supine with SBA increased time needed to complete task. supine to sit with MN A for upper body management with MOD vc needed for technqiue and sequencing . Today at [place] bed mobility is supine to sit and sit to supine MOD I. Pt was able to scoot to the EOB I with increased time needed to complete task. On [facility] transfers were sit to stand from bed x 2 with B UE support with MOD vc needed for technique and sequencing  stand piviot transfer for wc to bed with MIN A . Pt tends to place walker straight infront of bed and does a full [place] to come to sitting. Recommeded to pt and caregiver to place wc at an angle on the side of bed that way pt does not need to turn as far . Today at [place] transfers are  sit to stand from bed x 2 with CGA with 1 vc needed for handplacment. Pt refused to prefom standpiviot transfer from bed to wc and back this morning. Pt did make some progress but did not met goal of SBA for transfers  On [facility] gait was pt amb 5-8 steps with RW with MOD A with mod vc needed for technique and sequencing. On 3 Lo2 by nasal canula pt then became very nervous and fearfull and tired to sit premautrely. Pt when amb has a fwd flexed posture with wide BRAULIO shuffling gait pattern B LE did not clear the floor with amb. O2 sat affter amb was 99% with HR of 89. Today at [place] gait is Pt refused to amb with therapist today and has refused to amb on previous LTPA visits.  On SOC Pt was able to maintain a static standing balance with MIN/ CGA A wih RW with MOD vc needed for erect posture for 45 sec. pt then became nervous and needed to sit back down O2 sat on 3 LO2 by nasal canula was 99% 87 Hr . Pt was able to maintain a static sitting posture on the EOB with B UE support for 5 min. Static standing x 2 for 15 sec each time with SBA/ CGA  with RW then pt reports that he is faituged and needs to sit back down. Min vc needed for encouragement to partipcate in task. Tinetti balance Assessment not completed as pt refused to amb. Pt made some progress but did not met goal of static standing for 1-3 min with RW  Pt has made limited progress in therapy, as pt tends to be self limiting in his partipation level and is not working on standing with caregivers when therapist are not around . Discharge plan: Discharge from [address] as pt has reached MAX potenital for functional gains at this time as pt tends to self limiting in particpation .   Dr. Francisco J Clark office notifed of DC from [address] with Max benefit achieved

## 2022-01-07 NOTE — Clinical Note
thank you   ----- Message -----  From: Swetaellen Decatur, PT  Sent: 1/7/2022  12:24 PM EST  To: Genesis English OTR/JAMESON      Patient is s/p Afib, HTN  and has been treated for ROM, strengthening, gait training, stair training, HEP training, safety training, and balance training. On [facility] strength  was R hip flexors -4/5 R quads and hamstrings 4/5  L hip flexors, quads and hamstrings 4/5 . Today at [place] strength  isB hip flexors 4/5 B quad and hamstrings 5/5 . Pt did make progress but did not met goal of +4/5 B hip flexors. On [facility] bed mobility was sit to supine with SBA increased time needed to complete task. supine to sit with MN A for upper body management with MOD vc needed for technqiue and sequencing . Today at [place] bed mobility is supine to sit and sit to supine MOD I. Pt was able to scoot to the EOB I with increased time needed to complete task. On [facility] transfers were sit to stand from bed x 2 with B UE support with MOD vc needed for technique and sequencing  stand piviot transfer for wc to bed with MIN A . Pt tends to place walker straight infront of bed and does a full [place] to come to sitting. Recommeded to pt and caregiver to place wc at an angle on the side of bed that way pt does not need to turn as far . Today at [place] transfers are  sit to stand from bed x 2 with CGA with 1 vc needed for handplacment. Pt refused to prefom standpiviot transfer from bed to wc and back this morning. Pt did make some progress but did not met goal of SBA for transfers  On [facility] gait was pt amb 5-8 steps with RW with MOD A with mod vc needed for technique and sequencing. On 3 Lo2 by nasal canula pt then became very nervous and fearfull and tired to sit premautrely. Pt when amb has a fwd flexed posture with wide BRAULIO shuffling gait pattern B LE did not clear the floor with amb. O2 sat affter amb was 99% with HR of 89. Today at [place] gait is Pt refused to amb with therapist today and has refused to amb on previous LTPA visits.  On [facility] Pt was able to maintain a static standing balance with MIN/ CGA A wih RW with MOD vc needed for erect posture for 45 sec. pt then became nervous and needed to sit back down O2 sat on 3 LO2 by nasal canula was 99% 87 Hr . Pt was able to maintain a static sitting posture on the EOB with B UE support for 5 min. Static standing x 2 for 15 sec each time with SBA/ CGA  with RW then pt reports that he is faituged and needs to sit back down. Min vc needed for encouragement to partipcate in task. Tinetti balance Assessment not completed as pt refused to amb. Pt made some progress but did not met goal of static standing for 1-3 min with RW  Pt has made limited progress in therapy, as pt tends to be self limiting in his partipation level and is not working on standing with caregivers when therapist are not around . Discharge plan: Discharge from [address] as pt has reached MAX potenital for functional gains at this time as pt tends to self limiting in particpation .   Dr. Raya Frederick office notifed of DC from [address] with Max benefit achieved

## 2022-01-10 NOTE — HOME HEALTH
SUBJECTIVE: Pt up in transport chair upon arrival, pt reported he has not been compliant with HEP but was able to participate in performance using worksheet when instructed. Pt has been self-limiting and therapist educated that after discharge the responsibility is on him to carry over with his family to improve so that therapy can come back in the future and take him to the next step, pt verbalized understanding but CG does not seem confident pt will follow through based on how well she knows him: \"Him loves to lay in the bed and he will continue to. \"  CAREGIVER ASSISTANCE NEEDED FOR: pts daughter-in-law/CG present during visit for final education on pt mobility and self-care with CG providing input on how she has been carrying over and assisting pt while pushing him to do more. MEDICATIONS REVIEWED AND UPDATED: no medication changes reported this visit  . OBJECTIVE: see interventions  PATIENT RESPONSE TO TREATMENT:  Pt demonstrated positive response to treatment with no increased report of pain and ability to participate fully with instructed tasks. Zina Hare PATIENT/CAREGIVER EDUCATION PROVIDED THIS VISIT: Therapist educated pt and CG on additional core strengthening exercises, encouraged pt to gradually add to his time sitting unsupported and to work on static stance at EOB with CG to help with WB and remain safe. PATIENT LEVEL OF UNDERSTANDING OF EDUCATION PROVIDED: Pt and CG verbalized understanding, pt demonstrated teachback of core exercises after education and showed improved stability during dynamic reaching. ASSESSMENT AND PROGRESS TOWARD GOALS:  Pt has demonstrated fair progress towards goals and is self-limiting with activity level and cooperation with CG who tries to enforce carry over of education. PLAN: next visit will be for OTDC as pt has reached max potential with home care and is self-limiting with progression and carry over.     DISCHARGE PLANNING DISCUSSED: Discharge to self and family under MD supervision once all goals have been met or patient has reached maximum potential.  Frequency remaininw1 remaining with pt and CGs aware of OTDC next visit.

## 2022-01-11 NOTE — TELEPHONE ENCOUNTER
Patient's daughter is calling in for a refill on the Warfarin 5mg tabs    Requested Prescriptions     Pending Prescriptions Disp Refills    warfarin (COUMADIN) 5 mg tablet 90 Tablet 1     Sig: Take 1 Tablet by mouth every evening.  Except Thursday

## 2022-01-13 NOTE — Clinical Note
pt s/p Paroxysmal atrial fibrillation seen for OT 12.20.21 through 1.13.22, total visits of 6 completed, for ADL training, balance training, transfer training, AE/AD training, energy conservation training, functional strengthening/UB HEP training, and pt/caregiver education to improve functional level and quality of life. pt requires setup for UB/mod A LB bathing via sponge bath sitting EOB with good safety and technique. pt requires setup A UB, max A LB dressing sitting EOB with good safety/technique. pt requires max A for toileting task including clothing management and hygiene as well as setup A grooming tasks sitting EOB. Goals Met. pt and caregiver instructed importance pt performing tasks per self to maintain improved functional level as opposed to caregiver performing tasks for pt. pt/caregiver verbalized good understanding. pt static sitting good-/fair+ and dynamic sitting balance fair+ while performing all ADL tasks and transfers. Goal Met. pt MOD I for rolling R and L use bedrails, S supine to/from sit EOB and S scooting EOB. pt requires min A for SPT EOB to/from WC transfers. Goals Met. pt performs ADL tasks sitting EOB without rest x 50+ minutes with 4/10 Modified Frances level reported. Goal Met. pt caregiver able to verbalize and demonstrate I ECT for bathing, dressing, and self-setup. pt able to complete HEP with no rest breaks. Goal Met. pt demonstrates I UB HEP to maintain functional strength and endurance. Goal Met. pt R gross grasp 4-/5 MMT, L gross grasp 4/5 MMT, and RUE 4/5 MMT grossly (R gross grasp 3+/5 MMT, L gross grasp 4-/5 MMT, RUE 4-/5 MMT SOC). Goal Met. pt met all goals. D/C OT due to goals met. pt and caregiver agree D/C. D/C pt to care of Dr Salvatore Cornejo. .  D/C [facility] 1.13.22.

## 2022-01-14 NOTE — HOME HEALTH
SUBJECTIVE: Pt stated \"I think I'm doing better. I try to sit up as long as I can. \" pt sitting sitting EOB upon OT arrival. pt agreed tx. Maritza Dobson CAREGIVER INVOLVEMENT/ASSISTANCE NEEDED FOR: pt daughter mary provides A ADL tasks and transfers PRN, daughter mary and family perform IADL tasks, shopping, transportation, and manage medications. Maritzaned Dobson MEDICATIONS: OT reconciled medications with no changes. Maritza Olya HOME HEALTH SUPPLIES BY TYPE AND QUANTITY ORDERED/DELIVERED THIS VISIT INCLUDE: NA  .  OBJECTIVE: See Interventions. Maritza Dobson PATIENT RESONSE TO TREATMENT: pt reports no increased pain or discomfort while performing activity throughout tx. Maritza Dobson PATIENT LEVEL OF UNDERSTANDING OF EDUCATION PROVIDED: pt verbalized good understanding to continue UB HEP, energy conservation, diet and medications as instructed per MD, consult MD or urgent care for medical assistance as opposed to ER unless situation emergent. Maritza Dobson PROGRESS TOWARD GOALS/ASSESSMENT: pt s/p Paroxysmal atrial fibrillation seen for OT 12.20.21 through 1.13.22, total visits of 6 completed, for ADL training, balance training, transfer training, AE/AD training, energy conservation training, functional strengthening/UB HEP training, and pt/caregiver education to improve functional level and quality of life. pt requires setup for UB/mod A LB bathing via sponge bath sitting EOB with good safety and technique. pt requires setup A UB, max A LB dressing sitting EOB with good safety/technique. pt requires max A for toileting task including clothing management and hygiene as well as setup A grooming tasks sitting EOB. Goals Met. pt and caregiver instructed importance pt performing tasks per self to maintain improved functional level as opposed to caregiver performing tasks for pt. pt/caregiver verbalized good understanding. pt static sitting good-/fair+ and dynamic sitting balance fair+ while performing all ADL tasks and transfers. Goal Met.    pt MOD I for rolling R and L use bedrails, S supine to/from sit EOB and S scooting EOB. pt requires min A for SPT EOB to/from WC transfers. Goals Met. pt performs ADL tasks sitting EOB without rest x 50+ minutes with 4/10 Modified Frances level reported. Goal Met. pt caregiver able to verbalize and demonstrate I ECT for bathing, dressing, and self-setup. pt able to complete HEP with no rest breaks. Goal Met. pt demonstrates I UB HEP to maintain functional strength and endurance. Goal Met. pt R gross grasp 4-/5 MMT, L gross grasp 4/5 MMT, and RUE 4/5 MMT grossly (R gross grasp 3+/5 MMT, L gross grasp 4-/5 MMT, RUE 4-/5 MMT SOC). Goal Met. pt met all goals. D/C OT due to goals met. pt and caregiver agree D/C. D/C pt to care of Dr Martin Sanders. .  CONTINUED NEED FOR THE FOLLOWING SKILLS: D/C HHC and OT. .  THE FOLLOWING DISCHARGE PLANNING WAS DISCUSSED WITH THE PT/CAREGIVER: D/C OT due to goals met. pt and caregiver agree D/C. D/C pt to care of Dr Martin Sanders.

## 2022-01-17 NOTE — TELEPHONE ENCOUNTER
Call received from [facility]. States they received an order for labs today. States patient is no longer open to [facility] so they can not do the labs.

## 2022-01-21 NOTE — TELEPHONE ENCOUNTER
Called pt and left message. Call back number left and I myself or one of the other nurses will attempt to contact again. The call was to inform pt a follow up appt for Protime is needed.

## 2022-01-25 NOTE — TELEPHONE ENCOUNTER
Spoke with pt in regards to Pro time appt and medication compliance. Pt's daughter is aware of the two upcoming appts and verbalizes understanding. Pt's daughter denies any missed doses of warfarin and states the pt is taking all medications. Pt's daughter voices no concerns at this time.

## 2022-02-10 NOTE — PROGRESS NOTES
Patient here today for INR  No change in diet and no change with medication  Patient is taking 3 mg daily. Patient INR 1.3  Per Dr Glenys Castle take 4 mg daily and return in two weeks to recheck.

## 2022-03-08 NOTE — PROGRESS NOTES
ROOM # 14  Identified pt with two pt identifiers(name and ). Reviewed record in preparation for visit and have obtained necessary documentation. Chief Complaint   Patient presents with    Annual Wellness Visit      Cleopatra Lopez preferred language for health care discussion is english/other. Is the patient using any DME equipment during OV? YES wheelchair    Cleopatra Lopez is due for:  Health Maintenance Due   Topic    Foot Exam Q1     Shingrix Vaccine Age 50> (2 of 2)    Eye Exam Retinal or Dilated     MICROALBUMIN Q1     Medicare Yearly Exam     A1C test (Diabetic or Prediabetic)      Health Maintenance reviewed and discussed per provider  Please order/place referral if appropriate. 1. For patients aged 39-70: Has the patient had a colonoscopy? No   If the patient is female:    2. For patients aged 41-77: Has the patient had a mammogram within the past 2 years? NA - based on age    1. For patients aged 21-65: Has the patient had a pap smear? NA - based on age  Advance Directive:  1. Do you have an advance directive in place? Patient Reply: NO    2. If not, would you like material regarding how to put one in place? NO    Coordination of Care:  1. Have you been to the ER, urgent care clinic since your last visit? Hospitalized since your last visit? NO    2. Have you seen or consulted any other health care providers outside of the [address] [place] [address] since your last visit? Include any pap smears or colon screening. YES urology    Patient is accompanied by wife I have received verbal consent from Cleopatra Lopez to discuss any/all medical information while they are present in the room.     Learning Assessment:  Learning Assessment 7/3/2018 2014   PRIMARY LEARNER Patient Patient   HIGHEST LEVEL OF EDUCATION - PRIMARY LEARNER  SOME COLLEGE SOME COLLEGE   BARRIERS PRIMARY LEARNER NONE NONE   CO-LEARNER CAREGIVER No -   PRIMARY LANGUAGE ENGLISH ENGLISH   LEARNER PREFERENCE PRIMARY READING LISTENING DEMONSTRATION -   ANSWERED BY patient self   RELATIONSHIP SELF SELF     Depression Screening:  3 most recent [facility] Screens 3/8/2022 12/9/2021 10/26/2021 9/30/2021 9/9/2021 8/10/2021 5/6/2021   Little interest or pleasure in doing things Not at all Not at all Not at all Not at all Not at all Not at all Not at all   Feeling down, depressed, irritable, or hopeless Not at all Not at all Not at all Not at all Not at all Not at all Not at all   Total Score PHQ 2 0 0 0 0 0 0 0     Abuse Screening:  Abuse Screening Questionnaire 3/8/2022 2/19/2019 7/3/2018 8/10/2017 5/12/2015   Do you ever feel afraid of your partner? N N N N N   Are you in a relationship with someone who physically or mentally threatens you? N N N N N   Is it safe for you to go home? Maylin BOWDEN     Fall Risk  Fall Risk Assessment, last 12 mths 3/8/2022 12/9/2021 10/26/2021 9/30/2021 9/9/2021 8/10/2021 5/6/2021   Able to walk? No No Yes No Yes No Yes   Fall in past 12 months? - - 0 - 1 - 0   Do you feel unsteady? - - - - 1 - -   Are you worried about falling - - - - 1 - -   Number of falls in past 12 months - - - - 1 - 2   Fall with injury? - - - - 0 - 0     Recent Travel Screening and Travel History documentation     Travel Screening     Question   Response    In the last month, have you been in contact with someone who was confirmed or suspected to have Coronavirus / COVID-19? No / Unsure    Have you had a COVID-19 viral test in the last 14 days? No    Do you have any of the following new or worsening symptoms? None of these    Have you traveled internationally or domestically in the last month?   No      Travel History   Travel since 02/08/22    No documented travel since 02/08/22

## 2022-03-08 NOTE — PROGRESS NOTES
Progress Note    Patient: Ezra Munoz               Sex: male                  YOB: 1940      Age:  79 y.o.                    HPI:     Ezra Munoz is a 79 y.o. male who has been seen for protime, anticoagulant management.  He also needs an AWV     Past Medical History:   Diagnosis Date    Advance directive discussed with patient     Benign localized prostatic hyperplasia with lower urinary tract symptoms (LUTS)     COPD with emphysema ([address]     CVA (cerebrovascular accident) ([address] 7/11/2012    possible    Diabetes mellitus ([address]     Drowsiness     Enlarged prostate     Essential hypertension, benign     Eye exam, routine 07/26/2016    Dr. Lizzette Fitzgerald repeat in 1 yr    H/O colonoscopy 11/24/15    Dr. Hamilton Ryan ([facility])Tubular adenoma/polyp bx, showed diverticulosis in the sigmoid/descending colon and internal hemorrhoids, 7mm polyp    History of urinary retention     Hoarseness     Hypercholesterolemia     Microhematuria     Other and unspecified hyperlipidemia     Paroxysmal atrial fibrillation ([address] 10/2/2015    Pneumonia     Pulmonary embolus (HCC)     Renal cyst, right     Saddle embolus of pulmonary artery without acute cor pulmonale ([address] 10/2015    Scrotal abscess     Shortness of breath     Tobacco use disorder 8/10/2010    Type II or unspecified type diabetes mellitus without mention of complication, not stated as uncontrolled     Urinary retention        Past Surgical History:   Procedure Laterality Date    HX CATARACT REMOVAL Left 4/2015    by Dr. Chana Rosales  late [id number]       Family History   Problem Relation Age of Onset    Cancer Father         he does not know       Social History     Socioeconomic History    Marital status:    Tobacco Use    Smoking status: Former Smoker     Packs/day: 1.00     Years: 45.00     Pack years: 45.00     Types: Cigarettes     Quit date: 8/19/2015     Years since quitting: 6. 5    Smokeless tobacco: Never Used    Tobacco comment: Pt counseled to continue to not smoke. Vaping Use    Vaping Use: Never used   Substance and Sexual Activity    Alcohol use: No     Alcohol/week: 0.0 standard drinks    Drug use: No    Sexual activity: Not Currently         Current Outpatient Medications:     warfarin (COUMADIN) 4 mg tablet, Take 1 Tablet by mouth daily. , Disp: 30 Tablet, Rfl: 3    tamsulosin (FLOMAX) 0.4 mg capsule, Take 2 Capsules by mouth daily. , Disp: 180 Capsule, Rfl: 3    finasteride (PROSCAR) 5 mg tablet, Take 1 Tablet by mouth daily. , Disp: 90 Tablet, Rfl: 1    multivitamin (ONE A DAY) tablet, Take 1 Tablet by mouth daily. , Disp: , Rfl:     atorvastatin (Lipitor) 40 mg tablet, Take 1 Tablet by mouth daily. , Disp: 90 Tablet, Rfl: 1    metoprolol tartrate (LOPRESSOR) 25 mg tablet, Take 0.5 Tablets by mouth two (2) times a day., Disp: 90 Tablet, Rfl: 1    clopidogreL (PLAVIX) 75 mg tab, Take 1 Tablet by mouth daily. , Disp: 90 Tablet, Rfl: 5    lisinopriL (PRINIVIL, ZESTRIL) 30 mg tablet, Take 1 Tab by mouth daily. , Disp: 90 Tab, Rfl: 3    ascorbic acid, vitamin C, (Vitamin C) 500 mg tablet, Take 1 Tab by mouth daily. , Disp: 90 Tab, Rfl: 1    fluticasone furoate-vilanteroL (BREO ELLIPTA) 100-25 mcg/dose inhaler, Take 1 Puff by inhalation daily. , Disp: 3 Inhaler, Rfl: 1    albuterol (PROVENTIL HFA, VENTOLIN HFA, PROAIR HFA) 90 mcg/actuation inhaler, Take 2 Puffs by inhalation every six (6) hours as needed for Wheezing., Disp: 3 Inhaler, Rfl: 1    OXYGEN-AIR DELIVERY SYSTEMS, 3 L by Nasal route continuous. , Disp: , Rfl:     glucose blood VI test strips (Ascensia CONTOUR) strip, Use bid (Patient not taking: Reported on 3/8/2022), Disp: 100 Strip, Rfl: 5    lisinopriL (PRINIVIL, ZESTRIL) 10 mg tablet, Take 1 Tablet by mouth daily.  (Patient not taking: Reported on 12/9/2021), Disp: 60 Tablet, Rfl: 5    pioglitazone (Actos) 30 mg tablet, Take  by mouth., Disp: , Rfl:    potassium chloride SR (K-TAB) 20 mEq tablet, Take  by mouth., Disp: , Rfl:     senna (Senna) 8.6 mg tablet, Take 1 Tab by mouth daily. , Disp: 90 Tab, Rfl: 1    senna (Senna) 8.6 mg tablet, Take 1 Tab by mouth daily. , Disp: 90 Tab, Rfl: 3    bisacodyL (DULCOLAX) 10 mg supp, Insert 10 mg into rectum. (Patient not taking: Reported on 12/9/2021), Disp: , Rfl:     acetaminophen (TYLENOL) 325 mg tablet, Take 325 mg by mouth every four (4) hours as needed for Pain. (Patient not taking: Reported on 3/8/2022), Disp: , Rfl:     dextrose 5% solution, by IntraVENous route continuous. (Patient not taking: Reported on 12/9/2021), Disp: , Rfl:     glucagon (GlucaGen HypoKit) 1 mg injection, 1 mg by IntraVENous route once. (Patient not taking: Reported on 12/9/2021), Disp: , Rfl:     omeprazole (PRILOSEC) 40 mg capsule, Take 1 Cap by mouth daily. (Patient not taking: Reported on 12/9/2021), Disp: 90 Cap, Rfl: 1    tiotropium (Spiriva with HandiHaler) 18 mcg inhalation capsule, Take 1 Cap by inhalation daily. , Disp: 90 Cap, Rfl: 1    nystatin (MYCOSTATIN) topical cream, Apply to bilateral groin, after washing area with water and soap and dry well., Disp: 30 g, Rfl: 0    Lancets misc, Check fasting sugars daily before breakfast. DX: E11.9 for freestyle lite meter (Patient not taking: Reported on 3/8/2022), Disp: 100 Each, Rfl: 11    Nebulizer & Compressor machine, 1 Each., Disp: , Rfl:     glucose blood VI test strips (FREESTYLE LITE STRIPS) strip, Check fasting sugars daily before breakfast. DX: E11.9 for freestyle lite meter (Patient not taking: Reported on 12/9/2021), Disp: 100 Strip, Rfl: 11    Blood-Glucose Meter (ONE TOUCH ULTRA 2) monitoring kit, Check fasting sugars daily before breakfast. DX: 250.02 (Patient not taking: Reported on 12/9/2021), Disp: 1 Kit, Rfl: 0     No Known Allergies    Review of Systems   Constitutional: Negative for chills and fever. Cardiovascular: Negative for chest pain. Gastrointestinal: Negative for nausea and vomiting. Genitourinary: Negative. Neurological: Negative for dizziness and loss of consciousness. Physical Exam:      Visit Vitals  /81 (BP 1 Location: Left upper arm, BP Patient Position: Sitting, BP Cuff Size: Adult)   Pulse 75   Temp 96.9 °F (36.1 °C) (Temporal)   Resp 18   Ht 5' 7\" (1.702 m)   SpO2 90%   BMI 23.49 kg/m²       Physical Exam  HENT:      Head: Normocephalic and atraumatic. Cardiovascular:      Pulses:           Posterior tibial pulses are 1+ on the right side and 1+ on the left side. Heart sounds: No murmur heard. No friction rub. No gallop. Feet:      Right foot:      Skin integrity: Dry skin present. No warmth. Left foot:      Skin integrity: Dry skin present. No warmth. Skin:     General: Skin is warm. Labs Reviewed:  protime is 1.9    Assessment/Plan       ICD-10-CM ICD-9-CM    1. History of pulmonary embolism  Z86.711 V12.55 AMB POC PT/INR   2. Anticoagulation management encounter  Z51.81 V58.83 AMB POC PT/INR    Z79.01 V58.61    3. Paroxysmal atrial fibrillation (HCC)  I48.0 427.31    4. Medicare annual wellness visit, subsequent  Z00.00 V70.0    advised take 8 mg this evening   Then 4 mg qd         Katelyn Lozano MD  This is the Subsequent Medicare Annual Wellness Exam, performed 12 months or more after the Initial AWV or the last Subsequent AWV    I have reviewed the patient's medical history in detail and updated the computerized patient record. Assessment/Plan   Education and counseling provided:  Are appropriate based on today's review and evaluation    1. History of pulmonary embolism  -     AMB POC PT/INR  2. Anticoagulation management encounter  -     AMB POC PT/INR  3.  Paroxysmal atrial fibrillation (HCC)       Depression Risk Factor Screening     3 most recent PHQ Screens 3/8/2022   Little interest or pleasure in doing things Not at all   Feeling down, depressed, irritable, or hopeless Not at all   Total Score PHQ 2 0       Alcohol & Drug Abuse Risk Screen    Do you average more than 1 drink per night or more than 7 drinks a week: No    In the past three months have you have had more than 4 drinks containing alcohol on one occasion: No          Functional Ability and Level of Safety    Hearing: poor      Activities of Daily Living: The home contains: no safety equipment. Patient needs help with:  preparing meals, laundry, dressing and bathing      Ambulation: wheelchair bound     Fall Risk:  Fall Risk Assessment, last 12 mths 3/8/2022   Able to walk? No   Fall in past 12 months? -   Do you feel unsteady? -   Are you worried about falling -   Number of falls in past 12 months -   Fall with injury?  -      Abuse Screen:  Patient is not abused       Cognitive Screening    Has your family/caregiver stated any concerns about your memory: yes - dementia     Cognitive Screening: Abnormal - Mini Cog Test    Health Maintenance Due     Health Maintenance Due   Topic Date Due    Foot Exam Q1  08/10/2018    Shingrix Vaccine Age 50> (2 of 2) 12/17/2018    Eye Exam Retinal or Dilated  07/25/2019    MICROALBUMIN Q1  10/04/2019    Medicare Yearly Exam  10/05/2019    A1C test (Diabetic or Prediabetic)  02/10/2022       Patient Care Team   Patient Care Team:  Mayra Diego MD as PCP - General (Internal Medicine)  Mayra Diego MD as PCP - [address] [place] [address] Provider  Veronica Michael MD (Ophthalmology)  Kaitlin Avendano MD (Pulmonary Disease)    History     Patient Active Problem List   Diagnosis Code    Type II or unspecified type diabetes mellitus without mention of complication, not stated as uncontrolled E11.9    Essential hypertension I10    Tobacco use disorder F17.200    Dizziness and giddiness R42    CVA (cerebrovascular accident) ([address] I63.9    Saddle embolus of pulmonary artery without acute cor pulmonale ([address] I26.92    H/O colonoscopy Z98.890    COPD (chronic obstructive pulmonary disease) ([address] J44.9    Microhematuria R31.29    History of urinary retention Z87.898    Renal cyst, right N28.1    Enlarged prostate N40.0    Advance directive discussed with patient Z70.80    Eye exam, routine Z01.00    Abnormal CT scan of lung R91.8    Acute respiratory distress R06.03    Acute respiratory failure with hypercapnia (HCC) J96.02    Acute urinary retention R33.8    Dyslipidemia E78.5    H/O: pneumonia Z87.01    HCAP (healthcare-associated pneumonia) J18.9    History of pulmonary embolism Z86.711    Hypernatremia E87.0    Inability to walk R26.2    Leukocytosis D72.829    Light headed R42    SHERRIE (obstructive sleep apnea) G47.33    Paroxysmal atrial fibrillation (HCC) I48.0    Pedal edema R60.0    Pleural effusion J90    Pneumonia, organism unspecified(486) J18.9    Septic shock (HCC) A41.9, R65.21    Shortness of breath R06.02    Thrombocytopenia (HCC) D69.6    Type 2 diabetes mellitus without complication, without long-term current use of insulin (HCC) E11.9    Chronic anemia D64.9    Acute diastolic heart failure (HCC) I50.31    Acute exacerbation of CHF (congestive heart failure) [facility] I50.9    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 Raheel Massey repeat in 1 yr    H/O colonoscopy 11/24/15    Dr. Haile Solis ([facility])Tubular adenoma/polyp bx, showed diverticulosis in the sigmoid/descending colon and internal hemorrhoids, 7mm polyp    History of urinary retention     Hoarseness     Hypercholesterolemia     Microhematuria     Other and unspecified hyperlipidemia     Paroxysmal atrial fibrillation (HCC) 10/2/2015    Pneumonia     Pulmonary embolus (HCC)     Renal cyst, right     Saddle embolus of pulmonary artery without acute cor pulmonale (HCC) 10/2015    Scrotal abscess     Shortness of breath     Tobacco use disorder 8/10/2010    Type II or unspecified type diabetes mellitus without mention of complication, not stated as uncontrolled     Urinary retention       Past Surgical History:   Procedure Laterality Date    HX CATARACT REMOVAL Left 4/2015    by Dr. Yasmin Williamson  late [id number]     Current Outpatient Medications   Medication Sig Dispense Refill    warfarin (COUMADIN) 4 mg tablet Take 1 Tablet by mouth daily. 30 Tablet 3    tamsulosin (FLOMAX) 0.4 mg capsule Take 2 Capsules by mouth daily. 180 Capsule 3    finasteride (PROSCAR) 5 mg tablet Take 1 Tablet by mouth daily. 90 Tablet 1    multivitamin (ONE A DAY) tablet Take 1 Tablet by mouth daily.  atorvastatin (Lipitor) 40 mg tablet Take 1 Tablet by mouth daily. 90 Tablet 1    metoprolol tartrate (LOPRESSOR) 25 mg tablet Take 0.5 Tablets by mouth two (2) times a day. 90 Tablet 1    clopidogreL (PLAVIX) 75 mg tab Take 1 Tablet by mouth daily. 90 Tablet 5    lisinopriL (PRINIVIL, ZESTRIL) 30 mg tablet Take 1 Tab by mouth daily. 90 Tab 3    ascorbic acid, vitamin C, (Vitamin C) 500 mg tablet Take 1 Tab by mouth daily. 90 Tab 1    fluticasone furoate-vilanteroL (BREO ELLIPTA) 100-25 mcg/dose inhaler Take 1 Puff by inhalation daily. 3 Inhaler 1    albuterol (PROVENTIL HFA, VENTOLIN HFA, PROAIR HFA) 90 mcg/actuation inhaler Take 2 Puffs by inhalation every six (6) hours as needed for Wheezing. 3 Inhaler 1    OXYGEN-AIR DELIVERY SYSTEMS 3 L by Nasal route continuous.       glucose blood VI test strips (Ascensia CONTOUR) strip Use bid (Patient not taking: Reported on 3/8/2022) 100 Strip 5    lisinopriL (PRINIVIL, ZESTRIL) 10 mg tablet Take 1 Tablet by mouth daily. (Patient not taking: Reported on 12/9/2021) 60 Tablet 5    pioglitazone (Actos) 30 mg tablet Take  by mouth.  potassium chloride SR (K-TAB) 20 mEq tablet Take  by mouth.  senna (Senna) 8.6 mg tablet Take 1 Tab by mouth daily. 90 Tab 1    senna (Senna) 8.6 mg tablet Take 1 Tab by mouth daily. 90 Tab 3    bisacodyL (DULCOLAX) 10 mg supp Insert 10 mg into rectum. (Patient not taking: Reported on 12/9/2021)      acetaminophen (TYLENOL) 325 mg tablet Take 325 mg by mouth every four (4) hours as needed for Pain. (Patient not taking: Reported on 3/8/2022)      dextrose 5% solution by IntraVENous route continuous. (Patient not taking: Reported on 12/9/2021)      glucagon (GlucaGen HypoKit) 1 mg injection 1 mg by IntraVENous route once. (Patient not taking: Reported on 12/9/2021)      omeprazole (PRILOSEC) 40 mg capsule Take 1 Cap by mouth daily. (Patient not taking: Reported on 12/9/2021) 90 Cap 1    tiotropium (Spiriva with HandiHaler) 18 mcg inhalation capsule Take 1 Cap by inhalation daily. 90 Cap 1    nystatin (MYCOSTATIN) topical cream Apply to bilateral groin, after washing area with water and soap and dry well. 30 g 0    Lancets misc Check fasting sugars daily before breakfast. DX: E11.9 for freestyle lite meter (Patient not taking: Reported on 3/8/2022) 100 Each 11    Nebulizer & Compressor machine 1 Each.       glucose blood VI test strips (FREESTYLE LITE STRIPS) strip Check fasting sugars daily before breakfast. DX: E11.9 for freestyle lite meter (Patient not taking: Reported on 12/9/2021) 100 Strip 11    Blood-Glucose Meter (ONE TOUCH ULTRA 2) monitoring kit Check fasting sugars daily before breakfast. DX: 250.02 (Patient not taking: Reported on 12/9/2021) 1 Kit 0     No Known Allergies    Family History   Problem Relation Age of Onset    Cancer Father he does not know     Social History     Tobacco Use    Smoking status: Former Smoker     Packs/day: 1.00     Years: 45.00     Pack years: 45.00     Types: Cigarettes     Quit date: 2015     Years since quittin.5    Smokeless tobacco: Never Used    Tobacco comment: Pt counseled to continue to not smoke. Substance Use Topics    Alcohol use: No     Alcohol/week: 0.0 standard drinks         Manasa Barron MD   This is the Subsequent Medicare Annual Wellness Exam, performed 12 months or more after the Initial AWV or the last Subsequent AWV    I have reviewed the patient's medical history in detail and updated the computerized patient record. Assessment/Plan   Education and counseling provided:  Are appropriate based on today's review and evaluation    1. History of pulmonary embolism  -     AMB POC PT/INR  2. Anticoagulation management encounter  -     AMB POC PT/INR  3. Paroxysmal atrial fibrillation (HCC)  4. Medicare annual wellness visit, subsequent       Depression Risk Factor Screening:     3 most recent PHQ Screens 3/8/2022   Little interest or pleasure in doing things Not at all   Feeling down, depressed, irritable, or hopeless Not at all   Total Score PHQ 2 0       Alcohol & Drug Abuse Risk Screen    Do you average more than 1 drink per night or more than 7 drinks a week: No    In the past three months have you have had more than 4 drinks containing alcohol on one occasion: No          Functional Ability and Level of Safety:    Hearing: The patient needs further evaluation. Activities of Daily Living: The home contains: no safety equipment. Patient needs help with:  managing money, dressing and hygiene      Ambulation: wheelchair bound     Fall Risk:  Fall Risk Assessment, last 12 mths 3/8/2022   Able to walk? No   Fall in past 12 months? -   Do you feel unsteady? -   Are you worried about falling -   Number of falls in past 12 months -   Fall with injury?  -      Abuse Screen:  Patient is not abused       Cognitive Screening    Has your family/caregiver stated any concerns about your memory: yes -  has dementia    Cognitive Screening: Abnormal - Mini Cog Test    Health Maintenance Due     Health Maintenance Due   Topic Date Due    Foot Exam Q1  08/10/2018    Shingrix Vaccine Age 50> (2 of 2) 12/17/2018    Eye Exam Retinal or Dilated  07/25/2019    MICROALBUMIN Q1  10/04/2019    A1C test (Diabetic or Prediabetic)  02/10/2022       Patient Care Team   Patient Care Team:  Bárbara Richards MD as PCP - General (Internal Medicine)  Bárbara Richards MD as PCP - [facility]  Marie Chapman MD (Ophthalmology)  Katy Kline MD (Pulmonary Disease)    History     Patient Active Problem List   Diagnosis Code    Type II or unspecified type diabetes mellitus without mention of complication, not stated as uncontrolled E11.9    Essential hypertension I10    Tobacco use disorder F17.200    Dizziness and giddiness R42    CVA (cerebrovascular accident) ([address] I63.9    Saddle embolus of pulmonary artery without acute cor pulmonale ([address] I26.92    H/O colonoscopy Z98.890    COPD (chronic obstructive pulmonary disease) ([address] J44.9    Microhematuria R31.29    History of urinary retention Z87.898    Renal cyst, right N28.1    Enlarged prostate N40.0    Advance directive discussed with patient Z70.80    Eye exam, routine Z01.00    Abnormal CT scan of lung R91.8    Acute respiratory distress R06.03    Acute respiratory failure with hypercapnia ([address] J96.02    Acute urinary retention R33.8    Dyslipidemia E78.5    H/O: pneumonia Z87.01    HCAP (healthcare-associated pneumonia) J18.9    History of pulmonary embolism Z86.711    Hypernatremia E87.0    Inability to walk R26.2    Leukocytosis D72.829    Light headed R42    SHERRIE (obstructive sleep apnea) G47.33    Paroxysmal atrial fibrillation (HCC) I48.0    Pedal edema R60.0    Pleural effusion J90    Pneumonia, organism unspecified(486) J18.9    Septic shock ([facility]) A41.9, R65.21    Shortness of breath R06.02    Thrombocytopenia (HCC) D69.6    Type 2 diabetes mellitus without complication, without long-term current use of insulin (HCC) E11.9    Chronic anemia D64.9    Acute diastolic heart failure ([facility]) I50.31    Acute exacerbation of CHF (congestive heart failure) ([facility]) I50.9    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 Naomi Gellre repeat in 1 yr    H/O colonoscopy 11/24/15    Dr. Hillary Scott ([facility])Tubular adenoma/polyp bx, showed diverticulosis in the sigmoid/descending colon and internal hemorrhoids, 7mm polyp    History of urinary retention     Hoarseness     Hypercholesterolemia     Microhematuria     Other and unspecified hyperlipidemia     Paroxysmal atrial fibrillation ([address] 10/2/2015    Pneumonia     Pulmonary embolus ([address]     Renal cyst, right     Saddle embolus of pulmonary artery without acute cor pulmonale ([address] 10/2015    Scrotal abscess     Shortness of breath     Tobacco use disorder 8/10/2010    Type II or unspecified type diabetes mellitus without mention of complication, not stated as uncontrolled     Urinary retention       Past Surgical History:   Procedure Laterality Date    HX CATARACT REMOVAL Left 4/2015    by Dr. Vicky Epps  late [address]     Current Outpatient Medications   Medication Sig Dispense Refill    warfarin (COUMADIN) 4 mg tablet Take 1 Tablet by mouth daily. 30 Tablet 3    tamsulosin (FLOMAX) 0.4 mg capsule Take 2 Capsules by mouth daily. 180 Capsule 3    finasteride (PROSCAR) 5 mg tablet Take 1 Tablet by mouth daily. 90 Tablet 1    multivitamin (ONE A DAY) tablet Take 1 Tablet by mouth daily.  atorvastatin (Lipitor) 40 mg tablet Take 1 Tablet by mouth daily. 90 Tablet 1    metoprolol tartrate (LOPRESSOR) 25 mg tablet Take 0.5 Tablets by mouth two (2) times a day. 90 Tablet 1    clopidogreL (PLAVIX) 75 mg tab Take 1 Tablet by mouth daily. 90 Tablet 5    lisinopriL (PRINIVIL, ZESTRIL) 30 mg tablet Take 1 Tab by mouth daily. 90 Tab 3    ascorbic acid, vitamin C, (Vitamin C) 500 mg tablet Take 1 Tab by mouth daily. 90 Tab 1    fluticasone furoate-vilanteroL (BREO ELLIPTA) 100-25 mcg/dose inhaler Take 1 Puff by inhalation daily. 3 Inhaler 1    albuterol (PROVENTIL HFA, VENTOLIN HFA, PROAIR HFA) 90 mcg/actuation inhaler Take 2 Puffs by inhalation every six (6) hours as needed for Wheezing. 3 Inhaler 1    OXYGEN-AIR DELIVERY SYSTEMS 3 L by Nasal route continuous.  glucose blood VI test strips (Ascensia CONTOUR) strip Use bid (Patient not taking: Reported on 3/8/2022) 100 Strip 5    lisinopriL (PRINIVIL, ZESTRIL) 10 mg tablet Take 1 Tablet by mouth daily. (Patient not taking: Reported on 12/9/2021) 60 Tablet 5    pioglitazone (Actos) 30 mg tablet Take  by mouth.  potassium chloride SR (K-TAB) 20 mEq tablet Take  by mouth.  senna (Senna) 8.6 mg tablet Take 1 Tab by mouth daily. 90 Tab 1    senna (Senna) 8.6 mg tablet Take 1 Tab by mouth daily. 90 Tab 3    bisacodyL (DULCOLAX) 10 mg supp Insert 10 mg into rectum. (Patient not taking: Reported on 12/9/2021)      acetaminophen (TYLENOL) 325 mg tablet Take 325 mg by mouth every four (4) hours as needed for Pain. (Patient not taking: Reported on 3/8/2022)      dextrose 5% solution by IntraVENous route continuous. (Patient not taking: Reported on 2021)      glucagon (GlucaGen HypoKit) 1 mg injection 1 mg by IntraVENous route once. (Patient not taking: Reported on 2021)      omeprazole (PRILOSEC) 40 mg capsule Take 1 Cap by mouth daily. (Patient not taking: Reported on 2021) 90 Cap 1    tiotropium (Spiriva with HandiHaler) 18 mcg inhalation capsule Take 1 Cap by inhalation daily. 90 Cap 1    nystatin (MYCOSTATIN) topical cream Apply to bilateral groin, after washing area with water and soap and dry well. 30 g 0    Lancets misc Check fasting sugars daily before breakfast. DX: E11.9 for freestyle lite meter (Patient not taking: Reported on 3/8/2022) 100 Each 11    Nebulizer & Compressor machine 1 Each.  glucose blood VI test strips (FREESTYLE LITE STRIPS) strip Check fasting sugars daily before breakfast. DX: E11.9 for freestyle lite meter (Patient not taking: Reported on 2021) 100 Strip 11    Blood-Glucose Meter (ONE TOUCH ULTRA 2) monitoring kit Check fasting sugars daily before breakfast. DX: 250.02 (Patient not taking: Reported on 2021) 1 Kit 0     No Known Allergies    Family History   Problem Relation Age of Onset    Cancer Father         he does not know     Social History     Tobacco Use    Smoking status: Former Smoker     Packs/day: 1.00     Years: 45.00     Pack years: 45.00     Types: Cigarettes     Quit date: 2015     Years since quittin.5    Smokeless tobacco: Never Used    Tobacco comment: Pt counseled to continue to not smoke.    Substance Use Topics    Alcohol use: No     Alcohol/week: 0.0 standard drinks              Shankar Robbins MD

## 2022-03-08 NOTE — PATIENT INSTRUCTIONS
Medicare Wellness Visit, Male    The best way to live healthy is to have a lifestyle where you eat a well-balanced diet, exercise regularly, limit alcohol use, and quit all forms of tobacco/nicotine, if applicable. Regular preventive services are another way to keep healthy. Preventive services (vaccines, screening tests, monitoring & exams) can help personalize your care plan, which helps you manage your own care. Screening tests can find health problems at the earliest stages, when they are easiest to treat. [facility] follows the current, evidence-based guidelines published by the [facility] Connor Bhargavi ([facility]) when recommending preventive services for our patients. Because we follow these guidelines, sometimes recommendations change over time as research supports it. (For example, a prostate screening blood test is no longer routinely recommended for men with no symptoms). Of course, you and your doctor may decide to screen more often for some diseases, based on your risk and co-morbidities (chronic disease you are already diagnosed with). Preventive services for you include:  - Medicare offers their members a free annual wellness visit, which is time for you and your primary care provider to discuss and plan for your preventive service needs. Take advantage of this benefit every year!  -All adults over age 72 should receive the recommended pneumonia vaccines. Current USPSTF guidelines recommend a series of two vaccines for the best pneumonia protection.   -All adults should have a flu vaccine yearly and tetanus vaccine every 10 years.  -All adults age 48 and older should receive the shingles vaccines (series of two vaccines).        -All adults age 38-68 who are overweight should have a diabetes screening test once every three years.   -Other screening tests & preventive services for persons with diabetes include: an eye exam to screen for diabetic retinopathy, a kidney function test, a foot exam, and stricter control over your cholesterol.   -Cardiovascular screening for adults with routine risk involves an electrocardiogram (ECG) at intervals determined by the provider.   -Colorectal cancer screening should be done for adults age 54-65 with no increased risk factors for colorectal cancer. There are a number of acceptable methods of screening for this type of cancer. Each test has its own benefits and drawbacks. Discuss with your provider what is most appropriate for you during your annual wellness visit. The different tests include: colonoscopy (considered the best screening method), a fecal occult blood test, a fecal DNA test, and sigmoidoscopy.  -All adults born between [facility] should be screened once for Hepatitis C.  -An Abdominal Aortic Aneurysm (AAA) Screening is recommended for men age 73-68 who has ever smoked in their lifetime.      Here is a list of your current Health Maintenance items (your personalized list of preventive services) with a due date:  Health Maintenance Due   Topic Date Due    Diabetic Foot Care  08/10/2018    Shingles Vaccine (2 of 2) 12/17/2018    Eye Exam  07/25/2019    Albumin Urine Test  10/04/2019    Hemoglobin A1C    02/10/2022

## 2022-04-07 NOTE — PROGRESS NOTES
mara Note    Patient: Santos Sahu               Sex: male                  YOB: 1940      Age:  79 y.o.                    HPI:     Santos Sahu is a 79 y.o. male who has been seen for followup of anticoagulation .  He is followed for hypertension, atrial fibrillation  and DM     Past Medical History:   Diagnosis Date    Advance directive discussed with patient     Benign localized prostatic hyperplasia with lower urinary tract symptoms (LUTS)     COPD with emphysema ([address]     CVA (cerebrovascular accident) ([address] 7/11/2012    possible    Diabetes mellitus ([address]     Drowsiness     Enlarged prostate     Essential hypertension, benign     Eye exam, routine 07/26/2016    Dr. Rissa Michel repeat in 1 yr    H/O colonoscopy 11/24/15    Dr. Spencer Chavez (Digestive & Liver disease)Tubular adenoma/polyp bx, showed diverticulosis in the sigmoid/descending colon and internal hemorrhoids, 7mm polyp    History of urinary retention     Hoarseness     Hypercholesterolemia     Microhematuria     Other and unspecified hyperlipidemia     Paroxysmal atrial fibrillation ([address] 10/2/2015    Pneumonia     Pulmonary embolus (HCC)     Renal cyst, right     Saddle embolus of pulmonary artery without acute cor pulmonale ([address] 10/2015    Scrotal abscess     Shortness of breath     Tobacco use disorder 8/10/2010    Type II or unspecified type diabetes mellitus without mention of complication, not stated as uncontrolled     Urinary retention        Past Surgical History:   Procedure Laterality Date    HX CATARACT REMOVAL Left 4/2015    by Dr. Donovan Mulligan  late 0830'G       Family History   Problem Relation Age of Onset    Cancer Father         he does not know       Social History     Socioeconomic History    Marital status:    Tobacco Use    Smoking status: Former Smoker     Packs/day: 1.00     Years: 45.00     Pack years: 45.00     Types: Cigarettes     Quit date: 2015     Years since quittin.6    Smokeless tobacco: Never Used    Tobacco comment: Pt counseled to continue to not smoke. Vaping Use    Vaping Use: Never used   Substance and Sexual Activity    Alcohol use: No     Alcohol/week: 0.0 standard drinks    Drug use: No    Sexual activity: Not Currently         Current Outpatient Medications:     warfarin (COUMADIN) 4 mg tablet, Take 1 Tablet by mouth daily. , Disp: 30 Tablet, Rfl: 3    potassium chloride SR (K-TAB) 20 mEq tablet, Take  by mouth., Disp: , Rfl:     tamsulosin (FLOMAX) 0.4 mg capsule, Take 2 Capsules by mouth daily. , Disp: 180 Capsule, Rfl: 3    finasteride (PROSCAR) 5 mg tablet, Take 1 Tablet by mouth daily. , Disp: 90 Tablet, Rfl: 1    multivitamin (ONE A DAY) tablet, Take 1 Tablet by mouth daily. , Disp: , Rfl:     atorvastatin (Lipitor) 40 mg tablet, Take 1 Tablet by mouth daily. , Disp: 90 Tablet, Rfl: 1    metoprolol tartrate (LOPRESSOR) 25 mg tablet, Take 0.5 Tablets by mouth two (2) times a day., Disp: 90 Tablet, Rfl: 1    clopidogreL (PLAVIX) 75 mg tab, Take 1 Tablet by mouth daily. , Disp: 90 Tablet, Rfl: 5    lisinopriL (PRINIVIL, ZESTRIL) 30 mg tablet, Take 1 Tab by mouth daily. , Disp: 90 Tab, Rfl: 3    ascorbic acid, vitamin C, (Vitamin C) 500 mg tablet, Take 1 Tab by mouth daily. , Disp: 90 Tab, Rfl: 1    tiotropium (Spiriva with HandiHaler) 18 mcg inhalation capsule, Take 1 Cap by inhalation daily. , Disp: 90 Cap, Rfl: 1    fluticasone furoate-vilanteroL (BREO ELLIPTA) 100-25 mcg/dose inhaler, Take 1 Puff by inhalation daily. , Disp: 3 Inhaler, Rfl: 1    albuterol (PROVENTIL HFA, VENTOLIN HFA, PROAIR HFA) 90 mcg/actuation inhaler, Take 2 Puffs by inhalation every six (6) hours as needed for Wheezing., Disp: 3 Inhaler, Rfl: 1    nystatin (MYCOSTATIN) topical cream, Apply to bilateral groin, after washing area with water and soap and dry well., Disp: 30 g, Rfl: 0    OXYGEN-AIR DELIVERY SYSTEMS, 3 L by Nasal route continuous. , Disp: , Rfl:     Nebulizer & Compressor machine, 1 Each., Disp: , Rfl:     pioglitazone (Actos) 30 mg tablet, Take  by mouth., Disp: , Rfl:     senna (Senna) 8.6 mg tablet, Take 1 Tab by mouth daily. , Disp: 90 Tab, Rfl: 1    senna (Senna) 8.6 mg tablet, Take 1 Tab by mouth daily. , Disp: 90 Tab, Rfl: 3     No Known Allergies    Review of Systems   Constitutional: Negative for chills and fever. Respiratory: Positive for cough. Cardiovascular: Negative for chest pain. Gastrointestinal: Negative. Genitourinary: Negative. Neurological: Negative for dizziness. Psychiatric/Behavioral: Negative for depression. Physical Exam:      Visit Vitals  BP (!) 145/86 (BP 1 Location: Right arm, BP Patient Position: Sitting, BP Cuff Size: Adult)   Pulse 85   Temp 97.3 °F (36.3 °C) (Temporal)   Resp 18   SpO2 97%       Physical Exam  Constitutional:       Appearance: Normal appearance. Cardiovascular:      Rate and Rhythm: Normal rate. Rhythm irregular. Pulses: Normal pulses. Heart sounds: Normal heart sounds. No murmur heard. No gallop. Pulmonary:      Breath sounds: No wheezing or rhonchi. Comments: Distant breath sounds  Skin:     General: Skin is warm and dry. Neurological:      General: No focal deficit present. Mental Status: He is alert and oriented to person, place, and time. Labs Reviewed:      Assessment/Plan       ICD-10-CM ICD-9-CM    1. Paroxysmal atrial fibrillation (HCC)  I48.0 427.31 PROTHROMBIN TIME + INR   2. Type 2 diabetes mellitus without complication, without long-term current use of insulin (HCC)  E11.9 250.00 AMB POC HEMOGLOBIN A1C      AMB POC GLUCOSE TEST   3.  Essential hypertension  I10 401.9              Araceli Bauer MD  This is the Subsequent Medicare Annual Wellness Exam, performed 12 months or more after the Initial AWV or the last Subsequent AWV    I have reviewed the patient's medical history in detail and updated the computerized patient record. Assessment/Plan   Education and counseling provided:  Are appropriate based on today's review and evaluation    1. Paroxysmal atrial fibrillation (HCC)  -     PROTHROMBIN TIME + INR; Future  -     AMB POC PT/INR  2. Type 2 diabetes mellitus without complication, without long-term current use of insulin (HCC)  -     AMB POC HEMOGLOBIN A1C  -     AMB POC GLUCOSE BLOOD, BY GLUCOSE MONITORING DEVICE  3. Essential hypertension       Depression Risk Factor Screening     3 most recent PHQ Screens 3/8/2022   Little interest or pleasure in doing things Not at all   Feeling down, depressed, irritable, or hopeless Not at all   Total Score PHQ 2 0       Alcohol & Drug Abuse Risk Screen    Do you average more than 1 drink per night or more than 7 drinks a week: No    In the past three months have you have had more than 4 drinks containing alcohol on one occasion: No          Functional Ability and Level of Safety    Hearing: The patient needs further evaluation. Activities of Daily Living: The home contains: grab bars  Patient needs help with:  dressing, bathing, hygiene and bathroom needs      Ambulation: wheelchair bound     Fall Risk:  Fall Risk Assessment, last 12 mths 4/7/2022   Able to walk? Yes   Fall in past 12 months? 0   Do you feel unsteady? 1   Are you worried about falling 1   Number of falls in past 12 months -   Fall with injury?  -      Abuse Screen:  Patient is not abused       Cognitive Screening    Has your family/caregiver stated any concerns about your memory: yes - has some memory issues   Cognitive Screening: Abnormal - Mini Cog Test    Health Maintenance Due     Health Maintenance Due   Topic Date Due    Foot Exam Q1  08/10/2018    Shingrix Vaccine Age 50> (2 of 2) 12/17/2018    MICROALBUMIN Q1  10/04/2019    Medicare Yearly Exam  10/05/2019    A1C test (Diabetic or Prediabetic)  02/10/2022    Lipid Screen  04/04/2022       Patient Care Team   Patient Care Team:  Manpreet Sawant MD as PCP - General (Internal Medicine)  Manpreet Sawant MD as PCP - [facility]  Rigo Stephenson MD (Ophthalmology)  Gianna Powers MD (Pulmonary Disease)    History     Patient Active Problem List   Diagnosis Code    Type II or unspecified type diabetes mellitus without mention of complication, not stated as uncontrolled E11.9    Essential hypertension I10    Tobacco use disorder F17.200    Dizziness and giddiness R42    CVA (cerebrovascular accident) ([address] I63.9    Saddle embolus of pulmonary artery without acute cor pulmonale ([address] I26.92    H/O colonoscopy Z98.890    COPD (chronic obstructive pulmonary disease) ([address] J44.9    Microhematuria R31.29    History of urinary retention Z87.898    Renal cyst, right N28.1    Enlarged prostate N40.0    Advance directive discussed with patient Z70.80    Eye exam, routine Z01.00    Abnormal CT scan of lung R91.8    Acute respiratory distress R06.03    Acute respiratory failure with hypercapnia ([address] J96.02    Acute urinary retention R33.8    Dyslipidemia E78.5    H/O: pneumonia Z87.01    HCAP (healthcare-associated pneumonia) J18.9    History of pulmonary embolism Z86.711    Hypernatremia E87.0    Inability to walk R26.2    Leukocytosis D72.829    Light headed R42    SHERRIE (obstructive sleep apnea) G47.33    Paroxysmal atrial fibrillation ([address] I48.0    Pedal edema R60.0    Pleural effusion J90    Pneumonia, organism unspecified(486) J18.9    Septic shock (HCC) A41.9, R65.21    Shortness of breath R06.02    Thrombocytopenia (HCC) D69.6    Type 2 diabetes mellitus without complication, without long-term current use of insulin (HCC) E11.9    Chronic anemia D64.9    Acute diastolic heart failure (HCC) I50.31    Acute exacerbation of CHF (congestive heart failure) (HCC) I50.9    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XXXA    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 Jomar Berry repeat in 1 yr    H/O colonoscopy 11/24/15    Dr. Clyde Geronimo (Digestive & Liver disease)Tubular adenoma/polyp bx, showed diverticulosis in the sigmoid/descending colon and internal hemorrhoids, 7mm polyp    History of urinary retention     Hoarseness     Hypercholesterolemia     Microhematuria     Other and unspecified hyperlipidemia     Paroxysmal atrial fibrillation ([facility] 10/2/2015    Pneumonia     Pulmonary embolus ([facility]     Renal cyst, right     Saddle embolus of pulmonary artery without acute cor pulmonale ([facility] 10/2015    Scrotal abscess     Shortness of breath     Tobacco use disorder 8/10/2010    Type II or unspecified type diabetes mellitus without mention of complication, not stated as uncontrolled     Urinary retention       Past Surgical History:   Procedure Laterality Date    HX CATARACT REMOVAL Left 4/2015    by Dr. Cb Nielson  late 9796'R     Current Outpatient Medications   Medication Sig Dispense Refill    warfarin (COUMADIN) 4 mg tablet Take 1 Tablet by mouth daily. 30 Tablet 3    potassium chloride SR (K-TAB) 20 mEq tablet Take  by mouth.  tamsulosin (FLOMAX) 0.4 mg capsule Take 2 Capsules by mouth daily. 180 Capsule 3    finasteride (PROSCAR) 5 mg tablet Take 1 Tablet by mouth daily. 90 Tablet 1    multivitamin (ONE A DAY) tablet Take 1 Tablet by mouth daily.  atorvastatin (Lipitor) 40 mg tablet Take 1 Tablet by mouth daily.  90 Tablet 1    metoprolol tartrate (LOPRESSOR) 25 mg tablet Take 0.5 Tablets by mouth two (2) times a day. 90 Tablet 1    clopidogreL (PLAVIX) 75 mg tab Take 1 Tablet by mouth daily. 90 Tablet 5    lisinopriL (PRINIVIL, ZESTRIL) 30 mg tablet Take 1 Tab by mouth daily. 90 Tab 3    ascorbic acid, vitamin C, (Vitamin C) 500 mg tablet Take 1 Tab by mouth daily. 90 Tab 1    tiotropium (Spiriva with HandiHaler) 18 mcg inhalation capsule Take 1 Cap by inhalation daily. 90 Cap 1    fluticasone furoate-vilanteroL (BREO ELLIPTA) 100-25 mcg/dose inhaler Take 1 Puff by inhalation daily. 3 Inhaler 1    albuterol (PROVENTIL HFA, VENTOLIN HFA, PROAIR HFA) 90 mcg/actuation inhaler Take 2 Puffs by inhalation every six (6) hours as needed for Wheezing. 3 Inhaler 1    nystatin (MYCOSTATIN) topical cream Apply to bilateral groin, after washing area with water and soap and dry well. 30 g 0    OXYGEN-AIR DELIVERY SYSTEMS 3 L by Nasal route continuous.  Nebulizer & Compressor machine 1 Each.  pioglitazone (Actos) 30 mg tablet Take  by mouth.  senna (Senna) 8.6 mg tablet Take 1 Tab by mouth daily. 90 Tab 1    senna (Senna) 8.6 mg tablet Take 1 Tab by mouth daily. 80 Tab 3     No Known Allergies    Family History   Problem Relation Age of Onset    Cancer Father         he does not know     Social History     Tobacco Use    Smoking status: Former Smoker     Packs/day: 1.00     Years: 45.00     Pack years: 45.00     Types: Cigarettes     Quit date: 2015     Years since quittin.6    Smokeless tobacco: Never Used    Tobacco comment: Pt counseled to continue to not smoke.    Substance Use Topics    Alcohol use: No     Alcohol/week: 0.0 standard drinks         Jeison Chao MD

## 2022-04-07 NOTE — PROGRESS NOTES
1. \"Have you been to the ER, urgent care clinic since your last visit? Hospitalized since your last visit? \" No    2. \"Have you seen or consulted any other health care providers outside of the [address] [place] [address] since your last visit? \" No     3. For patients aged 39-70: Has the patient had a colonoscopy / FIT/ Cologuard? NA - based on age      If the patient is female:    4. For patients aged 41-77: Has the patient had a mammogram within the past 2 years? NA - based on age or sex      11. For patients aged 21-65: Has the patient had a pap smear?  NA - based on age or sex

## 2022-04-07 NOTE — PATIENT INSTRUCTIONS
Medicare Wellness Visit, Male    The best way to live healthy is to have a lifestyle where you eat a well-balanced diet, exercise regularly, limit alcohol use, and quit all forms of tobacco/nicotine, if applicable. Regular preventive services are another way to keep healthy. Preventive services (vaccines, screening tests, monitoring & exams) can help personalize your care plan, which helps you manage your own care. Screening tests can find health problems at the earliest stages, when they are easiest to treat. [facility] follows the current, evidence-based guidelines published by the [facility] Connor Bhargavi ([facility]) when recommending preventive services for our patients. Because we follow these guidelines, sometimes recommendations change over time as research supports it. (For example, a prostate screening blood test is no longer routinely recommended for men with no symptoms). Of course, you and your doctor may decide to screen more often for some diseases, based on your risk and co-morbidities (chronic disease you are already diagnosed with). Preventive services for you include:  - Medicare offers their members a free annual wellness visit, which is time for you and your primary care provider to discuss and plan for your preventive service needs. Take advantage of this benefit every year!  -All adults over age 72 should receive the recommended pneumonia vaccines. Current USPSTF guidelines recommend a series of two vaccines for the best pneumonia protection.   -All adults should have a flu vaccine yearly and tetanus vaccine every 10 years.  -All adults age 48 and older should receive the shingles vaccines (series of two vaccines).        -All adults age 38-68 who are overweight should have a diabetes screening test once every three years.   -Other screening tests & preventive services for persons with diabetes include: an eye exam to screen for diabetic retinopathy, a kidney function test, a foot exam, and stricter control over your cholesterol.   -Cardiovascular screening for adults with routine risk involves an electrocardiogram (ECG) at intervals determined by the provider.   -Colorectal cancer screening should be done for adults age 54-65 with no increased risk factors for colorectal cancer. There are a number of acceptable methods of screening for this type of cancer. Each test has its own benefits and drawbacks. Discuss with your provider what is most appropriate for you during your annual wellness visit. The different tests include: colonoscopy (considered the best screening method), a fecal occult blood test, a fecal DNA test, and sigmoidoscopy.  -All adults born between [facility] should be screened once for Hepatitis C.  -An Abdominal Aortic Aneurysm (AAA) Screening is recommended for men age 73-68 who has ever smoked in their lifetime.      Here is a list of your current Health Maintenance items (your personalized list of preventive services) with a due date:  Health Maintenance Due   Topic Date Due    Diabetic Foot Care  08/10/2018    Shingles Vaccine (2 of 2) 12/17/2018    Albumin Urine Test  10/04/2019    Hemoglobin A1C    02/10/2022    Cholesterol Test   04/04/2022

## 2022-04-12 NOTE — TELEPHONE ENCOUNTER
Patient's daughter is calling in for refill. Requested Prescriptions     Pending Prescriptions Disp Refills    metoprolol tartrate (LOPRESSOR) 25 mg tablet 90 Tablet 1     Sig: Take 0.5 Tablets by mouth two (2) times a day.  lisinopriL (PRINIVIL, ZESTRIL) 30 mg tablet 90 Tablet 3     Sig: Take 1 Tablet by mouth daily.  finasteride (PROSCAR) 5 mg tablet 90 Tablet 1     Sig: Take 1 Tablet by mouth daily.  atorvastatin (Lipitor) 40 mg tablet 90 Tablet 1     Sig: Take 1 Tablet by mouth daily.  tamsulosin (FLOMAX) 0.4 mg capsule 180 Capsule 3     Sig: Take 2 Capsules by mouth daily.  clopidogreL (PLAVIX) 75 mg tab 90 Tablet 5     Sig: Take 1 Tablet by mouth daily.

## 2022-05-05 NOTE — PROGRESS NOTES
Pt came in for INR check INR: 1.1   Taking coumadin 4mg daily, however, pts wife says that she is giving him 4mg in A.M. & 4MG P.M. totaling to 8mg a day.

## 2022-05-05 NOTE — TELEPHONE ENCOUNTER
Dr. Brigida Arriaga, looking back through Mr. Mimi Corral is taking 2 x daily:  Glipizide 5mg   Lopressor 25mg    Flomax 0.4mg. I think his wife is giving him Coumadin 4mg 2 x daily as well because that is what she was told. I advised her to take his INR daily and bring it in on Tuesday during his nurse visit. I think he needs someone to go to his house for medication evaluation/maintenance which he did have during [facility].

## 2022-05-05 NOTE — PROGRESS NOTES
Reviewed notes and last several INR readings. Per nurse's note, he is taking 4 mg BID of coumadin (not the correct way of taking) and yet his INR is only 1. 1! Last visit on 4/7/22 INR 2.6    Chart indicates he should be on 4 mg once daily. No documentation of being told to increase to BID dosing    Pt left the office before I could discuss further with him.     No meds noted that should interfere with the test    As Dr. Shahida Gonzales, his PCP, is here tomorrow, will forward message to him to decide med changes

## 2022-05-05 NOTE — TELEPHONE ENCOUNTER
Pt came in for INR check INR: 1.1   Taking coumadin 4mg daily, however, pts wife says that she is giving him 4mg in A.M. & 4MG P.M. totaling to 8mg a day.  Please advise

## 2022-05-10 NOTE — PROGRESS NOTES
Progress Note    Patient: Shefali Chaparro               Sex: male                  YOB: 1940      Age:  79 y.o.                    HPI:     Shefali Chaparro is a 79 y.o. male who has been seen for  followup of  His anticoagulation . He was mistakenly taken off  His coumadin  By his wife . INR was 1.0 when last checked. His wife states that he was out of his coumadin ( 4mg) a few days . She then switched him to coumadin 5 mg every day which she had at home. .  I spoke to their daughter who lives in [place] . It appears Mrs Jorge A Goldstein mixed up the warfarin and the glipizide. She had stopped the warfarin . Will resume warfarin today at 5 mg every day. Recheck in 1 month .      Past Medical History:   Diagnosis Date    Advance directive discussed with patient     Benign localized prostatic hyperplasia with lower urinary tract symptoms (LUTS)     COPD with emphysema ([address]     CVA (cerebrovascular accident) ([address] 7/11/2012    possible    Diabetes mellitus ([address]     Drowsiness     Enlarged prostate     Essential hypertension, benign     Eye exam, routine 07/26/2016    Dr. Rama Christy repeat in 1 yr    H/O colonoscopy 11/24/15    Dr. Jemima Wayne ([facility])Tubular adenoma/polyp bx, showed diverticulosis in the sigmoid/descending colon and internal hemorrhoids, 7mm polyp    History of urinary retention     Hoarseness     Hypercholesterolemia     Microhematuria     Other and unspecified hyperlipidemia     Paroxysmal atrial fibrillation ([address] 10/2/2015    Pneumonia     Pulmonary embolus (HCC)     Renal cyst, right     Saddle embolus of pulmonary artery without acute cor pulmonale ([address] 10/2015    Scrotal abscess     Shortness of breath     Tobacco use disorder 8/10/2010    Type II or unspecified type diabetes mellitus without mention of complication, not stated as uncontrolled     Urinary retention        Past Surgical History:   Procedure Laterality Date    HX CATARACT REMOVAL Left 2015    by Dr. Brandyn De Leon  late [id number]       Family History   Problem Relation Age of Onset    Cancer Father         he does not know       Social History     Socioeconomic History    Marital status:    Tobacco Use    Smoking status: Former Smoker     Packs/day: 1.00     Years: 45.00     Pack years: 45.00     Types: Cigarettes     Quit date: 2015     Years since quittin.7    Smokeless tobacco: Never Used    Tobacco comment: Pt counseled to continue to not smoke. Vaping Use    Vaping Use: Never used   Substance and Sexual Activity    Alcohol use: No     Alcohol/week: 0.0 standard drinks    Drug use: No    Sexual activity: Not Currently     Social Determinants of Health     Financial Resource Strain: Low Risk     Difficulty of Paying Living Expenses: Not very hard   Food Insecurity: No Food Insecurity    Worried About Running Out of Food in the Last Year: Never true    Ran Out of Food in the Last Year: Never true         Current Outpatient Medications:     metoprolol tartrate (LOPRESSOR) 25 mg tablet, Take 0.5 Tablets by mouth two (2) times a day., Disp: 90 Tablet, Rfl: 1    lisinopriL (PRINIVIL, ZESTRIL) 30 mg tablet, Take 1 Tablet by mouth daily. , Disp: 90 Tablet, Rfl: 3    finasteride (PROSCAR) 5 mg tablet, Take 1 Tablet by mouth daily. , Disp: 90 Tablet, Rfl: 1    atorvastatin (Lipitor) 40 mg tablet, Take 1 Tablet by mouth daily. , Disp: 90 Tablet, Rfl: 1    tamsulosin (FLOMAX) 0.4 mg capsule, Take 2 Capsules by mouth daily. , Disp: 180 Capsule, Rfl: 3    clopidogreL (PLAVIX) 75 mg tab, Take 1 Tablet by mouth daily. , Disp: 90 Tablet, Rfl: 5    glipiZIDE (GLUCOTROL) 5 mg tablet, Take 1 Tablet by mouth two (2) times a day., Disp: 120 Tablet, Rfl: 2    warfarin (COUMADIN) 4 mg tablet, Take 1 Tablet by mouth daily. , Disp: 30 Tablet, Rfl: 3    potassium chloride SR (K-TAB) 20 mEq tablet, Take  by mouth., Disp: , Rfl:     multivitamin (ONE A DAY) tablet, Take 1 Tablet by mouth daily. , Disp: , Rfl:     ascorbic acid, vitamin C, (Vitamin C) 500 mg tablet, Take 1 Tab by mouth daily. , Disp: 90 Tab, Rfl: 1    tiotropium (Spiriva with HandiHaler) 18 mcg inhalation capsule, Take 1 Cap by inhalation daily. , Disp: 90 Cap, Rfl: 1    fluticasone furoate-vilanteroL (BREO ELLIPTA) 100-25 mcg/dose inhaler, Take 1 Puff by inhalation daily. , Disp: 3 Inhaler, Rfl: 1    albuterol (PROVENTIL HFA, VENTOLIN HFA, PROAIR HFA) 90 mcg/actuation inhaler, Take 2 Puffs by inhalation every six (6) hours as needed for Wheezing., Disp: 3 Inhaler, Rfl: 1    nystatin (MYCOSTATIN) topical cream, Apply to bilateral groin, after washing area with water and soap and dry well., Disp: 30 g, Rfl: 0    OXYGEN-AIR DELIVERY SYSTEMS, 3 L by Nasal route continuous. , Disp: , Rfl:     Nebulizer & Compressor machine, 1 Each., Disp: , Rfl:     pioglitazone (Actos) 30 mg tablet, Take  by mouth., Disp: , Rfl:     senna (Senna) 8.6 mg tablet, Take 1 Tab by mouth daily. , Disp: 90 Tab, Rfl: 1    senna (Senna) 8.6 mg tablet, Take 1 Tab by mouth daily. , Disp: 90 Tab, Rfl: 3     No Known Allergies    Review of Systems   Constitutional: Negative for chills and fever. Respiratory: Negative for cough and shortness of breath. Cardiovascular: Negative for chest pain. Gastrointestinal: Negative. Genitourinary: Negative. Physical Exam:      Visit Vitals  BP (!) 149/88 (BP 1 Location: Left upper arm, BP Patient Position: Sitting, BP Cuff Size: Adult)   Pulse 80   Temp (!) 96.1 °F (35.6 °C) (Temporal)   Resp 18   SpO2 92%       Physical Exam  Constitutional:       Appearance: Normal appearance. Cardiovascular:      Rate and Rhythm: Normal rate. Rhythm irregular. Heart sounds: No murmur heard. Pulmonary:      Effort: Pulmonary effort is normal.      Comments: Reduced air entry bilaterally   Skin:     General: Skin is warm and dry.    Neurological:      Mental Status: He is alert and oriented to person, place, and time. Psychiatric:         Mood and Affect: Mood normal.         Behavior: Behavior normal.          Labs Reviewed:  BS from home today was 88 . POC Protime is 1.4  today    Assessment/Plan       ICD-10-CM ICD-9-CM    1. Anticoagulation management encounter  Z51.81 V58.83     Z79.01 V58.61    2. Chronic obstructive pulmonary disease, unspecified COPD type ([facility]  J44.9 496    3. Essential hypertension  I10 401.9    4. Enlarged prostate  N40.0 600.00    5. Current use of long term anticoagulation  Z79.01 V58.61    6. Frailty  R54 [id number]       Take 10 mg coumadin  today .        Dao Alcantar MD

## 2022-05-10 NOTE — PROGRESS NOTES
1. \"Have you been to the ER, urgent care clinic since your last visit? Hospitalized since your last visit? \" No    2. \"Have you seen or consulted any other health care providers outside of the [address] [place] [address] since your last visit? \" No     3. For patients aged 39-70: Has the patient had a colonoscopy / FIT/ Cologuard? NA - based on age      If the patient is female:    4. For patients aged 41-77: Has the patient had a mammogram within the past 2 years? NA - based on age or sex      11. For patients aged 21-65: Has the patient had a pap smear?  NA - based on age or sex

## 2022-06-07 NOTE — PROGRESS NOTES
Krish Jerez is a 79 y.o. male who presents today for Anticoagulation monitoring. Indication:   INR Goal: 2-3  Current dose:  Coumadin 5 mg daily. Missed Coumadin Doses:  NONE  Medication Changes:  NONE  Dietary Changes:  NONE    Symptoms: taking coumadin appropriately.     Latest INRs:  Lab Results   Component Value Date/Time    INR 1.6 (H) 06/11/2019 11:59 AM    INR 2.3 (H) 08/14/2018 02:10 PM    INR CANCELED 06/12/2013 04:23 PM    INR, External 2.8 06/25/2021 09:13 AM    INR, External 2.4 08/30/2018 12:00 AM    INR, External 2.10 10/24/2015 12:00 AM    INR POC 3.5 06/07/2022 11:00 AM    INR POC 2.6 04/07/2022 03:19 PM    INR POC 1.9 03/08/2022 01:48 PM    Prothrombin time 19.0 (H) 06/11/2019 11:59 AM    Prothrombin time 25.1 (H) 08/14/2018 02:10 PM    Prothrombin time CANCELED 06/12/2013 04:23 PM        New Coumadin dose: 5 MG DAILY EXCEPT WED    Next check to be scheduled for  4 WEEKS

## 2024-03-05 NOTE — PROGRESS NOTES
Problem: Prexisting or High Potential for Compromised Skin Integrity  Goal: Skin integrity is maintained or improved  Description: INTERVENTIONS:  - Identify patients at risk for skin breakdown  - Assess and monitor skin integrity  - Assess and monitor nutrition and hydration status  - Monitor labs   - Assess for incontinence   - Turn and reposition patient  - Assist with mobility/ambulation  - Relieve pressure over bony prominences  - Avoid friction and shearing  - Provide appropriate hygiene as needed including keeping skin clean and dry  - Evaluate need for skin moisturizer/barrier cream  - Collaborate with interdisciplinary team   - Patient/family teaching  - Consider wound care consult   Outcome: Progressing     Problem: INFECTION - ADULT  Goal: Absence or prevention of progression during hospitalization  Description: INTERVENTIONS:  - Assess and monitor for signs and symptoms of infection  - Monitor lab/diagnostic results  - Monitor all insertion sites, i.e. indwelling lines, tubes, and drains  - Monitor endotracheal if appropriate and nasal secretions for changes in amount and color  - [place] appropriate cooling/warming therapies per order  - Administer medications as ordered  - Instruct and encourage patient and family to use good hand hygiene technique  - Identify and instruct in appropriate isolation precautions for identified infection/condition  Outcome: Progressing     Problem: SAFETY ADULT  Goal: Patient will remain free of falls  Description: INTERVENTIONS:  - Educate patient/family on patient safety including physical limitations  - Instruct patient to call for assistance with activity   - Consult OT/PT to assist with strengthening/mobility   - Keep Call bell within reach  - Keep bed low and locked with side rails adjusted as appropriate  - Keep care items and personal belongings within reach  - Initiate and maintain comfort rounds  - Make Fall Risk Sign visible to staff  - Offer Toileting every  Darryn Chiu is a 79 y.o. male (: 1940) presenting to address:    Chief Complaint   Patient presents with    Transitions Of Care       Vitals:    21 1107   BP: (!) 159/79   Pulse: 85   Resp: 20   Temp: 98.9 °F (37.2 °C)   TempSrc: Oral   SpO2: 96%   Weight: 158 lb (71.7 kg)   Height: 5' 7\" (1.702 m)   PainSc:   0 - No pain       Hearing/Vision:   No exam data present    Learning Assessment:     Learning Assessment 7/3/2018   PRIMARY LEARNER Patient   HIGHEST LEVEL OF EDUCATION - PRIMARY LEARNER  SOME COLLEGE   BARRIERS PRIMARY LEARNER NONE   CO-LEARNER CAREGIVER No   PRIMARY LANGUAGE ENGLISH   LEARNER PREFERENCE PRIMARY READING     DEMONSTRATION   ANSWERED BY patient   RELATIONSHIP SELF     Depression Screening:     3 most recent PHQ Screens 2021   Little interest or pleasure in doing things Not at all   Feeling down, depressed, irritable, or hopeless Not at all   Total Score PHQ 2 0     Fall Risk Assessment:     Fall Risk Assessment, last 12 mths 2021   Able to walk? Yes   Fall in past 12 months? 0   Number of falls in past 12 months 2   Fall with injury? 0     Abuse Screening:     Abuse Screening Questionnaire 2019   Do you ever feel afraid of your partner? N   Are you in a relationship with someone who physically or mentally threatens you? N   Is it safe for you to go home? Y     Coordination of Care Questionaire:   1. Have you been to the ER, urgent care clinic since your last visit? Hospitalized since your last visit? YES    2. Have you seen or consulted any other health care providers outside of the [address] [place] [address] since your last visit? Include any pap smears or colon screening. YES    Advanced Directive:   1. Do you have an Advanced Directive? NO    2. Would you like information on Advanced Directives?  NO 2 Hours, in advance of need  - Initiate/Maintain bed alarm  - Obtain necessary fall risk management equipment: non skid footwear  - Apply yellow socks and bracelet for high fall risk patients  - Consider moving patient to room near nurses station  Outcome: Progressing     Problem: RESPIRATORY - ADULT  Goal: Achieves optimal ventilation and oxygenation  Description: INTERVENTIONS:  - Assess for changes in respiratory status  - Assess for changes in mentation and behavior  - Position to facilitate oxygenation and minimize respiratory effort  - Oxygen administered by appropriate delivery if ordered  - Initiate smoking cessation education as indicated  - Encourage broncho-pulmonary hygiene including cough, deep breathe, Incentive Spirometry  - Assess the need for suctioning and aspirate as needed  - Assess and instruct to report SOB or any respiratory difficulty  - Respiratory Therapy support as indicated  Outcome: Progressing     Problem: SKIN/TISSUE INTEGRITY - ADULT  Goal: Skin Integrity remains intact(Skin Breakdown Prevention)  Description: Assess:  -Perform Flavio assessment every shift   -Clean and moisturize skin every day   -Inspect skin when repositioning, toileting, and assisting with ADLS  -Assess under medical devices such as ronny  every hour   -Assess extremities for adequate circulation and sensation     Bed Management:  -Have minimal linens on bed & keep smooth, unwrinkled  -Change linens as needed when moist or perspiring  -Avoid sitting or lying in one position for more than 2 hours while in bed  -Keep HOB at 45 degrees     Toileting:  -Offer bedside commode  -Assess for incontinence every hour  -Use incontinent care products after each incontinent episode such as moisture barrier cream     Activity:  -Mobilize patient 3 times a day  -Encourage activity and walks on unit  -Encourage or provide ROM exercises   -Turn and reposition patient every 2 Hours  -Use appropriate equipment to lift or move  patient in bed  -Instruct/ Assist with weight shifting every hour when out of bed in chair  -Consider limitation of chair time 2 hour intervals    Skin Care:  -Avoid use of baby powder, tape, friction and shearing, hot water or constrictive clothing  -Relieve pressure over bony prominences using allevyn   -Do not massage red bony areas    Next Steps:  -Teach patient strategies to minimize risks such as weight shifting    -Consider consults to  interdisciplinary teams such as PT  Outcome: Progressing  Goal: Incision(s), wounds(s) or drain site(s) healing without S/S of infection  Description: INTERVENTIONS  - Assess and document dressing, incision, wound bed, drain sites and surrounding tissue  - Provide patient and family education  - Perform skin care/dressing changes every day  Outcome: Progressing  Goal: Pressure injury heals and does not worsen  Description: Interventions:  - Implement low air loss mattress or specialty surface (Criteria met)  - Apply silicone foam dressing  - Apply fecal or urinary incontinence containment device   - Perform passive or active ROM every day  - Turn and reposition patient & offload bony prominences every 2 hours   - Utilize friction reducing device or surface for transfers   - Use incontinent care products after each incontinent episode  - Consider nutrition services referral as needed  Outcome: Progressing     Problem: Potential for Falls  Goal: Patient will remain free of falls  Description: INTERVENTIONS:  - Educate patient/family on patient safety including physical limitations  - Instruct patient to call for assistance with activity   - Consult OT/PT to assist with strengthening/mobility   - Keep Call bell within reach  - Keep bed low and locked with side rails adjusted as appropriate  - Keep care items and personal belongings within reach  - Initiate and maintain comfort rounds  - Make Fall Risk Sign visible to staff  - Offer Toileting every 2 Hours, in advance of need  -  Initiate/Maintain bed alarm  - Obtain necessary fall risk management equipment: non skid footwear  - Apply yellow socks and bracelet for high fall risk patients  - Consider moving patient to room near nurses station  Outcome: Progressing     Problem: Nutrition/Hydration-ADULT  Goal: Nutrient/Hydration intake appropriate for improving, restoring or maintaining nutritional needs  Description: Monitor and assess patient's nutrition/hydration status for malnutrition. Collaborate with interdisciplinary team and initiate plan and interventions as ordered.  Monitor patient's weight and dietary intake as ordered or per policy. Utilize nutrition screening tool and intervene as necessary. Determine patient's food preferences and provide high-protein, high-caloric foods as appropriate.     INTERVENTIONS:  - Monitor oral intake, urinary output, labs, and treatment plans  - Assess nutrition and hydration status and recommend course of action  - Evaluate amount of meals eaten  - Assist patient with eating if necessary   - Allow adequate time for meals  - Recommend/ encourage appropriate diets, oral nutritional supplements, and vitamin/mineral supplements  - Order, calculate, and assess calorie counts as needed  - Recommend, monitor, and adjust tube feedings and TPN/PPN based on assessed needs  - Assess need for intravenous fluids  - Provide specific nutrition/hydration education as appropriate  - Include patient/family/caregiver in decisions related to nutrition  Outcome: Progressing     Problem: SAFETY,RESTRAINT: NV/NON-SELF DESTRUCTIVE BEHAVIOR  Goal: Remains free of harm/injury (restraint for non violent/non self-detsructive behavior)  Description: INTERVENTIONS:  - Instruct patient/family regarding restraint use   - Assess and monitor physiologic and psychological status   - Provide interventions and comfort measures to meet assessed patient needs   - Identify and implement measures to help patient regain control  - Assess  readiness for release of restraint   Outcome: Progressing  Goal: Returns to optimal restraint-free functioning  Description: INTERVENTIONS:  - Assess the patient's behavior and symptoms that indicate continued need for restraint  - Identify and implement measures to help patient regain control  - Assess readiness for release of restraint   Outcome: Progressing     Problem: COPING  Goal: Pt/Family able to verbalize concerns and demonstrate effective coping strategies  Description: INTERVENTIONS:  - Assist patient/family to identify coping skills, available support systems and cultural and spiritual values  - Provide emotional support, including active listening and acknowledgement of concerns of patient and caregivers  - Reduce environmental stimuli, as able  - Provide patient education  - Assess for spiritual pain/suffering and initiate spiritual care, including notification of Pastoral Care or [place] based community as needed  - Assess effectiveness of coping strategies  Outcome: Progressing  Goal: Will report anxiety at manageable levels  Description: INTERVENTIONS:  - Administer medication as ordered  - Teach and encourage coping skills  - Provide emotional support  - Assess patient/family for anxiety and ability to cope  Outcome: Progressing     Problem: BEHAVIOR  Goal: Pt/Family maintain appropriate behavior and adhere to behavioral management agreement, if implemented  Description: INTERVENTIONS:  - Assess the family dynamic   - Encourage verbalization of thoughts and concerns in a socially appropriate manner  - Assess patient/family's coping skills and non-compliant behavior (including use of illegal substances).  - Utilize positive, consistent limit setting strategies supporting safety of patient, staff and others  - Initiate consult with Case Management, Spiritual Care or other ancillary services as appropriate  - If a patient's/visitor's behavior jeopardizes the safety of the patient, staff, or others,  refer to organization procedure.   - Notify Security of behavior or suspected illegal substances which indicate the need for search of the patient and/or belongings  - Encourage participation in the decision making process about a behavioral management agreement; implement if patient meets criteria  Outcome: Progressing     Problem: NEUROSENSORY - ADULT  Goal: Achieves stable or improved neurological status  Description: INTERVENTIONS  - Monitor and report changes in neurological status  - Monitor vital signs such as temperature, blood pressure, glucose, and any other labs ordered   - Initiate measures to prevent increased intracranial pressure  - Monitor for seizure activity and implement precautions if appropriate      Outcome: Progressing  Goal: Achieves maximal functionality and self care  Description: INTERVENTIONS  - Monitor swallowing and airway patency with patient fatigue and changes in neurological status  - Encourage and assist patient to increase activity and self care.   - Encourage visually impaired, hearing impaired and aphasic patients to use assistive/communication devices  Outcome: Progressing     Problem: CARDIOVASCULAR - ADULT  Goal: Maintains optimal cardiac output and hemodynamic stability  Description: INTERVENTIONS:  - Monitor I/O, vital signs and rhythm  - Monitor for S/S and trends of decreased cardiac output  - Administer and titrate ordered vasoactive medications to optimize hemodynamic stability  - Assess quality of pulses, skin color and temperature  - Assess for signs of decreased coronary artery perfusion  - Instruct patient to report change in severity of symptoms  Outcome: Progressing  Goal: Absence of cardiac dysrhythmias or at baseline rhythm  Description: INTERVENTIONS:  - Continuous cardiac monitoring, vital signs, obtain 12 lead EKG if ordered  - Administer antiarrhythmic and heart rate control medications as ordered  - Monitor electrolytes and administer replacement therapy as  ordered  Outcome: Progressing     Problem: GASTROINTESTINAL - ADULT  Goal: Minimal or absence of nausea and/or vomiting  Description: INTERVENTIONS:  - Administer IV fluids if ordered to ensure adequate hydration  - Maintain NPO status until nausea and vomiting are resolved  - Nasogastric tube if ordered  - Administer ordered antiemetic medications as needed  - Provide nonpharmacologic comfort measures as appropriate  - Advance diet as tolerated, if ordered  - Consider nutrition services referral to assist patient with adequate nutrition and appropriate food choices  Outcome: Progressing  Goal: Maintains or returns to baseline bowel function  Description: INTERVENTIONS:  - Assess bowel function  - Encourage oral fluids to ensure adequate hydration  - Administer IV fluids if ordered to ensure adequate hydration  - Administer ordered medications as needed  - Encourage mobilization and activity  - Consider nutritional services referral to assist patient with adequate nutrition and appropriate food choices  Outcome: Progressing  Goal: Maintains adequate nutritional intake  Description: INTERVENTIONS:  - Monitor percentage of each meal consumed  - Identify factors contributing to decreased intake, treat as appropriate  - Assist with meals as needed  - Monitor I&O, weight, and lab values if indicated  - Obtain nutrition services referral as needed  Outcome: Progressing  Goal: Establish and maintain optimal ostomy function  Description: INTERVENTIONS:  - Assess bowel function  - Encourage oral fluids to ensure adequate hydration  - Administer IV fluids if ordered to ensure adequate hydration   - Administer ordered medications as needed  - Encourage mobilization and activity  - Nutrition services referral to assist patient with appropriate food choices  - Assess stoma site  - Consider wound care consult   Outcome: Progressing  Goal: Oral mucous membranes remain intact  Description: INTERVENTIONS  - Assess oral mucosa and  hygiene practices  - Implement preventative oral hygiene regimen  - Implement oral medicated treatments as ordered  - Initiate Nutrition services referral as needed  Outcome: Progressing     Problem: GENITOURINARY - ADULT  Goal: Maintains or returns to baseline urinary function  Description: INTERVENTIONS:  - Assess urinary function  - Encourage oral fluids to ensure adequate hydration if ordered  - Administer IV fluids as ordered to ensure adequate hydration  - Administer ordered medications as needed  - Offer frequent toileting  - Follow urinary retention protocol if ordered  Outcome: Progressing  Goal: Urinary catheter remains patent  Description: INTERVENTIONS:  - Assess patency of urinary catheter  - If patient has a chronic marie, consider changing catheter if non-functioning  - Follow guidelines for intermittent irrigation of non-functioning urinary catheter  Outcome: Progressing     Problem: METABOLIC, FLUID AND ELECTROLYTES - ADULT  Goal: Electrolytes maintained within normal limits  Description: INTERVENTIONS:  - Monitor labs and assess patient for signs and symptoms of electrolyte imbalances  - Administer electrolyte replacement as ordered  - Monitor response to electrolyte replacements, including repeat lab results as appropriate  - Instruct patient on fluid and nutrition as appropriate  Outcome: Progressing  Goal: Fluid balance maintained  Description: INTERVENTIONS:  - Monitor labs   - Monitor I/O and WT  - Instruct patient on fluid and nutrition as appropriate  - Assess for signs & symptoms of volume excess or deficit  Outcome: Progressing  Goal: Glucose maintained within target range  Description: INTERVENTIONS:  - Monitor Blood Glucose as ordered  - Assess for signs and symptoms of hyperglycemia and hypoglycemia  - Administer ordered medications to maintain glucose within target range  - Assess nutritional intake and initiate nutrition service referral as needed  Outcome: Progressing     Problem:  HEMATOLOGIC - ADULT  Goal: Maintains hematologic stability  Description: INTERVENTIONS  - Assess for signs and symptoms of bleeding or hemorrhage  - Monitor labs  - Administer supportive blood products/factors as ordered and appropriate  Outcome: Progressing     Problem: MUSCULOSKELETAL - ADULT  Goal: Maintain or return mobility to safest level of function  Description: INTERVENTIONS:  - Assess patient's ability to carry out ADLs; assess patient's baseline for ADL function and identify physical deficits which impact ability to perform ADLs (bathing, care of mouth/teeth, toileting, grooming, dressing, etc.)  - Assess/evaluate cause of self-care deficits   - Assess range of motion  - Assess patient's mobility  - Assess patient's need for assistive devices and provide as appropriate  - Encourage maximum independence but intervene and supervise when necessary  - Involve family in performance of ADLs  - Assess for home care needs following discharge   - Consider OT consult to assist with ADL evaluation and planning for discharge  - Provide patient education as appropriate  Outcome: Progressing